# Patient Record
Sex: FEMALE | Race: WHITE | NOT HISPANIC OR LATINO
[De-identification: names, ages, dates, MRNs, and addresses within clinical notes are randomized per-mention and may not be internally consistent; named-entity substitution may affect disease eponyms.]

---

## 2018-02-14 ENCOUNTER — APPOINTMENT (OUTPATIENT)
Dept: RHEUMATOLOGY | Facility: CLINIC | Age: 83
End: 2018-02-14
Payer: MEDICARE

## 2018-02-14 VITALS
RESPIRATION RATE: 16 BRPM | BODY MASS INDEX: 20.22 KG/M2 | OXYGEN SATURATION: 98 % | TEMPERATURE: 99.2 F | HEIGHT: 60 IN | SYSTOLIC BLOOD PRESSURE: 138 MMHG | HEART RATE: 81 BPM | WEIGHT: 103 LBS | DIASTOLIC BLOOD PRESSURE: 74 MMHG

## 2018-02-14 DIAGNOSIS — Z87.39 PERSONAL HISTORY OF OTHER DISEASES OF THE MUSCULOSKELETAL SYSTEM AND CONNECTIVE TISSUE: ICD-10-CM

## 2018-02-14 DIAGNOSIS — Z87.09 PERSONAL HISTORY OF OTHER DISEASES OF THE RESPIRATORY SYSTEM: ICD-10-CM

## 2018-02-14 PROCEDURE — 99205 OFFICE O/P NEW HI 60 MIN: CPT

## 2018-02-14 RX ORDER — TIOTROPIUM BROMIDE INHALATION SPRAY 3.12 UG/1
2.5 SPRAY, METERED RESPIRATORY (INHALATION)
Qty: 4 | Refills: 0 | Status: COMPLETED | COMMUNITY
Start: 2017-06-28

## 2018-02-14 RX ORDER — AMOXICILLIN AND CLAVULANATE POTASSIUM 500; 125 MG/1; MG/1
500-125 TABLET, FILM COATED ORAL
Qty: 20 | Refills: 0 | Status: COMPLETED | COMMUNITY
Start: 2017-11-28

## 2018-02-20 PROBLEM — Z87.09 HISTORY OF LUNG DISEASE: Status: RESOLVED | Noted: 2018-02-14 | Resolved: 2018-02-20

## 2018-02-20 PROBLEM — Z87.09 HISTORY OF ASTHMA: Status: RESOLVED | Noted: 2018-02-14 | Resolved: 2018-02-20

## 2018-02-20 PROBLEM — Z87.39 HISTORY OF OSTEOPOROSIS: Status: RESOLVED | Noted: 2018-02-14 | Resolved: 2018-02-20

## 2018-02-28 ENCOUNTER — RX RENEWAL (OUTPATIENT)
Age: 83
End: 2018-02-28

## 2018-03-04 ENCOUNTER — RX RENEWAL (OUTPATIENT)
Age: 83
End: 2018-03-04

## 2018-03-28 ENCOUNTER — APPOINTMENT (OUTPATIENT)
Dept: RHEUMATOLOGY | Facility: CLINIC | Age: 83
End: 2018-03-28

## 2018-04-04 ENCOUNTER — MOBILE ON CALL (OUTPATIENT)
Age: 83
End: 2018-04-04

## 2018-04-07 ENCOUNTER — APPOINTMENT (OUTPATIENT)
Dept: RHEUMATOLOGY | Facility: CLINIC | Age: 83
End: 2018-04-07

## 2018-04-16 ENCOUNTER — RX RENEWAL (OUTPATIENT)
Age: 83
End: 2018-04-16

## 2018-04-21 ENCOUNTER — EMERGENCY (EMERGENCY)
Facility: HOSPITAL | Age: 83
LOS: 1 days | Discharge: HOME | End: 2018-04-21
Attending: EMERGENCY MEDICINE
Payer: MEDICARE

## 2018-04-21 VITALS — WEIGHT: 104.94 LBS

## 2018-04-21 VITALS
HEART RATE: 105 BPM | DIASTOLIC BLOOD PRESSURE: 86 MMHG | TEMPERATURE: 98 F | SYSTOLIC BLOOD PRESSURE: 195 MMHG | OXYGEN SATURATION: 99 %

## 2018-04-21 LAB
ALBUMIN SERPL ELPH-MCNC: 4.4 G/DL — SIGNIFICANT CHANGE UP (ref 3.3–5.2)
ALP SERPL-CCNC: 94 U/L — SIGNIFICANT CHANGE UP (ref 40–120)
ALT FLD-CCNC: 23 U/L — SIGNIFICANT CHANGE UP
ANION GAP SERPL CALC-SCNC: 15 MMOL/L — SIGNIFICANT CHANGE UP (ref 5–17)
APTT BLD: 26.4 SEC — LOW (ref 27.5–37.4)
AST SERPL-CCNC: 29 U/L — SIGNIFICANT CHANGE UP
BASOPHILS # BLD AUTO: 0 K/UL — SIGNIFICANT CHANGE UP (ref 0–0.2)
BASOPHILS NFR BLD AUTO: 0.2 % — SIGNIFICANT CHANGE UP (ref 0–2)
BILIRUB SERPL-MCNC: 0.3 MG/DL — LOW (ref 0.4–2)
BUN SERPL-MCNC: 31 MG/DL — HIGH (ref 8–20)
CALCIUM SERPL-MCNC: 9.6 MG/DL — SIGNIFICANT CHANGE UP (ref 8.6–10.2)
CHLORIDE SERPL-SCNC: 102 MMOL/L — SIGNIFICANT CHANGE UP (ref 98–107)
CO2 SERPL-SCNC: 26 MMOL/L — SIGNIFICANT CHANGE UP (ref 22–29)
CREAT SERPL-MCNC: 0.66 MG/DL — SIGNIFICANT CHANGE UP (ref 0.5–1.3)
EOSINOPHIL # BLD AUTO: 0.2 K/UL — SIGNIFICANT CHANGE UP (ref 0–0.5)
EOSINOPHIL NFR BLD AUTO: 1.9 % — SIGNIFICANT CHANGE UP (ref 0–6)
GLUCOSE SERPL-MCNC: 140 MG/DL — HIGH (ref 70–115)
HCT VFR BLD CALC: 37.8 % — SIGNIFICANT CHANGE UP (ref 37–47)
HGB BLD-MCNC: 12.4 G/DL — SIGNIFICANT CHANGE UP (ref 12–16)
INR BLD: 0.89 RATIO — SIGNIFICANT CHANGE UP (ref 0.88–1.16)
LIDOCAIN IGE QN: 47 U/L — SIGNIFICANT CHANGE UP (ref 22–51)
LYMPHOCYTES # BLD AUTO: 1.8 K/UL — SIGNIFICANT CHANGE UP (ref 1–4.8)
LYMPHOCYTES # BLD AUTO: 18.5 % — LOW (ref 20–55)
MAGNESIUM SERPL-MCNC: 2.1 MG/DL — SIGNIFICANT CHANGE UP (ref 1.6–2.6)
MCHC RBC-ENTMCNC: 30.5 PG — SIGNIFICANT CHANGE UP (ref 27–31)
MCHC RBC-ENTMCNC: 32.8 G/DL — SIGNIFICANT CHANGE UP (ref 32–36)
MCV RBC AUTO: 93.1 FL — SIGNIFICANT CHANGE UP (ref 81–99)
MONOCYTES # BLD AUTO: 0.3 K/UL — SIGNIFICANT CHANGE UP (ref 0–0.8)
MONOCYTES NFR BLD AUTO: 3 % — SIGNIFICANT CHANGE UP (ref 3–10)
NEUTROPHILS # BLD AUTO: 7.3 K/UL — SIGNIFICANT CHANGE UP (ref 1.8–8)
NEUTROPHILS NFR BLD AUTO: 74.1 % — HIGH (ref 37–73)
NT-PROBNP SERPL-SCNC: 110 PG/ML — SIGNIFICANT CHANGE UP (ref 0–300)
PLATELET # BLD AUTO: 211 K/UL — SIGNIFICANT CHANGE UP (ref 150–400)
POTASSIUM SERPL-MCNC: 4.2 MMOL/L — SIGNIFICANT CHANGE UP (ref 3.5–5.3)
POTASSIUM SERPL-SCNC: 4.2 MMOL/L — SIGNIFICANT CHANGE UP (ref 3.5–5.3)
PROT SERPL-MCNC: 6.6 G/DL — SIGNIFICANT CHANGE UP (ref 6.6–8.7)
PROTHROM AB SERPL-ACNC: 9.8 SEC — SIGNIFICANT CHANGE UP (ref 9.8–12.7)
RBC # BLD: 4.06 M/UL — LOW (ref 4.4–5.2)
RBC # FLD: 14.4 % — SIGNIFICANT CHANGE UP (ref 11–15.6)
SODIUM SERPL-SCNC: 143 MMOL/L — SIGNIFICANT CHANGE UP (ref 135–145)
TROPONIN T SERPL-MCNC: <0.01 NG/ML — SIGNIFICANT CHANGE UP (ref 0–0.06)
TSH SERPL-MCNC: 1.12 UIU/ML — SIGNIFICANT CHANGE UP (ref 0.27–4.2)
WBC # BLD: 9.9 K/UL — SIGNIFICANT CHANGE UP (ref 4.8–10.8)
WBC # FLD AUTO: 9.9 K/UL — SIGNIFICANT CHANGE UP (ref 4.8–10.8)

## 2018-04-21 PROCEDURE — 85730 THROMBOPLASTIN TIME PARTIAL: CPT

## 2018-04-21 PROCEDURE — 71046 X-RAY EXAM CHEST 2 VIEWS: CPT

## 2018-04-21 PROCEDURE — 93005 ELECTROCARDIOGRAM TRACING: CPT

## 2018-04-21 PROCEDURE — 84484 ASSAY OF TROPONIN QUANT: CPT

## 2018-04-21 PROCEDURE — 83735 ASSAY OF MAGNESIUM: CPT

## 2018-04-21 PROCEDURE — 86140 C-REACTIVE PROTEIN: CPT

## 2018-04-21 PROCEDURE — 99282 EMERGENCY DEPT VISIT SF MDM: CPT

## 2018-04-21 PROCEDURE — 71250 CT THORAX DX C-: CPT

## 2018-04-21 PROCEDURE — 84443 ASSAY THYROID STIM HORMONE: CPT

## 2018-04-21 PROCEDURE — 80053 COMPREHEN METABOLIC PANEL: CPT

## 2018-04-21 PROCEDURE — 99284 EMERGENCY DEPT VISIT MOD MDM: CPT | Mod: 25

## 2018-04-21 PROCEDURE — 36415 COLL VENOUS BLD VENIPUNCTURE: CPT

## 2018-04-21 PROCEDURE — 85610 PROTHROMBIN TIME: CPT

## 2018-04-21 PROCEDURE — 71046 X-RAY EXAM CHEST 2 VIEWS: CPT | Mod: 26

## 2018-04-21 PROCEDURE — 85652 RBC SED RATE AUTOMATED: CPT

## 2018-04-21 PROCEDURE — 83880 ASSAY OF NATRIURETIC PEPTIDE: CPT

## 2018-04-21 PROCEDURE — 83690 ASSAY OF LIPASE: CPT

## 2018-04-21 PROCEDURE — 99285 EMERGENCY DEPT VISIT HI MDM: CPT

## 2018-04-21 PROCEDURE — 85027 COMPLETE CBC AUTOMATED: CPT

## 2018-04-21 PROCEDURE — 71250 CT THORAX DX C-: CPT | Mod: 26

## 2018-04-21 PROCEDURE — 93010 ELECTROCARDIOGRAM REPORT: CPT

## 2018-04-21 RX ORDER — LIDOCAINE 4 G/100G
1 CREAM TOPICAL ONCE
Qty: 0 | Refills: 0 | Status: COMPLETED | OUTPATIENT
Start: 2018-04-21 | End: 2018-04-21

## 2018-04-21 RX ADMIN — LIDOCAINE 1 PATCH: 4 CREAM TOPICAL at 21:09

## 2018-04-21 NOTE — ED ADULT TRIAGE NOTE - CHIEF COMPLAINT QUOTE
I am having diff breathing and when I bend over it is worse  I have pain in my back   and I have had pain in my lt chest since earlier

## 2018-04-21 NOTE — ED PROVIDER NOTE - PHYSICAL EXAMINATION
Constitutional : Appears uncomfortable , mild to moderate distress secondary to pain, talking in full sentences  Head :NC AT , no swelling  Eyes :eomi, no swelling  Mouth :mm moist,  Neck : supple, trachea in midline  Chest : Kyphotic chest. [place] air entry, symm chest expansion, no distress  Heart :S1 S2 distant  Abdomen :abd soft, non tender, old surgical scar  Musc/Skel : tenderness at T5-T7 band like, no skin changes. No andrew prominence. Able to exaggerate pain with movement and inspiration. ext no swelling, no deformity, distal pulses present  Neuro  :AAO 3 no focal deficits Constitutional : Appears uncomfortable , mild to moderate distress secondary to pain, talking in full sentences  Head :NC AT , no swelling  Eyes :eomi, no swelling  Mouth :mm moist,  Neck : supple, trachea in midline  Chest : Kyphotic chest. [place] air entry, symm chest expansion, no distress  Heart :S1 S2 distant  Abdomen :abd soft, non tender, old surgical scar  Musc/Skel : Multiple joint deformities. Tenderness at T5-T7 band like, no skin changes. No andrew prominence. Able to exaggerate pain with movement and inspiration. ext no swelling, no deformity, distal pulses present  Neuro  :AAO 3 no focal deficits

## 2018-04-21 NOTE — ED PROVIDER NOTE - OBJECTIVE STATEMENT
84 y/o female nonsmoker with PMHx diverticulosis, asthma and PSHx appendectomy and hysterectomy presents to the ED for evaluation of back pain. Pt reports back pain, and began to experience difficulty breathing upon bending over. Pt reports headache and visual changes. Per PT has similar symptoms in the past, pt states she has frequent flare-ups. Pt attempted to alleviate with 5mg of Prednisone, which pt was prescribed for arthritis. Per pt recently had x-ray of shoulder preformed, secondary to shoulder tear, pt scheduled to see ortho for MRI. Pt denies n/v, fever, and any other acute symptoms and complaints at this time.   Rheumatologist: Dr. Lai [phone]

## 2018-04-21 NOTE — ED PROVIDER NOTE - PROGRESS NOTE DETAILS
Spoke to Dr. Phillips, Rheumatologist, at [facility], who would like pt to make appointment for Monday, explained to pt to call office. follow up discussed, copy of labs given meds and side effects discussed

## 2018-04-21 NOTE — ED PROVIDER NOTE - NS ED ROS FT
no weight change, no fever or chills  no rash, no bruises  + visual changes, no eye discharge  no cough cold or congestion,   + sob, no chest pain  +back pain, no orthopnea, no pnd  no abd pain, no n/v/d  no hematuria, no change in urinary habits  no joint pain, no deformity  + headache, no paresthesia

## 2018-04-21 NOTE — ED PROVIDER NOTE - MEDICAL DECISION MAKING DETAILS
82 y/o with known diagnosis of - with frequent flares, pt has prescription of Prednisone for flare-ups presents with acute back pain, headache, and visual changes which improved with 1 dose of Prednisone, plan to check labs, CT, and reevaluate.

## 2018-04-23 ENCOUNTER — APPOINTMENT (OUTPATIENT)
Dept: RHEUMATOLOGY | Facility: CLINIC | Age: 83
End: 2018-04-23
Payer: MEDICARE

## 2018-04-23 VITALS
TEMPERATURE: 98.5 F | DIASTOLIC BLOOD PRESSURE: 80 MMHG | HEART RATE: 85 BPM | OXYGEN SATURATION: 98 % | SYSTOLIC BLOOD PRESSURE: 140 MMHG | RESPIRATION RATE: 17 BRPM

## 2018-04-23 PROCEDURE — 99215 OFFICE O/P EST HI 40 MIN: CPT

## 2018-04-23 RX ORDER — CELECOXIB 200 MG/1
200 CAPSULE ORAL
Qty: 60 | Refills: 0 | Status: COMPLETED | COMMUNITY
Start: 2018-02-22

## 2018-04-23 RX ORDER — CEPHALEXIN 500 MG/1
500 CAPSULE ORAL
Qty: 14 | Refills: 0 | Status: COMPLETED | COMMUNITY
Start: 2018-03-29 | End: 2018-04-05

## 2018-04-23 RX ORDER — IBUPROFEN 800 MG/1
800 TABLET, FILM COATED ORAL
Qty: 30 | Refills: 0 | Status: COMPLETED | COMMUNITY
Start: 2018-04-11

## 2018-05-03 ENCOUNTER — RX RENEWAL (OUTPATIENT)
Age: 83
End: 2018-05-03

## 2018-05-07 ENCOUNTER — APPOINTMENT (OUTPATIENT)
Dept: RHEUMATOLOGY | Facility: CLINIC | Age: 83
End: 2018-05-07

## 2018-05-10 ENCOUNTER — APPOINTMENT (OUTPATIENT)
Dept: ORTHOPEDIC SURGERY | Facility: CLINIC | Age: 83
End: 2018-05-10

## 2018-05-16 ENCOUNTER — APPOINTMENT (OUTPATIENT)
Dept: RHEUMATOLOGY | Facility: CLINIC | Age: 83
End: 2018-05-16
Payer: MEDICARE

## 2018-05-16 VITALS
RESPIRATION RATE: 17 BRPM | HEART RATE: 100 BPM | SYSTOLIC BLOOD PRESSURE: 136 MMHG | BODY MASS INDEX: 20.89 KG/M2 | HEIGHT: 59.5 IN | TEMPERATURE: 97.9 F | OXYGEN SATURATION: 98 % | DIASTOLIC BLOOD PRESSURE: 81 MMHG | WEIGHT: 105 LBS

## 2018-05-16 PROCEDURE — 36415 COLL VENOUS BLD VENIPUNCTURE: CPT

## 2018-05-16 PROCEDURE — 99215 OFFICE O/P EST HI 40 MIN: CPT | Mod: 25

## 2018-05-16 RX ORDER — OXYCODONE AND ACETAMINOPHEN 5; 325 MG/1; MG/1
5-325 TABLET ORAL
Qty: 10 | Refills: 0 | Status: DISCONTINUED | COMMUNITY
Start: 2018-05-03 | End: 2018-05-16

## 2018-06-07 ENCOUNTER — CLINICAL ADVICE (OUTPATIENT)
Age: 83
End: 2018-06-07

## 2018-06-08 ENCOUNTER — CLINICAL ADVICE (OUTPATIENT)
Age: 83
End: 2018-06-08

## 2018-06-20 ENCOUNTER — APPOINTMENT (OUTPATIENT)
Dept: RHEUMATOLOGY | Facility: CLINIC | Age: 83
End: 2018-06-20
Payer: MEDICARE

## 2018-06-20 VITALS
HEIGHT: 59.5 IN | SYSTOLIC BLOOD PRESSURE: 164 MMHG | TEMPERATURE: 98.3 F | HEART RATE: 86 BPM | WEIGHT: 106 LBS | DIASTOLIC BLOOD PRESSURE: 66 MMHG | RESPIRATION RATE: 16 BRPM | BODY MASS INDEX: 21.09 KG/M2 | OXYGEN SATURATION: 99 %

## 2018-06-20 PROCEDURE — 36415 COLL VENOUS BLD VENIPUNCTURE: CPT

## 2018-06-20 PROCEDURE — 99215 OFFICE O/P EST HI 40 MIN: CPT | Mod: 25

## 2018-06-20 RX ORDER — ALENDRONATE SODIUM 70 MG/1
70 TABLET ORAL
Qty: 12 | Refills: 3 | Status: DISCONTINUED | COMMUNITY
Start: 2018-05-16 | End: 2018-06-20

## 2018-06-21 ENCOUNTER — RX RENEWAL (OUTPATIENT)
Age: 83
End: 2018-06-21

## 2018-06-27 ENCOUNTER — APPOINTMENT (OUTPATIENT)
Dept: RHEUMATOLOGY | Facility: CLINIC | Age: 83
End: 2018-06-27
Payer: MEDICARE

## 2018-06-27 VITALS
WEIGHT: 106 LBS | RESPIRATION RATE: 18 BRPM | DIASTOLIC BLOOD PRESSURE: 88 MMHG | SYSTOLIC BLOOD PRESSURE: 140 MMHG | TEMPERATURE: 97.9 F | BODY MASS INDEX: 21.05 KG/M2 | OXYGEN SATURATION: 98 % | HEART RATE: 72 BPM

## 2018-06-27 DIAGNOSIS — M25.519 PAIN IN UNSPECIFIED SHOULDER: ICD-10-CM

## 2018-06-27 DIAGNOSIS — N30.00 ACUTE CYSTITIS W/OUT HEMATURIA: ICD-10-CM

## 2018-06-27 PROCEDURE — 99215 OFFICE O/P EST HI 40 MIN: CPT | Mod: 25

## 2018-06-27 PROCEDURE — 96401 CHEMO ANTI-NEOPL SQ/IM: CPT

## 2018-06-28 ENCOUNTER — RX RENEWAL (OUTPATIENT)
Age: 83
End: 2018-06-28

## 2018-06-29 ENCOUNTER — RX RENEWAL (OUTPATIENT)
Age: 83
End: 2018-06-29

## 2018-06-29 ENCOUNTER — MED ADMIN CHARGE (OUTPATIENT)
Age: 83
End: 2018-06-29

## 2018-06-29 PROBLEM — N30.00 ACUTE CYSTITIS WITHOUT HEMATURIA: Status: ACTIVE | Noted: 2018-03-29

## 2018-06-29 RX ORDER — DENOSUMAB 60 MG/ML
60 INJECTION SUBCUTANEOUS
Qty: 0 | Refills: 0 | Status: COMPLETED | OUTPATIENT
Start: 2018-06-29

## 2018-06-29 RX ADMIN — DENOSUMAB 0 MG/ML: 60 INJECTION SUBCUTANEOUS at 00:00

## 2018-07-09 ENCOUNTER — APPOINTMENT (OUTPATIENT)
Dept: RHEUMATOLOGY | Facility: CLINIC | Age: 83
End: 2018-07-09
Payer: MEDICARE

## 2018-07-09 VITALS
SYSTOLIC BLOOD PRESSURE: 118 MMHG | OXYGEN SATURATION: 98 % | TEMPERATURE: 97.9 F | HEART RATE: 105 BPM | DIASTOLIC BLOOD PRESSURE: 78 MMHG | RESPIRATION RATE: 26 BRPM

## 2018-07-09 PROCEDURE — 20610 DRAIN/INJ JOINT/BURSA W/O US: CPT | Mod: RT

## 2018-07-09 PROCEDURE — 99215 OFFICE O/P EST HI 40 MIN: CPT | Mod: 25

## 2018-07-09 PROCEDURE — 36415 COLL VENOUS BLD VENIPUNCTURE: CPT

## 2018-07-09 RX ORDER — PREDNISONE 1 MG/1
1 TABLET ORAL
Qty: 300 | Refills: 0 | Status: COMPLETED | COMMUNITY
Start: 2018-02-28

## 2018-07-12 ENCOUNTER — RESULT CHARGE (OUTPATIENT)
Age: 83
End: 2018-07-12

## 2018-07-13 RX ORDER — METHYLPRED ACET/NACL,ISO-OS/PF 40 MG/ML
40 VIAL (ML) INJECTION
Qty: 1 | Refills: 0 | Status: COMPLETED | OUTPATIENT
Start: 2018-07-13

## 2018-07-13 RX ORDER — LIDOCAINE HYDROCHLORIDE 10 MG/ML
1 INJECTION, SOLUTION INFILTRATION; PERINEURAL
Refills: 0 | Status: COMPLETED | OUTPATIENT
Start: 2018-07-13

## 2018-07-13 RX ADMIN — METHYLPREDNISOLONE ACETATE MG/ML: 40 INJECTION, SUSPENSION INTRA-ARTICULAR; INTRALESIONAL; INTRAMUSCULAR; SOFT TISSUE at 00:00

## 2018-07-13 RX ADMIN — LIDOCAINE HYDROCHLORIDE %: 10 INJECTION, SOLUTION INFILTRATION; PERINEURAL at 00:00

## 2018-07-23 ENCOUNTER — RX CHANGE (OUTPATIENT)
Age: 83
End: 2018-07-23

## 2018-07-25 ENCOUNTER — APPOINTMENT (OUTPATIENT)
Dept: RHEUMATOLOGY | Facility: CLINIC | Age: 83
End: 2018-07-25
Payer: MEDICARE

## 2018-07-25 VITALS
SYSTOLIC BLOOD PRESSURE: 150 MMHG | HEIGHT: 59.5 IN | DIASTOLIC BLOOD PRESSURE: 98 MMHG | BODY MASS INDEX: 20.89 KG/M2 | OXYGEN SATURATION: 97 % | TEMPERATURE: 98.6 F | RESPIRATION RATE: 20 BRPM | HEART RATE: 104 BPM | WEIGHT: 105 LBS

## 2018-07-25 PROCEDURE — 99215 OFFICE O/P EST HI 40 MIN: CPT | Mod: 25

## 2018-07-25 PROCEDURE — 36415 COLL VENOUS BLD VENIPUNCTURE: CPT

## 2018-08-01 ENCOUNTER — APPOINTMENT (OUTPATIENT)
Dept: RHEUMATOLOGY | Facility: CLINIC | Age: 83
End: 2018-08-01

## 2018-08-15 ENCOUNTER — APPOINTMENT (OUTPATIENT)
Dept: RHEUMATOLOGY | Facility: CLINIC | Age: 83
End: 2018-08-15
Payer: MEDICARE

## 2018-08-15 ENCOUNTER — LABORATORY RESULT (OUTPATIENT)
Age: 83
End: 2018-08-15

## 2018-08-15 VITALS
BODY MASS INDEX: 20.76 KG/M2 | RESPIRATION RATE: 22 BRPM | HEIGHT: 59 IN | HEART RATE: 101 BPM | OXYGEN SATURATION: 97 % | TEMPERATURE: 98.3 F | WEIGHT: 103 LBS | SYSTOLIC BLOOD PRESSURE: 140 MMHG | DIASTOLIC BLOOD PRESSURE: 102 MMHG

## 2018-08-15 DIAGNOSIS — M54.2 CERVICALGIA: ICD-10-CM

## 2018-08-15 DIAGNOSIS — R09.89 OTHER SPECIFIED SYMPTOMS AND SIGNS INVOLVING THE CIRCULATORY AND RESPIRATORY SYSTEMS: ICD-10-CM

## 2018-08-15 DIAGNOSIS — R13.19 OTHER DYSPHAGIA: ICD-10-CM

## 2018-08-15 PROCEDURE — 36415 COLL VENOUS BLD VENIPUNCTURE: CPT

## 2018-08-15 PROCEDURE — 99215 OFFICE O/P EST HI 40 MIN: CPT | Mod: 25

## 2018-08-15 RX ORDER — DULOXETINE HYDROCHLORIDE 30 MG/1
30 CAPSULE, DELAYED RELEASE PELLETS ORAL
Qty: 30 | Refills: 2 | Status: DISCONTINUED | COMMUNITY
Start: 2018-07-09 | End: 2018-08-15

## 2018-08-15 RX ORDER — METAXALONE 800 MG/1
TABLET ORAL
Refills: 0 | Status: DISCONTINUED | COMMUNITY
End: 2018-08-15

## 2018-08-15 RX ORDER — CIPROFLOXACIN HYDROCHLORIDE 500 MG/1
500 TABLET, FILM COATED ORAL
Qty: 10 | Refills: 0 | Status: COMPLETED | COMMUNITY
Start: 2018-03-24 | End: 2018-07-18

## 2018-08-15 RX ORDER — NITROFURANTOIN (MONOHYDRATE/MACROCRYSTALS) 25; 75 MG/1; MG/1
100 CAPSULE ORAL TWICE DAILY
Qty: 14 | Refills: 0 | Status: DISCONTINUED | COMMUNITY
Start: 2018-07-23 | End: 2018-08-15

## 2018-08-27 PROBLEM — R13.19 CERVICAL DYSPHAGIA: Status: ACTIVE | Noted: 2018-08-15

## 2018-08-27 PROBLEM — M54.2 NECK PAIN: Status: ACTIVE | Noted: 2018-08-15

## 2018-08-27 PROBLEM — R09.89 SENSATION, CHOKING: Status: ACTIVE | Noted: 2018-08-15

## 2018-09-05 DIAGNOSIS — R19.00 INTRA-ABDOMINAL AND PELVIC SWELLING, MASS AND LUMP, UNSPECIFIED SITE: ICD-10-CM

## 2018-09-05 DIAGNOSIS — R10.84 GENERALIZED ABDOMINAL PAIN: ICD-10-CM

## 2018-09-12 ENCOUNTER — APPOINTMENT (OUTPATIENT)
Dept: RHEUMATOLOGY | Facility: CLINIC | Age: 83
End: 2018-09-12
Payer: MEDICARE

## 2018-09-12 VITALS
HEIGHT: 59 IN | RESPIRATION RATE: 20 BRPM | DIASTOLIC BLOOD PRESSURE: 78 MMHG | WEIGHT: 103 LBS | BODY MASS INDEX: 20.76 KG/M2 | OXYGEN SATURATION: 98 % | TEMPERATURE: 99 F | HEART RATE: 102 BPM | SYSTOLIC BLOOD PRESSURE: 144 MMHG

## 2018-09-12 PROCEDURE — 99215 OFFICE O/P EST HI 40 MIN: CPT

## 2018-09-12 RX ORDER — BUDESONIDE AND FORMOTEROL FUMARATE DIHYDRATE 160; 4.5 UG/1; UG/1
160-4.5 AEROSOL RESPIRATORY (INHALATION)
Qty: 102 | Refills: 0 | Status: DISCONTINUED | COMMUNITY
Start: 2017-06-28 | End: 2018-09-12

## 2018-09-21 ENCOUNTER — APPOINTMENT (OUTPATIENT)
Dept: RHEUMATOLOGY | Facility: CLINIC | Age: 83
End: 2018-09-21
Payer: MEDICARE

## 2018-09-21 VITALS
OXYGEN SATURATION: 98 % | RESPIRATION RATE: 20 BRPM | HEART RATE: 100 BPM | DIASTOLIC BLOOD PRESSURE: 70 MMHG | SYSTOLIC BLOOD PRESSURE: 130 MMHG

## 2018-09-21 PROCEDURE — 96372 THER/PROPH/DIAG INJ SC/IM: CPT

## 2018-09-21 RX ORDER — TERIPARATIDE 250 UG/ML
600 INJECTION, SOLUTION SUBCUTANEOUS
Qty: 0 | Refills: 0 | Status: COMPLETED | OUTPATIENT
Start: 2018-09-21

## 2018-09-21 RX ADMIN — TERIPARATIDE 0 MCG/2.4ML: 250 INJECTION, SOLUTION SUBCUTANEOUS at 00:00

## 2018-10-02 ENCOUNTER — EMERGENCY (EMERGENCY)
Facility: HOSPITAL | Age: 83
LOS: 1 days | Discharge: HOME | End: 2018-10-02
Attending: EMERGENCY MEDICINE
Payer: MEDICARE

## 2018-10-02 VITALS
TEMPERATURE: 98 F | SYSTOLIC BLOOD PRESSURE: 184 MMHG | RESPIRATION RATE: 20 BRPM | DIASTOLIC BLOOD PRESSURE: 91 MMHG | OXYGEN SATURATION: 97 % | HEART RATE: 113 BPM

## 2018-10-02 PROCEDURE — 99283 EMERGENCY DEPT VISIT LOW MDM: CPT

## 2018-10-02 PROCEDURE — 73502 X-RAY EXAM HIP UNI 2-3 VIEWS: CPT | Mod: 26,LT

## 2018-10-02 RX ORDER — ACETAMINOPHEN 500 MG
650 TABLET ORAL ONCE
Qty: 0 | Refills: 0 | Status: COMPLETED | OUTPATIENT
Start: 2018-10-02 | End: 2018-10-02

## 2018-10-02 NOTE — ED ADULT NURSE NOTE - NSIMPLEMENTINTERV_GEN_ALL_ED
Implemented All Fall with Harm Risk Interventions:  [place] to call system. Call bell, personal items and telephone within reach. Instruct patient to call for assistance. Room bathroom lighting operational. Non-slip footwear when patient is off stretcher. Physically safe environment: no spills, clutter or unnecessary equipment. Stretcher in lowest position, wheels locked, appropriate side rails in place. Provide visual cue, wrist band, yellow gown, etc. Monitor gait and stability. Monitor for mental status changes and reorient to person, place, and time. Review medications for side effects contributing to fall risk. Reinforce activity limits and safety measures with patient and family. Provide visual clues: red socks.

## 2018-10-02 NOTE — ED PROVIDER NOTE - OBJECTIVE STATEMENT
85 y/o F, with hx of asthma, diverticulosis, hysterectomy, appendectomy, and left knee replacement, presents to the ED c/o right lower leg pain, s/p fall.  Pt states that she stumbled when walking and then had difficulty getting up.  Pt states that she hurt her leg when trying to get up.  Pt states that she is concerned about old fractures to her back, but notes pain is the same as it was prior to the fall.  Denies LOC, head trauma, numbness, or tingling,  Denies HA, chest pain, fever, chills, visual changes, 85 y/o F, with hx of asthma, diverticulosis, hysterectomy, appendectomy, and left knee replacement, presents to the ED c/o right lower leg pain, s/p fall.  Pt states that she stumbled when walking and then had difficulty getting up.  Pt states that she hurt her leg when trying to get up.  Pt states that she is concerned about old fractures to her back, but notes pain is the same as it was prior to the fall.  Denies LOC, head trauma, numbness, or tingling,  Denies HA, chest pain, fever, chills, visual changes, or abd pain. 85 y/o F, with hx of asthma, diverticulosis, hysterectomy, appendectomy, and left knee replacement, presents to the ED c/o right lower leg pain, s/p fall.  Pt states that she stumbled when walking and then had difficulty getting up.  Pt states that she hurt her leg ( point to the left shin) when trying to get up.  Pt states that she is concerned about old fractures to her back, but notes pain is the same as it was prior to the fall.  Denies LOC, head trauma, numbness, or tingling,  Denies HA, chest pain, fever, chills, visual changes, or abd pain.

## 2018-10-02 NOTE — ED PROVIDER NOTE - CONSTITUTIONAL, MLM
normal... Anxious affect, well nourished, awake, alert, oriented to person, place, time/situation and in no apparent distress.

## 2018-10-02 NOTE — ED ADULT NURSE NOTE - OBJECTIVE STATEMENT
pt biba s/p trip and fall while at home, c/o hip pain. no obv. deformities noted. no rotation or shortening. denies blood thinners. or hitting head. no loc.

## 2018-10-02 NOTE — ED ADULT TRIAGE NOTE - CHIEF COMPLAINT QUOTE
Pt BIBA s/p trip and fall at home.  Pt reports leg pain.  Denies hitting her head.  Pt on blood thinners.  Dr. Alfonso at bedside to evaluate pt.

## 2018-10-02 NOTE — ED PROVIDER NOTE - MUSCULOSKELETAL, MLM
Spine appears normal, range of motion is not limited, mild tenderness on palpation to left hip pain and right anterior Tib/Fib region

## 2018-10-03 VITALS
DIASTOLIC BLOOD PRESSURE: 76 MMHG | TEMPERATURE: 99 F | RESPIRATION RATE: 189 BRPM | SYSTOLIC BLOOD PRESSURE: 144 MMHG | HEART RATE: 98 BPM

## 2018-10-03 PROCEDURE — 73502 X-RAY EXAM HIP UNI 2-3 VIEWS: CPT

## 2018-10-03 PROCEDURE — 99283 EMERGENCY DEPT VISIT LOW MDM: CPT

## 2018-10-03 RX ADMIN — Medication 650 MILLIGRAM(S): at 03:44

## 2018-10-10 ENCOUNTER — APPOINTMENT (OUTPATIENT)
Dept: RHEUMATOLOGY | Facility: CLINIC | Age: 83
End: 2018-10-10
Payer: MEDICARE

## 2018-10-10 VITALS
BODY MASS INDEX: 20.16 KG/M2 | SYSTOLIC BLOOD PRESSURE: 140 MMHG | OXYGEN SATURATION: 98 % | TEMPERATURE: 98.2 F | HEIGHT: 59 IN | WEIGHT: 100 LBS | DIASTOLIC BLOOD PRESSURE: 60 MMHG | HEART RATE: 95 BPM

## 2018-10-10 PROCEDURE — 99215 OFFICE O/P EST HI 40 MIN: CPT | Mod: 25

## 2018-10-10 PROCEDURE — 36415 COLL VENOUS BLD VENIPUNCTURE: CPT

## 2018-10-10 RX ORDER — CALCITONIN SALMON 200 [IU]/1
200 SPRAY, METERED NASAL DAILY
Qty: 1 | Refills: 2 | Status: DISCONTINUED | COMMUNITY
Start: 2018-04-23 | End: 2018-10-10

## 2018-10-14 ENCOUNTER — EMERGENCY (EMERGENCY)
Facility: HOSPITAL | Age: 83
LOS: 1 days | Discharge: HOME | End: 2018-10-14
Attending: EMERGENCY MEDICINE
Payer: MEDICARE

## 2018-10-14 VITALS
OXYGEN SATURATION: 97 % | SYSTOLIC BLOOD PRESSURE: 161 MMHG | TEMPERATURE: 98 F | DIASTOLIC BLOOD PRESSURE: 91 MMHG | HEIGHT: 58 IN | WEIGHT: 102.07 LBS | HEART RATE: 101 BPM | RESPIRATION RATE: 18 BRPM

## 2018-10-14 PROCEDURE — 99284 EMERGENCY DEPT VISIT MOD MDM: CPT | Mod: 57,25

## 2018-10-14 PROCEDURE — 99282 EMERGENCY DEPT VISIT SF MDM: CPT

## 2018-10-14 PROCEDURE — 93010 ELECTROCARDIOGRAM REPORT: CPT

## 2018-10-14 PROCEDURE — 99284 EMERGENCY DEPT VISIT MOD MDM: CPT

## 2018-10-14 PROCEDURE — 23570 CLTX SCAPULAR FX W/O MNPJ: CPT | Mod: RT

## 2018-10-14 NOTE — ED ADULT TRIAGE NOTE - CHIEF COMPLAINT QUOTE
pt biba c/o back pain and nausea s/p fall ten days ago.  pt denies any new injuries, but states pain is worsening.  pt also reports dizziness and shortness of breath.

## 2018-10-15 VITALS
DIASTOLIC BLOOD PRESSURE: 99 MMHG | SYSTOLIC BLOOD PRESSURE: 155 MMHG | RESPIRATION RATE: 18 BRPM | HEART RATE: 100 BPM | OXYGEN SATURATION: 95 %

## 2018-10-15 LAB
ALBUMIN SERPL ELPH-MCNC: 4.2 G/DL — SIGNIFICANT CHANGE UP (ref 3.3–5.2)
ALP SERPL-CCNC: 58 U/L — SIGNIFICANT CHANGE UP (ref 40–120)
ALT FLD-CCNC: 17 U/L — SIGNIFICANT CHANGE UP
ANION GAP SERPL CALC-SCNC: 13 MMOL/L — SIGNIFICANT CHANGE UP (ref 5–17)
APTT BLD: 28 SEC — SIGNIFICANT CHANGE UP (ref 27.5–37.4)
AST SERPL-CCNC: 24 U/L — SIGNIFICANT CHANGE UP
BASOPHILS # BLD AUTO: 0 K/UL — SIGNIFICANT CHANGE UP (ref 0–0.2)
BASOPHILS NFR BLD AUTO: 0.2 % — SIGNIFICANT CHANGE UP (ref 0–2)
BILIRUB SERPL-MCNC: 0.4 MG/DL — SIGNIFICANT CHANGE UP (ref 0.4–2)
BUN SERPL-MCNC: 21 MG/DL — HIGH (ref 8–20)
CALCIUM SERPL-MCNC: 9.3 MG/DL — SIGNIFICANT CHANGE UP (ref 8.6–10.2)
CHLORIDE SERPL-SCNC: 103 MMOL/L — SIGNIFICANT CHANGE UP (ref 98–107)
CK SERPL-CCNC: 53 U/L — SIGNIFICANT CHANGE UP (ref 25–170)
CO2 SERPL-SCNC: 26 MMOL/L — SIGNIFICANT CHANGE UP (ref 22–29)
CREAT SERPL-MCNC: 0.66 MG/DL — SIGNIFICANT CHANGE UP (ref 0.5–1.3)
EOSINOPHIL # BLD AUTO: 0.1 K/UL — SIGNIFICANT CHANGE UP (ref 0–0.5)
EOSINOPHIL NFR BLD AUTO: 1 % — SIGNIFICANT CHANGE UP (ref 0–6)
GLUCOSE SERPL-MCNC: 104 MG/DL — SIGNIFICANT CHANGE UP (ref 70–115)
HCT VFR BLD CALC: 37.8 % — SIGNIFICANT CHANGE UP (ref 37–47)
HGB BLD-MCNC: 12.2 G/DL — SIGNIFICANT CHANGE UP (ref 12–16)
INR BLD: 0.98 RATIO — SIGNIFICANT CHANGE UP (ref 0.88–1.16)
LYMPHOCYTES # BLD AUTO: 1.8 K/UL — SIGNIFICANT CHANGE UP (ref 1–4.8)
LYMPHOCYTES # BLD AUTO: 21.5 % — SIGNIFICANT CHANGE UP (ref 20–55)
MCHC RBC-ENTMCNC: 30.4 PG — SIGNIFICANT CHANGE UP (ref 27–31)
MCHC RBC-ENTMCNC: 32.3 G/DL — SIGNIFICANT CHANGE UP (ref 32–36)
MCV RBC AUTO: 94.3 FL — SIGNIFICANT CHANGE UP (ref 81–99)
MONOCYTES # BLD AUTO: 0.9 K/UL — HIGH (ref 0–0.8)
MONOCYTES NFR BLD AUTO: 10.3 % — HIGH (ref 3–10)
NEUTROPHILS # BLD AUTO: 5.6 K/UL — SIGNIFICANT CHANGE UP (ref 1.8–8)
NEUTROPHILS NFR BLD AUTO: 66.5 % — SIGNIFICANT CHANGE UP (ref 37–73)
PLATELET # BLD AUTO: 229 K/UL — SIGNIFICANT CHANGE UP (ref 150–400)
POTASSIUM SERPL-MCNC: 3.4 MMOL/L — LOW (ref 3.5–5.3)
POTASSIUM SERPL-SCNC: 3.4 MMOL/L — LOW (ref 3.5–5.3)
PROT SERPL-MCNC: 6.5 G/DL — LOW (ref 6.6–8.7)
PROTHROM AB SERPL-ACNC: 10.8 SEC — SIGNIFICANT CHANGE UP (ref 9.8–12.7)
RBC # BLD: 4.01 M/UL — LOW (ref 4.4–5.2)
RBC # FLD: 13.5 % — SIGNIFICANT CHANGE UP (ref 11–15.6)
SODIUM SERPL-SCNC: 142 MMOL/L — SIGNIFICANT CHANGE UP (ref 135–145)
WBC # BLD: 8.4 K/UL — SIGNIFICANT CHANGE UP (ref 4.8–10.8)
WBC # FLD AUTO: 8.4 K/UL — SIGNIFICANT CHANGE UP (ref 4.8–10.8)

## 2018-10-15 PROCEDURE — 71045 X-RAY EXAM CHEST 1 VIEW: CPT | Mod: 26

## 2018-10-15 PROCEDURE — 72128 CT CHEST SPINE W/O DYE: CPT | Mod: 26

## 2018-10-15 PROCEDURE — 85610 PROTHROMBIN TIME: CPT

## 2018-10-15 PROCEDURE — 85730 THROMBOPLASTIN TIME PARTIAL: CPT

## 2018-10-15 PROCEDURE — 82550 ASSAY OF CK (CPK): CPT

## 2018-10-15 PROCEDURE — 72146 MRI CHEST SPINE W/O DYE: CPT | Mod: 26

## 2018-10-15 PROCEDURE — 72146 MRI CHEST SPINE W/O DYE: CPT

## 2018-10-15 PROCEDURE — 93005 ELECTROCARDIOGRAM TRACING: CPT

## 2018-10-15 PROCEDURE — 71045 X-RAY EXAM CHEST 1 VIEW: CPT

## 2018-10-15 PROCEDURE — 72128 CT CHEST SPINE W/O DYE: CPT

## 2018-10-15 PROCEDURE — 36415 COLL VENOUS BLD VENIPUNCTURE: CPT

## 2018-10-15 PROCEDURE — 85027 COMPLETE CBC AUTOMATED: CPT

## 2018-10-15 PROCEDURE — 96374 THER/PROPH/DIAG INJ IV PUSH: CPT

## 2018-10-15 PROCEDURE — 70450 CT HEAD/BRAIN W/O DYE: CPT

## 2018-10-15 PROCEDURE — 80053 COMPREHEN METABOLIC PANEL: CPT

## 2018-10-15 PROCEDURE — 70450 CT HEAD/BRAIN W/O DYE: CPT | Mod: 26

## 2018-10-15 PROCEDURE — 99284 EMERGENCY DEPT VISIT MOD MDM: CPT | Mod: 25

## 2018-10-15 RX ORDER — OXYCODONE AND ACETAMINOPHEN 5; 325 MG/1; MG/1
1 TABLET ORAL ONCE
Qty: 0 | Refills: 0 | Status: DISCONTINUED | OUTPATIENT
Start: 2018-10-15 | End: 2018-10-15

## 2018-10-15 RX ORDER — HYDROMORPHONE HYDROCHLORIDE 2 MG/ML
0.5 INJECTION INTRAMUSCULAR; INTRAVENOUS; SUBCUTANEOUS ONCE
Qty: 0 | Refills: 0 | Status: DISCONTINUED | OUTPATIENT
Start: 2018-10-15 | End: 2018-10-15

## 2018-10-15 RX ADMIN — HYDROMORPHONE HYDROCHLORIDE 0.5 MILLIGRAM(S): 2 INJECTION INTRAMUSCULAR; INTRAVENOUS; SUBCUTANEOUS at 02:21

## 2018-10-15 RX ADMIN — OXYCODONE AND ACETAMINOPHEN 1 TABLET(S): 5; 325 TABLET ORAL at 05:30

## 2018-10-15 NOTE — ED ADULT NURSE NOTE - OBJECTIVE STATEMENT
patient states that she fell 10days ago and has HX of back problems, states that her right upper back pain is getting worse with nausea and dizziness with HA and stabbing feeling, takes her breath away, was seen by Rheumatologist told him symptoms and was told to stop calcium injections, stopped fgor 1 week and does not believe that is the cause

## 2018-10-15 NOTE — ED PROVIDER NOTE - PHYSICAL EXAMINATION
Point tenderness to spot at R parathoracic rib cage; no bruising/ stepoffs/ crepitance/ rashes.   Full ROm b/l hips.   no hip tenderness.   Full ROM b/l knees; some limitation to R knee ( pt states chronic)  2+ radial and pedal pulses b/l.

## 2018-10-15 NOTE — ED PROVIDER NOTE - OBJECTIVE STATEMENT
Pt is an 86yoF with h/o osteoporosis, presenting with R lateral mid upper back pain , sharp, jabbing in nature; onset 3 days ago;  pt reports a "tumbling" fall 10 days ago but does not recall if she fell on her back.  Pt states that she had gotten up from bed too quickly and instead of getting her cane she stumbled. She states she remembers hitting her L hip.  Pt states she was not having this back pain at the time.  Pain is worse when using R arm.  Pt states she has known spinal compression fractures but she is unsure where they are.  Pt states she has PMR and chronically takes 15 mg prednisone.  Pt states that this feels different from her previous compression fracture. PT deneis SOB except when pain spikes it " takes her breatha way" . She denies any numbness/tingling/ paresthesias. Pt also reports headache, dizziness, and nausea ever since leaving the hospital last time.  She states that she followed up with her doctor regarding those symptoms; he advised her to stay off her osteoporosis injections (Forteo) to see if it could be 2/2 that, and said that he would reevaluate it at another time if she was still c/o those symptoms.

## 2018-10-15 NOTE — ED PROVIDER NOTE - PROGRESS NOTE DETAILS
[place]: received sign out; patient with frequent falls at home, with multiple old chronic injuries found on imaging [place]: received sign out; patient with frequent falls at home, with multiple old chronic injuries found on imaging; MRI reviewed, no cord injury; ambulated well with PT; family and patient eager for d/c; will give some additional pain control for home; f/u with dr doss outpt [place]: received sign out; patient with frequent falls at home, with multiple old chronic injuries found on imaging; MRI reviewed, no cord injury; ambulated well with PT; family and patient eager for d/c; will give some additional pain control for home; f/u with dr doss outpt; see notes from social work/case management, home care services arranged

## 2018-10-15 NOTE — ED PROVIDER NOTE - CARE PLAN
Principal Discharge DX:	Thoracic compression fracture, closed, initial encounter  Secondary Diagnosis:	Scapular fracture

## 2018-10-15 NOTE — PHYSICAL THERAPY INITIAL EVALUATION ADULT - ADDITIONAL COMMENTS
Pt lives alone in a two story house on the first floor with 3 steps to enter with bilat. rails.  Modified Independent with all PTA, with quad cane.

## 2018-10-15 NOTE — ED ADULT NURSE NOTE - NSIMPLEMENTINTERV_GEN_ALL_ED
Implemented All Fall Risk Interventions:  [place] to call system. Call bell, personal items and telephone within reach. Instruct patient to call for assistance. Room bathroom lighting operational. Non-slip footwear when patient is off stretcher. Physically safe environment: no spills, clutter or unnecessary equipment. Stretcher in lowest position, wheels locked, appropriate side rails in place. Provide visual cue, wrist band, yellow gown, etc. Monitor gait and stability. Monitor for mental status changes and reorient to person, place, and time. Review medications for side effects contributing to fall risk. Reinforce activity limits and safety measures with patient and family.

## 2018-10-15 NOTE — ED ADULT NURSE NOTE - CHPI ED NUR SYMPTOMS NEG
no neck tenderness/no numbness/no bladder dysfunction/no bowel dysfunction/no constipation/no anorexia

## 2018-10-15 NOTE — CONSULT NOTE ADULT - ATTENDING COMMENTS
Ortho Trauma Attending:  Agree with above PA note.  Note edited where necessary.  Non-op management for scapula fracture. PT/OT, f/u ortho trauma PRN    Sajan Rojas MD  Orthopaedic Trauma Surgery

## 2018-10-15 NOTE — CHART NOTE - NSCHARTNOTEFT_GEN_A_CORE
SOCIAL WORK NOTE:  MET WITH PATIENT THIS MORNING TO DISCUSS DISCHARGE PLANNING AFTER SPEAKING WITH THE DOCTOR.  PATIENT DOES NOT WANT [facility]. SPOKE WITH PHYSICAL THERAPIST WHOM STATED PATIENT IS SAFE TO GO HOME AND DOES NOT NEED REHAB AS SHE IS AT BASELINE. CCC SPOKE WITH PATIENT TO DISCUSS OUTPAITENT PLANNING AT THIS TIME

## 2018-10-15 NOTE — CONSULT NOTE ADULT - SUBJECTIVE AND OBJECTIVE BOX
Pt Name: SARA TRIANA    MRN: [id number]      Patient is a 86y Female presenting to the emergency department with a chief complaint of right sided upper back pain, worse with breathing x 3 days. Pt states that she fell 10 days ago but did not report any back pain at that time. Pt is chronically on 15mg prednisone for PMR. Pt otherwise denies fever, chills, c/p, sob, abdominal pain, n/v, numbness/tingling/weakness and has no other complaints. Pt states that she is aware of her compression fractures of her spine, but states that this pain is "new and different."    HEALTH ISSUES - PROBLEM Dx:      .      REVIEW OF SYSTEMS      General: Alert, responsive, in NAD    Skin/Breast: No rashes, no pruritis. +swelling.  	  Ophthalmologic: No visual changes. No redness.   	  ENMT:	No discharge. No swelling.    Respiratory and Thorax: No difficulty breathing. No cough.  	   Cardiovascular:	No chest pain. No palpitations.    Gastrointestinal:	 No abdominal pain. No diarrhea.     Genitourinary: No dysuria. No bleeding.    Musculoskeletal: SEE HPI.    Neurological: No sensory or motor changes.     Psychiatric: No anxiety or depression.    Hematology/Lymphatics: No swelling.    Endocrine: No Hx of diabetes.    ROS is otherwise negative.    PAST MEDICAL & SURGICAL HISTORY:  PAST MEDICAL & SURGICAL HISTORY:  Polymyalgia rheumatica  Diverticulosis  Asthma  S/P knee replacement, left  S/P hysterectomy: 1982  S/P appendectomy: 1962      Allergies: No Known Allergies      Medications: oxyCODONE    5 mG/acetaminophen 325 mG 1 Tablet(s) Oral Once      FAMILY HISTORY:  Family history of stroke (Father)  : non-contributory    Social History: Denies IVDA, denies ETOH.    Ambulation: Walking independently [ ] With Cane [ x ] With Walker [ ]  Bed / wheelchairbound [ ]                           12.2   8.4   )-----------( 229      ( 15 Oct 2018 02:21 )             37.8     10-15    142  |  103  |  21.0<H>  ----------------------------<  104  3.4<L>   |  26.0  |  0.66    Ca    9.3      15 Oct 2018 02:21    TPro  6.5<L>  /  Alb  4.2  /  TBili  0.4  /  DBili  x   /  AST  24  /  ALT  17  /  AlkPhos  58  10-15      PHYSICAL EXAM:    Vital Signs Last 24 Hrs  T(C): 36.7 (14 Oct 2018 20:14), Max: 36.7 (14 Oct 2018 20:14)  T(F): 98.1 (14 Oct 2018 20:14), Max: 98.1 (14 Oct 2018 20:14)  HR: 101 (14 Oct 2018 20:14) (101 - 101)  BP: 161/91 (14 Oct 2018 20:14) (161/91 - 161/91)  BP(mean): --  RR: 18 (14 Oct 2018 20:14) (18 - 18)  SpO2: 97% (14 Oct 2018 20:14) (97% - 97%)  Daily Height in cm: 147.32 (14 Oct 2018 20:14)    Daily     Appearance: Alert, responsive, in no acute distress.    Skin: Skin is clean, dry and intact. No bleeding. No abrasions. No ulcerations. +palpable localized swelling/ecchymosis of the right upper back.    Vascular: 2+ distal radial/DP/PT pulses. Cap refill < 2 sec. No extremity ulcerations. No cyanosis.    Musculoskeletal:         Left Upper Extremity: Atraumatic with normal alignment NROM. No crepitus. No bony tenderness.        Right Upper Extremity: Atraumatic with normal alignment NROM. No crepitus. No bony tenderness.        Left Lower Extremity: Atraumatic with normal alignment NROM. No crepitus. No bony tenderness.        Right Lower Extremity: Atraumatic with normal alignment NROM. No crepitus. No bony tenderness.     Neurological: Sensation is grossly intact to light touch. No focal deficits or weaknesses found.    Pathologic reflexes: NO Donovan,  NO Clonus            Reflexes:    Patella, Achilles intact            Motor exam: [  ]          Upper extremities - 5/5 hand  strength, 5/5 biceps flexion, 5/5 triceps extension, 5/5 shoulder extension           Lower extremities  -  5/5 plantar/dorsiflexion, 5/5 knee flexion, 5/5 hip flexion, able to SLR without difficulty      Imaging Studies: All imaging reviewed with Dr. 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    PLAN:  * Pain control as clinically indicated  * Pending plan as per Dr. Guerra Pt Name: SARA TRIANA    MRN: [id number]      Patient is a 86y Female presenting to the emergency department with a chief complaint of right sided upper back pain, worse with breathing x 3 days. Pt states that she fell 10 days ago but did not report any back pain at that time. Pt is chronically on 15mg prednisone for PMR. Pt otherwise denies fever, chills, c/p, sob, abdominal pain, n/v, numbness/tingling/weakness and has no other complaints. Pt states that she is aware of her compression fractures of her spine, but states that this pain is "new and different."    HEALTH ISSUES - PROBLEM Dx:      .      REVIEW OF SYSTEMS      General: Alert, responsive, in NAD    Skin/Breast: No rashes, no pruritis. +swelling.  	  Ophthalmologic: No visual changes. No redness.   	  ENMT:	No discharge. No swelling.    Respiratory and Thorax: No difficulty breathing. No cough.  	   Cardiovascular:	No chest pain. No palpitations.    Gastrointestinal:	 No abdominal pain. No diarrhea.     Genitourinary: No dysuria. No bleeding.    Musculoskeletal: SEE HPI.    Neurological: No sensory or motor changes.     Psychiatric: No anxiety or depression.    Hematology/Lymphatics: No swelling.    Endocrine: No Hx of diabetes.    ROS is otherwise negative.    PAST MEDICAL & SURGICAL HISTORY:  PAST MEDICAL & SURGICAL HISTORY:  Polymyalgia rheumatica  Diverticulosis  Asthma  S/P knee replacement, left  S/P hysterectomy: 1982  S/P appendectomy: 1962      Allergies: No Known Allergies      Medications: oxyCODONE    5 mG/acetaminophen 325 mG 1 Tablet(s) Oral Once      FAMILY HISTORY:  Family history of stroke (Father)  : non-contributory    Social History: Denies IVDA, denies ETOH.    Ambulation: Walking independently [ ] With Cane [ x ] With Walker [ ]  Bed / wheelchairbound [ ]                           12.2   8.4   )-----------( 229      ( 15 Oct 2018 02:21 )             37.8     10-15    142  |  103  |  21.0<H>  ----------------------------<  104  3.4<L>   |  26.0  |  0.66    Ca    9.3      15 Oct 2018 02:21    TPro  6.5<L>  /  Alb  4.2  /  TBili  0.4  /  DBili  x   /  AST  24  /  ALT  17  /  AlkPhos  58  10-15      PHYSICAL EXAM:    Vital Signs Last 24 Hrs  T(C): 36.7 (14 Oct 2018 20:14), Max: 36.7 (14 Oct 2018 20:14)  T(F): 98.1 (14 Oct 2018 20:14), Max: 98.1 (14 Oct 2018 20:14)  HR: 101 (14 Oct 2018 20:14) (101 - 101)  BP: 161/91 (14 Oct 2018 20:14) (161/91 - 161/91)  BP(mean): --  RR: 18 (14 Oct 2018 20:14) (18 - 18)  SpO2: 97% (14 Oct 2018 20:14) (97% - 97%)  Daily Height in cm: 147.32 (14 Oct 2018 20:14)    Daily     Appearance: Alert, responsive, in no acute distress.    Skin: Skin is clean, dry and intact. No bleeding. No abrasions. No ulcerations. +palpable localized swelling/ecchymosis of the right upper back.    Vascular: 2+ distal radial/DP/PT pulses. Cap refill < 2 sec. No extremity ulcerations. No cyanosis.    Musculoskeletal:         Back: No palpable spinal deformities or step offs, No midline spinal TTP palpated, + localized swelling/ecchymosis of the right upper back with significant TTP noted.       Left Upper Extremity: Atraumatic with normal alignment NROM. No crepitus. No bony tenderness.        Right Upper Extremity: Atraumatic with normal alignment NROM. No crepitus. No bony tenderness.        Left Lower Extremity: Atraumatic with normal alignment NROM. No crepitus. No bony tenderness.        Right Lower Extremity: Atraumatic with normal alignment NROM. No crepitus. No bony tenderness.     Neurological: Sensation is grossly intact to light touch. No focal deficits or weaknesses found.    Pathologic reflexes: NO Donovan,  NO Clonus            Reflexes:    Patella, Achilles intact            Motor exam: [  ]          Upper extremities - 5/5 hand  strength, 5/5 biceps flexion, 5/5 triceps extension, 5/5 shoulder extension           Lower extremities  -  5/5 plantar/dorsiflexion, 5/5 knee flexion, 5/5 hip flexion, able to SLR without difficulty      Imaging Studies: All imaging reviewed with Dr. 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 Pts area of localized tenderness/swelling/ecchymosis appears to correlate with a rib fracture based on CT scan.    PLAN:  * Pain control as clinically indicated  * Pending plan as per Dr. Guerra Pt Name: SARA TRIANA    MRN: [id number]      Patient is a 86y Female presenting to the emergency department with a chief complaint of right sided upper back pain, worse with breathing x 3 days. Pt states that she fell 10 days ago but did not report any back pain at that time. Pt is chronically on 15mg prednisone for PMR. Pt otherwise denies fever, chills, c/p, sob, abdominal pain, n/v, numbness/tingling/weakness and has no other complaints. Pt states that she is aware of her compression fractures of her spine, but states that this pain is "new and different."    HEALTH ISSUES - PROBLEM Dx:      .      REVIEW OF SYSTEMS      General: Alert, responsive, in NAD    Skin/Breast: No rashes, no pruritis. +swelling.  	  Ophthalmologic: No visual changes. No redness.   	  ENMT:	No discharge. No swelling.    Respiratory and Thorax: No difficulty breathing. No cough.  	   Cardiovascular:	No chest pain. No palpitations.    Gastrointestinal:	 No abdominal pain. No diarrhea.     Genitourinary: No dysuria. No bleeding.    Musculoskeletal: SEE HPI.    Neurological: No sensory or motor changes.     Psychiatric: No anxiety or depression.    Hematology/Lymphatics: No swelling.    Endocrine: No Hx of diabetes.    ROS is otherwise negative.    PAST MEDICAL & SURGICAL HISTORY:  PAST MEDICAL & SURGICAL HISTORY:  Polymyalgia rheumatica  Diverticulosis  Asthma  S/P knee replacement, left  S/P hysterectomy: 1982  S/P appendectomy: 1962      Allergies: No Known Allergies      Medications: oxyCODONE    5 mG/acetaminophen 325 mG 1 Tablet(s) Oral Once      FAMILY HISTORY:  Family history of stroke (Father)  : non-contributory    Social History: Denies IVDA, denies ETOH.    Ambulation: Walking independently [ ] With Cane [ x ] With Walker [ ]  Bed / wheelchairbound [ ]                           12.2   8.4   )-----------( 229      ( 15 Oct 2018 02:21 )             37.8     10-15    142  |  103  |  21.0<H>  ----------------------------<  104  3.4<L>   |  26.0  |  0.66    Ca    9.3      15 Oct 2018 02:21    TPro  6.5<L>  /  Alb  4.2  /  TBili  0.4  /  DBili  x   /  AST  24  /  ALT  17  /  AlkPhos  58  10-15      PHYSICAL EXAM:    Vital Signs Last 24 Hrs  T(C): 36.7 (14 Oct 2018 20:14), Max: 36.7 (14 Oct 2018 20:14)  T(F): 98.1 (14 Oct 2018 20:14), Max: 98.1 (14 Oct 2018 20:14)  HR: 101 (14 Oct 2018 20:14) (101 - 101)  BP: 161/91 (14 Oct 2018 20:14) (161/91 - 161/91)  BP(mean): --  RR: 18 (14 Oct 2018 20:14) (18 - 18)  SpO2: 97% (14 Oct 2018 20:14) (97% - 97%)  Daily Height in cm: 147.32 (14 Oct 2018 20:14)    Daily     Appearance: Alert, responsive, in no acute distress.    Skin: Skin is clean, dry and intact. No bleeding. No abrasions. No ulcerations. +palpable localized swelling/ecchymosis of the right upper back.    Vascular: 2+ distal radial/DP/PT pulses. Cap refill < 2 sec. No extremity ulcerations. No cyanosis.    Musculoskeletal:         Back: No palpable spinal deformities or step offs, No midline spinal TTP palpated, + localized swelling/ecchymosis of the right upper back with significant TTP noted.       Left Upper Extremity: Atraumatic with normal alignment NROM. No crepitus. No bony tenderness.        Right Upper Extremity: Atraumatic with normal alignment NROM. No crepitus. No bony tenderness.        Left Lower Extremity: Atraumatic with normal alignment NROM. No crepitus. No bony tenderness.        Right Lower Extremity: Atraumatic with normal alignment NROM. No crepitus. No bony tenderness.     Neurological: Sensation is grossly intact to light touch. No focal deficits or weaknesses found.    Pathologic reflexes: NO Donovan,  NO Clonus            Reflexes:    Patella, Achilles intact            Motor exam: [  ]          Upper extremities - 5/5 hand  strength, 5/5 biceps flexion, 5/5 triceps extension, 5/5 shoulder extension           Lower extremities  -  5/5 plantar/dorsiflexion, 5/5 knee flexion, 5/5 hip flexion, able to SLR without difficulty      Imaging Studies: All imaging reviewed with Dr. 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 Pts area of localized tenderness/swelling/ecchymosis appears to correlate with a rib fracture based on CT scan.    PLAN:  * reviewed CT imaging and will tx T7 fx with a TLSO brace if mri depicts acute fx. Additional fx (rib & scap.) will be treated conservatively. PT/OT evals WBATafter mri obtained

## 2018-10-15 NOTE — PROVIDER CONTACT NOTE (OTHER) - SITUATION
pt requesting to be dc'd with out pt services.  CM discussed with FRANCES who wrote a script. pt states that her son will drive her if needed.
Chart reviewed, contents noted. Attempted to see pt for OT evaluation however pt has been discharged. Pt NOT seen for OT evaluation.
Chart reviewed, contents noted. Pt currently off unit at this time at MRI. Pt NOT seen for OT at this time. Will attempt to see pt at a later time as schedule and medical orders permit.
PT order received, chart reviewed and contents noted.  PT eval completed and documented.

## 2018-10-15 NOTE — PROVIDER CONTACT NOTE (OTHER) - ASSESSMENT
Pt with  c/o 5/10 upper back pain, before, during and after Rx. Pt is functionally independent and not in need of PT.  Will no longer continue to follow. Pt left supine in bed in no apparent distress and call bell within reach, RN made aware.

## 2018-10-15 NOTE — PHYSICAL THERAPY INITIAL EVALUATION ADULT - PERTINENT HX OF CURRENT PROBLEM, REHAB EVAL
chronic compression fx of T7, multiple acute on chronic b/l rib fractures and right superior scapular fx

## 2018-10-15 NOTE — ED ADULT NURSE REASSESSMENT NOTE - NS ED NURSE REASSESS COMMENT FT1
Pt is AOx4 status post fall at home about10 days ago. Pt complaining of chronic back pain 8/10. Awaiting physical therapy eval and MRI. Pt aware of plan of care.

## 2018-11-14 ENCOUNTER — APPOINTMENT (OUTPATIENT)
Dept: RHEUMATOLOGY | Facility: CLINIC | Age: 83
End: 2018-11-14
Payer: MEDICARE

## 2018-11-14 ENCOUNTER — LABORATORY RESULT (OUTPATIENT)
Age: 83
End: 2018-11-14

## 2018-11-14 VITALS
BODY MASS INDEX: 20.2 KG/M2 | SYSTOLIC BLOOD PRESSURE: 126 MMHG | RESPIRATION RATE: 22 BRPM | DIASTOLIC BLOOD PRESSURE: 64 MMHG | TEMPERATURE: 98.6 F | OXYGEN SATURATION: 98 % | WEIGHT: 100 LBS | HEART RATE: 104 BPM

## 2018-11-14 PROCEDURE — 36415 COLL VENOUS BLD VENIPUNCTURE: CPT

## 2018-11-14 PROCEDURE — 99215 OFFICE O/P EST HI 40 MIN: CPT | Mod: 25

## 2018-11-23 ENCOUNTER — MOBILE ON CALL (OUTPATIENT)
Age: 83
End: 2018-11-23

## 2018-12-12 ENCOUNTER — APPOINTMENT (OUTPATIENT)
Dept: RHEUMATOLOGY | Facility: CLINIC | Age: 83
End: 2018-12-12
Payer: MEDICARE

## 2018-12-12 VITALS
BODY MASS INDEX: 19.79 KG/M2 | HEART RATE: 107 BPM | TEMPERATURE: 98.6 F | DIASTOLIC BLOOD PRESSURE: 80 MMHG | OXYGEN SATURATION: 98 % | WEIGHT: 98 LBS | RESPIRATION RATE: 22 BRPM | SYSTOLIC BLOOD PRESSURE: 132 MMHG

## 2018-12-12 PROBLEM — M35.3 POLYMYALGIA RHEUMATICA: Chronic | Status: ACTIVE | Noted: 2018-10-15

## 2018-12-12 PROCEDURE — 36415 COLL VENOUS BLD VENIPUNCTURE: CPT

## 2018-12-12 PROCEDURE — 99215 OFFICE O/P EST HI 40 MIN: CPT | Mod: 25

## 2018-12-13 LAB
BASOPHILS # BLD AUTO: 0.01 K/UL
BASOPHILS NFR BLD AUTO: 0.1 %
EOSINOPHIL # BLD AUTO: 0.01 K/UL
EOSINOPHIL NFR BLD AUTO: 0.1 %
HCT VFR BLD CALC: 38.6 %
HGB BLD-MCNC: 12.4 G/DL
IMM GRANULOCYTES NFR BLD AUTO: 0.6 %
LYMPHOCYTES # BLD AUTO: 0.72 K/UL
LYMPHOCYTES NFR BLD AUTO: 10.1 %
MAN DIFF?: NORMAL
MCHC RBC-ENTMCNC: 31.3 PG
MCHC RBC-ENTMCNC: 32.1 GM/DL
MCV RBC AUTO: 97.5 FL
MONOCYTES # BLD AUTO: 0.31 K/UL
MONOCYTES NFR BLD AUTO: 4.3 %
NEUTROPHILS # BLD AUTO: 6.07 K/UL
NEUTROPHILS NFR BLD AUTO: 84.8 %
PLATELET # BLD AUTO: 280 K/UL
RBC # BLD: 3.96 M/UL
RBC # FLD: 14.6 %
WBC # FLD AUTO: 7.16 K/UL

## 2019-01-16 ENCOUNTER — APPOINTMENT (OUTPATIENT)
Dept: RHEUMATOLOGY | Facility: CLINIC | Age: 84
End: 2019-01-16
Payer: MEDICARE

## 2019-01-16 VITALS
OXYGEN SATURATION: 98 % | HEART RATE: 69 BPM | SYSTOLIC BLOOD PRESSURE: 138 MMHG | BODY MASS INDEX: 20.16 KG/M2 | DIASTOLIC BLOOD PRESSURE: 80 MMHG | WEIGHT: 100 LBS | RESPIRATION RATE: 20 BRPM | TEMPERATURE: 98 F | HEIGHT: 59 IN

## 2019-01-16 DIAGNOSIS — M53.3 SACROCOCCYGEAL DISORDERS, NOT ELSEWHERE CLASSIFIED: ICD-10-CM

## 2019-01-16 PROCEDURE — 99215 OFFICE O/P EST HI 40 MIN: CPT | Mod: 25

## 2019-01-16 PROCEDURE — 36415 COLL VENOUS BLD VENIPUNCTURE: CPT

## 2019-01-21 PROBLEM — M53.3 SACRAL PAIN: Status: ACTIVE | Noted: 2018-11-02

## 2019-01-22 ENCOUNTER — RX CHANGE (OUTPATIENT)
Age: 84
End: 2019-01-22

## 2019-01-24 LAB
25(OH)D3 SERPL-MCNC: 28.1 NG/ML
25(OH)D3 SERPL-MCNC: 34.9 NG/ML
25(OH)D3 SERPL-MCNC: 35 NG/ML
ALBUMIN SERPL ELPH-MCNC: 4.6 G/DL
ALBUMIN SERPL ELPH-MCNC: 4.6 G/DL
ALBUMIN SERPL ELPH-MCNC: 4.7 G/DL
ALBUMIN SERPL ELPH-MCNC: 4.8 G/DL
ALP BLD-CCNC: 106 U/L
ALP BLD-CCNC: 110 U/L
ALP BLD-CCNC: 117 U/L
ALP BLD-CCNC: 153 U/L
ALP BLD-CCNC: 60 U/L
ALP BLD-CCNC: 64 U/L
ALP BLD-CCNC: 66 U/L
ALP BLD-CCNC: 70 U/L
ALP BONE SERPL-MCNC: 20 MCG/L
ALT SERPL-CCNC: 13 U/L
ALT SERPL-CCNC: 17 U/L
ALT SERPL-CCNC: 18 U/L
ALT SERPL-CCNC: 20 U/L
ALT SERPL-CCNC: 20 U/L
ALT SERPL-CCNC: 23 U/L
ALT SERPL-CCNC: 27 U/L
ALT SERPL-CCNC: 27 U/L
ANION GAP SERPL CALC-SCNC: 16 MMOL/L
ANION GAP SERPL CALC-SCNC: 17 MMOL/L
ANION GAP SERPL CALC-SCNC: 18 MMOL/L
ANION GAP SERPL CALC-SCNC: 19 MMOL/L
ANION GAP SERPL CALC-SCNC: 22 MMOL/L
APPEARANCE: ABNORMAL
APPEARANCE: CLEAR
AST SERPL-CCNC: 22 U/L
AST SERPL-CCNC: 24 U/L
AST SERPL-CCNC: 25 U/L
AST SERPL-CCNC: 26 U/L
AST SERPL-CCNC: 27 U/L
AST SERPL-CCNC: 28 U/L
AST SERPL-CCNC: 30 U/L
AST SERPL-CCNC: 30 U/L
BACTERIA: ABNORMAL
BACTERIA: ABNORMAL
BASOPHILS # BLD AUTO: 0 K/UL
BASOPHILS # BLD AUTO: 0.01 K/UL
BASOPHILS # BLD AUTO: 0.01 K/UL
BASOPHILS # BLD AUTO: 0.02 K/UL
BASOPHILS # BLD AUTO: 0.03 K/UL
BASOPHILS NFR BLD AUTO: 0 %
BASOPHILS NFR BLD AUTO: 0.1 %
BASOPHILS NFR BLD AUTO: 0.2 %
BASOPHILS NFR BLD AUTO: 0.2 %
BASOPHILS NFR BLD AUTO: 0.3 %
BILIRUB SERPL-MCNC: 0.3 MG/DL
BILIRUB SERPL-MCNC: 0.4 MG/DL
BILIRUB SERPL-MCNC: 0.6 MG/DL
BILIRUBIN URINE: NEGATIVE
BILIRUBIN URINE: NEGATIVE
BLOOD URINE: ABNORMAL
BLOOD URINE: NEGATIVE
BUN SERPL-MCNC: 26 MG/DL
BUN SERPL-MCNC: 27 MG/DL
BUN SERPL-MCNC: 27 MG/DL
BUN SERPL-MCNC: 29 MG/DL
BUN SERPL-MCNC: 29 MG/DL
BUN SERPL-MCNC: 31 MG/DL
BUN SERPL-MCNC: 33 MG/DL
BUN SERPL-MCNC: 40 MG/DL
CA-I SERPL-SCNC: 1.03 MMOL/L
CA-I SERPL-SCNC: 1.2 MMOL/L
CA-I SERPL-SCNC: 1.27 MMOL/L
CA-I SERPL-SCNC: 1.33 MMOL/L
CALCIUM OXALATE CRYSTALS: ABNORMAL
CALCIUM SERPL-MCNC: 10.2 MG/DL
CALCIUM SERPL-MCNC: 10.3 MG/DL
CALCIUM SERPL-MCNC: 10.5 MG/DL
CALCIUM SERPL-MCNC: 9.4 MG/DL
CALCIUM SERPL-MCNC: 9.5 MG/DL
CALCIUM SERPL-MCNC: 9.6 MG/DL
CALCIUM SERPL-MCNC: 9.7 MG/DL
CHLORIDE SERPL-SCNC: 101 MMOL/L
CHLORIDE SERPL-SCNC: 102 MMOL/L
CHLORIDE SERPL-SCNC: 103 MMOL/L
CHLORIDE SERPL-SCNC: 104 MMOL/L
CHLORIDE SERPL-SCNC: 96 MMOL/L
CHLORIDE SERPL-SCNC: 99 MMOL/L
CO2 SERPL-SCNC: 20 MMOL/L
CO2 SERPL-SCNC: 22 MMOL/L
CO2 SERPL-SCNC: 24 MMOL/L
CO2 SERPL-SCNC: 24 MMOL/L
CO2 SERPL-SCNC: 25 MMOL/L
CO2 SERPL-SCNC: 26 MMOL/L
COLOR: YELLOW
COLOR: YELLOW
CREAT SERPL-MCNC: 0.82 MG/DL
CREAT SERPL-MCNC: 0.9 MG/DL
CREAT SERPL-MCNC: 0.91 MG/DL
CREAT SERPL-MCNC: 0.95 MG/DL
CREAT SERPL-MCNC: 0.98 MG/DL
CREAT SERPL-MCNC: 0.99 MG/DL
CREAT SERPL-MCNC: 1.06 MG/DL
CREAT SERPL-MCNC: 1.24 MG/DL
CRP SERPL-MCNC: 0.2 MG/DL
CRP SERPL-MCNC: 0.24 MG/DL
CRP SERPL-MCNC: 0.33 MG/DL
CRP SERPL-MCNC: 0.58 MG/DL
CRP SERPL-MCNC: 0.77 MG/DL
CRP SERPL-MCNC: 0.85 MG/DL
CRP SERPL-MCNC: 1.08 MG/DL
CRP SERPL-MCNC: 1.1 MG/DL
CRP SERPL-MCNC: 12.8 MG/DL
EOSINOPHIL # BLD AUTO: 0 K/UL
EOSINOPHIL # BLD AUTO: 0.01 K/UL
EOSINOPHIL # BLD AUTO: 0.03 K/UL
EOSINOPHIL # BLD AUTO: 0.04 K/UL
EOSINOPHIL # BLD AUTO: 0.07 K/UL
EOSINOPHIL NFR BLD AUTO: 0 %
EOSINOPHIL NFR BLD AUTO: 0.1 %
EOSINOPHIL NFR BLD AUTO: 0.4 %
EOSINOPHIL NFR BLD AUTO: 0.4 %
EOSINOPHIL NFR BLD AUTO: 0.7 %
ERYTHROCYTE [SEDIMENTATION RATE] IN BLOOD BY WESTERGREN METHOD: 22 MM/HR
ERYTHROCYTE [SEDIMENTATION RATE] IN BLOOD BY WESTERGREN METHOD: 23 MM/HR
ERYTHROCYTE [SEDIMENTATION RATE] IN BLOOD BY WESTERGREN METHOD: 4 MM/HR
ERYTHROCYTE [SEDIMENTATION RATE] IN BLOOD BY WESTERGREN METHOD: 4 MM/HR
ERYTHROCYTE [SEDIMENTATION RATE] IN BLOOD BY WESTERGREN METHOD: 5 MM/HR
ERYTHROCYTE [SEDIMENTATION RATE] IN BLOOD BY WESTERGREN METHOD: 5 MM/HR
ERYTHROCYTE [SEDIMENTATION RATE] IN BLOOD BY WESTERGREN METHOD: 7 MM/HR
ERYTHROCYTE [SEDIMENTATION RATE] IN BLOOD BY WESTERGREN METHOD: 8 MM/HR
ERYTHROCYTE [SEDIMENTATION RATE] IN BLOOD BY WESTERGREN METHOD: 9 MM/HR
GLUCOSE QUALITATIVE U: 100 MG/DL
GLUCOSE QUALITATIVE U: NEGATIVE MG/DL
GLUCOSE SERPL-MCNC: 109 MG/DL
GLUCOSE SERPL-MCNC: 112 MG/DL
GLUCOSE SERPL-MCNC: 114 MG/DL
GLUCOSE SERPL-MCNC: 127 MG/DL
GLUCOSE SERPL-MCNC: 138 MG/DL
GLUCOSE SERPL-MCNC: 175 MG/DL
GLUCOSE SERPL-MCNC: 186 MG/DL
GLUCOSE SERPL-MCNC: 93 MG/DL
HCT VFR BLD CALC: 39.9 %
HCT VFR BLD CALC: 40.1 %
HCT VFR BLD CALC: 40.1 %
HCT VFR BLD CALC: 40.4 %
HCT VFR BLD CALC: 40.5 %
HCT VFR BLD CALC: 40.6 %
HCT VFR BLD CALC: 43.8 %
HGB BLD-MCNC: 12.1 G/DL
HGB BLD-MCNC: 12.3 G/DL
HGB BLD-MCNC: 12.4 G/DL
HGB BLD-MCNC: 12.8 G/DL
HGB BLD-MCNC: 12.9 G/DL
HGB BLD-MCNC: 13.2 G/DL
HGB BLD-MCNC: 14.3 G/DL
HYALINE CASTS: 4 /LPF
IL6 SERPL-MCNC: 4.1 PG/ML
IL6 SERPL-MCNC: 4.7 PG/ML
IMM GRANULOCYTES NFR BLD AUTO: 0.3 %
IMM GRANULOCYTES NFR BLD AUTO: 0.4 %
IMM GRANULOCYTES NFR BLD AUTO: 0.5 %
IMM GRANULOCYTES NFR BLD AUTO: 0.6 %
IMM GRANULOCYTES NFR BLD AUTO: 0.6 %
IMM GRANULOCYTES NFR BLD AUTO: 1.4 %
IMM GRANULOCYTES NFR BLD AUTO: 1.9 %
KETONES URINE: ABNORMAL
KETONES URINE: NEGATIVE
LEUKOCYTE ESTERASE URINE: NEGATIVE
LEUKOCYTE ESTERASE URINE: NEGATIVE
LYMPHOCYTES # BLD AUTO: 0.48 K/UL
LYMPHOCYTES # BLD AUTO: 0.55 K/UL
LYMPHOCYTES # BLD AUTO: 0.58 K/UL
LYMPHOCYTES # BLD AUTO: 0.64 K/UL
LYMPHOCYTES # BLD AUTO: 0.76 K/UL
LYMPHOCYTES # BLD AUTO: 0.93 K/UL
LYMPHOCYTES # BLD AUTO: 1.26 K/UL
LYMPHOCYTES NFR BLD AUTO: 11.3 %
LYMPHOCYTES NFR BLD AUTO: 12.4 %
LYMPHOCYTES NFR BLD AUTO: 3.3 %
LYMPHOCYTES NFR BLD AUTO: 5.5 %
LYMPHOCYTES NFR BLD AUTO: 5.5 %
LYMPHOCYTES NFR BLD AUTO: 7.2 %
LYMPHOCYTES NFR BLD AUTO: 7.2 %
MAGNESIUM SERPL-MCNC: 2.1 MG/DL
MAGNESIUM SERPL-MCNC: 2.3 MG/DL
MAGNESIUM SERPL-MCNC: 2.5 MG/DL
MAN DIFF?: NORMAL
MCHC RBC-ENTMCNC: 29.8 PG
MCHC RBC-ENTMCNC: 30 PG
MCHC RBC-ENTMCNC: 30.1 PG
MCHC RBC-ENTMCNC: 30.2 GM/DL
MCHC RBC-ENTMCNC: 30.5 PG
MCHC RBC-ENTMCNC: 30.8 GM/DL
MCHC RBC-ENTMCNC: 30.9 GM/DL
MCHC RBC-ENTMCNC: 31.1 PG
MCHC RBC-ENTMCNC: 31.3 PG
MCHC RBC-ENTMCNC: 31.6 GM/DL
MCHC RBC-ENTMCNC: 31.8 PG
MCHC RBC-ENTMCNC: 31.9 GM/DL
MCHC RBC-ENTMCNC: 32.5 GM/DL
MCHC RBC-ENTMCNC: 32.6 GM/DL
MCV RBC AUTO: 96.2 FL
MCV RBC AUTO: 96.4 FL
MCV RBC AUTO: 96.9 FL
MCV RBC AUTO: 97.3 FL
MCV RBC AUTO: 97.6 FL
MCV RBC AUTO: 97.8 FL
MCV RBC AUTO: 98.8 FL
MICROSCOPIC-UA: NORMAL
MICROSCOPIC-UA: NORMAL
MONOCYTES # BLD AUTO: 0.39 K/UL
MONOCYTES # BLD AUTO: 0.56 K/UL
MONOCYTES # BLD AUTO: 0.59 K/UL
MONOCYTES # BLD AUTO: 0.73 K/UL
MONOCYTES # BLD AUTO: 0.83 K/UL
MONOCYTES # BLD AUTO: 0.83 K/UL
MONOCYTES # BLD AUTO: 1.04 K/UL
MONOCYTES NFR BLD AUTO: 4.5 %
MONOCYTES NFR BLD AUTO: 4.5 %
MONOCYTES NFR BLD AUTO: 6.7 %
MONOCYTES NFR BLD AUTO: 7 %
MONOCYTES NFR BLD AUTO: 8.1 %
MONOCYTES NFR BLD AUTO: 8.3 %
MONOCYTES NFR BLD AUTO: 8.9 %
NEUTROPHILS # BLD AUTO: 20.96 K/UL
NEUTROPHILS # BLD AUTO: 6.51 K/UL
NEUTROPHILS # BLD AUTO: 6.83 K/UL
NEUTROPHILS # BLD AUTO: 7.57 K/UL
NEUTROPHILS # BLD AUTO: 7.75 K/UL
NEUTROPHILS # BLD AUTO: 7.87 K/UL
NEUTROPHILS # BLD AUTO: 8.34 K/UL
NEUTROPHILS NFR BLD AUTO: 77.1 %
NEUTROPHILS NFR BLD AUTO: 78.9 %
NEUTROPHILS NFR BLD AUTO: 83.7 %
NEUTROPHILS NFR BLD AUTO: 84.9 %
NEUTROPHILS NFR BLD AUTO: 85.8 %
NEUTROPHILS NFR BLD AUTO: 89.4 %
NEUTROPHILS NFR BLD AUTO: 91.5 %
NITRITE URINE: NEGATIVE
NITRITE URINE: NEGATIVE
PARATHYROID HORMONE INTACT: 65 PG/ML
PH URINE: 5
PH URINE: 6.5
PHOSPHATE SERPL-MCNC: 3.5 MG/DL
PHOSPHATE SERPL-MCNC: 3.6 MG/DL
PHOSPHATE SERPL-MCNC: 3.9 MG/DL
PLATELET # BLD AUTO: 232 K/UL
PLATELET # BLD AUTO: 254 K/UL
PLATELET # BLD AUTO: 279 K/UL
PLATELET # BLD AUTO: 282 K/UL
PLATELET # BLD AUTO: 283 K/UL
PLATELET # BLD AUTO: 295 K/UL
PLATELET # BLD AUTO: 313 K/UL
POTASSIUM SERPL-SCNC: 4.2 MMOL/L
POTASSIUM SERPL-SCNC: 4.5 MMOL/L
POTASSIUM SERPL-SCNC: 4.6 MMOL/L
POTASSIUM SERPL-SCNC: 4.7 MMOL/L
POTASSIUM SERPL-SCNC: 4.9 MMOL/L
POTASSIUM SERPL-SCNC: 5 MMOL/L
POTASSIUM SERPL-SCNC: 5.1 MMOL/L
POTASSIUM SERPL-SCNC: 5.2 MMOL/L
PROT SERPL-MCNC: 6.6 G/DL
PROT SERPL-MCNC: 6.8 G/DL
PROT SERPL-MCNC: 7 G/DL
PROT SERPL-MCNC: 7.2 G/DL
PROT SERPL-MCNC: 7.3 G/DL
PROTEIN URINE: 30 MG/DL
PROTEIN URINE: NEGATIVE MG/DL
RBC # BLD: 4.06 M/UL
RBC # BLD: 4.08 M/UL
RBC # BLD: 4.14 M/UL
RBC # BLD: 4.15 M/UL
RBC # BLD: 4.2 M/UL
RBC # BLD: 4.22 M/UL
RBC # BLD: 4.49 M/UL
RBC # FLD: 14.4 %
RBC # FLD: 14.6 %
RBC # FLD: 14.6 %
RBC # FLD: 14.7 %
RBC # FLD: 15 %
RBC # FLD: 15.4 %
RBC # FLD: 15.6 %
RED BLOOD CELLS URINE: 2 /HPF
RED BLOOD CELLS URINE: 5 /HPF
SODIUM SERPL-SCNC: 141 MMOL/L
SODIUM SERPL-SCNC: 142 MMOL/L
SODIUM SERPL-SCNC: 143 MMOL/L
SODIUM SERPL-SCNC: 144 MMOL/L
SODIUM SERPL-SCNC: 145 MMOL/L
SODIUM SERPL-SCNC: 145 MMOL/L
SPECIFIC GRAVITY URINE: 1.02
SPECIFIC GRAVITY URINE: 1.03
SQUAMOUS EPITHELIAL CELLS: 0 /HPF
SQUAMOUS EPITHELIAL CELLS: 4 /HPF
T3 SERPL-MCNC: 72 NG/DL
T3RU NFR SERPL: 1.05 INDEX
T4 FREE SERPL-MCNC: 0.9 NG/DL
THYROGLOB AB SERPL-ACNC: <20 IU/ML
THYROPEROXIDASE AB SERPL IA-ACNC: <10 IU/ML
TSH SERPL-ACNC: 0.42 UIU/ML
TSH SERPL-ACNC: 0.54 UIU/ML
UROBILINOGEN URINE: NEGATIVE MG/DL
UROBILINOGEN URINE: NEGATIVE MG/DL
WBC # FLD AUTO: 10.2 K/UL
WBC # FLD AUTO: 22.91 K/UL
WBC # FLD AUTO: 8.05 K/UL
WBC # FLD AUTO: 8.24 K/UL
WBC # FLD AUTO: 8.67 K/UL
WBC # FLD AUTO: 8.83 K/UL
WBC # FLD AUTO: 9.97 K/UL
WHITE BLOOD CELLS URINE: 2 /HPF
WHITE BLOOD CELLS URINE: 4 /HPF

## 2019-02-20 ENCOUNTER — RX RENEWAL (OUTPATIENT)
Age: 84
End: 2019-02-20

## 2019-02-20 ENCOUNTER — APPOINTMENT (OUTPATIENT)
Dept: RHEUMATOLOGY | Facility: CLINIC | Age: 84
End: 2019-02-20

## 2019-02-28 ENCOUNTER — TRANSCRIPTION ENCOUNTER (OUTPATIENT)
Age: 84
End: 2019-02-28

## 2019-03-14 NOTE — PHYSICAL THERAPY INITIAL EVALUATION ADULT - PREDICTED DURATION OF THERAPY (DAYS/WKS), PT EVAL
<<----- Click to add NO significant Past Surgical History Pt is functionally independent and not in need of skilled PT, will no longer follow

## 2019-03-18 ENCOUNTER — TRANSCRIPTION ENCOUNTER (OUTPATIENT)
Age: 84
End: 2019-03-18

## 2019-03-19 ENCOUNTER — TRANSCRIPTION ENCOUNTER (OUTPATIENT)
Age: 84
End: 2019-03-19

## 2019-03-20 ENCOUNTER — APPOINTMENT (OUTPATIENT)
Dept: RHEUMATOLOGY | Facility: CLINIC | Age: 84
End: 2019-03-20
Payer: MEDICARE

## 2019-03-20 VITALS
BODY MASS INDEX: 20.99 KG/M2 | WEIGHT: 100 LBS | HEART RATE: 45 BPM | RESPIRATION RATE: 17 BRPM | HEIGHT: 58 IN | SYSTOLIC BLOOD PRESSURE: 140 MMHG | DIASTOLIC BLOOD PRESSURE: 80 MMHG | OXYGEN SATURATION: 98 % | TEMPERATURE: 97.8 F

## 2019-03-20 DIAGNOSIS — S32.9XXA FRACTURE OF UNSPECIFIED PARTS OF LUMBOSACRAL SPINE AND PELVIS, INITIAL ENCOUNTER FOR CLOSED FRACTURE: ICD-10-CM

## 2019-03-20 PROCEDURE — 99215 OFFICE O/P EST HI 40 MIN: CPT | Mod: 25

## 2019-03-20 PROCEDURE — 36415 COLL VENOUS BLD VENIPUNCTURE: CPT

## 2019-03-20 RX ORDER — TERIPARATIDE 250 UG/ML
600 INJECTION, SOLUTION SUBCUTANEOUS
Qty: 1 | Refills: 6 | Status: DISCONTINUED | COMMUNITY
Start: 2018-09-12 | End: 2019-03-20

## 2019-03-20 RX ORDER — CALCITONIN SALMON 200 [IU]/1
200 SPRAY, METERED NASAL DAILY
Qty: 3 | Refills: 3 | Status: ACTIVE | COMMUNITY
Start: 2019-03-20 | End: 1900-01-01

## 2019-03-20 RX ORDER — PEN NEEDLE, DIABETIC 29 G X1/2"
32G X 4 MM NEEDLE, DISPOSABLE MISCELLANEOUS
Qty: 100 | Refills: 1 | Status: DISCONTINUED | COMMUNITY
Start: 2018-09-13 | End: 2019-03-20

## 2019-03-20 RX ORDER — DENOSUMAB 60 MG/ML
60 INJECTION SUBCUTANEOUS
Qty: 1 | Refills: 0 | Status: DISCONTINUED | COMMUNITY
Start: 2018-06-20 | End: 2019-03-20

## 2019-03-20 NOTE — ASSESSMENT
[FreeTextEntry1] : 1. PMR currently on steroids - has been an high courses with pt increasing dosage due to her pain symptoms, many of which were not 2/2 PMR and due to underlying diffuse DDD, osteoarthritis\par 2. OA R knee OA, hand OA, and bilateral shoulder OA c/w glenohumeral and acromioclavicular OA.\par 3. Chronic LBP with lumbar spondylosis and radiculopathy, possibly peripheral vs central (herniation?)\par 4. T7 wedge deformity compression fx - was seen in ED, started on Skelaxin\par 5.R RTC tendinopathy s/p injection\par 6. injury of L shoulder with resultant similar RTC tendinopathy on L with tender/reduced internal rotation\par Bone health - unable to tolerate alendronate for GI intolerance will change to Prolia injections q6 months\par On calcitonin spray as well for pain mgmt and increase BMD\par 7. Coracoid process fx on R,- STS swelling in neck over last days.\par 8 Rib fxs - multiple noted on CTAP, no evidence of hernia, hepatic lesions stable\par 9. Fall - due to chronically progressive decompensation, difficulty with ambulation.  Has assistive devices w/ cane, walker \par Has not followed appropriately with orthopedics as requested regarding shoulder coracoid fracture, vertebral compression fxs, rib fractures.\par Pt has not reduced prednisone as per recommendations due to her pain. I described many times on multiple visits that her pain is due to osteoarthritis and degenerative disease, as well as fractures and vertebral compression fxs.  This has resulted in prolonged high-dose steroids. I have begun her on Prolia and Forteo injections to try to improve any remaining BMD - she has maintained normal calcium and mineral levels.\par \par - c/w prednisone taper from 18mg down to 13 or less if tolerated\par - F/U PCP, obtain pain mgmt evaluation.  C/w prescribed as per PCP, hydrocodone 5-300mg q4h as needed, use to control pain effectively - reports splinting and decreased breathing due to rib pain. Reinforced deep breaths to prevent atelectasis.\par - remain on Ca 500-600mg BID, D3 6217-7589 IU daily\par - c/w Forteo injections daily\par - check UA and Cx\par RTO 1 month

## 2019-03-20 NOTE — HISTORY OF PRESENT ILLNESS
[___ Month(s) Ago] : [unfilled] month(s) ago [FreeTextEntry1] : - was reporting increased pain and taking more prednisone again, had nurse call her back w/ recommendations. Plan for labs today to evaluate for PMR activity, however she has been having normal inflammatory markers repeatedly w/ severe degenerative OA, RTC tendinopathy, fractures of ribs/coracoid/shoulder that are nonsurgical. She has not followed w/ ortho as per my recommendations, however was seen inpatient once recently\par - has been taking Forteo as well daily to reinforce and encourage bone strength and healing rib/coracoid fracture since pt deferred seeing orthopedic and having surgical interventions. \par - on prednisone 15mg/d recently she increased due to pain and more symptoms, however are all likely due to OA.  Unable to taper as she feels pain\par - tries to be active, takes care of her own home, self, and pets.  Unable to drive on her own now.\par - Also on Ca 500mg BID, D3 2000 IU daily, Forteo inj daily\par - recent fall - went to ED, had imaging with no acute fractures. Has been having chronic pain\par - last inflamm markers normal - no evidence of inflammatory PMR disease [de-identified] : \par \par All other ROS negative except as mentioned in HPI.

## 2019-03-20 NOTE — CONSULT LETTER
[Dear  ___] : Dear  [unfilled], [Courtesy Letter:] : I had the pleasure of seeing your patient, [unfilled], in my office today. [Please see my note below.] : Please see my note below. [Consult Closing:] : Thank you very much for allowing me to participate in the care of this patient.  If you have any questions, please do not hesitate to contact me. [Sincerely,] : Sincerely, [FreeTextEntry2] :  [phone] [FreeTextEntry3] : Paul Lai II, MD\par \par Division of Rheumatology\par [facility]\par    [facility] &\par    Rheumatology at [place],\par    [facility]\par [facility] [employer] [facility]\par \par Office: [phone]

## 2019-03-20 NOTE — DATA REVIEWED
[No studies available for review at this time.] : No studies available for review at this time. [FreeTextEntry1] : CXR 1/2018 normal\par ESR 10\par \par 4/2018 ED visit\par ESR 13, CRP 1.1

## 2019-03-20 NOTE — PROCEDURE
[Risks] : risks [Benefits] : benefits [Today's Date:] : Date: [unfilled] [Alternatives] : alternatives [Patient] : Prior to the start of the procedure a time out was taken and the identity of the patient was confirmed via name and date of birth with the patient. The correct site and the procedure to be performed were confirmed. The correct side was confirmed if applicable. The availability of the correct equipment was verified [Therapeutic] : therapeutic [#1 Site: ______] : #1 site identified in the [unfilled] [Ethyl Chloride] : ethyl chloride [Chlorhexidine] : chlorhexidine [22 gauge 1.5 inch] : A 22 gauge 1.5 inch needle was used [___ml 1% Lidocaine] : [unfilled] ml of 1% lidocaine [Depomedrol ___ mg] : Depomedrol [unfilled] mg [Tolerated Well] : the patient tolerated the procedure well [Reports Improvement in Symptoms] : reports improvement in symptoms [No Complications] : there were no complications [Instructions Given] : handouts/patient instructions were given to patient [Patient Instructed to Call] : patient was instructed to call if redness at site, a decrease in range of motion or an increase in pain is noted after procedure. [de-identified] : Posterior approach

## 2019-03-20 NOTE — PHYSICAL EXAM
[General Appearance - Well Nourished] : well nourished [General Appearance - Alert] : alert [General Appearance - In No Acute Distress] : in no acute distress [General Appearance - Well-Appearing] : healthy appearing [Sclera] : the sclera and conjunctiva were normal [Extraocular Movements] : extraocular movements were intact [Outer Ear] : the ears and nose were normal in appearance [Oropharynx] : the oropharynx was normal [Neck Appearance] : the appearance of the neck was normal [Neck Cervical Mass (___cm)] : no neck mass was observed [Thyroid Diffuse Enlargement] : the thyroid was not enlarged [Jugular Venous Distention Increased] : there was no jugular-venous distention [Thyroid Nodule] : there were no palpable thyroid nodules [Auscultation Breath Sounds / Voice Sounds] : lungs were clear to auscultation bilaterally [Respiration, Rhythm And Depth] : normal respiratory rhythm and effort [Heart Rate And Rhythm] : heart rate was normal and rhythm regular [Heart Sounds] : normal S1 and S2 [Heart Sounds Gallop] : no gallops [Murmurs] : no murmurs [Full Pulse] : the pedal pulses are present [Heart Sounds Pericardial Friction Rub] : no pericardial rub [Edema] : there was no peripheral edema [Veins - Varicosity Changes] : there were no varicosital changes [Bowel Sounds] : normal bowel sounds [Abdomen Tenderness] : non-tender [Abdomen Soft] : soft [Abdomen Mass (___ Cm)] : no abdominal mass palpated [Cervical Lymph Nodes Enlarged Posterior Bilaterally] : posterior cervical [Cervical Lymph Nodes Enlarged Anterior Bilaterally] : anterior cervical [Supraclavicular Lymph Nodes Enlarged Bilaterally] : supraclavicular [No CVA Tenderness] : no ~M costovertebral angle tenderness [No Spinal Tenderness] : no spinal tenderness [Abnormal Walk] : normal gait [Nail Clubbing] : no clubbing  or cyanosis of the fingernails [Musculoskeletal - Swelling] : no joint swelling seen [Motor Tone] : muscle strength and tone were normal [Skin Color & Pigmentation] : normal skin color and pigmentation [Skin Lesions] : no skin lesions [Skin Turgor] : normal skin turgor [] : no rash [Deep Tendon Reflexes (DTR)] : deep tendon reflexes were 2+ and symmetric [Sensation] : the sensory exam was normal to light touch and pinprick [Motor Exam] : the motor exam was normal [No Focal Deficits] : no focal deficits [Oriented To Time, Place, And Person] : oriented to person, place, and time [Impaired Insight] : insight and judgment were intact [Affect] : the affect was normal [Mood] : the mood was normal [FreeTextEntry1] : \par Hands- OA changes in B/L hands with Heberden and Lizandro's nodes. \par Wrists- normal\par Elbows- normal\par Shoulders- R shoulder w/ severely reduced abd/FF actively, passively up to 30. +gh and ac tenderness, +cross over, mild RTC tendinopathy throughout cuff.\par Hips- Reduced external rotation bilaterally. \par Knees- Patellofemoral crepitus bilaterally without effusion on R. L normal s/p TKR\par Ankles- normal\par Feet- normal\par MMT 10/10 proximally/distally bilaterally

## 2019-03-27 LAB
ALBUMIN SERPL ELPH-MCNC: 4.7 G/DL
ALP BLD-CCNC: 68 U/L
ALT SERPL-CCNC: 19 U/L
ANION GAP SERPL CALC-SCNC: 14 MMOL/L
AST SERPL-CCNC: 28 U/L
BASOPHILS # BLD AUTO: 0.03 K/UL
BASOPHILS NFR BLD AUTO: 0.3 %
BILIRUB SERPL-MCNC: 0.2 MG/DL
BUN SERPL-MCNC: 29 MG/DL
CALCIUM SERPL-MCNC: 9.9 MG/DL
CHLORIDE SERPL-SCNC: 104 MMOL/L
CO2 SERPL-SCNC: 24 MMOL/L
CREAT SERPL-MCNC: 1.07 MG/DL
CRP SERPL-MCNC: 0.46 MG/DL
EOSINOPHIL # BLD AUTO: 0.02 K/UL
EOSINOPHIL NFR BLD AUTO: 0.2 %
ERYTHROCYTE [SEDIMENTATION RATE] IN BLOOD BY WESTERGREN METHOD: 4 MM/HR
GLUCOSE SERPL-MCNC: 95 MG/DL
HCT VFR BLD CALC: 40 %
HGB BLD-MCNC: 12.5 G/DL
IL6 SERPL-MCNC: 4.7 PG/ML
IMM GRANULOCYTES NFR BLD AUTO: 1.1 %
LDH SERPL-CCNC: 287 U/L
LYMPHOCYTES # BLD AUTO: 0.65 K/UL
LYMPHOCYTES NFR BLD AUTO: 7.2 %
MAN DIFF?: NORMAL
MCHC RBC-ENTMCNC: 30.4 PG
MCHC RBC-ENTMCNC: 31.3 GM/DL
MCV RBC AUTO: 97.3 FL
MONOCYTES # BLD AUTO: 0.71 K/UL
MONOCYTES NFR BLD AUTO: 7.9 %
NEUTROPHILS # BLD AUTO: 7.48 K/UL
NEUTROPHILS NFR BLD AUTO: 83.3 %
PLATELET # BLD AUTO: 258 K/UL
POTASSIUM SERPL-SCNC: 4.6 MMOL/L
PROT SERPL-MCNC: 6.7 G/DL
RBC # BLD: 4.11 M/UL
RBC # FLD: 14.1 %
SODIUM SERPL-SCNC: 142 MMOL/L
WBC # FLD AUTO: 8.99 K/UL

## 2019-04-17 ENCOUNTER — APPOINTMENT (OUTPATIENT)
Dept: RHEUMATOLOGY | Facility: CLINIC | Age: 84
End: 2019-04-17

## 2019-06-05 ENCOUNTER — APPOINTMENT (OUTPATIENT)
Dept: RHEUMATOLOGY | Facility: CLINIC | Age: 84
End: 2019-06-05

## 2019-06-05 RX ORDER — PREDNISONE 1 MG/1
1 TABLET ORAL
Qty: 100 | Refills: 1 | Status: ACTIVE | COMMUNITY
Start: 2018-10-10 | End: 1900-01-01

## 2019-06-26 ENCOUNTER — APPOINTMENT (OUTPATIENT)
Dept: RHEUMATOLOGY | Facility: CLINIC | Age: 84
End: 2019-06-26
Payer: MEDICARE

## 2019-06-26 VITALS
HEART RATE: 87 BPM | TEMPERATURE: 98.6 F | HEIGHT: 57 IN | WEIGHT: 98 LBS | SYSTOLIC BLOOD PRESSURE: 140 MMHG | OXYGEN SATURATION: 98 % | BODY MASS INDEX: 21.14 KG/M2 | RESPIRATION RATE: 17 BRPM | DIASTOLIC BLOOD PRESSURE: 80 MMHG

## 2019-06-26 DIAGNOSIS — S42.91XA FRACTURE OF RIGHT SHOULDER GIRDLE, PART UNSPECIFIED, INITIAL ENCOUNTER FOR CLOSED FRACTURE: ICD-10-CM

## 2019-06-26 DIAGNOSIS — M25.511 PAIN IN RIGHT SHOULDER: ICD-10-CM

## 2019-06-26 DIAGNOSIS — G89.29 PAIN IN LEFT SHOULDER: ICD-10-CM

## 2019-06-26 DIAGNOSIS — S22.41XA MULTIPLE FRACTURES OF RIBS, RIGHT SIDE, INITIAL ENCOUNTER FOR CLOSED FRACTURE: ICD-10-CM

## 2019-06-26 DIAGNOSIS — S32.501D: ICD-10-CM

## 2019-06-26 DIAGNOSIS — S32.502D: ICD-10-CM

## 2019-06-26 DIAGNOSIS — M25.512 PAIN IN LEFT SHOULDER: ICD-10-CM

## 2019-06-26 PROCEDURE — 36415 COLL VENOUS BLD VENIPUNCTURE: CPT

## 2019-06-26 PROCEDURE — 99215 OFFICE O/P EST HI 40 MIN: CPT | Mod: 25

## 2019-06-26 RX ORDER — METHENAMINE HIPPURATE 1 G/1
1 TABLET ORAL DAILY
Qty: 30 | Refills: 0 | Status: DISCONTINUED | COMMUNITY
Start: 2019-03-20 | End: 2019-06-26

## 2019-06-26 RX ORDER — CIPROFLOXACIN HYDROCHLORIDE 250 MG/1
250 TABLET, FILM COATED ORAL
Qty: 14 | Refills: 0 | Status: DISCONTINUED | COMMUNITY
Start: 2018-07-25 | End: 2019-06-26

## 2019-07-08 PROBLEM — S42.91XA CLOSED FRACTURE OF RIGHT SHOULDER, INITIAL ENCOUNTER: Status: RESOLVED | Noted: 2018-08-27 | Resolved: 2019-07-08

## 2019-07-08 PROBLEM — S22.41XA CLOSED FRACTURE OF MULTIPLE RIBS OF RIGHT SIDE, INITIAL ENCOUNTER: Status: RESOLVED | Noted: 2018-09-12 | Resolved: 2019-07-08

## 2019-07-08 PROBLEM — M25.512 CHRONIC LEFT SHOULDER PAIN: Status: RESOLVED | Noted: 2018-05-16 | Resolved: 2019-07-08

## 2019-07-08 PROBLEM — M25.511 ACUTE PAIN OF RIGHT SHOULDER: Status: RESOLVED | Noted: 2018-07-13 | Resolved: 2019-07-08

## 2019-07-29 ENCOUNTER — RX RENEWAL (OUTPATIENT)
Age: 84
End: 2019-07-29

## 2019-08-26 ENCOUNTER — RX RENEWAL (OUTPATIENT)
Age: 84
End: 2019-08-26

## 2019-08-29 ENCOUNTER — TRANSCRIPTION ENCOUNTER (OUTPATIENT)
Age: 84
End: 2019-08-29

## 2019-09-07 ENCOUNTER — TRANSCRIPTION ENCOUNTER (OUTPATIENT)
Age: 84
End: 2019-09-07

## 2019-09-11 ENCOUNTER — LABORATORY RESULT (OUTPATIENT)
Age: 84
End: 2019-09-11

## 2019-09-11 ENCOUNTER — APPOINTMENT (OUTPATIENT)
Dept: RHEUMATOLOGY | Facility: CLINIC | Age: 84
End: 2019-09-11
Payer: MEDICARE

## 2019-09-11 VITALS
DIASTOLIC BLOOD PRESSURE: 80 MMHG | RESPIRATION RATE: 17 BRPM | WEIGHT: 98 LBS | HEIGHT: 56 IN | BODY MASS INDEX: 22.04 KG/M2 | TEMPERATURE: 98.1 F | SYSTOLIC BLOOD PRESSURE: 142 MMHG

## 2019-09-11 DIAGNOSIS — T38.0X5A OTHER OSTEOPOROSIS W/OUT CURRENT PATHOLOGICAL FRACTURE: ICD-10-CM

## 2019-09-11 DIAGNOSIS — M35.3 POLYMYALGIA RHEUMATICA: ICD-10-CM

## 2019-09-11 DIAGNOSIS — N39.0 URINARY TRACT INFECTION, SITE NOT SPECIFIED: ICD-10-CM

## 2019-09-11 DIAGNOSIS — M54.16 RADICULOPATHY, LUMBAR REGION: ICD-10-CM

## 2019-09-11 DIAGNOSIS — S32.010D WEDGE COMPRESSION FRACTURE OF T11-T12 VERTEBRA, SUBSEQUENT ENCOUNTER FOR FRACTURE WITH ROUTINE HEALING: ICD-10-CM

## 2019-09-11 DIAGNOSIS — R53.1 WEAKNESS: ICD-10-CM

## 2019-09-11 DIAGNOSIS — M19.041 PRIMARY OSTEOARTHRITIS, RIGHT HAND: ICD-10-CM

## 2019-09-11 DIAGNOSIS — S42.134S: ICD-10-CM

## 2019-09-11 DIAGNOSIS — S22.41XS MULTIPLE FRACTURES OF RIBS, RIGHT SIDE, SEQUELA: ICD-10-CM

## 2019-09-11 DIAGNOSIS — M47.816 SPONDYLOSIS W/OUT MYELOPATHY OR RADICULOPATHY, LUMBAR REGION: ICD-10-CM

## 2019-09-11 DIAGNOSIS — M19.011 PRIMARY OSTEOARTHRITIS, RIGHT SHOULDER: ICD-10-CM

## 2019-09-11 DIAGNOSIS — Z79.52 LONG TERM (CURRENT) USE OF SYSTEMIC STEROIDS: ICD-10-CM

## 2019-09-11 DIAGNOSIS — M81.0 AGE-RELATED OSTEOPOROSIS W/OUT CURRENT PATHOLOGICAL FRACTURE: ICD-10-CM

## 2019-09-11 DIAGNOSIS — M19.042 PRIMARY OSTEOARTHRITIS, RIGHT HAND: ICD-10-CM

## 2019-09-11 DIAGNOSIS — M81.8 OTHER OSTEOPOROSIS W/OUT CURRENT PATHOLOGICAL FRACTURE: ICD-10-CM

## 2019-09-11 DIAGNOSIS — M19.012 PRIMARY OSTEOARTHRITIS, RIGHT SHOULDER: ICD-10-CM

## 2019-09-11 DIAGNOSIS — M67.911 UNSPECIFIED DISORDER OF SYNOVIUM AND TENDON, RIGHT SHOULDER: ICD-10-CM

## 2019-09-11 DIAGNOSIS — T07.XXXA UNSPECIFIED MULTIPLE INJURIES, INITIAL ENCOUNTER: ICD-10-CM

## 2019-09-11 DIAGNOSIS — M67.912 UNSPECIFIED DISORDER OF SYNOVIUM AND TENDON, LEFT SHOULDER: ICD-10-CM

## 2019-09-11 DIAGNOSIS — M17.11 UNILATERAL PRIMARY OSTEOARTHRITIS, RIGHT KNEE: ICD-10-CM

## 2019-09-11 DIAGNOSIS — S22.080D WEDGE COMPRESSION FRACTURE OF T11-T12 VERTEBRA, SUBSEQUENT ENCOUNTER FOR FRACTURE WITH ROUTINE HEALING: ICD-10-CM

## 2019-09-11 PROCEDURE — 36415 COLL VENOUS BLD VENIPUNCTURE: CPT

## 2019-09-11 PROCEDURE — 99215 OFFICE O/P EST HI 40 MIN: CPT | Mod: 25

## 2019-09-11 RX ORDER — METHENAMINE HIPPURATE 1 G/1
1 TABLET ORAL DAILY
Qty: 30 | Refills: 2 | Status: DISCONTINUED | COMMUNITY
Start: 2019-07-29 | End: 2019-09-11

## 2019-09-11 RX ORDER — PREDNISONE 5 MG/1
5 TABLET ORAL
Qty: 120 | Refills: 1 | Status: DISCONTINUED | COMMUNITY
Start: 2018-07-25 | End: 2019-09-11

## 2019-09-19 PROBLEM — S22.080D: Status: ACTIVE | Noted: 2018-04-23

## 2019-09-19 PROBLEM — T07.XXXA MULTIPLE FRACTURES: Status: ACTIVE | Noted: 2018-09-12

## 2019-09-19 PROBLEM — M19.041 PRIMARY OSTEOARTHRITIS OF BOTH HANDS: Status: ACTIVE | Noted: 2018-02-20

## 2019-09-19 PROBLEM — S42.134S: Status: ACTIVE | Noted: 2018-11-14

## 2019-09-19 PROBLEM — M47.816 LUMBAR SPONDYLOSIS: Status: ACTIVE | Noted: 2018-02-20

## 2019-09-19 PROBLEM — M19.011 OSTEOARTHRITIS OF GLENOHUMERAL JOINTS, BILATERAL: Status: ACTIVE | Noted: 2018-02-20

## 2019-09-19 PROBLEM — M67.911 TENDINOPATHY OF RIGHT ROTATOR CUFF: Status: ACTIVE | Noted: 2018-05-16

## 2019-09-19 PROBLEM — M81.8 STEROID-INDUCED OSTEOPOROSIS: Status: ACTIVE | Noted: 2018-09-12

## 2019-09-19 PROBLEM — S22.41XS CLOSED FRACTURE OF MULTIPLE RIBS OF RIGHT SIDE, SEQUELA: Status: ACTIVE | Noted: 2018-11-14

## 2019-09-19 PROBLEM — M35.3 POLYMYALGIA RHEUMATICA: Status: ACTIVE | Noted: 2018-02-14

## 2019-09-19 PROBLEM — M67.912 TENDINOPATHY OF LEFT ROTATOR CUFF: Status: ACTIVE | Noted: 2018-05-16

## 2019-09-19 PROBLEM — N39.0 CHRONIC URINARY TRACT INFECTION: Status: ACTIVE | Noted: 2019-03-20

## 2019-09-19 PROBLEM — Z79.52 CURRENT CHRONIC USE OF SYSTEMIC STEROIDS: Status: ACTIVE | Noted: 2018-02-20

## 2019-09-19 PROBLEM — M17.11 PRIMARY OSTEOARTHRITIS OF RIGHT KNEE: Status: ACTIVE | Noted: 2018-02-20

## 2019-09-19 PROBLEM — M54.16 LUMBAR RADICULOPATHY: Status: ACTIVE | Noted: 2018-02-20

## 2019-09-19 PROBLEM — R53.1 GENERALIZED WEAKNESS: Status: ACTIVE | Noted: 2018-10-10

## 2019-09-19 PROBLEM — M19.011 OSTEOARTHRITIS OF ACROMIOCLAVICULAR JOINTS, BILATERAL: Status: ACTIVE | Noted: 2018-02-20

## 2019-09-19 PROBLEM — M81.0 OSTEOPOROSIS, POSTMENOPAUSAL: Status: ACTIVE | Noted: 2018-05-16

## 2019-11-17 ENCOUNTER — INPATIENT (INPATIENT)
Facility: HOSPITAL | Age: 84
LOS: 2 days | Discharge: NURSING, REHAB OR LONG-TERM CARE | DRG: 300 | End: 2019-11-20
Attending: HOSPITALIST | Admitting: INTERNAL MEDICINE
Payer: MEDICARE

## 2019-11-17 VITALS
DIASTOLIC BLOOD PRESSURE: 90 MMHG | WEIGHT: 93.92 LBS | HEIGHT: 59 IN | OXYGEN SATURATION: 98 % | HEART RATE: 98 BPM | RESPIRATION RATE: 20 BRPM | TEMPERATURE: 98 F | SYSTOLIC BLOOD PRESSURE: 138 MMHG

## 2019-11-17 DIAGNOSIS — I82.432 ACUTE EMBOLISM AND THROMBOSIS OF LEFT POPLITEAL VEIN: ICD-10-CM

## 2019-11-17 DIAGNOSIS — I80.229 PHLEBITIS AND THROMBOPHLEBITIS OF UNSPECIFIED POPLITEAL VEIN: ICD-10-CM

## 2019-11-17 LAB
ALBUMIN SERPL ELPH-MCNC: 4.5 G/DL — SIGNIFICANT CHANGE UP (ref 3.3–5.2)
ALP SERPL-CCNC: 126 U/L — HIGH (ref 40–120)
ALT FLD-CCNC: 24 U/L — SIGNIFICANT CHANGE UP
ANION GAP SERPL CALC-SCNC: 15 MMOL/L — SIGNIFICANT CHANGE UP (ref 5–17)
ANISOCYTOSIS BLD QL: SLIGHT — SIGNIFICANT CHANGE UP
APPEARANCE UR: CLEAR — SIGNIFICANT CHANGE UP
APTT BLD: 29 SEC — SIGNIFICANT CHANGE UP (ref 27.5–36.3)
AST SERPL-CCNC: 31 U/L — SIGNIFICANT CHANGE UP
BACTERIA # UR AUTO: ABNORMAL
BASOPHILS # BLD AUTO: 0 K/UL — SIGNIFICANT CHANGE UP (ref 0–0.2)
BASOPHILS NFR BLD AUTO: 0 % — SIGNIFICANT CHANGE UP (ref 0–2)
BILIRUB SERPL-MCNC: 0.6 MG/DL — SIGNIFICANT CHANGE UP (ref 0.4–2)
BILIRUB UR-MCNC: NEGATIVE — SIGNIFICANT CHANGE UP
BUN SERPL-MCNC: 30 MG/DL — HIGH (ref 8–20)
CALCIUM SERPL-MCNC: 9.9 MG/DL — SIGNIFICANT CHANGE UP (ref 8.6–10.2)
CHLORIDE SERPL-SCNC: 101 MMOL/L — SIGNIFICANT CHANGE UP (ref 98–107)
CK SERPL-CCNC: 60 U/L — SIGNIFICANT CHANGE UP (ref 25–170)
CO2 SERPL-SCNC: 26 MMOL/L — SIGNIFICANT CHANGE UP (ref 22–29)
COLOR SPEC: YELLOW — SIGNIFICANT CHANGE UP
CREAT SERPL-MCNC: 1.03 MG/DL — SIGNIFICANT CHANGE UP (ref 0.5–1.3)
DACRYOCYTES BLD QL SMEAR: SLIGHT — SIGNIFICANT CHANGE UP
DIFF PNL FLD: ABNORMAL
EOSINOPHIL # BLD AUTO: 0 K/UL — SIGNIFICANT CHANGE UP (ref 0–0.5)
EOSINOPHIL NFR BLD AUTO: 0 % — SIGNIFICANT CHANGE UP (ref 0–6)
EPI CELLS # UR: SIGNIFICANT CHANGE UP
GIANT PLATELETS BLD QL SMEAR: PRESENT — SIGNIFICANT CHANGE UP
GLUCOSE SERPL-MCNC: 217 MG/DL — HIGH (ref 70–115)
GLUCOSE UR QL: NEGATIVE MG/DL — SIGNIFICANT CHANGE UP
HCT VFR BLD CALC: 40.5 % — SIGNIFICANT CHANGE UP (ref 34.5–45)
HGB BLD-MCNC: 13.1 G/DL — SIGNIFICANT CHANGE UP (ref 11.5–15.5)
INR BLD: 0.95 RATIO — SIGNIFICANT CHANGE UP (ref 0.88–1.16)
KETONES UR-MCNC: NEGATIVE — SIGNIFICANT CHANGE UP
LEUKOCYTE ESTERASE UR-ACNC: ABNORMAL
LYMPHOCYTES # BLD AUTO: 0.09 K/UL — LOW (ref 1–3.3)
LYMPHOCYTES # BLD AUTO: 0.9 % — LOW (ref 13–44)
MACROCYTES BLD QL: SLIGHT — SIGNIFICANT CHANGE UP
MANUAL SMEAR VERIFICATION: SIGNIFICANT CHANGE UP
MCHC RBC-ENTMCNC: 30.9 PG — SIGNIFICANT CHANGE UP (ref 27–34)
MCHC RBC-ENTMCNC: 32.3 GM/DL — SIGNIFICANT CHANGE UP (ref 32–36)
MCV RBC AUTO: 95.5 FL — SIGNIFICANT CHANGE UP (ref 80–100)
MICROCYTES BLD QL: SLIGHT — SIGNIFICANT CHANGE UP
MONOCYTES # BLD AUTO: 0.17 K/UL — SIGNIFICANT CHANGE UP (ref 0–0.9)
MONOCYTES NFR BLD AUTO: 1.7 % — LOW (ref 2–14)
NEUTROPHILS # BLD AUTO: 9.49 K/UL — HIGH (ref 1.8–7.4)
NEUTROPHILS NFR BLD AUTO: 97.4 % — HIGH (ref 43–77)
NITRITE UR-MCNC: NEGATIVE — SIGNIFICANT CHANGE UP
NT-PROBNP SERPL-SCNC: 515 PG/ML — HIGH (ref 0–300)
OVALOCYTES BLD QL SMEAR: SLIGHT — SIGNIFICANT CHANGE UP
PH UR: 6.5 — SIGNIFICANT CHANGE UP (ref 5–8)
PLAT MORPH BLD: NORMAL — SIGNIFICANT CHANGE UP
PLATELET # BLD AUTO: 225 K/UL — SIGNIFICANT CHANGE UP (ref 150–400)
POIKILOCYTOSIS BLD QL AUTO: SLIGHT — SIGNIFICANT CHANGE UP
POLYCHROMASIA BLD QL SMEAR: SLIGHT — SIGNIFICANT CHANGE UP
POTASSIUM SERPL-MCNC: 4.5 MMOL/L — SIGNIFICANT CHANGE UP (ref 3.5–5.3)
POTASSIUM SERPL-SCNC: 4.5 MMOL/L — SIGNIFICANT CHANGE UP (ref 3.5–5.3)
PROT SERPL-MCNC: 7.1 G/DL — SIGNIFICANT CHANGE UP (ref 6.6–8.7)
PROT UR-MCNC: 30 MG/DL
PROTHROM AB SERPL-ACNC: 10.9 SEC — SIGNIFICANT CHANGE UP (ref 10–12.9)
RBC # BLD: 4.24 M/UL — SIGNIFICANT CHANGE UP (ref 3.8–5.2)
RBC # FLD: 14 % — SIGNIFICANT CHANGE UP (ref 10.3–14.5)
RBC BLD AUTO: ABNORMAL
RBC CASTS # UR COMP ASSIST: ABNORMAL /HPF (ref 0–4)
SODIUM SERPL-SCNC: 142 MMOL/L — SIGNIFICANT CHANGE UP (ref 135–145)
SP GR SPEC: 1.01 — SIGNIFICANT CHANGE UP (ref 1.01–1.02)
TROPONIN T SERPL-MCNC: <0.01 NG/ML — SIGNIFICANT CHANGE UP (ref 0–0.06)
UROBILINOGEN FLD QL: NEGATIVE MG/DL — SIGNIFICANT CHANGE UP
WBC # BLD: 9.74 K/UL — SIGNIFICANT CHANGE UP (ref 3.8–10.5)
WBC # FLD AUTO: 9.74 K/UL — SIGNIFICANT CHANGE UP (ref 3.8–10.5)
WBC UR QL: ABNORMAL

## 2019-11-17 PROCEDURE — 71046 X-RAY EXAM CHEST 2 VIEWS: CPT | Mod: 26

## 2019-11-17 PROCEDURE — 99285 EMERGENCY DEPT VISIT HI MDM: CPT

## 2019-11-17 PROCEDURE — 99223 1ST HOSP IP/OBS HIGH 75: CPT | Mod: AI

## 2019-11-17 PROCEDURE — 93010 ELECTROCARDIOGRAM REPORT: CPT

## 2019-11-17 PROCEDURE — 93970 EXTREMITY STUDY: CPT | Mod: 26

## 2019-11-17 PROCEDURE — 71275 CT ANGIOGRAPHY CHEST: CPT | Mod: 26

## 2019-11-17 RX ORDER — BUDESONIDE AND FORMOTEROL FUMARATE DIHYDRATE 160; 4.5 UG/1; UG/1
2 AEROSOL RESPIRATORY (INHALATION)
Refills: 0 | Status: DISCONTINUED | OUTPATIENT
Start: 2019-11-17 | End: 2019-11-20

## 2019-11-17 RX ORDER — HEPARIN SODIUM 5000 [USP'U]/ML
INJECTION INTRAVENOUS; SUBCUTANEOUS
Qty: 25000 | Refills: 0 | Status: DISCONTINUED | OUTPATIENT
Start: 2019-11-17 | End: 2019-11-18

## 2019-11-17 RX ORDER — HEPARIN SODIUM 5000 [USP'U]/ML
1500 INJECTION INTRAVENOUS; SUBCUTANEOUS EVERY 6 HOURS
Refills: 0 | Status: DISCONTINUED | OUTPATIENT
Start: 2019-11-17 | End: 2019-11-17

## 2019-11-17 RX ORDER — HEPARIN SODIUM 5000 [USP'U]/ML
INJECTION INTRAVENOUS; SUBCUTANEOUS
Qty: 25000 | Refills: 0 | Status: DISCONTINUED | OUTPATIENT
Start: 2019-11-17 | End: 2019-11-17

## 2019-11-17 RX ORDER — OXYCODONE HYDROCHLORIDE 5 MG/1
5 TABLET ORAL ONCE
Refills: 0 | Status: DISCONTINUED | OUTPATIENT
Start: 2019-11-17 | End: 2019-11-17

## 2019-11-17 RX ORDER — POLYETHYLENE GLYCOL 3350 17 G/17G
17 POWDER, FOR SOLUTION ORAL DAILY
Refills: 0 | Status: DISCONTINUED | OUTPATIENT
Start: 2019-11-17 | End: 2019-11-20

## 2019-11-17 RX ORDER — SENNA PLUS 8.6 MG/1
2 TABLET ORAL AT BEDTIME
Refills: 0 | Status: DISCONTINUED | OUTPATIENT
Start: 2019-11-17 | End: 2019-11-20

## 2019-11-17 RX ORDER — HEPARIN SODIUM 5000 [USP'U]/ML
3000 INJECTION INTRAVENOUS; SUBCUTANEOUS ONCE
Refills: 0 | Status: COMPLETED | OUTPATIENT
Start: 2019-11-17 | End: 2019-11-17

## 2019-11-17 RX ORDER — HEPARIN SODIUM 5000 [USP'U]/ML
3500 INJECTION INTRAVENOUS; SUBCUTANEOUS EVERY 6 HOURS
Refills: 0 | Status: DISCONTINUED | OUTPATIENT
Start: 2019-11-17 | End: 2019-11-17

## 2019-11-17 RX ORDER — CELECOXIB 200 MG/1
200 CAPSULE ORAL
Refills: 0 | Status: DISCONTINUED | OUTPATIENT
Start: 2019-11-17 | End: 2019-11-20

## 2019-11-17 RX ORDER — OXYCODONE AND ACETAMINOPHEN 5; 325 MG/1; MG/1
1 TABLET ORAL EVERY 4 HOURS
Refills: 0 | Status: DISCONTINUED | OUTPATIENT
Start: 2019-11-17 | End: 2019-11-20

## 2019-11-17 RX ORDER — OXYCODONE AND ACETAMINOPHEN 5; 325 MG/1; MG/1
1 TABLET ORAL EVERY 6 HOURS
Refills: 0 | Status: DISCONTINUED | OUTPATIENT
Start: 2019-11-17 | End: 2019-11-20

## 2019-11-17 RX ORDER — SODIUM CHLORIDE 9 MG/ML
500 INJECTION INTRAMUSCULAR; INTRAVENOUS; SUBCUTANEOUS
Refills: 0 | Status: DISCONTINUED | OUTPATIENT
Start: 2019-11-17 | End: 2019-11-19

## 2019-11-17 RX ORDER — HEPARIN SODIUM 5000 [USP'U]/ML
3500 INJECTION INTRAVENOUS; SUBCUTANEOUS ONCE
Refills: 0 | Status: DISCONTINUED | OUTPATIENT
Start: 2019-11-17 | End: 2019-11-17

## 2019-11-17 RX ORDER — HEPARIN SODIUM 5000 [USP'U]/ML
1500 INJECTION INTRAVENOUS; SUBCUTANEOUS EVERY 6 HOURS
Refills: 0 | Status: DISCONTINUED | OUTPATIENT
Start: 2019-11-17 | End: 2019-11-18

## 2019-11-17 RX ORDER — HEPARIN SODIUM 5000 [USP'U]/ML
3000 INJECTION INTRAVENOUS; SUBCUTANEOUS EVERY 6 HOURS
Refills: 0 | Status: DISCONTINUED | OUTPATIENT
Start: 2019-11-17 | End: 2019-11-18

## 2019-11-17 RX ADMIN — BUDESONIDE AND FORMOTEROL FUMARATE DIHYDRATE 2 PUFF(S): 160; 4.5 AEROSOL RESPIRATORY (INHALATION) at 20:56

## 2019-11-17 RX ADMIN — OXYCODONE HYDROCHLORIDE 5 MILLIGRAM(S): 5 TABLET ORAL at 15:33

## 2019-11-17 RX ADMIN — HEPARIN SODIUM 3000 UNIT(S): 5000 INJECTION INTRAVENOUS; SUBCUTANEOUS at 17:24

## 2019-11-17 RX ADMIN — OXYCODONE HYDROCHLORIDE 5 MILLIGRAM(S): 5 TABLET ORAL at 14:39

## 2019-11-17 RX ADMIN — OXYCODONE AND ACETAMINOPHEN 1 TABLET(S): 5; 325 TABLET ORAL at 23:11

## 2019-11-17 RX ADMIN — HEPARIN SODIUM 700 UNIT(S)/HR: 5000 INJECTION INTRAVENOUS; SUBCUTANEOUS at 17:25

## 2019-11-17 RX ADMIN — SENNA PLUS 2 TABLET(S): 8.6 TABLET ORAL at 23:11

## 2019-11-17 NOTE — ED PROVIDER NOTE - PHYSICAL EXAMINATION
Gen: NAD  Head: NCAT  HEENT: oral mucosa moist, normal conjunctiva  Lung: CTAB, no respiratory distress, no wheezing, rales, rhonchi  CV: normal s1/s2, rrr, no murmurs, Normal perfusion  Abd: soft, NTND  MSK: trace nonpitting edema b/l lower extremities L>R, no visible deformities, full range of motion in all 4 extremities  Neuro: No focal neurologic deficits  Skin: petechial rash b/l lower extremities, L>R  Psych: normal affect

## 2019-11-17 NOTE — ED PROVIDER NOTE - CLINICAL SUMMARY MEDICAL DECISION MAKING FREE TEXT BOX
86yo female PMH PMR presenting with b/l lower extremity edema and petechial rash- no petechiae elsewhere other than legs- will check labs, us b/l LE, cxr, ekg, pain control, and reassess. Charlotte Bobo DO

## 2019-11-17 NOTE — ED ADULT NURSE NOTE - OBJECTIVE STATEMENT
Assumed care at 1248 pt c/o edema and numbness to bilateral lower legs, pt noted to have petechia bilaterally to lower extremities, pt also co increasing SOB with ambulation and difficulty ambulating for 2 weeks, pt usually uses a walker to get around. Pt lives alone and has son that comes and checks on her daily.

## 2019-11-17 NOTE — H&P ADULT - HISTORY OF PRESENT ILLNESS
87 year old female with PMH PMR, Asthma/COPD and opioid related constipation present with 2 weeks of difficulty ambulating, ankles swollen and legs 'blood shot'. Her feet hurt upon ambulation and she was using a 4 pronged cane prior, now using a walker with seat.  Worsening over the past week so came to the ER  Occasional dyspnea (resolves with Nebs) and chills. Denies chest pain, palpitations or dizziness    Denies any immobilization - usually very active. Denies any recent trips, prior VTE.  On chronic Prednisone 20mg for PMR  Never smoker    Unsure about family history of VTE - one of patients 3 children is dead, patient didn't want to discuss further.

## 2019-11-17 NOTE — H&P ADULT - EXTREMITIES COMMENTS
LLE with mottling, ecchymoses. intact peripheral pulses  Right ankle with mottling, ecchymoses (much improved as per patient)

## 2019-11-17 NOTE — CONSULT NOTE ADULT - SUBJECTIVE AND OBJECTIVE BOX
SUBJECTIVE: 87 W h/o PMR on prednisone, asthma, osteoarthritis presents with 2 weeks of leg swelling. Legs have progressively become swollen and painful. Legs are so painful that they are preventing her from walking. Pain worse on L leg. States legs are chronically swollen but legs are now more swollen than usual. Patient is normally independent with exception of having groceries delivered once a month. Ambulates with assistance of walker at baseline.    States she also has SOB with exertion which also occurred over last 2 weeks. Patient attributes this to cold weather.    OBJECTIVE  ROS:  +leg swelling, leg pain, SOB  -f/c/n/v/d/cp    PHYSICAL EXAM: Tele-evaluation precludes physical exam. Pertinent physical exam findings as per verbal communication by Dr. Bobo and nurse at bedside are as following:  WN/WD elderly woman, seated in stretcher, pleasant, no use of accessory muscles, speaking in complete sentences; legs appear swollen, bilateral purpura on inspection    LABS AND IMAGING DATA:                        13.1   9.74  )-----------( 225      ( 2019 12:47 )             40.5         142  |  101  |  30.0<H>  ----------------------------<  217<H>  4.5   |  26.0  |  1.03    Ca    9.9      2019 12:47    TPro  7.1  /  Alb  4.5  /  TBili  0.6  /  DBili  x   /  AST  31  /  ALT  24  /  AlkPhos  126<H>      Urinalysis Basic - ( 2019 17:31 )    Color: Yellow / Appearance: Clear / S.015 / pH: x  Gluc: x / Ketone: Negative  / Bili: Negative / Urobili: Negative mg/dL   Blood: x / Protein: 30 mg/dL / Nitrite: Negative   Leuk Esterase: Trace / RBC: 3-5 /HPF / WBC 6-10   Sq Epi: x / Non Sq Epi: Occasional / Bacteria: TNTC      LE duplex: nonocclusive thrombus from the popliteal vein and to the tibioperoneal trunk to the gastrocnemius vein on left    CXR clear lungs    ASSESSMENT AND PLAN: 87 W h/o PMR on prednisone, asthma, osteoarthritis presents with 2 weeks of leg swelling. Found to have L leg DVT.    1) L leg DVT - on heparin gtt. sCr 1.03. Unclear if this represents baseline as sCr was 0.66 in 2018. Will trial IVNS at 75 cc/hr, repeat chemistry in AM. Continue with heparin gtt in AM. Can transition to DOAC based off of findings. Purpura on inspection. Unclear if related to DVT or whether represent skin manifestation of rheumatologic disease (slightly elevated CRP, normal ESR, h/o PMR), such as vasculitis. Consider rheumatology vs dermatology consultation.    2) SOB - patient reports SOB with exertion. CXR is clear. pro-BNP normal for age. ED ordered CTA to exclude PE. This would not affect ultimate management. Defer decision to proceed with CTA to primary team.    3) Abnormal UA - patient has no sx of UTI.  UA also with occasional epithelial cells. Specimen is contaminated. At best, it is asymptomatic bacteriuria. Will not treat with abx.    4) Osteoarthritis - patient on celecoxib and hydrocodone daily. Will hold celecoxib given need for anticoagulation. Pain well-controlled at present.    5) Asthma - no observable respiratory distress on interview. c/w home dose Symbicort, prn albuterol inhaler    Voicemail left with Dr. Andrews.

## 2019-11-17 NOTE — H&P ADULT - ASSESSMENT
87 year old female with PMH PMR, Asthma/COPD and opioid related constipation present with 2 weeks of difficulty ambulating, ankles swollen and legs 'blood shot'. LE doppler with non-occlusive popliteal thrombus.   Hemodynamically stable, without any hypoxia though has intermittent dyspnea.  Started on Heparin in ER    Acute Left Popliteal DVT - Given patient with limited ambulation, support at home will admit for now.  - Monitored bed  - Heparin drip for now, will transition to DOAC  - TTE to evaluate for RV dysfunction  - PT evaluation (patient open to rehab if necessary)  - Hematology consult; will need to follow up for outpatient work up for hypercoagulable disorder    Elevated BUN/SCr ratio - dehydration  - IVF as order by TeleHospitalist    Chronic Moderate Persistent Asthma; likely component of Restrictive disease as well given severe kyphosis  - Continue ICS  - Continue NYA    PMR  - Continue Prednisone  - Continue Opioids, Celebrex    Constipation  - Continue bowel regimen    Dirty UA, no symptoms of infection.  - Monitor clinically - defer antibiotics for now      Disposition - pending clinical course, PT

## 2019-11-17 NOTE — ED PROVIDER NOTE - OBJECTIVE STATEMENT
88yo female PMH polymyalgia rheumatica, asthma, presenting with b/l lower extremity edema x 2 weeks, a/w lower extremity pain, and redness x 2 days. patient states that she noticed both of her ankles were swollen over last 2 weeks, a/w increased shortness of breath and increased use of nebulizer. Denies trauma/falls. Denies h/o blood clots. Takes no blood thinners. Unable to ambulate today 2/2 pain. No fever/chills. patient states that her PMR flares typically present as neck and shoulder pain.

## 2019-11-18 LAB
ANION GAP SERPL CALC-SCNC: 13 MMOL/L — SIGNIFICANT CHANGE UP (ref 5–17)
APTT BLD: 50.4 SEC — HIGH (ref 27.5–36.3)
APTT BLD: 53.7 SEC — HIGH (ref 27.5–36.3)
BUN SERPL-MCNC: 24 MG/DL — HIGH (ref 8–20)
CALCIUM SERPL-MCNC: 9.1 MG/DL — SIGNIFICANT CHANGE UP (ref 8.6–10.2)
CHLORIDE SERPL-SCNC: 101 MMOL/L — SIGNIFICANT CHANGE UP (ref 98–107)
CO2 SERPL-SCNC: 25 MMOL/L — SIGNIFICANT CHANGE UP (ref 22–29)
CREAT SERPL-MCNC: 0.72 MG/DL — SIGNIFICANT CHANGE UP (ref 0.5–1.3)
GLUCOSE SERPL-MCNC: 128 MG/DL — HIGH (ref 70–115)
HCT VFR BLD CALC: 33.7 % — LOW (ref 34.5–45)
HCT VFR BLD CALC: 36 % — SIGNIFICANT CHANGE UP (ref 34.5–45)
HGB BLD-MCNC: 11.2 G/DL — LOW (ref 11.5–15.5)
HGB BLD-MCNC: 11.9 G/DL — SIGNIFICANT CHANGE UP (ref 11.5–15.5)
MCHC RBC-ENTMCNC: 31.1 PG — SIGNIFICANT CHANGE UP (ref 27–34)
MCHC RBC-ENTMCNC: 31.3 PG — SIGNIFICANT CHANGE UP (ref 27–34)
MCHC RBC-ENTMCNC: 33.1 GM/DL — SIGNIFICANT CHANGE UP (ref 32–36)
MCHC RBC-ENTMCNC: 33.2 GM/DL — SIGNIFICANT CHANGE UP (ref 32–36)
MCV RBC AUTO: 94 FL — SIGNIFICANT CHANGE UP (ref 80–100)
MCV RBC AUTO: 94.1 FL — SIGNIFICANT CHANGE UP (ref 80–100)
PLATELET # BLD AUTO: 197 K/UL — SIGNIFICANT CHANGE UP (ref 150–400)
PLATELET # BLD AUTO: 205 K/UL — SIGNIFICANT CHANGE UP (ref 150–400)
POTASSIUM SERPL-MCNC: 3.7 MMOL/L — SIGNIFICANT CHANGE UP (ref 3.5–5.3)
POTASSIUM SERPL-SCNC: 3.7 MMOL/L — SIGNIFICANT CHANGE UP (ref 3.5–5.3)
RBC # BLD: 3.58 M/UL — LOW (ref 3.8–5.2)
RBC # BLD: 3.83 M/UL — SIGNIFICANT CHANGE UP (ref 3.8–5.2)
RBC # FLD: 13.8 % — SIGNIFICANT CHANGE UP (ref 10.3–14.5)
RBC # FLD: 14 % — SIGNIFICANT CHANGE UP (ref 10.3–14.5)
SODIUM SERPL-SCNC: 139 MMOL/L — SIGNIFICANT CHANGE UP (ref 135–145)
TSH SERPL-MCNC: 0.62 UIU/ML — SIGNIFICANT CHANGE UP (ref 0.27–4.2)
VIT B12 SERPL-MCNC: 464 PG/ML — SIGNIFICANT CHANGE UP (ref 232–1245)
WBC # BLD: 11.24 K/UL — HIGH (ref 3.8–10.5)
WBC # BLD: 9.83 K/UL — SIGNIFICANT CHANGE UP (ref 3.8–10.5)
WBC # FLD AUTO: 11.24 K/UL — HIGH (ref 3.8–10.5)
WBC # FLD AUTO: 9.83 K/UL — SIGNIFICANT CHANGE UP (ref 3.8–10.5)

## 2019-11-18 PROCEDURE — 99233 SBSQ HOSP IP/OBS HIGH 50: CPT

## 2019-11-18 PROCEDURE — 99222 1ST HOSP IP/OBS MODERATE 55: CPT | Mod: 57

## 2019-11-18 PROCEDURE — 22310 CLOSED TX VERT FX W/O MANJ: CPT

## 2019-11-18 RX ORDER — CEFTRIAXONE 500 MG/1
1000 INJECTION, POWDER, FOR SOLUTION INTRAMUSCULAR; INTRAVENOUS EVERY 24 HOURS
Refills: 0 | Status: DISCONTINUED | OUTPATIENT
Start: 2019-11-18 | End: 2019-11-19

## 2019-11-18 RX ORDER — ACETAMINOPHEN 500 MG
650 TABLET ORAL EVERY 6 HOURS
Refills: 0 | Status: DISCONTINUED | OUTPATIENT
Start: 2019-11-18 | End: 2019-11-20

## 2019-11-18 RX ORDER — ENOXAPARIN SODIUM 100 MG/ML
35 INJECTION SUBCUTANEOUS
Refills: 0 | Status: DISCONTINUED | OUTPATIENT
Start: 2019-11-18 | End: 2019-11-19

## 2019-11-18 RX ADMIN — OXYCODONE AND ACETAMINOPHEN 1 TABLET(S): 5; 325 TABLET ORAL at 20:25

## 2019-11-18 RX ADMIN — BUDESONIDE AND FORMOTEROL FUMARATE DIHYDRATE 2 PUFF(S): 160; 4.5 AEROSOL RESPIRATORY (INHALATION) at 23:00

## 2019-11-18 RX ADMIN — SENNA PLUS 2 TABLET(S): 8.6 TABLET ORAL at 21:16

## 2019-11-18 RX ADMIN — OXYCODONE AND ACETAMINOPHEN 1 TABLET(S): 5; 325 TABLET ORAL at 01:05

## 2019-11-18 RX ADMIN — OXYCODONE AND ACETAMINOPHEN 1 TABLET(S): 5; 325 TABLET ORAL at 14:52

## 2019-11-18 RX ADMIN — CELECOXIB 200 MILLIGRAM(S): 200 CAPSULE ORAL at 05:03

## 2019-11-18 RX ADMIN — HEPARIN SODIUM 800 UNIT(S)/HR: 5000 INJECTION INTRAVENOUS; SUBCUTANEOUS at 01:01

## 2019-11-18 RX ADMIN — OXYCODONE AND ACETAMINOPHEN 1 TABLET(S): 5; 325 TABLET ORAL at 21:50

## 2019-11-18 RX ADMIN — HEPARIN SODIUM 1500 UNIT(S): 5000 INJECTION INTRAVENOUS; SUBCUTANEOUS at 00:58

## 2019-11-18 RX ADMIN — CEFTRIAXONE 100 MILLIGRAM(S): 500 INJECTION, POWDER, FOR SOLUTION INTRAMUSCULAR; INTRAVENOUS at 10:03

## 2019-11-18 RX ADMIN — OXYCODONE AND ACETAMINOPHEN 1 TABLET(S): 5; 325 TABLET ORAL at 07:58

## 2019-11-18 RX ADMIN — ENOXAPARIN SODIUM 35 MILLIGRAM(S): 100 INJECTION SUBCUTANEOUS at 21:17

## 2019-11-18 RX ADMIN — OXYCODONE AND ACETAMINOPHEN 1 TABLET(S): 5; 325 TABLET ORAL at 08:14

## 2019-11-18 RX ADMIN — OXYCODONE AND ACETAMINOPHEN 1 TABLET(S): 5; 325 TABLET ORAL at 13:50

## 2019-11-18 RX ADMIN — Medication 1 TABLET(S): at 16:55

## 2019-11-18 RX ADMIN — Medication 20 MILLIGRAM(S): at 05:00

## 2019-11-18 RX ADMIN — ENOXAPARIN SODIUM 35 MILLIGRAM(S): 100 INJECTION SUBCUTANEOUS at 10:02

## 2019-11-18 NOTE — CONSULT NOTE ADULT - ATTENDING COMMENTS
Attending statement:  I have personally seen this patient, and formed a face to face diagnostic evaluation on this patient on this date.  I have reviewed the PA, NP and or Medical/PA student and/or Resident documentation and agree with the history, physical exam and plan of care except if noted otherwise.     Patient seen and examined in emergency department today's date is November 18. Patient states her primary concern is her inability to walk. And based on the above mentioned physical exam I believe this patient is myelopathic with sustained clonus in bilateral lower extremities patient has pronounced thoracic kyphosis to thoracic compression fractures. 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 Cervical imaging needs to be obtained as well as thoracic and lumbar and perform an MRI to be 7 educated discussion with this woman as regard to the spine as a contributing factor hurt to her gait disturbance/disability. Surgical management is going to be highly unlikely based upon her age.

## 2019-11-18 NOTE — CONSULT NOTE ADULT - SUBJECTIVE AND OBJECTIVE BOX
SARA TRIANA  87y  Female  [id number]      Patient is a 87y old  Female who presents with a chief complaint of DVT (18 Nov 2019 16:18)    HPI:  87 year old female with PMH PMR, Asthma/COPD and opioid related constipation present with 2 weeks of difficulty ambulating, ankles swollen and legs 'blood shot'. Her feet hurt upon ambulation and she was using a 4 pronged cane prior, now using a walker with seat.  Worsening over the past week so came to the ER  Occasional dyspnea (resolves with Nebs) and chills. Denies chest pain, palpitations or dizziness  Hx of PMR, was on 20 mg prednisone a day  Has had compression fx and was not very active or mobile  Developed pain and swelling left ankle    Denies any immobilization - usually very active. Denies any recent trips, prior VTE.  On chronic Prednisone 20mg for PMR  Never smoker    Unsure about family history of VTE - one of patients 3 children is dead, patient didn't want to discuss further. (17 Nov 2019 19:02)    PAST MEDICAL & SURGICAL HISTORY:  Polymyalgia rheumatica  Diverticulosis  Asthma  S/P knee replacement, left  S/P hysterectomy: 1982  S/P appendectomy: 1962    FAMILY HISTORY:  Family history of stroke    REVIEW OF SYSTEMS    No SOB  No CP  No cough   No bleeding  No headaches  No change in BMs or urination  No generalized bone pain.  All other ROS is neg.  	      PHYSICAL EXAM:    Alert.  Oriented  Very pleasant  Mild pallor  No jaundice  No LAD  Lungs clear  Heart RR  Abd: Non-tender  No HSM  LEs: redness left ankle and shin  No tenderness or significant edema  :      CBC Full  -  ( 18 Nov 2019 07:19 )  WBC Count : 11.24 K/uL  RBC Count : 3.83 M/uL  Hemoglobin : 11.9 g/dL  Hematocrit : 36.0 %  Platelet Count - Automated : 205 K/uL  Mean Cell Volume : 94.0 fl  Mean Cell Hemoglobin : 31.1 pg  Mean Cell Hemoglobin Concentration : 33.1 gm/dL  Auto Neutrophil # : x  Auto Lymphocyte # : x  Auto Monocyte # : x  Auto Eosinophil # : x  Auto Basophil # : x  Auto Neutrophil % : x  Auto Lymphocyte % : x  Auto Monocyte % : x  Auto Eosinophil % : x  Auto Basophil % : x    PT/INR - ( 17 Nov 2019 12:47 )   PT: 10.9 sec;   INR: 0.95 ratio         PTT - ( 18 Nov 2019 09:56 )  PTT:50.4 sec  18 Nov 2019 07:19    139    |  101    |  24.0   ----------------------------<  128    3.7     |  25.0   |  0.72     Ca    9.1        18 Nov 2019 07:19    TPro  7.1    /  Alb  4.5    /  TBili  0.6    /  DBili  x      /  AST  31     /  ALT  24     /  AlkPhos  126    17 Nov 2019 12:47          < from: CT Angio Chest w/ IV Cont (11.17.19 @ 19:18) >   EXAM:  CT ANGIO CHEST (W)AW IC                          PROCEDURE DATE:  11/17/2019          INTERPRETATION:  FINAL REPORT:    PROCEDURE INFORMATION:   Exam: CT Angiography Chest With Contrast   Exam date and time: 11/17/2019 7:05 PM   Clinical history: Shortness of breath    TECHNIQUE:   Imaging protocol: Computed tomographic angiography of the chest with   intravenous contrast.   3D rendering: MIP reconstructed images were created and reviewed.   Radiation optimization: All CT scans at this facility use at least one of   these   dose optimization techniques: automated exposure control; mA and/or kV   adjustment per patient size (includes targeted exams where dose is   matched to   clinical indication); or iterative reconstruction.   Contrast material: 350 OMNIPAQUE; Contrast volume: 44 ml; Contrast route:   IV;      COMPARISON:   CT chest 4/21/2018    FINDINGS:   Vessels: No pulmonary embolism.  Aorta: Thoracic aorta normal in caliber with mild calcified plaque.  Lungs: Central airways are patent. Scattered foci of linear atelectasis   or scarring. Subsegmental atelectasis or scarring at the posterior medial   aspect of the right lower lobe. Lungs otherwise clear.  Pleural space: Unremarkable. No pneumothorax. No pleural effusion.   Heart: Cardiomegaly. No pericardial effusion.  Mediastinum/south: No enlarged lymph nodes.  Chest wall/lower neck: No enlarged axillary lymph nodes.  Upper abdomen: 5.6 cm cyst in the lateral segment of the left hepatic   lobe containing a thin calcified internal septation. Small cyst in the   right hepatic lobe.  Bones/joints: Multiple healing bilateral rib fractures. Compression   fractures of T7 and T11, T7 has worsened since 4/21/2018 and T11 is new.   Severe bilateral glenohumeral joint space narrowing. Fracture of the   anterior aspect of the glenoid on the right. Mild anterolisthesis of C6   on C7.    IMPRESSION:    No pulmonary embolism.    No acute pulmonary disease.     Compression fracture of T11 and fracture of the anterior aspect of the   glenoid on the right of indeterminate age; clinical correlation is   recommended.    Compression fracture of T7, worsened since 4/21/2018.                ENEIDA FUNEZ   This document has been electronically signed. Nov 18 2019  8:56AM        < end of copied text >      LLE DVT. partially occlusive, popliteal vein likely due to immobility  Plan: Becuase of age and expected ongoing immobility, will need indefinite AC  No need to do a hypercoagulable state workup    Switch to Eliquis or Xarelto    Thank you/

## 2019-11-18 NOTE — CONSULT NOTE ADULT - SUBJECTIVE AND OBJECTIVE BOX
Pt Name: SARA TRIANA    MRN: [id number]      Patient is a 87y Female presenting to the emergency department with a chief complaint of difficulty ambulating and left leg pain found to have LLE DVT and T7 and T11 compression fxs. Pt says she has known T7 compression fx and has been treated by her rheumatologist x 3 years. She has chronic back pain and was told she is not a surgical candidate due to her comorbidities. She has been treated conservatively by her rheumatologist and pain management with minimal relief. Back pain is mostly tolerable and she says she functions fine and lives by herself without any issues. Recently she noticed difficulty ambulating. She does not fall. She uses a cane. She has some weakness in her legs and had pain and swelling in left lower leg, found to have dvt. Denies any numbness or tingling. No problems with upper extremities. No other complaints.  .    HEALTH ISSUES - PROBLEM Dx:  Thrombophlebitis of popliteal vein    REVIEW OF SYSTEMS    General:	No fevers/chills    Skin/Breast: No rash    Respiratory and Thorax: Some dyspnea secondary to rib fxs per pt  	  Cardiovascular:	No CP    Gastrointestinal:	 No nausea    Genitourinary:	No incontinence    Musculoskeletal:	 See HPI    Neurological:	See HPI    ROS is otherwise negative.    PAST MEDICAL & SURGICAL HISTORY:  PAST MEDICAL & SURGICAL HISTORY:  Polymyalgia rheumatica  Diverticulosis  Asthma  S/P knee replacement, left  S/P hysterectomy: 1982  S/P appendectomy: 1962      Allergies: No Known Allergies      Medications: acetaminophen   Tablet .. 650 milliGRAM(s) Oral every 6 hours PRN  budesonide 160 MICROgram(s)/formoterol 4.5 MICROgram(s) Inhaler 2 Puff(s) Inhalation two times a day  calcium carbonate 1250 mG  + Vitamin D (OsCal 500 + D) 1 Tablet(s) Oral two times a day  cefTRIAXone   IVPB 1000 milliGRAM(s) IV Intermittent every 24 hours  celecoxib Oral Tab/Cap - Peds 200 milliGRAM(s) Oral before breakfast PRN  enoxaparin Injectable 35 milliGRAM(s) SubCutaneous two times a day  oxycodone    5 mG/acetaminophen 325 mG 1 Tablet(s) Oral every 4 hours PRN  oxycodone    5 mG/acetaminophen 325 mG 1 Tablet(s) Oral every 6 hours PRN  polyethylene glycol 3350 17 Gram(s) Oral daily PRN  predniSONE   Tablet 20 milliGRAM(s) Oral daily  senna 2 Tablet(s) Oral at bedtime  sodium chloride 0.9%. 500 milliLiter(s) IV Continuous <Continuous>      FAMILY HISTORY:  Family history of stroke  : non-contributory    Social History:     Ambulation: Walking independently [ ] With Cane [X ] With Walker [ ]  Bedbound [ ]                           11.9   11.24 )-----------( 205      ( 18 Nov 2019 07:19 )             36.0     11-18    139  |  101  |  24.0<H>  ----------------------------<  128<H>  3.7   |  25.0  |  0.72    Ca    9.1      18 Nov 2019 07:19    TPro  7.1  /  Alb  4.5  /  TBili  0.6  /  DBili  x   /  AST  31  /  ALT  24  /  AlkPhos  126<H>  11-17      PHYSICAL EXAM:    Vital Signs Last 24 Hrs  T(C): 36.9 (18 Nov 2019 15:25), Max: 37.2 (17 Nov 2019 17:52)  T(F): 98.4 (18 Nov 2019 15:25), Max: 99 (17 Nov 2019 17:52)  HR: 89 (18 Nov 2019 15:25) (74 - 100)  BP: 126/64 (18 Nov 2019 15:25) (98/58 - 155/84)  BP(mean): 107 (17 Nov 2019 19:02) (107 - 107)  RR: 18 (18 Nov 2019 15:25) (16 - 18)  SpO2: 97% (18 Nov 2019 15:25) (97% - 98%)  Daily     Daily     Appearance: Alert, responsive, in no acute distress.    Skin: no rash on visible skin. Skin is clean, dry and intact. No bleeding. No abrasions. No ulcerations. +ecchymosis left lower leg    Vascular: 2+ distal pulses. Cap refill < 2 sec. No extremity ulcerations. No cyanosis.    Musculoskeletal:         Left Upper Extremity: Atraumatic with normal alignment NROM. No crepitus. No bony tenderness.        Right Upper Extremity: Atraumatic with normal alignment NROM. No crepitus. No bony tenderness.        Left Lower Extremity: Atraumatic with normal alignment NROM. No crepitus. No bony tenderness.        Right Lower Extremity: Atraumatic with normal alignment NROM. No crepitus. No bony tenderness.     Neurological: Sensation is grossly intact to light touch. No focal deficits or weaknesses found.  Back- +Kyphotic deformity. Minimal TTP Thoracic region.   Pathologic reflexes: [- ] Donovan,  [ +]  Clonus            Reflexes:   Biceps, Brachioradialis, Patella, Achilles intact            Motor exam: [  ]          [ ] Upper extremity              Bi(c5)  WE(c6)  EE(c7)   FF(c8)                                       R         5/5        5/5        5/5       5/5                                      L          5/5        5/5        5/5       5/5         [ ] Lower extremity             HF(l2)   KE(l3)    TA(l4)   EHL(l5)  GS(s1)                                          R        5/5        5/5        5/5       5/5         5/5                                        L         5/5        5/5       5/5       5/5          5/5    Imaging Studies:  Imaging Studies:  < from: CT Angio Chest w/ IV Cont (11.17.19 @ 19:18) >   EXAM:  CT ANGIO CHEST (W)AW IC                          PROCEDURE DATE:  11/17/2019          INTERPRETATION:  FINAL REPORT:    PROCEDURE INFORMATION:   Exam: CT Angiography Chest With Contrast   Exam date and time: 11/17/2019 7:05 PM   Clinical history: Shortness of breath    TECHNIQUE:   Imaging protocol: Computed tomographic angiography of the chest with   intravenous contrast.   3D rendering: MIP reconstructed images were created and reviewed.   Radiation optimization: All CT scans at this facility use at least one of   these   dose optimization techniques: automated exposure control; mA and/or kV   adjustment per patient size (includes targeted exams where dose is   matched to   clinical indication); or iterative reconstruction.   Contrast material: 350 OMNIPAQUE; Contrast volume: 44 ml; Contrast route:   IV;      COMPARISON:   CT chest 4/21/2018    FINDINGS:   Vessels: No pulmonary embolism.  Aorta: Thoracic aorta normal in caliber with mild calcified plaque.  Lungs: Central airways are patent. Scattered foci of linear atelectasis   or scarring. Subsegmental atelectasis or scarring at the posterior medial   aspect of the right lower lobe. Lungs otherwise clear.  Pleural space: Unremarkable. No pneumothorax. No pleural effusion.   Heart: Cardiomegaly. No pericardial effusion.  Mediastinum/south: No enlarged lymph nodes.  Chest wall/lower neck: No enlarged axillary lymph nodes.  Upper abdomen: 5.6 cm cyst in the lateral segment of the left hepatic   lobe containing a thin calcified internal septation. Small cyst in the   right hepatic lobe.  Bones/joints: Multiple healing bilateral rib fractures. Compression   fractures of T7 and T11, T7 has worsened since 4/21/2018 and T11 is new.   Severe bilateral glenohumeral joint space narrowing. Fracture of the   anterior aspect of the glenoid on the right. Mild anterolisthesis of C6   on C7.    IMPRESSION:    No pulmonary embolism.    No acute pulmonary disease.     Compression fracture of T11 and fracture of the anterior aspect of the   glenoid on the right of indeterminate age; clinical correlation is   recommended.    Compression fracture of T7, worsened since 4/21/2018.    < end of copied text >       A/P:  Pt is a  87y Female with LLE DVT found to have chronic T7 compression fx and sub/acute T11 compression fx    PLAN:  -pain control  -PT/OT  -WBAT  -MRI C/T/L spine  -will f/u mri  -Rehab eval  Pt seen with and discussed with Dr Guerra

## 2019-11-18 NOTE — PROGRESS NOTE ADULT - ASSESSMENT
87 year old female with PMH PMR, Asthma/COPD and opioid related constipation present with 2 weeks of difficulty ambulating, ankles swollen and legs 'blood shot'. LE doppler with non-occlusive popliteal thrombus.   Hemodynamically stable, without any hypoxia though has intermittent dyspnea.  Started on Heparin in ER    Acute Left Popliteal DVT - Admitted and started on Heparin. CTA chest without any PE, however has multiple VCF  - Monitored bed  - Heparin drip for now, will transition to DOAC  - TTE to evaluate for RV dysfunction  - PT evaluation (patient open to rehab if necessary)  - Hematology consult; will need to follow up for outpatient work up for hypercoagulable disorder    Vertebral Compression Fracture T7 chronic, t11 new  - MR spine  - Pain medications  - Ortho (may need brace)    Elevated BUN/SCr ratio - dehydration  - IVF       Chronic Moderate Persistent Asthma; likely component of Restrictive disease as well given severe kyphosis  - Continue ICS  - Continue NYA    PMR  - Continue Prednisone  - Continue Opioids, Celebrex    Constipation  - Continue bowel regimen    Dirty UA, no symptoms of infection, however now with leukocytosis  - Rocephin      Disposition - pending clinical course, PT

## 2019-11-19 ENCOUNTER — TRANSCRIPTION ENCOUNTER (OUTPATIENT)
Age: 84
End: 2019-11-19

## 2019-11-19 DIAGNOSIS — S22.000A WEDGE COMPRESSION FRACTURE OF UNSPECIFIED THORACIC VERTEBRA, INITIAL ENCOUNTER FOR CLOSED FRACTURE: ICD-10-CM

## 2019-11-19 LAB
-  AMIKACIN: SIGNIFICANT CHANGE UP
-  AMPICILLIN/SULBACTAM: SIGNIFICANT CHANGE UP
-  AMPICILLIN: SIGNIFICANT CHANGE UP
-  AZTREONAM: SIGNIFICANT CHANGE UP
-  CEFAZOLIN: SIGNIFICANT CHANGE UP
-  CEFEPIME: SIGNIFICANT CHANGE UP
-  CEFOXITIN: SIGNIFICANT CHANGE UP
-  CEFTRIAXONE: SIGNIFICANT CHANGE UP
-  CIPROFLOXACIN: SIGNIFICANT CHANGE UP
-  ERTAPENEM: SIGNIFICANT CHANGE UP
-  GENTAMICIN: SIGNIFICANT CHANGE UP
-  IMIPENEM: SIGNIFICANT CHANGE UP
-  LEVOFLOXACIN: SIGNIFICANT CHANGE UP
-  MEROPENEM: SIGNIFICANT CHANGE UP
-  NITROFURANTOIN: SIGNIFICANT CHANGE UP
-  PIPERACILLIN/TAZOBACTAM: SIGNIFICANT CHANGE UP
-  TIGECYCLINE: SIGNIFICANT CHANGE UP
-  TOBRAMYCIN: SIGNIFICANT CHANGE UP
-  TRIMETHOPRIM/SULFAMETHOXAZOLE: SIGNIFICANT CHANGE UP
ALBUMIN SERPL ELPH-MCNC: 4 G/DL — SIGNIFICANT CHANGE UP (ref 3.3–5.2)
ALP SERPL-CCNC: 104 U/L — SIGNIFICANT CHANGE UP (ref 40–120)
ALT FLD-CCNC: 18 U/L — SIGNIFICANT CHANGE UP
ANION GAP SERPL CALC-SCNC: 16 MMOL/L — SIGNIFICANT CHANGE UP (ref 5–17)
AST SERPL-CCNC: 26 U/L — SIGNIFICANT CHANGE UP
BILIRUB SERPL-MCNC: 0.4 MG/DL — SIGNIFICANT CHANGE UP (ref 0.4–2)
BUN SERPL-MCNC: 34 MG/DL — HIGH (ref 8–20)
CALCIUM SERPL-MCNC: 9.6 MG/DL — SIGNIFICANT CHANGE UP (ref 8.6–10.2)
CHLORIDE SERPL-SCNC: 101 MMOL/L — SIGNIFICANT CHANGE UP (ref 98–107)
CO2 SERPL-SCNC: 25 MMOL/L — SIGNIFICANT CHANGE UP (ref 22–29)
CREAT SERPL-MCNC: 0.76 MG/DL — SIGNIFICANT CHANGE UP (ref 0.5–1.3)
CULTURE RESULTS: SIGNIFICANT CHANGE UP
GLUCOSE SERPL-MCNC: 181 MG/DL — HIGH (ref 70–115)
HCT VFR BLD CALC: 41.1 % — SIGNIFICANT CHANGE UP (ref 34.5–45)
HGB BLD-MCNC: 13.3 G/DL — SIGNIFICANT CHANGE UP (ref 11.5–15.5)
MAGNESIUM SERPL-MCNC: 2.1 MG/DL — SIGNIFICANT CHANGE UP (ref 1.6–2.6)
MCHC RBC-ENTMCNC: 30.7 PG — SIGNIFICANT CHANGE UP (ref 27–34)
MCHC RBC-ENTMCNC: 32.4 GM/DL — SIGNIFICANT CHANGE UP (ref 32–36)
MCV RBC AUTO: 94.9 FL — SIGNIFICANT CHANGE UP (ref 80–100)
METHOD TYPE: SIGNIFICANT CHANGE UP
ORGANISM # SPEC MICROSCOPIC CNT: SIGNIFICANT CHANGE UP
ORGANISM # SPEC MICROSCOPIC CNT: SIGNIFICANT CHANGE UP
PLATELET # BLD AUTO: 253 K/UL — SIGNIFICANT CHANGE UP (ref 150–400)
POTASSIUM SERPL-MCNC: 4.4 MMOL/L — SIGNIFICANT CHANGE UP (ref 3.5–5.3)
POTASSIUM SERPL-SCNC: 4.4 MMOL/L — SIGNIFICANT CHANGE UP (ref 3.5–5.3)
PROT SERPL-MCNC: 6.5 G/DL — LOW (ref 6.6–8.7)
RBC # BLD: 4.33 M/UL — SIGNIFICANT CHANGE UP (ref 3.8–5.2)
RBC # FLD: 13.9 % — SIGNIFICANT CHANGE UP (ref 10.3–14.5)
SODIUM SERPL-SCNC: 142 MMOL/L — SIGNIFICANT CHANGE UP (ref 135–145)
SPECIMEN SOURCE: SIGNIFICANT CHANGE UP
WBC # BLD: 10.74 K/UL — HIGH (ref 3.8–10.5)
WBC # FLD AUTO: 10.74 K/UL — HIGH (ref 3.8–10.5)

## 2019-11-19 PROCEDURE — 99232 SBSQ HOSP IP/OBS MODERATE 35: CPT

## 2019-11-19 PROCEDURE — 72146 MRI CHEST SPINE W/O DYE: CPT | Mod: 26

## 2019-11-19 PROCEDURE — 72148 MRI LUMBAR SPINE W/O DYE: CPT | Mod: 26

## 2019-11-19 PROCEDURE — 72141 MRI NECK SPINE W/O DYE: CPT | Mod: 26

## 2019-11-19 PROCEDURE — 99232 SBSQ HOSP IP/OBS MODERATE 35: CPT | Mod: 24

## 2019-11-19 RX ORDER — CEPHALEXIN 500 MG
500 CAPSULE ORAL THREE TIMES A DAY
Refills: 0 | Status: DISCONTINUED | OUTPATIENT
Start: 2019-11-19 | End: 2019-11-20

## 2019-11-19 RX ORDER — APIXABAN 2.5 MG/1
10 TABLET, FILM COATED ORAL EVERY 12 HOURS
Refills: 0 | Status: DISCONTINUED | OUTPATIENT
Start: 2019-11-19 | End: 2019-11-19

## 2019-11-19 RX ORDER — ALBUTEROL 90 UG/1
1 AEROSOL, METERED ORAL EVERY 4 HOURS
Refills: 0 | Status: DISCONTINUED | OUTPATIENT
Start: 2019-11-19 | End: 2019-11-20

## 2019-11-19 RX ORDER — APIXABAN 2.5 MG/1
5 TABLET, FILM COATED ORAL EVERY 12 HOURS
Refills: 0 | Status: DISCONTINUED | OUTPATIENT
Start: 2019-11-19 | End: 2019-11-20

## 2019-11-19 RX ORDER — LIDOCAINE 4 G/100G
2 CREAM TOPICAL DAILY
Refills: 0 | Status: DISCONTINUED | OUTPATIENT
Start: 2019-11-19 | End: 2019-11-20

## 2019-11-19 RX ORDER — ALBUTEROL 90 UG/1
2.5 AEROSOL, METERED ORAL ONCE
Refills: 0 | Status: DISCONTINUED | OUTPATIENT
Start: 2019-11-19 | End: 2019-11-20

## 2019-11-19 RX ORDER — FENTANYL CITRATE 50 UG/ML
1 INJECTION INTRAVENOUS
Refills: 0 | Status: DISCONTINUED | OUTPATIENT
Start: 2019-11-19 | End: 2019-11-20

## 2019-11-19 RX ORDER — ALBUTEROL 90 UG/1
2.5 AEROSOL, METERED ORAL EVERY 6 HOURS
Refills: 0 | Status: DISCONTINUED | OUTPATIENT
Start: 2019-11-19 | End: 2019-11-20

## 2019-11-19 RX ADMIN — OXYCODONE AND ACETAMINOPHEN 1 TABLET(S): 5; 325 TABLET ORAL at 21:30

## 2019-11-19 RX ADMIN — BUDESONIDE AND FORMOTEROL FUMARATE DIHYDRATE 2 PUFF(S): 160; 4.5 AEROSOL RESPIRATORY (INHALATION) at 22:22

## 2019-11-19 RX ADMIN — OXYCODONE AND ACETAMINOPHEN 1 TABLET(S): 5; 325 TABLET ORAL at 08:13

## 2019-11-19 RX ADMIN — SENNA PLUS 2 TABLET(S): 8.6 TABLET ORAL at 21:52

## 2019-11-19 RX ADMIN — Medication 500 MILLIGRAM(S): at 23:00

## 2019-11-19 RX ADMIN — APIXABAN 5 MILLIGRAM(S): 2.5 TABLET, FILM COATED ORAL at 15:45

## 2019-11-19 RX ADMIN — OXYCODONE AND ACETAMINOPHEN 1 TABLET(S): 5; 325 TABLET ORAL at 05:52

## 2019-11-19 RX ADMIN — OXYCODONE AND ACETAMINOPHEN 1 TABLET(S): 5; 325 TABLET ORAL at 02:38

## 2019-11-19 RX ADMIN — OXYCODONE AND ACETAMINOPHEN 1 TABLET(S): 5; 325 TABLET ORAL at 10:18

## 2019-11-19 RX ADMIN — OXYCODONE AND ACETAMINOPHEN 1 TABLET(S): 5; 325 TABLET ORAL at 01:38

## 2019-11-19 RX ADMIN — CEFTRIAXONE 100 MILLIGRAM(S): 500 INJECTION, POWDER, FOR SOLUTION INTRAMUSCULAR; INTRAVENOUS at 10:17

## 2019-11-19 RX ADMIN — Medication 1 TABLET(S): at 05:15

## 2019-11-19 RX ADMIN — CELECOXIB 200 MILLIGRAM(S): 200 CAPSULE ORAL at 05:53

## 2019-11-19 RX ADMIN — OXYCODONE AND ACETAMINOPHEN 1 TABLET(S): 5; 325 TABLET ORAL at 11:29

## 2019-11-19 RX ADMIN — BUDESONIDE AND FORMOTEROL FUMARATE DIHYDRATE 2 PUFF(S): 160; 4.5 AEROSOL RESPIRATORY (INHALATION) at 08:35

## 2019-11-19 RX ADMIN — OXYCODONE AND ACETAMINOPHEN 1 TABLET(S): 5; 325 TABLET ORAL at 20:44

## 2019-11-19 RX ADMIN — Medication 1 TABLET(S): at 15:45

## 2019-11-19 RX ADMIN — ENOXAPARIN SODIUM 35 MILLIGRAM(S): 100 INJECTION SUBCUTANEOUS at 05:15

## 2019-11-19 RX ADMIN — ALBUTEROL 2.5 MILLIGRAM(S): 90 AEROSOL, METERED ORAL at 22:20

## 2019-11-19 RX ADMIN — Medication 20 MILLIGRAM(S): at 05:15

## 2019-11-19 RX ADMIN — FENTANYL CITRATE 1 PATCH: 50 INJECTION INTRAVENOUS at 14:43

## 2019-11-19 RX ADMIN — CELECOXIB 200 MILLIGRAM(S): 200 CAPSULE ORAL at 08:13

## 2019-11-19 RX ADMIN — FENTANYL CITRATE 1 PATCH: 50 INJECTION INTRAVENOUS at 21:50

## 2019-11-19 RX ADMIN — LIDOCAINE 2 PATCH: 4 CREAM TOPICAL at 21:39

## 2019-11-19 RX ADMIN — ALBUTEROL 2.5 MILLIGRAM(S): 90 AEROSOL, METERED ORAL at 14:34

## 2019-11-19 RX ADMIN — LIDOCAINE 2 PATCH: 4 CREAM TOPICAL at 14:42

## 2019-11-19 NOTE — DISCHARGE NOTE PROVIDER - NSDCFUSCHEDAPPT_GEN_ALL_CORE_FT
SARA TRIANA ; 11/20/2019 ; [facility] SARA TRIANA ; 11/20/2019 ; [facility]

## 2019-11-19 NOTE — DISCHARGE NOTE PROVIDER - HOSPITAL COURSE
87 year old female with PMH PMR, Asthma/COPD and opioid related constipation present with 2 weeks of difficulty ambulating, ankles swollen and legs 'blood shot'. LE doppler with non-occlusive popliteal thrombus. She has remained hemodynamically stable, without any hypoxia though has intermittent dyspnea, which is chronic. Started on Heparin in ER, then switched to LMWH and then Eliquis (lower dose given age, weight and CrCl. CTA chest without any PE, however has multiple VCF. TTE without any RV dysfunction. She is tolerating her anticoagulation well and was also seen in consultation by hematology who did not recommend any further work up            Vertebral Compression Fracture T7 chronic, T11 subacute on MRI. No intervention as per Spine surgery. She was started on Lidoderm as well as Fentanyl. Seen in consulation by PT with recommendations for KATIE.        She was also noted to have a dirty UA, culture positive for E. coli, treated with Rocephin and switched to PO Keflx to complete 5 day course.        She was continued on her home medications for her Asthma, PMR.        Medically stable for discharge.        Discharge time 38 minutes

## 2019-11-19 NOTE — PROGRESS NOTE ADULT - ASSESSMENT
Pt. requires LSO with rigid anterior, posterior and lateral panels, interface socks.  Device to limit ROM, support Fx, support spine, reduce discomfort and promote healing.  Socks to prevent skin breakdown.

## 2019-11-19 NOTE — PROGRESS NOTE ADULT - ASSESSMENT
87 year old female with PMH PMR, Asthma/COPD and opioid related constipation present with 2 weeks of difficulty ambulating, ankles swollen and legs 'blood shot'. LE doppler with non-occlusive popliteal thrombus.   Hemodynamically stable, without any hypoxia though has intermittent dyspnea.  Started on Heparin in ER    Acute Left Popliteal DVT - Admitted and started on Heparin. CTA chest without any PE, however has multiple VCF. TTE without any RV dysfunction  - Monitored bed  - Eliquis started, lower dose given weight and CrCl  - PT evaluation (patient open to rehab if necessary)  - Hematology consult appreciated    Vertebral Compression Fracture T7 chronic, [id number] on MRI. No intervention as per Spine surgery  - Pain medications, added Lidoderm  - Added Fentanyl (observe for sedation, delirium)  - PT evaluation      Elevated BUN/SCr ratio - dehydration  - IVF       Chronic Moderate Persistent Asthma; likely component of Restrictive disease as well given severe kyphosis  - Continue ICS  - Continue NYA  - Add Albuterol Nebs    PMR  - Continue Prednisone  - Continue Opioids, Celebrex    Constipation  - Continue bowel regimen    UTi with E. coli  - Rocephin, switch to Cefazolin      Disposition - monitor for side effects of Fentanyl PT evaluation.   [facility] vs home in next 24 hours      Discussed with patient son Gilberto who is concerned about patient going home on DOAC and weakness, risk.      Discussed with Rheumatologist who has tried to take off Prednisone but patient insistent on staying as contains her pain. 87 year old female with PMH PMR, Asthma/COPD and opioid related constipation present with 2 weeks of difficulty ambulating, ankles swollen and legs 'blood shot'. LE doppler with non-occlusive popliteal thrombus.   Hemodynamically stable, without any hypoxia though has intermittent dyspnea.  Started on Heparin in ER    Acute Left Popliteal DVT - Admitted and started on Heparin. CTA chest without any PE, however has multiple VCF. TTE without any RV dysfunction  - Monitored bed  - Eliquis started, lower dose given weight and CrCl  - PT evaluation (patient open to rehab if necessary)  - Hematology consult appreciated    Vertebral Compression Fracture T7 chronic, [id number] on MRI. No intervention as per Spine surgery  - Pain medications, added Lidoderm  - Added Fentanyl (observe for sedation, delirium)  - PT evaluation      Elevated BUN/SCr ratio - dehydration  - IVF       Chronic Moderate Persistent Asthma; likely component of Restrictive disease as well given severe kyphosis  - Continue ICS  - Continue NYA  - Add Albuterol Nebs    PMR  - Continue Prednisone  - Continue Opioids, Celebrex    Constipation  - Continue bowel regimen    UTi with E. coli  - Rocephin, switch to Cephalexin      Disposition - monitor for side effects of Fentanyl PT evaluation.   [facility]ATIE vs home in next 24 hours      Discussed with patient son Gilberto who is concerned about patient going home on DOAC and weakness, risk.      Discussed with Rheumatologist who has tried to take off Prednisone but patient insistent on staying as contains her pain.

## 2019-11-19 NOTE — DISCHARGE NOTE PROVIDER - CARE PROVIDER_API CALL
Reynaldo Guerra (DO)  Orthopaedic Surgery  [address]  [place] [id number]  Phone: [phone]  Fax: [phone]  Follow Up Time:     Alcira Alas)  [facility]  [address], [address]  [place] [address]  Phone: [phone]  Fax: [phone]  Follow Up Time:     Paul [place])  [place] [address]  Phone: [phone]  Fax: [phone]  Follow Up Time:

## 2019-11-19 NOTE — DISCHARGE NOTE PROVIDER - NSDCCPCAREPLAN_GEN_ALL_CORE_FT
PRINCIPAL DISCHARGE DIAGNOSIS  Diagnosis: Acute deep vein thrombosis (DVT) of popliteal vein of left lower extremity  Assessment and Plan of Treatment:       SECONDARY DISCHARGE DIAGNOSES  Diagnosis: Thoracic compression fracture  Assessment and Plan of Treatment: Follow all verbal and written instructions. Take medications as prescribed. DO NOT drive, operate machinery, and/or make important decisions while on prescription pain medication. DO NOT hesitate to call Doctor's office with questions or concerns.   * follow up 1-2 weeks in office with Dr. Guerra.  * No lifting greater than 10 - 15 lbs.  * light walking with assistance of walker and use of LSO brace when out of bed. Brace not needed when in bed.  * Activity as tolerated.  * Pain control as indicated. PRINCIPAL DISCHARGE DIAGNOSIS  Diagnosis: Acute deep vein thrombosis (DVT) of popliteal vein of left lower extremity  Assessment and Plan of Treatment: Continue medications as prescribed.      SECONDARY DISCHARGE DIAGNOSES  Diagnosis: Thoracic compression fracture  Assessment and Plan of Treatment: Follow all verbal and written instructions. Take medications as prescribed. DO NOT drive, operate machinery, and/or make important decisions while on prescription pain medication. DO NOT hesitate to call Doctor's office with questions or concerns.   * follow up 1-2 weeks in office with Dr. Guerra.  * No lifting greater than 10 - 15 lbs.  * light walking with assistance of walker and use of LSO brace when out of bed. Brace not needed when in bed.  * Activity as tolerated.  * Pain control as indicated.      Diagnosis: PMR (polymyalgia rheumatica)  Assessment and Plan of Treatment: Continue medications as prescribed.  Follow up with Rheumatology    Diagnosis: Asthma with COPD  Assessment and Plan of Treatment: Contionue home medications

## 2019-11-19 NOTE — PROGRESS NOTE ADULT - PROBLEM SELECTOR PLAN 1
Dispensed LSO and socks.  All went without incident.  Pt. to use as directed by   Please call [facility] with any questions, [phone].

## 2019-11-19 NOTE — PROGRESS NOTE ADULT - ATTENDING COMMENTS
Attending statement:  I have personally seen this patient, and formed a face to face diagnostic evaluation on this patient on this date.  I have reviewed the PA, NP and or Medical/PA student and/or Resident documentation and agree with the history, physical exam and plan of care except if noted otherwise.

## 2019-11-19 NOTE — DISCHARGE NOTE PROVIDER - CARE PROVIDERS DIRECT ADDRESSES
[email or web address]

## 2019-11-19 NOTE — DISCHARGE NOTE PROVIDER - PROVIDER TOKENS
PROVIDER:[TOKEN:[[id number]]],PROVIDER:[TOKEN:[[id number]]],PROVIDER:[TOKEN:[[id number]]]

## 2019-11-19 NOTE — DISCHARGE NOTE PROVIDER - NSDCMRMEDTOKEN_GEN_ALL_CORE_FT
albuterol 4 mg oral tablet: 1 tab(s) orally once a day (at bedtime)  albuterol CFC free 90 mcg/inh inhalation aerosol: 1 puff(s) inhaled every 6 hours, As needed, Shortness of Breath and/or Wheezing  celecoxib 200 mg oral capsule: 1 cap(s) orally once a day (before a meal), As Needed  hydrocodone-acetaminophen 5 mg-300 mg oral tablet: 1 tab(s) orally every 6 hours  predniSONE 20 mg oral tablet: 1 tab(s) orally once a day  Symbicort 160 mcg-4.5 mcg/inh inhalation aerosol: 2 puff(s) inhaled 2 times a day albuterol 4 mg oral tablet: 1 tab(s) orally once a day (at bedtime)  albuterol CFC free 90 mcg/inh inhalation aerosol: 1 puff(s) inhaled every 6 hours, As needed, Shortness of Breath and/or Wheezing  apixaban 5 mg oral tablet: 1 tab(s) orally every 12 hours  calcium-vitamin D 500 mg-200 intl units oral tablet: 1 tab(s) orally 2 times a day  celecoxib 200 mg oral capsule: 1 cap(s) orally once a day (before a meal), As Needed  cephalexin 500 mg oral capsule: 1 cap(s) orally 3 times a day  fentaNYL 12 mcg/hr transdermal film, extended release: 1 patch transdermal every 48 hours  lidocaine 5% topical film: Apply topically to affected area once a day; on for 12 hours, off for 12 hours  oxycodone-acetaminophen 5 mg-325 mg oral tablet: 1 tab(s) orally every 6 hours, As needed, Moderate Pain (4 - 6)  polyethylene glycol 3350 oral powder for reconstitution: 17 gram(s) orally once a day, As needed, Constipation  predniSONE 20 mg oral tablet: 1 tab(s) orally once a day  saccharomyces boulardii lyo 250 mg oral capsule: 1 cap(s) orally 2 times a day  senna oral tablet: 2 tab(s) orally once a day (at bedtime)  Symbicort 160 mcg-4.5 mcg/inh inhalation aerosol: 2 puff(s) inhaled 2 times a day

## 2019-11-20 ENCOUNTER — TRANSCRIPTION ENCOUNTER (OUTPATIENT)
Age: 84
End: 2019-11-20

## 2019-11-20 ENCOUNTER — APPOINTMENT (OUTPATIENT)
Dept: RHEUMATOLOGY | Facility: CLINIC | Age: 84
End: 2019-11-20

## 2019-11-20 VITALS
HEART RATE: 84 BPM | OXYGEN SATURATION: 97 % | SYSTOLIC BLOOD PRESSURE: 146 MMHG | RESPIRATION RATE: 18 BRPM | TEMPERATURE: 98 F | DIASTOLIC BLOOD PRESSURE: 82 MMHG

## 2019-11-20 PROCEDURE — 71275 CT ANGIOGRAPHY CHEST: CPT

## 2019-11-20 PROCEDURE — 81001 URINALYSIS AUTO W/SCOPE: CPT

## 2019-11-20 PROCEDURE — 84484 ASSAY OF TROPONIN QUANT: CPT

## 2019-11-20 PROCEDURE — 85027 COMPLETE CBC AUTOMATED: CPT

## 2019-11-20 PROCEDURE — 83880 ASSAY OF NATRIURETIC PEPTIDE: CPT

## 2019-11-20 PROCEDURE — 86140 C-REACTIVE PROTEIN: CPT

## 2019-11-20 PROCEDURE — 71046 X-RAY EXAM CHEST 2 VIEWS: CPT

## 2019-11-20 PROCEDURE — 99239 HOSP IP/OBS DSCHRG MGMT >30: CPT

## 2019-11-20 PROCEDURE — 84443 ASSAY THYROID STIM HORMONE: CPT

## 2019-11-20 PROCEDURE — 94640 AIRWAY INHALATION TREATMENT: CPT

## 2019-11-20 PROCEDURE — 83735 ASSAY OF MAGNESIUM: CPT

## 2019-11-20 PROCEDURE — 93970 EXTREMITY STUDY: CPT

## 2019-11-20 PROCEDURE — 85730 THROMBOPLASTIN TIME PARTIAL: CPT

## 2019-11-20 PROCEDURE — 93306 TTE W/DOPPLER COMPLETE: CPT

## 2019-11-20 PROCEDURE — 72146 MRI CHEST SPINE W/O DYE: CPT

## 2019-11-20 PROCEDURE — 82607 VITAMIN B-12: CPT

## 2019-11-20 PROCEDURE — 99285 EMERGENCY DEPT VISIT HI MDM: CPT

## 2019-11-20 PROCEDURE — 93005 ELECTROCARDIOGRAM TRACING: CPT

## 2019-11-20 PROCEDURE — 97163 PT EVAL HIGH COMPLEX 45 MIN: CPT

## 2019-11-20 PROCEDURE — 85610 PROTHROMBIN TIME: CPT

## 2019-11-20 PROCEDURE — 82550 ASSAY OF CK (CPK): CPT

## 2019-11-20 PROCEDURE — 36415 COLL VENOUS BLD VENIPUNCTURE: CPT

## 2019-11-20 PROCEDURE — 85652 RBC SED RATE AUTOMATED: CPT

## 2019-11-20 PROCEDURE — 80053 COMPREHEN METABOLIC PANEL: CPT

## 2019-11-20 PROCEDURE — 87086 URINE CULTURE/COLONY COUNT: CPT

## 2019-11-20 PROCEDURE — 72148 MRI LUMBAR SPINE W/O DYE: CPT

## 2019-11-20 PROCEDURE — 87186 SC STD MICRODIL/AGAR DIL: CPT

## 2019-11-20 PROCEDURE — 72141 MRI NECK SPINE W/O DYE: CPT

## 2019-11-20 PROCEDURE — 80048 BASIC METABOLIC PNL TOTAL CA: CPT

## 2019-11-20 RX ORDER — CEPHALEXIN 500 MG
1 CAPSULE ORAL
Qty: 0 | Refills: 0 | DISCHARGE
Start: 2019-11-20 | End: 2019-11-21

## 2019-11-20 RX ORDER — POLYETHYLENE GLYCOL 3350 17 G/17G
17 POWDER, FOR SOLUTION ORAL
Qty: 0 | Refills: 0 | DISCHARGE
Start: 2019-11-20

## 2019-11-20 RX ORDER — SENNA PLUS 8.6 MG/1
2 TABLET ORAL
Qty: 0 | Refills: 0 | DISCHARGE
Start: 2019-11-20

## 2019-11-20 RX ORDER — SACCHAROMYCES BOULARDII 250 MG
1 POWDER IN PACKET (EA) ORAL
Qty: 0 | Refills: 0 | DISCHARGE
Start: 2019-11-20

## 2019-11-20 RX ORDER — SACCHAROMYCES BOULARDII 250 MG
250 POWDER IN PACKET (EA) ORAL
Refills: 0 | Status: DISCONTINUED | OUTPATIENT
Start: 2019-11-20 | End: 2019-11-20

## 2019-11-20 RX ORDER — APIXABAN 2.5 MG/1
1 TABLET, FILM COATED ORAL
Qty: 0 | Refills: 0 | DISCHARGE
Start: 2019-11-20

## 2019-11-20 RX ORDER — LIDOCAINE 4 G/100G
1 CREAM TOPICAL
Qty: 0 | Refills: 0 | DISCHARGE
Start: 2019-11-20

## 2019-11-20 RX ORDER — FENTANYL CITRATE 50 UG/ML
1 INJECTION INTRAVENOUS
Qty: 0 | Refills: 0 | DISCHARGE
Start: 2019-11-20

## 2019-11-20 RX ADMIN — OXYCODONE AND ACETAMINOPHEN 1 TABLET(S): 5; 325 TABLET ORAL at 06:21

## 2019-11-20 RX ADMIN — Medication 20 MILLIGRAM(S): at 05:19

## 2019-11-20 RX ADMIN — OXYCODONE AND ACETAMINOPHEN 1 TABLET(S): 5; 325 TABLET ORAL at 01:45

## 2019-11-20 RX ADMIN — LIDOCAINE 2 PATCH: 4 CREAM TOPICAL at 04:19

## 2019-11-20 RX ADMIN — APIXABAN 5 MILLIGRAM(S): 2.5 TABLET, FILM COATED ORAL at 05:14

## 2019-11-20 RX ADMIN — OXYCODONE AND ACETAMINOPHEN 1 TABLET(S): 5; 325 TABLET ORAL at 09:18

## 2019-11-20 RX ADMIN — Medication 10 MILLIGRAM(S): at 15:51

## 2019-11-20 RX ADMIN — ALBUTEROL 2.5 MILLIGRAM(S): 90 AEROSOL, METERED ORAL at 15:14

## 2019-11-20 RX ADMIN — FENTANYL CITRATE 1 PATCH: 50 INJECTION INTRAVENOUS at 07:01

## 2019-11-20 RX ADMIN — CELECOXIB 200 MILLIGRAM(S): 200 CAPSULE ORAL at 06:00

## 2019-11-20 RX ADMIN — OXYCODONE AND ACETAMINOPHEN 1 TABLET(S): 5; 325 TABLET ORAL at 06:50

## 2019-11-20 RX ADMIN — Medication 500 MILLIGRAM(S): at 15:48

## 2019-11-20 RX ADMIN — Medication 500 MILLIGRAM(S): at 05:15

## 2019-11-20 RX ADMIN — OXYCODONE AND ACETAMINOPHEN 1 TABLET(S): 5; 325 TABLET ORAL at 00:54

## 2019-11-20 RX ADMIN — OXYCODONE AND ACETAMINOPHEN 1 TABLET(S): 5; 325 TABLET ORAL at 11:44

## 2019-11-20 RX ADMIN — LIDOCAINE 2 PATCH: 4 CREAM TOPICAL at 11:46

## 2019-11-20 RX ADMIN — Medication 1 TABLET(S): at 05:14

## 2019-11-20 RX ADMIN — CELECOXIB 200 MILLIGRAM(S): 200 CAPSULE ORAL at 05:14

## 2019-11-20 NOTE — PROGRESS NOTE ADULT - SUBJECTIVE AND OBJECTIVE BOX
87 YOF with compression Fx, thoracic.  Pt. in bed.
HOSPITALIST PROGRESS NOTE    SARA TRIANA  [id number]  87yFemale    Patient is a 87y old  Female who presents with a chief complaint of DVT (2019 19:02)      SUBJECTIVE:   Chart reviewed since last visit.  Patient seen and examined at bedside for LLE DVT  Feels better - denies any leg pain at rest  Denies any dyspnea, chest pain or palpitations. inquiring about discharge home      OBJECTIVE:  Vital Signs Last 24 Hrs  T(C): 36.9 (2019 15:25), Max: 37.2 (2019 17:52)  T(F): 98.4 (2019 15:25), Max: 99 (2019 17:52)  HR: 89 (2019 15:25) (74 - 100)  BP: 126/64 (2019 15:25) (98/58 - 155/84)  BP(mean): 107 (2019 19:02) (107 - 107)  RR: 18 (2019 15:25) (16 - 18)  SpO2: 97% (2019 15:25) (97% - 98%)    PHYSICAL EXAMINATION  General: Sitting in bed, NAD  HEENT:  EOMI  NECK:  Supple  CVS: regular rate and rhythm S1 S2  RESP:  Fair air entry  GI:  Soft nondistended nontender BS+  : No suprapubic tenderness  MSK:  LLE with patchy/punctate erythema. Severe Kyphosis  CNS:  No gross global or focal deficit noted  INTEG:  Erythematous rash, Ecchymoses  PSYCH:  Fair mood    MONITOR:  CAPILLARY BLOOD GLUCOSE            I&O's Summary                          11.9   11.24 )-----------( 205      ( 2019 07:19 )             36.0     PT/INR - ( 2019 12:47 )   PT: 10.9 sec;   INR: 0.95 ratio         PTT - ( 2019 09:56 )  PTT:50.4 sec      139  |  101  |  24.0<H>  ----------------------------<  128<H>  3.7   |  25.0  |  0.72    Ca    9.1      2019 07:19    TPro  7.1  /  Alb  4.5  /  TBili  0.6  /  DBili  x   /  AST  31  /  ALT  24  /  AlkPhos  126<H>  -    CARDIAC MARKERS ( 2019 12:47 )  x     / <0.01 ng/mL / 60 U/L / x     / x          Urinalysis Basic - ( 2019 17:31 )    Color: Yellow / Appearance: Clear / S.015 / pH: x  Gluc: x / Ketone: Negative  / Bili: Negative / Urobili: Negative mg/dL   Blood: x / Protein: 30 mg/dL / Nitrite: Negative   Leuk Esterase: Trace / RBC: 3-5 /HPF / WBC 6-10   Sq Epi: x / Non Sq Epi: Occasional / Bacteria: TNTC        Culture:    TTE:    RADIOLOGY  < from: CT Angio Chest w/ IV Cont (19 @ 19:18) >     EXAM:  CT ANGIO CHEST (W)AW IC                          PROCEDURE DATE:  2019          INTERPRETATION:  FINAL REPORT:    PROCEDURE INFORMATION:   Exam: CT Angiography Chest With Contrast   Exam date and time: 2019 7:05 PM   Clinical history: Shortness of breath    TECHNIQUE:   Imaging protocol: Computed tomographic angiography of the chest with   intravenous contrast.   3D rendering: MIP reconstructed images were created and reviewed.   Radiation optimization: All CT scans at this facility use at least one of   these   dose optimization techniques: automated exposure control; mA and/or kV   adjustment per patient size (includes targeted exams where dose is   matched to   clinical indication); or iterative reconstruction.   Contrast material: 350 OMNIPAQUE; Contrast volume: 44 ml; Contrast route:   IV;      COMPARISON:   CT chest 2018    FINDINGS:   Vessels: No pulmonary embolism.  Aorta: Thoracic aorta normal in caliber with mild calcified plaque.  Lungs: Central airways are patent. Scattered foci of linear atelectasis   or scarring. Subsegmental atelectasis or scarring at the posterior medial   aspect of the right lower lobe. Lungs otherwise clear.  Pleural space: Unremarkable. No pneumothorax. No pleural effusion.   Heart: Cardiomegaly. No pericardial effusion.  Mediastinum/south: No enlarged lymph nodes.  Chest wall/lower neck: No enlarged axillary lymph nodes.  Upper abdomen: 5.6 cm cyst in the lateral segment of the left hepatic   lobe containing a thin calcified internal septation. Small cyst in the   right hepatic lobe.  Bones/joints: Multiple healing bilateral rib fractures. Compression   fractures of T7 and T11, T7 has worsened since 2018 and T11 is new.   Severe bilateral glenohumeral joint space narrowing. Fracture of the   anterior aspect of the glenoid on the right. Mild anterolisthesis of C6   on C7.    IMPRESSION:    No pulmonary embolism.    No acute pulmonary disease.     Compression fracture of T11 and fracture of the anterior aspect of the   glenoid on the right of indeterminate age; clinical correlation is   recommended.    Compression fracture of T7, worsened since 2018.                ENEIDA FUNEZ   This document has been electronically signed. 2019  8:56AM    < end of copied text >        MEDICATIONS  (STANDING):  budesonide 160 MICROgram(s)/formoterol 4.5 MICROgram(s) Inhaler 2 Puff(s) Inhalation two times a day  calcium carbonate 1250 mG  + Vitamin D (OsCal 500 + D) 1 Tablet(s) Oral two times a day  cefTRIAXone   IVPB 1000 milliGRAM(s) IV Intermittent every 24 hours  enoxaparin Injectable 35 milliGRAM(s) SubCutaneous two times a day  predniSONE   Tablet 20 milliGRAM(s) Oral daily  senna 2 Tablet(s) Oral at bedtime  sodium chloride 0.9%. 500 milliLiter(s) (75 mL/Hr) IV Continuous <Continuous>      MEDICATIONS  (PRN):  acetaminophen   Tablet .. 650 milliGRAM(s) Oral every 6 hours PRN Temp greater or equal to 38C (100.4F), Mild Pain (1 - 3)  celecoxib Oral Tab/Cap - Peds 200 milliGRAM(s) Oral before breakfast PRN PMR pain  oxycodone    5 mG/acetaminophen 325 mG 1 Tablet(s) Oral every 4 hours PRN Mild Pain (1 - 3)  oxycodone    5 mG/acetaminophen 325 mG 1 Tablet(s) Oral every 6 hours PRN Moderate Pain (4 - 6)  polyethylene glycol 3350 17 Gram(s) Oral daily PRN Constipation
HOSPITALIST PROGRESS NOTE    SARA TRIANA  [id number]  87yFemale    Patient is a 87y old  Female who presents with a chief complaint of DVT (2019 14:05)      SUBJECTIVE:   Chart reviewed since last visit.  Patient seen and examined at bedside for LLE DVT, T11 fracture.  Denies any leg or foot pain.      OBJECTIVE:  Vital Signs Last 24 Hrs  T(C): 36.4 (2019 16:13), Max: 36.8 (2019 23:45)  T(F): 97.6 (2019 16:13), Max: 98.3 (2019 23:45)  HR: 74 (2019 16:13) (74 - 79)  BP: 105/63 (2019 16:13) (105/63 - 153/90)  BP(mean): 106 (2019 04:44) (106 - 111)  RR: 18 (2019 16:13) (18 - 18)  SpO2: 98% (2019 16:13) (98% - 98%)    PHYSICAL EXAMINATION  General: Sitting in bed, NAD  HEENT:  EOMI  NECK:  Supple  CVS: regular rate and rhythm S1 S2  RESP:  Fair air entry  GI:  Soft nondistended nontender BS+  : No suprapubic tenderness  MSK:  LLE with patchy/punctate erythema. Severe Kyphosis  CNS:  No gross global or focal deficit noted  INTEG:  Erythematous rash, Ecchymoses  PSYCH:  Fair mood      MONITOR:  CAPILLARY BLOOD GLUCOSE            I&O's Summary                          13.3   10.74 )-----------( 253      ( 2019 11:33 )             41.1     PTT - ( 2019 09:56 )  PTT:50.4 sec      142  |  101  |  34.0<H>  ----------------------------<  181<H>  4.4   |  25.0  |  0.76    Ca    9.6      2019 11:33  Mg     2.1         TPro  6.5<L>  /  Alb  4.0  /  TBili  0.4  /  DBili  x   /  AST  26  /  ALT  18  /  AlkPhos  104          Urinalysis Basic - ( 2019 17:31 )    Color: Yellow / Appearance: Clear / S.015 / pH: x  Gluc: x / Ketone: Negative  / Bili: Negative / Urobili: Negative mg/dL   Blood: x / Protein: 30 mg/dL / Nitrite: Negative   Leuk Esterase: Trace / RBC: 3-5 /HPF / WBC 6-10   Sq Epi: x / Non Sq Epi: Occasional / Bacteria: TNTC        Culture:  Culture - Urine (19 @ 17:32)    -  Gentamicin: S <=4    -  Imipenem: S <=1    -  Levofloxacin: S <=2    -  Meropenem: S <=1    -  Nitrofurantoin: R >64 Should not be used to treat pyelonephritis    -  Piperacillin/Tazobactam: S <=16    -  Tigecycline: S <=2    -  Tobramycin: S <=4    -  Trimethoprim/Sulfamethoxazole: S <=2/38    -  Amikacin: S <=16    -  Ampicillin: R >16 These ampicillin results predict results for amoxicillin    -  Ampicillin/Sulbactam: I 16/8 Enterobacter, Citrobacter, and Serratia may develop resistance during prolonged therapy (3-4 days)    -  Aztreonam: S <=4    -  Cefazolin: S <=8 ([id number]) For uncomplicated UTI with K. pneumoniae, E. coli, or P. mirablis: TALIB <=16 is sensitive and TALIB >=32 is resistant. This also predicts results for oral agents cefaclor, cefdinir, cefpodoxime, cefprozil, cefuroxime axetil, cephalexin and locarbef for uncomplicated UTI. Note that some isolates may be susceptible to these agents while testing resistant to cefazolin.    -  Cefepime: S <=4    -  Cefoxitin: S <=8    -  Ceftriaxone: S <=1 Enterobacter, Citrobacter, and Serratia may develop resistance during prolonged therapy    -  Ciprofloxacin: S <=1    -  Ertapenem: S <=1    Specimen Source: .Urine    Culture Results:   >100,000 CFU/ml Escherichia coli    Organism Identification: Escherichia coli    Organism: Escherichia coli    Method Type: TALIB      TTE:  < from: TTE Echo Complete w/Doppler (19 @ 08:09) >    EXAM:  ECHO TRANSTHORACIC COMP W DOPP      PROCEDURE DATE:  2019   .      INTERPRETATION:  REPORT:    TRANSTHORACIC ECHOCARDIOGRAM REPORT         Patient Name:   SARA TRIANA Patient Location: [facility] Rec #:  [id number] Accession #:      [id number]  Account #:      [id number]          Height:           59.0 in 149.9 cm  YOB: 1932         Weight:           80.5 lb 36.50 kg  Patient Age:    87 years          BSA:              1.25 m²  Patient Gender: F             BP:               136/82 mmHg       Date of Exam:        2019 8:09:03 AM  Sonographer:         Lisa Pandey  Referring Physician: Roshan Storm MD    Procedure:     2D Echo/Doppler/Color Doppler Complete.  Indications:   Dyspnea, unspecified - R06.00  Diagnosis:     Dyspnea, unspecified - R06.00  Study Details: Technically adequate study.         2D AND M-MODE MEASUREMENTS (normal ranges within parentheses):  Left                 Normal   Aorta/Left            Normal  Ventricle:                    Atrium:  IVSd (2D):    0.99  (0.7-1.1) Left Atrium  3.70 cm (1.9-4.0)                 cm             (2D):  LVPWd (2D):   0.95  (0.7-1.1) LA Volume     34.4                 cm             Index         ml/m²  LVIDd (2D):   4.07 (3.4-5.7) Right Ventricle:                 cm             RVd (2D):        3.10 cm  LVIDs (2D):   3.19                 cm  LV FS (2D):   21.6   (>25%)                  %  Relative Wall 0.47   (<0.42)  Thickness    LV SYSTOLIC FUNCTION BY 2D PLANIMETRY (MOD):  EF-Biplane: 50 %    LV DIASTOLIC FUNCTION:  MV Peak E: 0.59 m/s e', MV Ping: 0.06 m/s  MV Peak A: 1.06 m/s E/e' Ratio: 9.25  E/A Ratio: 0.56    SPECTRAL DOPPLER ANALYSIS (where applicable):  Aortic Valve: AoV Max Robert: 1.34 m/s AoV Peak P.2 mmHg AoV Mean PG:   3.7 mmHg    LVOT Vmax: 0.90 m/s LVOT VTI: 0.176 m LVOT Diameter: 1.89 cm    AoV Area, Vmax: 1.88 cm² AoV Area, VTI: 2.06 cm² AoV Area, Vmn: 1.81 cm²  Ao VTI: 0.240  Tricuspid Valve and PA/RV Systolic Pressure: TR Max Velocity: 2.53 m/s RA   Pressure: 8 mmHg RVSP/PASP: 33.6 mmHg       PHYSICIAN INTERPRETATION:  Left Ventricle: The left ventricular internal cavity size is normal. Left   ventricular wall thickness is mildly increased.  Global LV systolic function was mildly decreased. Left ventricular   ejection fraction, by visual estimation, is 50 to 55%. Spectral Doppler   shows impaired relaxation pattern of left ventricular myocardial filling   (Grade I diastolic dysfunction).  Right Ventricle: Normal right ventricularsize and function.  Left Atrium: Mildly enlarged left atrium.  Right Atrium: Normal right atrial size.  Pericardium: There is no evidence of pericardial effusion.  Mitral Valve: Mild thickening of the anterior and posterior mitral valve   leaflets. Moderate mitral valve regurgitation is seen.  Aortic Valve: The aortic valve is trileaflet. Sclerotic aortic valve with   normal opening. Peak transaortic gradient equals 7.2 mmHg, mean   transaortic gradient equals 3.7 mmHg, the calculated aortic valve area   equals 2.06 cm² by the continuity equation consistent with mild aortic   stenosis. No evidence of aortic valve regurgitation is seen.  Pulmonic Valve: Structurally normal pulmonic valve, with normal leaflet   excursion. Trace pulmonic valve regurgitation.  Aorta: The aortic root is normal in size and structure.  Pulmonary Artery: The main pulmonary artery is normal in size.  Venous: The inferior vena cava was normal sized, with respiratory size   variation less than 50%.       Summary:   1. Left ventricular ejection fraction, by visual estimation, is 50 to   55%.   2. Mildly decreased global left ventricular systolic function.   3. Mildly increased LV wall thickness.   4. Spectral Doppler shows impaired relaxation pattern of left   ventricular myocardial filling (Grade I diastolic dysfunction).   5. Mild thickening of the anterior and posterior mitral valve leaflets.   6. Moderate mitral valve regurgitation.   7. Sclerotic aortic valve with normal opening.   8. Peak transaortic gradient equals 7.2 mmHg, mean transaortic gradient   equals 3.7 mmHg, the calculated aortic valve area equals 2.06 cm² by the   continuity equation consistent with mild aortic stenosis.    MD Raymon Electronically signed on 2019 at 8:06:53 PM              *** Final ***                  DEMI SOUSA M.D.  This document has been electronically signed. 2019  8:09AM    < end of copied text >    RADIOLOGY  < from: MR Thoracic Spine No Cont (11.19.19 @ 07:57) >     EXAM:  MR SPINE THORACIC                          PROCEDURE DATE:  2019          INTERPRETATION:  CLINICAL INDICATIONS: T11 fracture    COMPARISON: CT chest 2019. MRI thoracic spine dated 10/13/2018    TECHNIQUE: Thoracic spine MRI: Multiplanar, multisequence MR images of   the thoracic spine without the administration of intravenous gadolinium.    FINDINGS:  Severe chronic T7 compression fracture with mild retropulsion of the   inferior endplate indenting the ventral aspect of thecord, unchanged. No   cord signal abnormality. Severe subacute chronic T11 compression fracture   mild retropulsion of fracture fragments. No evidence of cord compression   or cord signal abnormality.    Small left-sided pleural effusion. Tiny right-sided pleural   effusion/pleural thickening. Mediastinal adenopathy.    IMPRESSION: Severe chronic T7 compression fracture. Severe   subacute/chronic T11 compression fracture.                ELODIA GAMING M.D., ATTENDING RADIOLOGIST  This document has been electronically signed. 2019  9:14AM                < end of copied text >        MEDICATIONS  (STANDING):  ALBUTerol    0.083% 2.5 milliGRAM(s) Nebulizer every 6 hours  ALBUTerol    0.083%. 2.5 milliGRAM(s) Nebulizer once  ALBUTerol    90 MICROgram(s) HFA Inhaler 1 Puff(s) Inhalation every 4 hours  apixaban 5 milliGRAM(s) Oral every 12 hours  budesonide 160 MICROgram(s)/formoterol 4.5 MICROgram(s) Inhaler 2 Puff(s) Inhalation two times a day  calcium carbonate 1250 mG  + Vitamin D (OsCal 500 + D) 1 Tablet(s) Oral two times a day  cefTRIAXone   IVPB 1000 milliGRAM(s) IV Intermittent every 24 hours  fentaNYL   Patch  12 MICROgram(s)/Hr. 1 Patch Transdermal every 48 hours  lidocaine   Patch 2 Patch Transdermal daily  predniSONE   Tablet 20 milliGRAM(s) Oral daily  senna 2 Tablet(s) Oral at bedtime      MEDICATIONS  (PRN):  acetaminophen   Tablet .. 650 milliGRAM(s) Oral every 6 hours PRN Temp greater or equal to 38C (100.4F), Mild Pain (1 - 3)  celecoxib Oral Tab/Cap - Peds 200 milliGRAM(s) Oral before breakfast PRN PMR pain  oxycodone    5 mG/acetaminophen 325 mG 1 Tablet(s) Oral every 4 hours PRN Mild Pain (1 - 3)  oxycodone    5 mG/acetaminophen 325 mG 1 Tablet(s) Oral every 6 hours PRN Moderate Pain (4 - 6)  polyethylene glycol 3350 17 Gram(s) Oral daily PRN Constipation
HOSPITALIST PROGRESS NOTE    SARA TRIANA  [id number]  87yFemale    Patient is a 87y old  Female who presents with a chief complaint of DVT (19 Nov 2019 21:23)      SUBJECTIVE:   Chart reviewed since last visit.  Patient seen and examined at bedside.      OBJECTIVE:  Vital Signs Last 24 Hrs  T(C): 36.7 (20 Nov 2019 08:25), Max: 37 (19 Nov 2019 18:29)  T(F): 98.1 (20 Nov 2019 08:25), Max: 98.6 (19 Nov 2019 18:29)  HR: 69 (20 Nov 2019 08:25) (69 - 86)  BP: 128/83 (20 Nov 2019 08:25) (105/63 - 149/86)   RR: 18 (20 Nov 2019 08:25) (17 - 18)  SpO2: 96% (20 Nov 2019 08:25) (93% - 98%)    PHYSICAL EXAMINATION  General: Sitting in bed, NAD  HEENT:  EOMI  NECK:  Supple  CVS: regular rate and rhythm S1 S2  RESP:  Fair air entry  GI:  Soft nondistended nontender BS+  : No suprapubic tenderness  MSK:  LLE with patchy/punctate erythema. Severe Kyphosis  CNS:  No gross global or focal deficit noted  INTEG:  Erythematous rash, Ecchymoses  PSYCH:  Fair mood      MONITOR:  CAPILLARY BLOOD GLUCOSE            I&O's Summary    19 Nov 2019 07:01  -  20 Nov 2019 07:00  --------------------------------------------------------  IN: 0 mL / OUT: 1 mL / NET: -1 mL                  Culture:    TTE:    RADIOLOGY        MEDICATIONS  (STANDING):  ALBUTerol    0.083% 2.5 milliGRAM(s) Nebulizer every 6 hours  ALBUTerol    0.083%. 2.5 milliGRAM(s) Nebulizer once  ALBUTerol    90 MICROgram(s) HFA Inhaler 1 Puff(s) Inhalation every 4 hours  apixaban 5 milliGRAM(s) Oral every 12 hours  bisacodyl Suppository 10 milliGRAM(s) Rectal once  budesonide 160 MICROgram(s)/formoterol 4.5 MICROgram(s) Inhaler 2 Puff(s) Inhalation two times a day  calcium carbonate 1250 mG  + Vitamin D (OsCal 500 + D) 1 Tablet(s) Oral two times a day  cephalexin 500 milliGRAM(s) Oral three times a day  fentaNYL   Patch  12 MICROgram(s)/Hr. 1 Patch Transdermal every 48 hours  lidocaine   Patch 2 Patch Transdermal daily  predniSONE   Tablet 20 milliGRAM(s) Oral daily  saccharomyces boulardii 250 milliGRAM(s) Oral two times a day  senna 2 Tablet(s) Oral at bedtime      MEDICATIONS  (PRN):  acetaminophen   Tablet .. 650 milliGRAM(s) Oral every 6 hours PRN Temp greater or equal to 38C (100.4F), Mild Pain (1 - 3)  celecoxib Oral Tab/Cap - Peds 200 milliGRAM(s) Oral before breakfast PRN PMR pain  oxycodone    5 mG/acetaminophen 325 mG 1 Tablet(s) Oral every 4 hours PRN Mild Pain (1 - 3)  oxycodone    5 mG/acetaminophen 325 mG 1 Tablet(s) Oral every 6 hours PRN Moderate Pain (4 - 6)  polyethylene glycol 3350 17 Gram(s) Oral daily PRN Constipation  saline laxative (FLEET) Rectal Enema 1 Enema Rectal once PRN if no BM after suppository
SARA FITCHMIMI    [id number]    History:  The patient is currently being treated non-operatively for a subacute T11 compression fracture. Patient is pending physical therapy evaluation. Denies nausea, vomiting, chest pain, shortness of breath, abdominal pain or fever. No new complaints. No acute motor or sensory changes are reported.         Vital Signs Last 24 Hrs  T(C): 36.6 (19 Nov 2019 04:44), Max: 36.9 (18 Nov 2019 15:25)  T(F): 97.8 (19 Nov 2019 04:44), Max: 98.4 (18 Nov 2019 15:25)  HR: 75 (19 Nov 2019 04:44) (75 - 89)  BP: 145/86 (19 Nov 2019 04:44) (126/64 - 153/90)  BP(mean): 106 (19 Nov 2019 04:44) (106 - 111)  RR: 18 (19 Nov 2019 04:44) (18 - 18)  SpO2: 97% (18 Nov 2019 15:25) (97% - 97%)                          13.3   10.74 )-----------( 253      ( 19 Nov 2019 11:33 )             41.1     11-19    142  |  101  |  34.0<H>  ----------------------------<  181<H>  4.4   |  25.0  |  0.76    Ca    9.6      19 Nov 2019 11:33  Mg     2.1     11-19    TPro  6.5<L>  /  Alb  4.0  /  TBili  0.4  /  DBili  x   /  AST  26  /  ALT  18  /  AlkPhos  104  11-19      MEDICATIONS  (STANDING):  ALBUTerol    0.083% 2.5 milliGRAM(s) Nebulizer every 6 hours  ALBUTerol    0.083%. 2.5 milliGRAM(s) Nebulizer once  ALBUTerol    90 MICROgram(s) HFA Inhaler 1 Puff(s) Inhalation every 4 hours  apixaban 5 milliGRAM(s) Oral every 12 hours  budesonide 160 MICROgram(s)/formoterol 4.5 MICROgram(s) Inhaler 2 Puff(s) Inhalation two times a day  calcium carbonate 1250 mG  + Vitamin D (OsCal 500 + D) 1 Tablet(s) Oral two times a day  cefTRIAXone   IVPB 1000 milliGRAM(s) IV Intermittent every 24 hours  fentaNYL   Patch  12 MICROgram(s)/Hr. 1 Patch Transdermal every 48 hours  lidocaine   Patch 2 Patch Transdermal daily  predniSONE   Tablet 20 milliGRAM(s) Oral daily  senna 2 Tablet(s) Oral at bedtime  sodium chloride 0.9%. 500 milliLiter(s) (75 mL/Hr) IV Continuous <Continuous>    MEDICATIONS  (PRN):  acetaminophen   Tablet .. 650 milliGRAM(s) Oral every 6 hours PRN Temp greater or equal to 38C (100.4F), Mild Pain (1 - 3)  celecoxib Oral Tab/Cap - Peds 200 milliGRAM(s) Oral before breakfast PRN PMR pain  oxycodone    5 mG/acetaminophen 325 mG 1 Tablet(s) Oral every 4 hours PRN Mild Pain (1 - 3)  oxycodone    5 mG/acetaminophen 325 mG 1 Tablet(s) Oral every 6 hours PRN Moderate Pain (4 - 6)  polyethylene glycol 3350 17 Gram(s) Oral daily PRN Constipation      < from: MR Lumbar Spine No Cont (11.19.19 @ 07:58) >   EXAM:  MR SPINE LUMBAR                          PROCEDURE DATE:  11/19/2019          INTERPRETATION:  CLINICAL HISTORY: Back pain.     COMPARISON: CT abdomen pelvis dated 11/8/2013    TECHNIQUE: MR lumbar spine was performed without the administration of IV   contrast. Multiplanar reformations are submitted.     FINDINGS:  Mild grade 1 retrolisthesis of L2 over L3 and L3 over L4. Mild grade 1   anterolisthesis of L4 over L5. Severe disc desiccation L4/L5. Moderate   disc desiccation at L2/L3. Unremarkable T1 marrow signal. No bone marrow   edema. No soft tissue edema. The conus medullaris ends at the T12/L1   level. The visualized SI joints unremarkable. Subacute/chronic severe T11   compression fracture with mild retropulsion of fracture fragments.    Evaluation of the individual levels:    L1/L2 level: No disc herniation, spinal canal stenosis, or foraminal   narrowing.    L2/L3 level: Broad-based ridging. Mild bilateral foraminal narrowing. No   spinal canal stenosis.     L3/L4 level: Mild diffuse disc bulge. No spinal canal stenosis. No   right-sided foraminal narrowing. Mild left-sided foraminal narrowing.     L4/L5 level: Mild diffuse disc bulge. Mild spinal canal stenosis.   Moderate right-sided foraminal narrowing. Moderateto severe left-sided   foraminal narrowing. Ligamentum flavum thickening.     L5/S1 level: Broad-based disc herniation. No spinal canal stenosis. Mild   to moderate right-sided foraminal narrowing. No left-sided foraminal   narrowing.     5.1 cm lefthepatic lobe cyst is partially visualized. Please refer to   the preceding chest CT for further details.    IMPRESSION: Subacute/chronic severe T11 compression fracture with mild   retropulsion of fracture fragments. Moderate to severe multilevel   discogenic degenerative and spondylitic changes throughout the lumbar   spine. Additional findings above.      ELODIA GAMING M.D., ATTENDING RADIOLOGIST  This document has been electronically signed. Nov 19 2019  9:22AM         < from: MR Cervical Spine No Cont (11.19.19 @ 07:57) >  EXAM:  MR SPINE CERVICAL                          PROCEDURE DATE:  11/19/2019          INTERPRETATION:  CLINICAL HISTORY: Myelopathic    COMPARISON: None.    TECHNIQUE: Cervical spine MRI: Multiplanar, multisequence MR images of   the cervical spine are obtained without administration of intravenous   gadolinium.    FINDINGS:  There is no evidence for acute fracture. Severe exaggeration of the   normal lordosis is noted, can be on the basis of patient positioning. Not   really anterolisthesis of C5 over C6. 5 mm grade 1 anterolisthesis of C7   over T1. Mild chronic height loss involves the C6 and C7 vertebral   bodies. No bone marrow edema or soft tissue edema. Unremarkable T1 marrow   signal. Moderate disc desiccation with loss of height at C4/C5 and C7/T1   levels. Craniocervical junction is normal. The cervicovertebral body   heights and remaining intervertebral disc spaces are preserved. There is   no prevertebral soft tissue abnormality. No cord signal abnormality.    T1 hyperintense right thyroid lobe nodule measures 1.6 cm. Outpatient   thyroid ultrasound and FNA are recommended as clinically warranted.    Evaluation of the individual levels:    C2/C3 level: Small midline disc protrusion. No disc herniation, spinal   canal stenosis, or foraminal narrowing.    C3/C4 level: No disc herniation, spinal canal stenosis, or foraminal   narrowing.    C4/C5 level: Broad-based ridging. Mild right-sided foraminal narrowing.   No spinal canal stenosis or left-sided foraminal narrowing.    C5/C6 level: No disc herniation, spinal canal stenosis, or foraminal   narrowing.    C6/C7 level: No disc herniation, spinal canal stenosis, or foraminal   narrowing.    C7/T1 level: No disc herniation, spinal canal stenosis, or foraminal   narrowing.      IMPRESSION:    Moderate to severe exaggeration of the normal cervical lordosis. Moderate   multilevel spondylosis, as above.      ELODIA GAMING M.D., ATTENDING RADIOLOGIST  This document has been electronically signed. Nov 19 20199:01AM          < from: MR Thoracic Spine No Cont (11.19.19 @ 07:57) >   EXAM:  MR SPINE THORACIC                          PROCEDURE DATE:  11/19/2019          INTERPRETATION:  CLINICAL INDICATIONS: T11 fracture    COMPARISON: CT chest 11/17/2019. MRI thoracic spine dated 10/13/2018    TECHNIQUE: Thoracic spine MRI: Multiplanar, multisequence MR images of   the thoracic spine without the administration of intravenous gadolinium.    FINDINGS:  Severe chronic T7 compression fracture with mild retropulsion of the   inferior endplate indenting the ventral aspect of thecord, unchanged. No   cord signal abnormality. Severe subacute chronic T11 compression fracture   mild retropulsion of fracture fragments. No evidence of cord compression   or cord signal abnormality.    Small left-sided pleural effusion. Tiny right-sided pleural   effusion/pleural thickening. Mediastinal adenopathy.    IMPRESSION: Severe chronic T7 compression fracture. Severe   subacute/chronic T11 compression fracture.      ELODIA GAMING M.D., ATTENDING RADIOLOGIST  This document has been electronically signed. Nov 19 2019  9:14AM            Physical exam: Sensation to light touch of the lower extremities is grossly intact without focal deficit and is symmetric bilaterally. No tremor or clonus. Babinski test is negative. Motor function is 4+/5 without focal deficit. No calf tenderness. There is good passive range of motion of the hips and knees without noted joint pain provocation. 2+ dorsalis pedis pulse. Capillary refill is less than 2 seconds. No cyanosis.    Primary Orthopedic Assessment:  • subacute T11 Compression Fracture    Secondary  Orthopedic Assessment(s):   • chronic T7 compression fx      Plan:   • DVT prophylaxis as prescribed, including use of compression devices and ankle pumps  • Continue physical therapy  • Weightbearing as tolerated of the lower extremities with assistance of a walker  • Incentive spirometry encouraged  • Pain control as clinically indicated  • LSO brace to be used when out of bed and ambulating  • Discharge planning – anticipated discharge is subacute rehabilitation
SARA TRIANA  87y  Female  [id number]   [facility]    Patient is a 87y old  Female who presents with a chief complaint of DVT (18 Nov 2019 16:18)    HPI:  87 year old female with PMH PMR, Asthma/COPD and opioid related constipation present with 2 weeks of difficulty ambulating, ankles swollen and legs 'blood shot'. Her feet hurt upon ambulation and she was using a 4 pronged cane prior, now using a walker with seat.  Worsening over the past week so came to the ER  Occasional dyspnea (resolves with Nebs) and chills. Denies chest pain, palpitations or dizziness  Hx of PMR, was on 20 mg prednisone a day  Has had compression fx and was not very active or mobile  Developed pain and swelling left ankle    Denies any immobilization - usually very active. Denies any recent trips, prior VTE.  On chronic Prednisone 20mg for PMR  Never smoker    Unsure about family history of VTE - one of patients 3 children is dead, patient didn't want to discuss further. (17 Nov 2019 19:02)    PAST MEDICAL & SURGICAL HISTORY:  Polymyalgia rheumatica  Diverticulosis  Asthma  S/P knee replacement, left  S/P hysterectomy: 1982  S/P appendectomy: 1962    FAMILY HISTORY:  Family history of stroke    REVIEW OF SYSTEMS    No SOB  No CP  No cough   No bleeding  No headaches  No change in BMs or urination  No generalized bone pain.  All other ROS is neg.  	      PHYSICAL EXAM:    Alert.  Oriented  Very pleasant  Mild pallor  No jaundice  No LAD  Lungs clear  Heart RR  Abd: Non-tender  No HSM  LEs: redness left ankle and shin  No tenderness or significant edema  :      CBC Full  -  ( 18 Nov 2019 07:19 )  WBC Count : 11.24 K/uL  RBC Count : 3.83 M/uL  Hemoglobin : 11.9 g/dL  Hematocrit : 36.0 %  Platelet Count - Automated : 205 K/uL  Mean Cell Volume : 94.0 fl  Mean Cell Hemoglobin : 31.1 pg  Mean Cell Hemoglobin Concentration : 33.1 gm/dL  Auto Neutrophil # : x  Auto Lymphocyte # : x  Auto Monocyte # : x  Auto Eosinophil # : x  Auto Basophil # : x  Auto Neutrophil % : x  Auto Lymphocyte % : x  Auto Monocyte % : x  Auto Eosinophil % : x  Auto Basophil % : x    PT/INR - ( 17 Nov 2019 12:47 )   PT: 10.9 sec;   INR: 0.95 ratio         PTT - ( 18 Nov 2019 09:56 )  PTT:50.4 sec  18 Nov 2019 07:19    139    |  101    |  24.0   ----------------------------<  128    3.7     |  25.0   |  0.72     Ca    9.1        18 Nov 2019 07:19    TPro  7.1    /  Alb  4.5    /  TBili  0.6    /  DBili  x      /  AST  31     /  ALT  24     /  AlkPhos  126    17 Nov 2019 12:47    CBC Full  -  ( 19 Nov 2019 11:33 )  WBC Count : 10.74 K/uL  RBC Count : 4.33 M/uL  Hemoglobin : 13.3 g/dL  Hematocrit : 41.1 %  Platelet Count - Automated : 253 K/uL  Mean Cell Volume : 94.9 fl  Mean Cell Hemoglobin : 30.7 pg  Mean Cell Hemoglobin Concentration : 32.4 gm/dL  Auto Neutrophil # : x  Auto Lymphocyte # : x  Auto Monocyte # : x  Auto Eosinophil # : x  Auto Basophil # : x  Auto Neutrophil % : x  Auto Lymphocyte % : x  Auto Monocyte % : x  Auto Eosinophil % : x  Auto Basophil % : x    11-19    142  |  101  |  34.0<H>  ----------------------------<  181<H>  4.4   |  25.0  |  0.76    Ca    9.6      19 Nov 2019 11:33  Mg     2.1     11-19    TPro  6.5<L>  /  Alb  4.0  /  TBili  0.4  /  DBili  x   /  AST  26  /  ALT  18  /  AlkPhos  104  11-19      < from: CT Angio Chest w/ IV Cont (11.17.19 @ 19:18) >   EXAM:  CT ANGIO CHEST (W)AW IC                          PROCEDURE DATE:  11/17/2019          INTERPRETATION:  FINAL REPORT:    PROCEDURE INFORMATION:   Exam: CT Angiography Chest With Contrast   Exam date and time: 11/17/2019 7:05 PM   Clinical history: Shortness of breath    TECHNIQUE:   Imaging protocol: Computed tomographic angiography of the chest with   intravenous contrast.   3D rendering: MIP reconstructed images were created and reviewed.   Radiation optimization: All CT scans at this facility use at least one of   these   dose optimization techniques: automated exposure control; mA and/or kV   adjustment per patient size (includes targeted exams where dose is   matched to   clinical indication); or iterative reconstruction.   Contrast material: 350 OMNIPAQUE; Contrast volume: 44 ml; Contrast route:   IV;      COMPARISON:   CT chest 4/21/2018    FINDINGS:   Vessels: No pulmonary embolism.  Aorta: Thoracic aorta normal in caliber with mild calcified plaque.  Lungs: Central airways are patent. Scattered foci of linear atelectasis   or scarring. Subsegmental atelectasis or scarring at the posterior medial   aspect of the right lower lobe. Lungs otherwise clear.  Pleural space: Unremarkable. No pneumothorax. No pleural effusion.   Heart: Cardiomegaly. No pericardial effusion.  Mediastinum/south: No enlarged lymph nodes.  Chest wall/lower neck: No enlarged axillary lymph nodes.  Upper abdomen: 5.6 cm cyst in the lateral segment of the left hepatic   lobe containing a thin calcified internal septation. Small cyst in the   right hepatic lobe.  Bones/joints: Multiple healing bilateral rib fractures. Compression   fractures of T7 and T11, T7 has worsened since 4/21/2018 and T11 is new.   Severe bilateral glenohumeral joint space narrowing. Fracture of the   anterior aspect of the glenoid on the right. Mild anterolisthesis of C6   on C7.    IMPRESSION:    No pulmonary embolism.    No acute pulmonary disease.     Compression fracture of T11 and fracture of the anterior aspect of the   glenoid on the right of indeterminate age; clinical correlation is   recommended.    Compression fracture of T7, worsened since 4/21/2018.                ENEIDA FUNEZ   This document has been electronically signed. Nov 18 2019  8:56AM        < end of copied text >      LLE DVT, partially occlusive, popliteal vein likely due to immobility  Plan: Because of age and expected ongoing immobility, will need indefinite AC  No need to do a hypercoagulable state workup    Switch to Eliquis or Xarelto    Thank you.
Patient's K. MRIs of the cervical thoracic and lumbar spine been reviewed. There is significant thoracic kyphosis which I think is age-appropriate as well as the underlying thoracic compression fractures. There does not appear to be a significant area of thoracic myelomalacia type changes however the spinal cord is significantly draped over the thoracic kyphosis and associated fractures. At her age of 87 there is a non-operative approach to this I would not recommend deformity type correction at this point in time. This was communicated to the patient. And her clonus that she is presenting with maybe initiation of loss of lower extremity function which I think is not surgically correctable at this point in time.    Physical exam  Patient is lying supine she is alert she is orientated x3 from a neurologic perspective there does remain sustained clonus in bilateral lower extremities upper and lower extremity motor strength is 4+ out of 5 thank age-appropriate. Constitutional pain is reported but however well-controlled. She is in no acute distress.    Imaging  Cervical thoracic and lumbar MRIs of been reviewed with no mary spinal cord compression or significant myelomalacia findings.    Impression and plan  Acute thoracic compression fractures which will be treated conservatively. I would not recommend bracing at this point because of the kyphosis may be a significant limiting factor. If patient can tolerate an LSO brace at all I would recommend for bracing perspective. With regard to clonus/disability patient does not have any operative conditions at this point in time which will remedy her clonus and/or postpone loss of lower extremity functioning. Recommend neurology followup.

## 2019-11-20 NOTE — DISCHARGE NOTE NURSING/CASE MANAGEMENT/SOCIAL WORK - PATIENT PORTAL LINK FT
You can access the FollowMyHealth Patient Portal offered by [facility] by registering at the following website: [email or web address]. By joining [facility]’s FollowMyHealth portal, you will also be able to view your health information using other applications (apps) compatible with our system.

## 2019-11-20 NOTE — PROGRESS NOTE ADULT - ASSESSMENT
87 year old female with PMH PMR, Asthma/COPD and opioid related constipation present with 2 weeks of difficulty ambulating, ankles swollen and legs 'blood shot'. LE doppler with non-occlusive popliteal thrombus.   Hemodynamically stable, without any hypoxia though has intermittent dyspnea.  Started on Heparin in ER    Acute Left Popliteal DVT - Admitted and started on Heparin. CTA chest without any PE, however has multiple VCF. TTE without any RV dysfunction  - Eliquis started, lower dose given weight and CrCl  - PT evaluation recommending Rehab  - Hematology consult appreciated    Vertebral Compression Fracture T7 chronic, [id number] on MRI. No intervention as per Spine surgery  - Pain medications, added Lidoderm  - Added Fentanyl (observe for sedation, delirium)  - PT        Elevated BUN/SCr ratio - dehydration resolved     Chronic Moderate Persistent Asthma; likely component of Restrictive disease as well given severe kyphosis  - Continue ICS  - Continue NYA  - Continue Albuterol Nebs    PMR  - Continue Prednisone  - Continue Opioids, Celebrex    Constipation  - Continue bowel regimen    UTi with E. coli  - Rocephin, switched to Cephalexin      Disposition - Discharge to Rehab

## 2019-11-20 NOTE — PHYSICAL THERAPY INITIAL EVALUATION ADULT - ADDITIONAL COMMENTS
Pt reports living alone in a 1 story house with 2 steps to enter.  Independent with all PTA, with use of rollator.

## 2019-11-20 NOTE — DISCHARGE NOTE NURSING/CASE MANAGEMENT/SOCIAL WORK - NSDCPEELIQUIS_GEN_ALL_CORE
Apixaban/Eliquis - Follow up monitoring/Apixaban/Eliquis - Dietary Advice/Apixaban/Eliquis - Potential for adverse drug reactions and interactions/Apixaban/Eliquis - Compliance

## 2019-11-20 NOTE — DISCHARGE NOTE NURSING/CASE MANAGEMENT/SOCIAL WORK - NSDCPETBCESMAN_GEN_ALL_CORE
If you are a smoker, it is important for your health to stop smoking. Please be aware that second hand smoke is also harmful. 02-Feb-2018 08:31

## 2019-12-06 LAB
25(OH)D3 SERPL-MCNC: 25.2 NG/ML
ALBUMIN SERPL ELPH-MCNC: 4.6 G/DL
ALBUMIN SERPL ELPH-MCNC: 4.6 G/DL
ALP BLD-CCNC: 74 U/L
ALP BLD-CCNC: 84 U/L
ALT SERPL-CCNC: 20 U/L
ALT SERPL-CCNC: 25 U/L
ANION GAP SERPL CALC-SCNC: 14 MMOL/L
ANION GAP SERPL CALC-SCNC: 16 MMOL/L
AST SERPL-CCNC: 24 U/L
AST SERPL-CCNC: 32 U/L
BASOPHILS # BLD AUTO: 0.02 K/UL
BASOPHILS # BLD AUTO: 0.02 K/UL
BASOPHILS NFR BLD AUTO: 0.2 %
BASOPHILS NFR BLD AUTO: 0.2 %
BILIRUB SERPL-MCNC: 0.3 MG/DL
BILIRUB SERPL-MCNC: 0.4 MG/DL
BUN SERPL-MCNC: 28 MG/DL
BUN SERPL-MCNC: 30 MG/DL
CALCIUM SERPL-MCNC: 10.4 MG/DL
CALCIUM SERPL-MCNC: 9.9 MG/DL
CHLORIDE SERPL-SCNC: 105 MMOL/L
CHLORIDE SERPL-SCNC: 105 MMOL/L
CO2 SERPL-SCNC: 24 MMOL/L
CO2 SERPL-SCNC: 25 MMOL/L
CREAT SERPL-MCNC: 0.85 MG/DL
CREAT SERPL-MCNC: 0.95 MG/DL
CRP SERPL-MCNC: 0.35 MG/DL
CRP SERPL-MCNC: 0.4 MG/DL
EOSINOPHIL # BLD AUTO: 0.01 K/UL
EOSINOPHIL # BLD AUTO: 0.01 K/UL
EOSINOPHIL NFR BLD AUTO: 0.1 %
EOSINOPHIL NFR BLD AUTO: 0.1 %
ERYTHROCYTE [SEDIMENTATION RATE] IN BLOOD BY WESTERGREN METHOD: 2 MM/HR
ERYTHROCYTE [SEDIMENTATION RATE] IN BLOOD BY WESTERGREN METHOD: 3 MM/HR
GLUCOSE SERPL-MCNC: 102 MG/DL
GLUCOSE SERPL-MCNC: 131 MG/DL
HCT VFR BLD CALC: 38.2 %
HCT VFR BLD CALC: 39.4 %
HGB BLD-MCNC: 11.9 G/DL
HGB BLD-MCNC: 12.3 G/DL
IMM GRANULOCYTES NFR BLD AUTO: 1 %
IMM GRANULOCYTES NFR BLD AUTO: 1.2 %
LYMPHOCYTES # BLD AUTO: 0.46 K/UL
LYMPHOCYTES # BLD AUTO: 0.61 K/UL
LYMPHOCYTES NFR BLD AUTO: 5.6 %
LYMPHOCYTES NFR BLD AUTO: 6.9 %
MAN DIFF?: NORMAL
MAN DIFF?: NORMAL
MCHC RBC-ENTMCNC: 30.7 PG
MCHC RBC-ENTMCNC: 31.2 GM/DL
MCHC RBC-ENTMCNC: 31.2 GM/DL
MCHC RBC-ENTMCNC: 31.2 PG
MCV RBC AUTO: 100 FL
MCV RBC AUTO: 98.7 FL
MONOCYTES # BLD AUTO: 0.56 K/UL
MONOCYTES # BLD AUTO: 0.73 K/UL
MONOCYTES NFR BLD AUTO: 6.8 %
MONOCYTES NFR BLD AUTO: 8.2 %
NEUTROPHILS # BLD AUTO: 7.09 K/UL
NEUTROPHILS # BLD AUTO: 7.41 K/UL
NEUTROPHILS NFR BLD AUTO: 83.4 %
NEUTROPHILS NFR BLD AUTO: 86.3 %
PLATELET # BLD AUTO: 226 K/UL
PLATELET # BLD AUTO: 248 K/UL
POTASSIUM SERPL-SCNC: 4.2 MMOL/L
POTASSIUM SERPL-SCNC: 4.9 MMOL/L
PROT SERPL-MCNC: 6.4 G/DL
PROT SERPL-MCNC: 6.6 G/DL
RBC # BLD: 3.87 M/UL
RBC # BLD: 3.94 M/UL
RBC # FLD: 14.3 %
RBC # FLD: 14.6 %
SODIUM SERPL-SCNC: 143 MMOL/L
SODIUM SERPL-SCNC: 146 MMOL/L
WBC # FLD AUTO: 8.22 K/UL
WBC # FLD AUTO: 8.89 K/UL

## 2019-12-13 ENCOUNTER — APPOINTMENT (OUTPATIENT)
Dept: NEUROSURGERY | Facility: CLINIC | Age: 84
End: 2019-12-13
Payer: MEDICARE

## 2019-12-13 VITALS
SYSTOLIC BLOOD PRESSURE: 132 MMHG | WEIGHT: 98 LBS | BODY MASS INDEX: 22.04 KG/M2 | HEIGHT: 56 IN | TEMPERATURE: 98.6 F | DIASTOLIC BLOOD PRESSURE: 82 MMHG

## 2019-12-13 PROCEDURE — 99203 OFFICE O/P NEW LOW 30 MIN: CPT

## 2019-12-17 NOTE — REVIEW OF SYSTEMS
[Difficulty Walking] : difficulty walking [As Noted in HPI] : as noted in HPI [Joint Pain] : joint pain [Negative] : Heme/Lymph [FreeTextEntry9] : lower back pain

## 2019-12-17 NOTE — HISTORY OF PRESENT ILLNESS
[FreeTextEntry1] : lower back pain  [de-identified] : Ms. Madrid is a 87 year old female who presents for neurosurgical evaluation.  Post hospitalization 11/17- 11/20/2019. 87 year old female with PMH PMR, Asthma/COPD.\par Vertebral Compression Fracture T7 chronic, T11 subacute on MRI. No intervention as per Spine surgery. She was started on Lidoderm as well as Fentanyl.  Patient presents with her son in no acute distress, patient is currently residing at the [facility].  Ms. Madrid presents with but is partially compliant with her TLSO brace.  She reports mid thoracic and lower back pain.  Denies any radicular component.   Pain intensity 6/10.  Denies any saddle anesthesia, urinary or bowel dysfunction.  Patient is ambulating with the assistance of a walker. She is participating in physical therapy at the facility.  She is managing her pain with Fentanyl patch.   \par

## 2019-12-17 NOTE — PHYSICAL EXAM
[General Appearance - Alert] : alert [General Appearance - Well Nourished] : well nourished [General Appearance - In No Acute Distress] : in no acute distress [General Appearance - Well Developed] : well developed [Oriented To Time, Place, And Person] : oriented to person, place, and time [Impaired Insight] : insight and judgment were intact [Person] : oriented to person [Time] : oriented to time [Place] : oriented to place [Concentration Intact] : normal concentrating ability [Short Term Intact] : short term memory intact [Cranial Nerves Trigeminal (V)] : facial sensation intact symmetrically [Fluency] : fluency intact [Comprehension] : comprehension intact [Cranial Nerves Facial (VII)] : face symmetrical [Motor Tone] : muscle tone was normal in all four extremities [Sensation Pain / Temperature Decrease] : pain and temperature was intact [Sensation Tactile Decrease] : light touch was intact [Motor Strength] : muscle strength was normal in all four extremities [No Visual Abnormalities] : no visible abnormailities [Over the Past 2 Weeks, Have You Felt Down, Depressed, or Hopeless?] : 1.) Over the past 2 weeks, have you felt down, depressed, or hopeless? No [Over the Past 2 Weeks, Have You Felt Little Interest or Pleasure Doing Things?] : 2.) Over the past 2 weeks, have you felt little interest or pleasure doing things? No [FreeTextEntry8] : ambulates with a cane.

## 2019-12-17 NOTE — ASSESSMENT
[FreeTextEntry1] : Ms. Madrid presents with above history and imaging.  She is neurologically intact.  Patient's primary complaint is TLSO brace is ill fitted and causes her more discomfort. Patient will present to [facility] for adjustment.  Patient may merit a custom fit TLSO brace contoured to her body habitus.  She should follow up in 1 month to assess her recovery. She knows to call the office if there are any new or worsening symptoms.

## 2019-12-17 NOTE — REASON FOR VISIT
[New Patient Visit] : a new patient visit [Referred By: _________] : Patient was referred by FREDDIE [FreeTextEntry1] : T7 and T11 compression fracture

## 2019-12-20 ENCOUNTER — APPOINTMENT (OUTPATIENT)
Dept: FAMILY MEDICINE | Facility: CLINIC | Age: 84
End: 2019-12-20
Payer: MEDICARE

## 2019-12-20 VITALS
SYSTOLIC BLOOD PRESSURE: 130 MMHG | HEIGHT: 56 IN | BODY MASS INDEX: 20.02 KG/M2 | HEART RATE: 82 BPM | WEIGHT: 89 LBS | DIASTOLIC BLOOD PRESSURE: 80 MMHG

## 2019-12-20 DIAGNOSIS — T14.8XXA OTHER INJURY OF UNSPECIFIED BODY REGION, INITIAL ENCOUNTER: ICD-10-CM

## 2019-12-20 PROCEDURE — 99213 OFFICE O/P EST LOW 20 MIN: CPT

## 2019-12-24 ENCOUNTER — MEDICATION RENEWAL (OUTPATIENT)
Age: 84
End: 2019-12-24

## 2019-12-27 RX ORDER — PREDNISONE 10 MG/1
10 TABLET ORAL
Qty: 8 | Refills: 0 | Status: ACTIVE | COMMUNITY
Start: 2019-12-27 | End: 1900-01-01

## 2019-12-27 RX ORDER — FENTANYL 12 UG/H
12 PATCH, EXTENDED RELEASE TRANSDERMAL
Qty: 2 | Refills: 0 | Status: ACTIVE | COMMUNITY
Start: 1900-01-01 | End: 1900-01-01

## 2019-12-31 PROBLEM — Z87.898 HISTORY OF DYSURIA: Status: RESOLVED | Noted: 2019-01-16 | Resolved: 2019-12-31

## 2020-01-03 ENCOUNTER — APPOINTMENT (OUTPATIENT)
Dept: FAMILY MEDICINE | Facility: CLINIC | Age: 85
End: 2020-01-03
Payer: MEDICARE

## 2020-01-03 VITALS
WEIGHT: 89 LBS | HEIGHT: 56 IN | DIASTOLIC BLOOD PRESSURE: 88 MMHG | BODY MASS INDEX: 20.02 KG/M2 | SYSTOLIC BLOOD PRESSURE: 148 MMHG | HEART RATE: 86 BPM

## 2020-01-03 DIAGNOSIS — J45.909 UNSPECIFIED ASTHMA, UNCOMPLICATED: ICD-10-CM

## 2020-01-03 DIAGNOSIS — Z87.898 PERSONAL HISTORY OF OTHER SPECIFIED CONDITIONS: ICD-10-CM

## 2020-01-03 PROCEDURE — 99214 OFFICE O/P EST MOD 30 MIN: CPT

## 2020-01-03 RX ORDER — MENTHOL/SORBITOL
250 LOZENGE MUCOUS MEMBRANE TWICE DAILY
Refills: 0 | Status: ACTIVE | COMMUNITY
Start: 2020-01-03

## 2020-01-03 RX ORDER — LACTOSE-REDUCED FOOD 0.04G-1/ML
LIQUID (ML) ORAL
Refills: 0 | Status: ACTIVE | COMMUNITY
Start: 2020-01-03

## 2020-01-03 RX ORDER — SENNOSIDES 8.6 MG/1
8.6 TABLET ORAL
Qty: 180 | Refills: 0 | Status: ACTIVE | COMMUNITY
Start: 2020-01-03

## 2020-01-03 RX ORDER — BUDESONIDE AND FORMOTEROL FUMARATE DIHYDRATE 160; 4.5 UG/1; UG/1
160-4.5 AEROSOL RESPIRATORY (INHALATION)
Qty: 1 | Refills: 0 | Status: ACTIVE | COMMUNITY
Start: 2020-01-03

## 2020-01-03 RX ORDER — OYSTER SHELL CALCIUM WITH VITAMIN D 500; 200 MG/1; [IU]/1
500-200 TABLET, FILM COATED ORAL TWICE DAILY
Refills: 0 | Status: ACTIVE | COMMUNITY
Start: 2020-01-03

## 2020-01-03 RX ORDER — AMINO ACIDS/PROTEIN HYDROLYS 15G-100/30
LIQUID (ML) ORAL
Refills: 0 | Status: ACTIVE | COMMUNITY
Start: 2020-01-03

## 2020-01-03 RX ORDER — POLYETHYLENE GLYCOL 3350 17 G/17G
17 POWDER, FOR SOLUTION ORAL DAILY
Refills: 0 | Status: ACTIVE | COMMUNITY
Start: 2020-01-03

## 2020-01-03 NOTE — PHYSICAL EXAM
[No Acute Distress] : no acute distress [Well Nourished] : well nourished [Well Developed] : well developed [Well-Appearing] : well-appearing [Normal Sclera/Conjunctiva] : normal sclera/conjunctiva [PERRL] : pupils equal round and reactive to light [EOMI] : extraocular movements intact [Normal Outer Ear/Nose] : the outer ears and nose were normal in appearance [Normal Oropharynx] : the oropharynx was normal [No JVD] : no jugular venous distention [No Lymphadenopathy] : no lymphadenopathy [Supple] : supple [Thyroid Normal, No Nodules] : the thyroid was normal and there were no nodules present [No Respiratory Distress] : no respiratory distress  [No Accessory Muscle Use] : no accessory muscle use [Clear to Auscultation] : lungs were clear to auscultation bilaterally [Normal Rate] : normal rate  [Regular Rhythm] : with a regular rhythm [Normal S1, S2] : normal S1 and S2 [No Murmur] : no murmur heard [No Carotid Bruits] : no carotid bruits [No Abdominal Bruit] : a ~M bruit was not heard ~T in the abdomen [No Varicosities] : no varicosities [Pedal Pulses Present] : the pedal pulses are present [No Edema] : there was no peripheral edema [No Palpable Aorta] : no palpable aorta [No Extremity Clubbing/Cyanosis] : no extremity clubbing/cyanosis [Soft] : abdomen soft [Non Tender] : non-tender [Non-distended] : non-distended [No Masses] : no abdominal mass palpated [No HSM] : no HSM [Normal Bowel Sounds] : normal bowel sounds [Normal Posterior Cervical Nodes] : no posterior cervical lymphadenopathy [Normal Anterior Cervical Nodes] : no anterior cervical lymphadenopathy [No CVA Tenderness] : no CVA  tenderness [No Spinal Tenderness] : no spinal tenderness [No Joint Swelling] : no joint swelling [Grossly Normal Strength/Tone] : grossly normal strength/tone [No Rash] : no rash [Coordination Grossly Intact] : coordination grossly intact [No Focal Deficits] : no focal deficits [Normal Gait] : normal gait [Deep Tendon Reflexes (DTR)] : deep tendon reflexes were 2+ and symmetric [Normal Affect] : the affect was normal [Normal Insight/Judgement] : insight and judgment were intact [de-identified] : leekusum inner leg brusing in thigh and leg area/ hematoma right below knee

## 2020-01-03 NOTE — PLAN
[FreeTextEntry1] : Brusing from Trauma - superficial on eliquis- - reassurance and don't get hurt\par DVT- to hematology On eliquis\par BAck pain- will need hydrocodone and fentanyl soon\par advise to stop Celebrex\par asthma- Meds renewed

## 2020-01-03 NOTE — HISTORY OF PRESENT ILLNESS
[de-identified] : here alone ,lives alone , few days ago she went around a TV screen On the floor and doesn't remember how she banged it now her entire left leg is bruised and she has hematoma on the shin area , no pain and she is able to walk ,\par she also needs her meds, [FreeTextEntry1] : patient states banged left leg and now experiencing pain and it is discolored.  Patient is also requesting refills

## 2020-01-05 PROBLEM — T14.8XXA BRUISING: Status: ACTIVE | Noted: 2020-01-03

## 2020-01-05 NOTE — HISTORY OF PRESENT ILLNESS
[de-identified] :  from Rheumatology\par 85y F here for evaluation of pain\par \par - h/o PMR dx 2015, was eventually tapered down to prednisone 1mg/d.  Had a flare and recurrence of symptoms.  Has had difficulty tapering off prednisone less than 3 mg.\par - last flare 1/23/18. increased to prednisone 7mg/d.  ESR 10\par - does not know having DEXA or dx of OP\par - current lumbar pain, had MRI and dx with spondylosis, ?herniation\par - takes calcium 1000mg/d, vitamin D 1000 IU, B12, MVI\par - h/o pelvic ring + sacral fx, previously seen by orthopedics Dr. Rojas.  Had CT imaging. Treated with pain meds, NSAIDs, and PT\par - no cranial symptoms - no visual changes, temporal headache, jaw claudication, tongue claudication, scalp tenderness.\par - takes Vivodin 5-300mg q5h, Celebrex 200mg BID prn\par - Vax: UTD Flu [id number], PNV/Prev-13 in past 5 years\par \par From [facility]\par CT chest\par Mild anterior wedging of T7, age indeterminate but potentially acute in  the appropriate clinical setting.\par CXR: Mild to moderate compression deformity of a mid thoracic vertebral body  with kyphosis.\par \par 5/2018:\par - Thoracic compression fx pain stable, still takes occasional hydrocodone-APAP 5-300mg.  Improved as well with calcitonin spray\par - had DEXA, overall w/ osteoporosis - Fem neck w/ -3.1 and -3.4; AP LS -3.2.  Has been on chronic steroids since 2015 for PMR.  Recent vertebral compression fx, no hip fxs. No parental hip fxs. Never a smoker, <3 EtOH beverages/wk, no history of RA but has PMR. On alendronate.\par - FRAX score:\par  Ten Year Major Osteoporotic Fracture Risk: 42.77% \par  Ten Year Hip Fracture Risk: 21.83% \par  T-Score: -3.4 \par - recent R shoulder pain, saw PMD and had XR -> dx with RTC tendinopathy and referred to orthopedic, pending appt however pt will not undergo surgery so it is unlikely a visit she may keep\par - Prolia last 6/27/18 injection\par - neck pain and swelling 2/2 coracoid process fx on R w/ STS, MRI confirmed, GH OA, RTC tears, old stress fx of acromion\par - CTAP showing multiple R rib fxs resulting in R side bulge and tenderness\par \par Updates:\par - has been attempting to reduce prednisone, PMR activity not likely as normal inflammatory markers. Pt was taking higher than recommended doses due to her pain, thinking it was due to PMR in the past.  I have had extensive conversations about the risk - including fractures\par - restarted her on osteoporosis treatment due to multiple fxs, h/o old pelvic R superior and inferior pubic rami and L pubic symphysis.  On Prolia given last 6/2018 next due 12/2018.\par - was placed previously on Calcitonin spray for OP aid and pain relief however was not helpful and stopped taking\par \par \par \par Ms. Madrid is a 87 year old female who presents for medical evaluation.  Post hospitalization 11/17- 11/20/2019. 87 year old female with PMH PMR, Asthma/COPD.\par Vertebral Compression Fracture T7 chronic, T11 subacute on MRI. No intervention as per Spine surgery. She was started on Lidoderm as well as Fentanyl.  Patient presents with her son in no acute distress, patient is currently residing at the [facility].  Ms. Madrid presents with but is partially compliant with her TLSO brace.  She reports mid thoracic and lower back pain.  Denies any radicular component.   Pain intensity 6/10.  Denies any saddle anesthesia, urinary or bowel dysfunction.  Patient is ambulating with the assistance\par  of a walker. She is participating in physical therapy at the facility.  She is managing her pain with Fentanyl patch.  \par here to get her pain meds and asking how to apply her Lidoderm patch as she lives alone and unable to reach her back.\par she is in pain but she gets around doing well on Fentanyl and Oxycodone.\par patient relies on TV dinner and her brother gets her food which she keeps frozen.\par also needs supply pf Proximal patches for her hump\par patient gets around through [employer]\par \par 12/2019- Patient is here t get checked on Bruise on her leg happened 3-4 days\par patient was trying to go around TV and she scratched [FreeTextEntry1] : lower back pain

## 2020-01-05 NOTE — PHYSICAL EXAM
[Well Nourished] : well nourished [No Acute Distress] : no acute distress [Well-Appearing] : well-appearing [Well Developed] : well developed [Normal Sclera/Conjunctiva] : normal sclera/conjunctiva [EOMI] : extraocular movements intact [PERRL] : pupils equal round and reactive to light [No JVD] : no jugular venous distention [Normal Outer Ear/Nose] : the outer ears and nose were normal in appearance [Normal Oropharynx] : the oropharynx was normal [No Lymphadenopathy] : no lymphadenopathy [Supple] : supple [Thyroid Normal, No Nodules] : the thyroid was normal and there were no nodules present [No Respiratory Distress] : no respiratory distress  [No Accessory Muscle Use] : no accessory muscle use [Clear to Auscultation] : lungs were clear to auscultation bilaterally [Regular Rhythm] : with a regular rhythm [Normal Rate] : normal rate  [No Abdominal Bruit] : a ~M bruit was not heard ~T in the abdomen [No Carotid Bruits] : no carotid bruits [Normal S1, S2] : normal S1 and S2 [No Murmur] : no murmur heard [Pedal Pulses Present] : the pedal pulses are present [No Edema] : there was no peripheral edema [No Varicosities] : no varicosities [Soft] : abdomen soft [No Palpable Aorta] : no palpable aorta [No Extremity Clubbing/Cyanosis] : no extremity clubbing/cyanosis [No Masses] : no abdominal mass palpated [Non Tender] : non-tender [Non-distended] : non-distended [Normal Posterior Cervical Nodes] : no posterior cervical lymphadenopathy [No HSM] : no HSM [Normal Bowel Sounds] : normal bowel sounds [Normal Anterior Cervical Nodes] : no anterior cervical lymphadenopathy [No Spinal Tenderness] : no spinal tenderness [No CVA Tenderness] : no CVA  tenderness [No Joint Swelling] : no joint swelling [Grossly Normal Strength/Tone] : grossly normal strength/tone [No Rash] : no rash [No Focal Deficits] : no focal deficits [Normal Gait] : normal gait [Deep Tendon Reflexes (DTR)] : deep tendon reflexes were 2+ and symmetric [Coordination Grossly Intact] : coordination grossly intact [Normal Affect] : the affect was normal [Normal Insight/Judgement] : insight and judgment were intact [de-identified] : left Inner leg thigh to lower leg Bruise with small hematoma near knee

## 2020-01-10 ENCOUNTER — INPATIENT (INPATIENT)
Facility: HOSPITAL | Age: 85
LOS: 4 days | Discharge: NURSING, REHAB OR LONG-TERM CARE | DRG: 551 | End: 2020-01-15
Attending: FAMILY MEDICINE | Admitting: HOSPITALIST
Payer: MEDICARE

## 2020-01-10 VITALS
DIASTOLIC BLOOD PRESSURE: 95 MMHG | RESPIRATION RATE: 18 BRPM | SYSTOLIC BLOOD PRESSURE: 160 MMHG | OXYGEN SATURATION: 96 % | HEART RATE: 102 BPM | TEMPERATURE: 98 F

## 2020-01-10 DIAGNOSIS — M54.9 DORSALGIA, UNSPECIFIED: ICD-10-CM

## 2020-01-10 LAB
ALBUMIN SERPL ELPH-MCNC: 3.9 G/DL — SIGNIFICANT CHANGE UP (ref 3.3–5.2)
ALP SERPL-CCNC: 132 U/L — HIGH (ref 40–120)
ALT FLD-CCNC: 21 U/L — SIGNIFICANT CHANGE UP
ANION GAP SERPL CALC-SCNC: 12 MMOL/L — SIGNIFICANT CHANGE UP (ref 5–17)
APTT BLD: 31.1 SEC — SIGNIFICANT CHANGE UP (ref 27.5–36.3)
AST SERPL-CCNC: 29 U/L — SIGNIFICANT CHANGE UP
BILIRUB SERPL-MCNC: 0.6 MG/DL — SIGNIFICANT CHANGE UP (ref 0.4–2)
BUN SERPL-MCNC: 21 MG/DL — HIGH (ref 8–20)
CALCIUM SERPL-MCNC: 9.3 MG/DL — SIGNIFICANT CHANGE UP (ref 8.6–10.2)
CHLORIDE SERPL-SCNC: 103 MMOL/L — SIGNIFICANT CHANGE UP (ref 98–107)
CO2 SERPL-SCNC: 27 MMOL/L — SIGNIFICANT CHANGE UP (ref 22–29)
CREAT SERPL-MCNC: 0.68 MG/DL — SIGNIFICANT CHANGE UP (ref 0.5–1.3)
GLUCOSE SERPL-MCNC: 96 MG/DL — SIGNIFICANT CHANGE UP (ref 70–115)
HCT VFR BLD CALC: 37.7 % — SIGNIFICANT CHANGE UP (ref 34.5–45)
HGB BLD-MCNC: 11.8 G/DL — SIGNIFICANT CHANGE UP (ref 11.5–15.5)
INR BLD: 1.13 RATIO — SIGNIFICANT CHANGE UP (ref 0.88–1.16)
MCHC RBC-ENTMCNC: 30.4 PG — SIGNIFICANT CHANGE UP (ref 27–34)
MCHC RBC-ENTMCNC: 31.3 GM/DL — LOW (ref 32–36)
MCV RBC AUTO: 97.2 FL — SIGNIFICANT CHANGE UP (ref 80–100)
PLATELET # BLD AUTO: 255 K/UL — SIGNIFICANT CHANGE UP (ref 150–400)
POTASSIUM SERPL-MCNC: 4.4 MMOL/L — SIGNIFICANT CHANGE UP (ref 3.5–5.3)
POTASSIUM SERPL-SCNC: 4.4 MMOL/L — SIGNIFICANT CHANGE UP (ref 3.5–5.3)
PROT SERPL-MCNC: 6.5 G/DL — LOW (ref 6.6–8.7)
PROTHROM AB SERPL-ACNC: 13.1 SEC — HIGH (ref 10–12.9)
RBC # BLD: 3.88 M/UL — SIGNIFICANT CHANGE UP (ref 3.8–5.2)
RBC # FLD: 14.1 % — SIGNIFICANT CHANGE UP (ref 10.3–14.5)
SODIUM SERPL-SCNC: 142 MMOL/L — SIGNIFICANT CHANGE UP (ref 135–145)
WBC # BLD: 9.92 K/UL — SIGNIFICANT CHANGE UP (ref 3.8–10.5)
WBC # FLD AUTO: 9.92 K/UL — SIGNIFICANT CHANGE UP (ref 3.8–10.5)

## 2020-01-10 PROCEDURE — 73562 X-RAY EXAM OF KNEE 3: CPT | Mod: 26,RT

## 2020-01-10 PROCEDURE — 70450 CT HEAD/BRAIN W/O DYE: CPT | Mod: 26

## 2020-01-10 PROCEDURE — 99285 EMERGENCY DEPT VISIT HI MDM: CPT

## 2020-01-10 PROCEDURE — 72125 CT NECK SPINE W/O DYE: CPT | Mod: 26

## 2020-01-10 PROCEDURE — 71250 CT THORAX DX C-: CPT | Mod: 26

## 2020-01-10 PROCEDURE — 74176 CT ABD & PELVIS W/O CONTRAST: CPT | Mod: 26

## 2020-01-10 PROCEDURE — 72131 CT LUMBAR SPINE W/O DYE: CPT | Mod: 26

## 2020-01-10 PROCEDURE — 72128 CT CHEST SPINE W/O DYE: CPT | Mod: 26

## 2020-01-10 RX ORDER — CALCIUM CARBONATE 500(1250)
1 TABLET ORAL
Refills: 0 | Status: DISCONTINUED | OUTPATIENT
Start: 2020-01-10 | End: 2020-01-15

## 2020-01-10 RX ORDER — FENTANYL CITRATE 50 UG/ML
50 INJECTION INTRAVENOUS ONCE
Refills: 0 | Status: DISCONTINUED | OUTPATIENT
Start: 2020-01-10 | End: 2020-01-10

## 2020-01-10 RX ORDER — ALBUTEROL 90 UG/1
1 AEROSOL, METERED ORAL EVERY 6 HOURS
Refills: 0 | Status: DISCONTINUED | OUTPATIENT
Start: 2020-01-10 | End: 2020-01-15

## 2020-01-10 RX ORDER — NALOXONE HYDROCHLORIDE 4 MG/.1ML
0.2 SPRAY NASAL ONCE
Refills: 0 | Status: COMPLETED | OUTPATIENT
Start: 2020-01-10 | End: 2020-01-10

## 2020-01-10 RX ORDER — SENNA PLUS 8.6 MG/1
2 TABLET ORAL AT BEDTIME
Refills: 0 | Status: DISCONTINUED | OUTPATIENT
Start: 2020-01-10 | End: 2020-01-15

## 2020-01-10 RX ORDER — CHOLECALCIFEROL (VITAMIN D3) 125 MCG
1000 CAPSULE ORAL DAILY
Refills: 0 | Status: DISCONTINUED | OUTPATIENT
Start: 2020-01-10 | End: 2020-01-15

## 2020-01-10 RX ORDER — POLYETHYLENE GLYCOL 3350 17 G/17G
17 POWDER, FOR SOLUTION ORAL DAILY
Refills: 0 | Status: DISCONTINUED | OUTPATIENT
Start: 2020-01-10 | End: 2020-01-15

## 2020-01-10 RX ORDER — CELECOXIB 200 MG/1
200 CAPSULE ORAL DAILY
Refills: 0 | Status: DISCONTINUED | OUTPATIENT
Start: 2020-01-10 | End: 2020-01-15

## 2020-01-10 RX ORDER — BUDESONIDE AND FORMOTEROL FUMARATE DIHYDRATE 160; 4.5 UG/1; UG/1
2 AEROSOL RESPIRATORY (INHALATION)
Refills: 0 | Status: DISCONTINUED | OUTPATIENT
Start: 2020-01-10 | End: 2020-01-15

## 2020-01-10 RX ORDER — LIDOCAINE 4 G/100G
2 CREAM TOPICAL DAILY
Refills: 0 | Status: DISCONTINUED | OUTPATIENT
Start: 2020-01-10 | End: 2020-01-15

## 2020-01-10 RX ORDER — APIXABAN 2.5 MG/1
5 TABLET, FILM COATED ORAL EVERY 12 HOURS
Refills: 0 | Status: DISCONTINUED | OUTPATIENT
Start: 2020-01-10 | End: 2020-01-15

## 2020-01-10 RX ADMIN — NALOXONE HYDROCHLORIDE 0.2 MILLIGRAM(S): 4 SPRAY NASAL at 22:05

## 2020-01-10 RX ADMIN — Medication 1 MILLIGRAM(S): at 17:46

## 2020-01-10 RX ADMIN — FENTANYL CITRATE 50 MICROGRAM(S): 50 INJECTION INTRAVENOUS at 15:41

## 2020-01-10 RX ADMIN — FENTANYL CITRATE 50 MICROGRAM(S): 50 INJECTION INTRAVENOUS at 16:29

## 2020-01-10 NOTE — ED ADULT TRIAGE NOTE - CHIEF COMPLAINT QUOTE
Fall from standing height now C/O upper back and neck pain. (-) LOC, is on Eliquis   C spine immobilization in place. Dr Moyer at bedside for eval. Pt taken down CT for priority CT of HEAD.   VSS Fall from standing height now C/O upper back and neck pain. (-) LOC, is on Eliquis   C spine immobilization in place. Dr West at bedside for eval. Pt taken down CT for priority CT of HEAD.   VSS

## 2020-01-10 NOTE — ED ADULT NURSE NOTE - OBJECTIVE STATEMENT
Pt c/o 10/10 pain r/t fall at home, son states mother was cleaning out litter box and lost her balance, hematoma noted to Left upper arm, pt screaming at staff, uncooperative, will continue to monitor

## 2020-01-10 NOTE — H&P ADULT - HISTORY OF PRESENT ILLNESS
87 year old female with PMH PMR, Asthma/COPD, opioid related constipation, DVT dx 11/2019 on eliquis, chronic t7, t11 compression fx 87 year old female with PMH PMR, osteoporosis, Asthma/COPD, opioid related constipation, DVT dx 11/2019 on eliquis, chronic t7, t11 compression fx, chronic RT iliac wing fx, chronic BL rib fx presenting w/ mechanical fall. patient sp multiple pain meds, unable to fully wake up. history obtained from chart + patient son via telephone. patient came from home sp fall, complained of diffuse back pain of entire spine on arrival. denies fevers/chills, cp, shortness of breath, weakness, LOC to ED doctor.

## 2020-01-10 NOTE — H&P ADULT - NSICDXPASTSURGICALHX_GEN_ALL_CORE_FT
PAST SURGICAL HISTORY:  S/P appendectomy 1962    S/P hysterectomy 1982    S/P knee replacement, left

## 2020-01-10 NOTE — CONSULT NOTE ADULT - ASSESSMENT
88 y/o female w/ PMHx of PMH PMR, Asthma/COPD, presents s/p trip and fall, found to have acute inferior endplate L1 compression fracture.     - Discussed case w/ Dr. Hoffman  - pt to be arranged for brace  - pain control as needed, avoid over sedation  - PT as tolerated  - continue home medications for Athma, and PMR  - continue to coordinate care w/ primary team

## 2020-01-10 NOTE — ED PROVIDER NOTE - CLINICAL SUMMARY MEDICAL DECISION MAKING FREE TEXT BOX
s/p fall with history compression fx T7 and T 11 with worseing back pain ; ct , pain meds and reeval; pt has seen Dr Guerra in past; pt not a surgical candidate due to comorbidities

## 2020-01-10 NOTE — H&P ADULT - NSHPLABSRESULTS_GEN_ALL_CORE
01-10    142  |  103  |  21.0<H>  ----------------------------<  96  4.4   |  27.0  |  0.68    Ca    9.3      10 Dar 2020 16:04    TPro  6.5<L>  /  Alb  3.9  /  TBili  0.6  /  DBili  x   /  AST  29  /  ALT  21  /  AlkPhos  132<H>  01-10                          11.8   9.92  )-----------( 255      ( 10 Dar 2020 16:04 )             37.7     LIVER FUNCTIONS - ( 10 Dar 2020 16:04 )  Alb: 3.9 g/dL / Pro: 6.5 g/dL / ALK PHOS: 132 U/L / ALT: 21 U/L / AST: 29 U/L / GGT: x           PT/INR - ( 10 Dar 2020 16:04 )   PT: 13.1 sec;   INR: 1.13 ratio         PTT - ( 10 Dar 2020 16:04 )  PTT:31.1 sec

## 2020-01-10 NOTE — CONSULT NOTE ADULT - SUBJECTIVE AND OBJECTIVE BOX
HISTORY OF PRESENT ILLNESS:   88 y/o female w/ PMHx of PMH PMR, Asthma/COPD, presents s/p trip and fall, found to have acute inferior endplate compression fracture. As per ED attending, pt lives at home, independent of self care. Pt last in [facility] Nov 2019 for 2 weeks of difficulty ambulating, found to have vertebral Compression Fracture T7 chronic, T11 subacute on MRI, w/ recommendations for no intervention as per spine surgery. Exam limited at bedside 2/2 pt receiving ativan.       PAST MEDICAL & SURGICAL HISTORY:  Polymyalgia rheumatica  Diverticulosis  Asthma  S/P knee replacement, left  S/P hysterectomy: 1982  S/P appendectomy: 1962    FAMILY HISTORY:  Family history of stroke      Allergies    No Known Allergies    Intolerances        REVIEW OF SYSTEMS  Pt unable to provide     HOME MEDICATIONS:  Home Medications:  albuterol 4 mg oral tablet: 1 tab(s) orally once a day (at bedtime) (17 Nov 2019 17:50)  apixaban 5 mg oral tablet: 1 tab(s) orally every 12 hours (20 Nov 2019 10:18)  calcium-vitamin D 500 mg-200 intl units oral tablet: 1 tab(s) orally 2 times a day (20 Nov 2019 10:18)  cephalexin 500 mg oral capsule: 1 cap(s) orally 3 times a day (20 Nov 2019 10:18)  fentaNYL 12 mcg/hr transdermal film, extended release: 1 patch transdermal every 48 hours (20 Nov 2019 10:18)  lidocaine 5% topical film: Apply topically to affected area once a day; on for 12 hours, off for 12 hours (20 Nov 2019 10:18)  oxycodone-acetaminophen 5 mg-325 mg oral tablet: 1 tab(s) orally every 6 hours, As needed, Moderate Pain (4 - 6) (20 Nov 2019 10:18)  polyethylene glycol 3350 oral powder for reconstitution: 17 gram(s) orally once a day, As needed, Constipation (20 Nov 2019 10:18)  predniSONE 20 mg oral tablet: 1 tab(s) orally once a day (17 Nov 2019 17:50)  saccharomyces boulardii lyo 250 mg oral capsule: 1 cap(s) orally 2 times a day (20 Nov 2019 10:18)  senna oral tablet: 2 tab(s) orally once a day (at bedtime) (20 Nov 2019 10:18)  Symbicort 160 mcg-4.5 mcg/inh inhalation aerosol: 2 puff(s) inhaled 2 times a day (17 Nov 2019 17:50)        Vital Signs Last 24 Hrs  T(C): 36.9 (10 Dar 2020 13:35), Max: 36.9 (10 Dar 2020 13:35)  T(F): 98.5 (10 Dar 2020 13:35), Max: 98.5 (10 Dar 2020 13:35)  HR: 102 (10 Dar 2020 13:35) (102 - 102)  BP: 160/95 (10 Dar 2020 13:35) (160/95 - 160/95)  BP(mean): --  RR: 18 (10 Dar 2020 13:35) (18 - 18)  SpO2: 96% (10 Dar 2020 13:35) (96% - 96%)      PHYSICAL EXAM:  GENERAL: NAD, well-groomed  HEAD:  Atraumatic, normocephalic  EYES: Conjunctiva and sclera clear; corneal reflex intact  ENMT: No tonsillar erythema, exudates, or enlargement; moist mucous membranes,   NECK: Supple, no JVD, dormal thyroid  MENTAL STATUS: sleeping; Opens eyes spontaneously to noxious stimuli;   CRANIAL NERVES: PERRL.   REFLEXES: Doll's eyes positive. Blinks to threat. Gag reflex intact.  MOTOR: Localizes B/L UE, withdraws B/L UE  CHEST/LUNG: +breath sounds bilaterally; no rales, rhonchi, wheezing  HEART: +S1/+S2; Regular rate and rhythm; no murmurs, rubs, or gallops  ABDOMEN: Soft, nontender, nondistended;   EXTREMITIES:  2+ peripheral pulses, no clubbing, cyanosis, or edema  SKIN: +ecchymosis LLE from ankle to inner thigh    LABS:                        11.8   9.92  )-----------( 255      ( 10 Dar 2020 16:04 )             37.7     01-10    142  |  103  |  21.0<H>  ----------------------------<  96  4.4   |  27.0  |  0.68    Ca    9.3      10 Dar 2020 16:04    TPro  6.5<L>  /  Alb  3.9  /  TBili  0.6  /  DBili  x   /  AST  29  /  ALT  21  /  AlkPhos  132<H>  01-10    PT/INR - ( 10 Dar 2020 16:04 )   PT: 13.1 sec;   INR: 1.13 ratio         PTT - ( 10 Dar 2020 16:04 )  PTT:31.1 sec      CULTURES:      RADIOLOGY & ADDITIONAL STUDIES:    EXAM: CT REFORM SPINE L   EXAM: CT REFORM SPINE T   PROCEDURE DATE: 01/10/2020   IMPRESSION:     CT thoracic spine:   No evidence of acute fracture or dislocation of the thoracic spine. Chronic   C7 and T11 compression fractures.   Multiple chronic appearing bilateral rib fractures.     CT lumbar spine:   New compression deformity at the inferior endplate of L1, likely acute.   There is mild bony retropulsion.

## 2020-01-10 NOTE — GOALS OF CARE CONVERSATION - ADVANCED CARE PLANNING - CONVERSATION DETAILS
Goals of Care discussed at length with the patient's son via telephone. This conversation included current condition, prognosis, and options for therapy. Discussed desires by patient for further care and wishes were expressed - curative measures. unaware of his moms current wishes and recommends discussion w/ his brother who is currently on a plane at the moment.    Conversation start time: 2100  Conversation end time: 2135  Total time of conversation: 35min Goals of Care discussed at length with the patient's son via telephone. This conversation included current condition, prognosis, and options for therapy. Discussed desires by patient for further care and wishes were expressed - curative measures. unaware of his moms current wishes and recommends discussion w/ his brother who is currently on a plane at the moment.    brother: parker mcguire. [phone]    Conversation start time: 2100  Conversation end time: 2135  Total time of conversation: 35min

## 2020-01-10 NOTE — CONSULT NOTE ADULT - ATTENDING COMMENTS
NSGY Attg:    see above    patient seen and examined by PA staff    imaging reviewed    agree with plan as above

## 2020-01-10 NOTE — ED PROVIDER NOTE - OBJECTIVE STATEMENT
86 yo female pmh compression fracture  T& and T11 on eliquis comes to ed s/p fall  with pain in upper and lower back; pt denies any head or neck injury; 86 yo female pmh compression fracture  T7 and T11 on eliquis comes to ed s/p fall  with pain in upper and lower back; pt denies any head or neck injury;

## 2020-01-10 NOTE — H&P ADULT - ASSESSMENT
87 year old female with PMH PMR, Asthma/COPD, opioid related constipation, DVT dx 11/2019 on eliquis, chronic t7, t11 compression fx, chronic RT iliac wing fx, chronic BL rib fx presenting w/ acute L1 compression fx and poss acute LT posterior 5th rib fx sp mechanical fall.     #post traumatic acute inferior endplate L1 compression fx, stable  -sec to mechanical fall, in setting of chronic t7 + t11 compression fx  -cth w/ incidental LT frontal lesion - meningioma vs. dural calcification, no acute findings  -clinically has osteoporosis - vitamin d + calcium supplementation, vitamin d level in am, should pursue tx  -neurosx eval appreciated, non surgical intervention  -brace pending - to be arranged by neurosx  -prn pain control  -PT to be ordered once brace secured    #acute on chronic BL rib fx, stable  -mult chronic fx noted, LT posteior 5th rib fx is poss acute  -supportive care, pain meds as above    #anemia, chronic, stable  -baseline hgb 12, no acute/active s/s of blood loss, repeat in am if stable stop trending, outpt w/u    #elevated alk phos, minimal, asymptomatic  -repeat in am, if still elevated can be worked up outpt    #dvt ppx. lovenox    #dispo. PT eval prior to dc, likely dc in 2-3d to [place]    #outpt fu. pmd, poss rheum for osteoporosis management 87 year old female with PMH PMR, osteoporosis, Asthma/COPD, opioid related constipation, DVT dx 11/2019 on eliquis, chronic t7, t11 compression fx, chronic RT iliac wing fx, chronic BL rib fx presenting w/ acute L1 compression fx and poss acute LT posterior 5th rib fx sp mechanical fall.     early hospital course complicated by overdose. given fentanyl 50mcg iv x2 + ativan 1mg iv x1 in ED. found heavily sedated + unarousable. narcan given.     #osteoporosis complicated by post traumatic acute inferior endplate L1 compression fx, stable  -sec to mechanical fall, in setting of chronic t7 + t11 compression fx  -cth w/ incidental LT frontal lesion - meningioma vs. dural calcification, no acute findings  -vitamin d + calcium supplementation, vitamin d level in am, per son pursuing further tx already outpt  -neurosx eval appreciated, non surgical intervention  -brace pending - to be arranged by neurosx  -prn pain control  -PT to be ordered once brace secured, bedrest until then    #iatrogenic overdose, not present on arrival  -sec to given fentanyl 50mcg iv x2 + ativan 1mg iv x1 in ED  -no signs resp compromise - protecting airway, vss but unarousable - per pt son pt normally aaox4  -narcan 0.2mg for partial reversal, , neuro checks - repeat narcan dose if becoming unstable    #acute on chronic BL rib fx, stable  -mult chronic fx noted, LT posteior 5th rib fx is poss acute  -supportive care, pain meds as above    #anemia, chronic, stable  -baseline hgb 12, no acute/active s/s of blood loss, repeat in am if stable stop trending, outpt w/u    #elevated alk phos, minimal, asymptomatic  -repeat in am, if still elevated can be worked up outpt    #dvt ppx. lovenox    #dispo. PT eval prior to dc, likely dc in 2-3d to [place]    #outpt fu. pmd 87 year old female with PMH PMR, osteoporosis, Asthma/COPD, opioid related constipation, DVT dx 11/2019 on eliquis, chronic t7, t11 compression fx, chronic RT iliac wing fx, chronic BL rib fx presenting w/ acute L1 compression fx and poss acute LT posterior 5th rib fx sp mechanical fall.     early hospital course complicated by overdose. given fentanyl 50mcg iv x2 + ativan 1mg iv x1 in ED. found heavily sedated + unarousable. narcan given.     #osteoporosis complicated by post traumatic acute inferior endplate L1 compression fx, stable  -sec to mechanical fall, in setting of chronic t7 + t11 compression fx  -cth w/ incidental LT frontal lesion - meningioma vs. dural calcification, no acute findings  -vitamin d + calcium supplementation, vitamin d level in am, per son pursuing further tx already outpt  -neurosx eval appreciated, non surgical intervention  -brace pending - to be arranged by neurosx  -prn pain control held given iatrogenic overdose  -PT to be ordered once brace secured, bedrest until then    #iatrogenic overdose, not present on arrival  -sec to given fentanyl 50mcg iv x2 + ativan 1mg iv x1 in ED  -no signs resp compromise - protecting airway, vss but fully unarousable - per pt son pt normally aaox4  -narcan 0.2mg for partial reversal,  - repeat narcan dose if becoming unstable  -med rec to be completed in daytime when patient awake    #acute on chronic BL rib fx, stable  -mult chronic fx noted, LT posteior 5th rib fx is poss acute  -supportive care, pain meds as above    #anemia, chronic, stable  -baseline hgb 12, no acute/active s/s of blood loss, repeat in am if stable stop trending, outpt w/u    #elevated alk phos, minimal, asymptomatic  -repeat in am, if still elevated can be worked up outpt    #dvt ppx. lovenox    #dispo. PT eval prior to dc, likely dc in 2-3d to [place]    #outpt fu. pmd

## 2020-01-10 NOTE — H&P ADULT - NSHPPHYSICALEXAM_GEN_ALL_CORE
T(C): 36.9 (01-10-20 @ 13:35), Max: 36.9 (01-10-20 @ 13:35)  HR: 91 (01-10-20 @ 19:40) (91 - 102)  BP: 130/71 (01-10-20 @ 19:40) (130/71 - 160/95)  RR: 18 (01-10-20 @ 19:40) (18 - 18)  SpO2: 98% (01-10-20 @ 19:40) (96% - 98%)    GEN - appears age appropriate. frail. unarousable - groans w/ deep sternal rub but not able to fully awaken  HEENT - NCAT  RESP - CTA BL, no wheeze/stridor/rhonchi/crackles. not on supplemental O2.  CARDIO - NS1S2, RRR. No murmurs/rubs/gallops.  ABD - Soft/Non distended. Normal BS x4 quadrants.   Ext - No BETTE. no signs of venous/arterial stasis ulcers  MSK - unable to assess   Neuro - unable to assess. no visible seizure activity appreciated. no tremor. gait not observed.   Skin - scattered areas of ecchymosis throughout body, particularly on LLE  Psych- unable to assess, unarousable

## 2020-01-10 NOTE — ED ADULT NURSE NOTE - CHIEF COMPLAINT QUOTE
Fall from standing height now C/O upper back and neck pain. (-) LOC, is on Eliquis   C spine immobilization in place. Dr West at bedside for eval. Pt taken down CT for priority CT of HEAD.   VSS

## 2020-01-10 NOTE — H&P ADULT - NSHPSOCIALHISTORY_GEN_ALL_CORE
per son denies hx or current tobacco use, ilicit drug use, etoh per son denies hx or current tobacco use, ilicit drug use, etoh    lives alone, walks w/ cane + walker, recent hx multiple falls + multiple fx from osteoporosis

## 2020-01-11 ENCOUNTER — TRANSCRIPTION ENCOUNTER (OUTPATIENT)
Age: 85
End: 2020-01-11

## 2020-01-11 DIAGNOSIS — M54.9 DORSALGIA, UNSPECIFIED: ICD-10-CM

## 2020-01-11 DIAGNOSIS — J45.909 UNSPECIFIED ASTHMA, UNCOMPLICATED: ICD-10-CM

## 2020-01-11 DIAGNOSIS — M35.3 POLYMYALGIA RHEUMATICA: ICD-10-CM

## 2020-01-11 LAB
24R-OH-CALCIDIOL SERPL-MCNC: 26.7 NG/ML — LOW (ref 30–80)
ALBUMIN SERPL ELPH-MCNC: 3.4 G/DL — SIGNIFICANT CHANGE UP (ref 3.3–5.2)
ALP SERPL-CCNC: 120 U/L — SIGNIFICANT CHANGE UP (ref 40–120)
ALT FLD-CCNC: 18 U/L — SIGNIFICANT CHANGE UP
ANION GAP SERPL CALC-SCNC: 14 MMOL/L — SIGNIFICANT CHANGE UP (ref 5–17)
APPEARANCE UR: CLEAR — SIGNIFICANT CHANGE UP
AST SERPL-CCNC: 27 U/L — SIGNIFICANT CHANGE UP
BACTERIA # UR AUTO: ABNORMAL
BILIRUB SERPL-MCNC: 0.9 MG/DL — SIGNIFICANT CHANGE UP (ref 0.4–2)
BILIRUB UR-MCNC: NEGATIVE — SIGNIFICANT CHANGE UP
BUN SERPL-MCNC: 16 MG/DL — SIGNIFICANT CHANGE UP (ref 8–20)
CALCIUM SERPL-MCNC: 9.1 MG/DL — SIGNIFICANT CHANGE UP (ref 8.6–10.2)
CHLORIDE SERPL-SCNC: 103 MMOL/L — SIGNIFICANT CHANGE UP (ref 98–107)
CO2 SERPL-SCNC: 25 MMOL/L — SIGNIFICANT CHANGE UP (ref 22–29)
COLOR SPEC: YELLOW — SIGNIFICANT CHANGE UP
CREAT SERPL-MCNC: 0.63 MG/DL — SIGNIFICANT CHANGE UP (ref 0.5–1.3)
DIFF PNL FLD: ABNORMAL
EPI CELLS # UR: SIGNIFICANT CHANGE UP
GLUCOSE SERPL-MCNC: 89 MG/DL — SIGNIFICANT CHANGE UP (ref 70–115)
GLUCOSE UR QL: NEGATIVE MG/DL — SIGNIFICANT CHANGE UP
HCT VFR BLD CALC: 37.8 % — SIGNIFICANT CHANGE UP (ref 34.5–45)
HGB BLD-MCNC: 11.8 G/DL — SIGNIFICANT CHANGE UP (ref 11.5–15.5)
KETONES UR-MCNC: NEGATIVE — SIGNIFICANT CHANGE UP
LEUKOCYTE ESTERASE UR-ACNC: NEGATIVE — SIGNIFICANT CHANGE UP
NITRITE UR-MCNC: NEGATIVE — SIGNIFICANT CHANGE UP
PH UR: 7 — SIGNIFICANT CHANGE UP (ref 5–8)
POTASSIUM SERPL-MCNC: 3.9 MMOL/L — SIGNIFICANT CHANGE UP (ref 3.5–5.3)
POTASSIUM SERPL-SCNC: 3.9 MMOL/L — SIGNIFICANT CHANGE UP (ref 3.5–5.3)
PROT SERPL-MCNC: 6 G/DL — LOW (ref 6.6–8.7)
PROT UR-MCNC: NEGATIVE MG/DL — SIGNIFICANT CHANGE UP
RBC CASTS # UR COMP ASSIST: ABNORMAL /HPF (ref 0–4)
SODIUM SERPL-SCNC: 142 MMOL/L — SIGNIFICANT CHANGE UP (ref 135–145)
SP GR SPEC: 1.01 — SIGNIFICANT CHANGE UP (ref 1.01–1.02)
UROBILINOGEN FLD QL: NEGATIVE MG/DL — SIGNIFICANT CHANGE UP
WBC UR QL: SIGNIFICANT CHANGE UP

## 2020-01-11 PROCEDURE — 99232 SBSQ HOSP IP/OBS MODERATE 35: CPT

## 2020-01-11 RX ADMIN — CELECOXIB 200 MILLIGRAM(S): 200 CAPSULE ORAL at 22:45

## 2020-01-11 RX ADMIN — Medication 1 TABLET(S): at 05:31

## 2020-01-11 RX ADMIN — APIXABAN 5 MILLIGRAM(S): 2.5 TABLET, FILM COATED ORAL at 17:28

## 2020-01-11 RX ADMIN — Medication 1000 UNIT(S): at 12:56

## 2020-01-11 RX ADMIN — LIDOCAINE 2 PATCH: 4 CREAM TOPICAL at 21:11

## 2020-01-11 RX ADMIN — Medication 20 MILLIGRAM(S): at 05:30

## 2020-01-11 RX ADMIN — Medication 1 TABLET(S): at 17:28

## 2020-01-11 RX ADMIN — BUDESONIDE AND FORMOTEROL FUMARATE DIHYDRATE 2 PUFF(S): 160; 4.5 AEROSOL RESPIRATORY (INHALATION) at 21:56

## 2020-01-11 RX ADMIN — APIXABAN 5 MILLIGRAM(S): 2.5 TABLET, FILM COATED ORAL at 05:30

## 2020-01-11 RX ADMIN — BUDESONIDE AND FORMOTEROL FUMARATE DIHYDRATE 2 PUFF(S): 160; 4.5 AEROSOL RESPIRATORY (INHALATION) at 10:55

## 2020-01-11 RX ADMIN — Medication 1 TABLET(S): at 12:56

## 2020-01-11 RX ADMIN — LIDOCAINE 2 PATCH: 4 CREAM TOPICAL at 12:56

## 2020-01-11 NOTE — CHART NOTE - NSCHARTNOTEFT_GEN_A_CORE
The patient could not be fit with an off the shelf brace. She is much to kyphotic for a stock brace. Nor will I be able to adjust it enough to make the necessary accommodations. I had to measure her for a custom orthosis which will be ready for Monday. I will look to have it on her as early as possible.     Brent Flower [facility] orthopedic  [phone]

## 2020-01-11 NOTE — DISCHARGE NOTE PROVIDER - NSDCFUSCHEDAPPT_GEN_ALL_CORE_FT
SARA TRIANA ; 02/04/2020 ; [facility]PP [facility] [address]  SARA TRIANA ; 02/26/2020 ; NPP Rheum [address] [facility] SARA TRIANA ; 02/04/2020 ; [facility] [address]  SARA TRIANA ; 02/26/2020 ; NPP Rheum [address] [facility] SARA TRIANA ; 02/04/2020 ; [facility] [address]  SARA TRIANA ; 02/26/2020 ; NPP Rheum [address] [facility] SARA TRIANA ; 02/04/2020 ; N[facility] [address] [address] [facility] [address]  SARA TRIANA ; 02/26/2020 ; NPP Rheum [address] [facility] SARA TRIANA ; 02/04/2020 ; N[facility] [address] [facility] [address]  SARA TRIANA ; 02/26/2020 ; NPP Rheum [address] [facility]

## 2020-01-11 NOTE — DISCHARGE NOTE PROVIDER - NSDCFUADDINST_GEN_ALL_CORE_FT
used back brace with all activities   remove brace at night for comfort used back brace with all activities   remove brace at night for comfort  pt lethargic with narcotics

## 2020-01-11 NOTE — PROGRESS NOTE ADULT - ASSESSMENT
87y Female PMH PMR, osteoporosis, asthma/COPD, opioid related constipation, DVT dx 11/2019 on Eliquis, chronic T7/T11 compression fracture, chronic right iliac wing fracture, chronic b/l ring fx, s/p fall found with L1 fx.     PLAN:  - D/w Dr. Hoffman  - Q4 neuro checks  - Bedrest for now  - MR L spine to determine acuity of fracture- if acute will need brace, if chronic, will not require brace  - Pain control PRN, avoid oversedation  - DVT ppx  - Supportive care/further medical management per primary team  - Will f/u MRI results

## 2020-01-11 NOTE — DISCHARGE NOTE PROVIDER - NSDCCPCAREPLAN_GEN_ALL_CORE_FT
PRINCIPAL DISCHARGE DIAGNOSIS  Diagnosis: Acute bilateral back pain, unspecified back location  Assessment and Plan of Treatment:       SECONDARY DISCHARGE DIAGNOSES  Diagnosis: Drug-induced osteopenia  Assessment and Plan of Treatment: due to Steroids

## 2020-01-11 NOTE — DISCHARGE NOTE PROVIDER - HOSPITAL COURSE
87 year old female with PMH PMR, osteoporosis, Asthma/COPD, opioid related constipation, DVT dx 11/2019 on eliquis, chronic t7, t11 compression fx, chronic RT iliac wing fx, chronic BL rib fx presenting w/ mechanical fall. patient sp multiple pain meds, unable to fully wake up. history obtained from chart + patient son via telephone. patient came from home sp fall, complained of diffuse back pain of entire spine on arrival. denies fevers/chills, cp, shortness of breath, weakness, LOC to ED doctor. 87 year old female with PMH PMR, osteoporosis, Asthma/COPD, opioid related constipation, DVT dx 11/2019 on eliquis, chronic t7, t11 compression fx, chronic RT iliac wing fx, chronic BL rib fx presenting w/ mechanical fall. patient sp multiple pain meds, unable to fully wake up. history obtained from chart + patient son via telephone. patient came from home sp fall, complained of diffuse back pain of entire spine on arrival. denies fevers/chills, cp, shortness of breath, weakness, LOC to ED doctor.        seen by neuroSx    fitted for back brace

## 2020-01-11 NOTE — DISCHARGE NOTE PROVIDER - NSDCMRMEDTOKEN_GEN_ALL_CORE_FT
albuterol 4 mg oral tablet: 1 tab(s) orally once a day (at bedtime)  albuterol CFC free 90 mcg/inh inhalation aerosol: 1 puff(s) inhaled every 6 hours, As needed, Shortness of Breath and/or Wheezing  apixaban 5 mg oral tablet: 1 tab(s) orally every 12 hours  calcium-vitamin D 500 mg-200 intl units oral tablet: 1 tab(s) orally 2 times a day  celecoxib 200 mg oral capsule: 1 cap(s) orally once a day (before a meal), As Needed  fentaNYL 12 mcg/hr transdermal film, extended release: 1 patch transdermal every 48 hours  lidocaine 5% topical film: Apply topically to affected area once a day; on for 12 hours, off for 12 hours  oxycodone-acetaminophen 5 mg-325 mg oral tablet: 1 tab(s) orally every 6 hours, As needed, Moderate Pain (4 - 6)  polyethylene glycol 3350 oral powder for reconstitution: 17 gram(s) orally once a day, As needed, Constipation  predniSONE 20 mg oral tablet: 1 tab(s) orally once a day  senna oral tablet: 2 tab(s) orally once a day (at bedtime)  Symbicort 160 mcg-4.5 mcg/inh inhalation aerosol: 2 puff(s) inhaled 2 times a day acetaminophen 325 mg oral tablet: 2 tab(s) orally every 8 hours, As needed, Severe Pain (7 - 10)  albuterol 4 mg oral tablet: 1 tab(s) orally once a day (at bedtime)  apixaban 5 mg oral tablet: 1 tab(s) orally every 12 hours  calcium-vitamin D 500 mg-200 intl units oral tablet: 1 tab(s) orally 2 times a day  celecoxib 200 mg oral capsule: 1 cap(s) orally once a day (before a meal), As Needed  lidocaine 5% topical film: Apply topically to affected area once a day; on for 12 hours, off for 12 hours  predniSONE 20 mg oral tablet: 1 tab(s) orally once a day  senna oral tablet: 2 tab(s) orally once a day (at bedtime)  Symbicort 160 mcg-4.5 mcg/inh inhalation aerosol: 2 puff(s) inhaled 2 times a day

## 2020-01-11 NOTE — ED ADULT NURSE REASSESSMENT NOTE - NS ED NURSE REASSESS COMMENT FT1
at 0030 pt c/o burning on urination and unable to urinate, bladder scan obtained showed >557 of urine. MD called and made aware, ordering straight cath x1 and RN to send urine samples.

## 2020-01-11 NOTE — DISCHARGE NOTE PROVIDER - CARE PROVIDERS DIRECT ADDRESSES
,DirectAddress_Unknown ,DirectAddress_Unknown,[email or web address]

## 2020-01-11 NOTE — PROGRESS NOTE ADULT - SUBJECTIVE AND OBJECTIVE BOX
INTERVAL HPI/OVERNIGHT EVENTS:  87y Female PMH PMR, osteoporosis, asthma/COPD, opioid related constipation, DVT dx 2019 on Eliquis, chronic T7/T11 compression fracture, chronic right iliac wing fracture, chronic b/l ring fx, s/p fall found with L1 fx. Patient seen earlier this AM c/o complaints     Vital Signs Last 24 Hrs  T(C): 36.7 (2020 03:31), Max: 36.7 (2020 03:31)  T(F): 98 (2020 03:31), Max: 98 (2020 03:31)  HR: 100 (2020 03:31) (86 - 108)  BP: 130/70 (2020 03:31) (124/63 - 138/87)  BP(mean): --  RR: 17 (2020 03:31) (17 - 18)  SpO2: 94% (2020 03:31) (94% - 98%)    PHYSICAL EXAM:  GENERAL: NAD, thin  HEAD:  Atraumatic, normocephalic  MENTAL STATUS: Awake, alert. Answers questions appropriately. Following commands  MOTOR: moves all extremities to command  SENSATION: grossly intact to light touch all extremities  SKIN: +ecchymosis LLE from ankle to inner thigh    LABS:                        11.8   x     )-----------( x        ( 2020 05:33 )             37.8     01-11    142  |  103  |  16.0  ----------------------------<  89  3.9   |  25.0  |  0.63    Ca    9.1      2020 05:33    TPro  6.0<L>  /  Alb  3.4  /  TBili  0.9  /  DBili  x   /  AST  27  /  ALT  18  /  AlkPhos  120  01-11    PT/INR - ( 10 Dar 2020 16:04 )   PT: 13.1 sec;   INR: 1.13 ratio      PTT - ( 10 Dar 2020 16:04 )  PTT:31.1 sec  Urinalysis Basic - ( 2020 03:38 )    Color: Yellow / Appearance: Clear / S.010 / pH: x  Gluc: x / Ketone: Negative  / Bili: Negative / Urobili: Negative mg/dL   Blood: x / Protein: Negative mg/dL / Nitrite: Negative   Leuk Esterase: Negative / RBC: 6-10 /HPF / WBC 0-2   Sq Epi: x / Non Sq Epi: Occasional / Bacteria: Many    RADIOLOGY & ADDITIONAL TESTS:  CT Head No Cont (01.10.20 @ 14:51)  IMPRESSION: No acute intracranial hemorrhage or mass effect.     CT Thoracic Spine Reform No Cont (01.10.20 @ 14:16)  IMPRESSION:   CT thoracic spine:  No evidence of acute fracture or dislocation of the thoracic spine. Chronic C7 and T11 compression fractures.   Multiple chronic appearing bilateral rib fractures.  CT lumbar spine:  New compression deformity at the inferior endplate of L1, likely acute. There is mild bony retropulsion.   Please see same-day CT chest abdomen and pelvis for additional findings.    CAPRINI SCORE [CLOT]:  Patient has an estimated Caprini score of greater than 5.  However, the patient's unique clinical situation will be addressed in an individual manner to determine appropriate anticoagulation treatment, if any.

## 2020-01-11 NOTE — DISCHARGE NOTE PROVIDER - PROVIDER TOKENS
FREE:[LAST:[PMD],PHONE:[(   )    -],FAX:[(   )    -]] FREE:[LAST:[PMD],PHONE:[(   )    -],FAX:[(   )    -]],PROVIDER:[TOKEN:[[id number]]]

## 2020-01-11 NOTE — DISCHARGE NOTE PROVIDER - CARE PROVIDER_API CALL
PMD,   Phone: (   )    -  Fax: (   )    -  Follow Up Time: PMD,   Phone: (   )    -  Fax: (   )    -  Follow Up Time:     Olegario Hoffman)  Neurosurgery  [facility], [address] [place] [address]  Phone: [phone]  Fax: [phone]  Follow Up Time:

## 2020-01-11 NOTE — PROGRESS NOTE ADULT - SUBJECTIVE AND OBJECTIVE BOX
Patient is a 87y old  Female who presents with a chief complaint of compression dx (2020 09:38)        87 yr old female with PMH PMR, osteoporosis, Asthma/COPD, opioid related constipation, DVT dx 2019 on eliquis, chronic t7, t11 compression fx, chronic RT iliac wing fx, chronic BL rib fx presenting w/ mechanical fall. patient sp multiple pain meds, unable to fully wake up. history obtained from chart + patient son via telephone. patient came from home s/p fall, complained of diffuse back pain of entire spine on arrival. denies fevers/chills, cp, shortness of breath, weakness, LOC to ED doctor.         PULMONARY:  ALBUTerol    90 MICROgram(s) HFA Inhaler 1 Puff(s) Inhalation every 6 hours PRN  budesonide 160 MICROgram(s)/formoterol 4.5 MICROgram(s) Inhaler 2 Puff(s) Inhalation two times a day    NEUROLOGIC:  celecoxib 200 milliGRAM(s) Oral daily PRN    HEMATOLOGIC:  apixaban 5 milliGRAM(s) Oral every 12 hours    GATROINTESTINAL:  polyethylene glycol 3350 17 Gram(s) Oral daily PRN  senna 2 Tablet(s) Oral at bedtime    ENDO/METABOLIC:  predniSONE   Tablet 20 milliGRAM(s) Oral <User Schedule>    IV FLUID/NUTRITION:  calcium carbonate   1250 mG (OsCal) 1 Tablet(s) Oral two times a day  cholecalciferol 1000 Unit(s) Oral daily  multivitamin 1 Tablet(s) Oral daily    TOPICAL:  lidocaine   Patch 2 Patch Transdermal daily        Allergies    No Known Allergies        Vital Signs Last 24 Hrs  T(C): 36.7 (2020 03:31), Max: 36.9 (10 Dar 2020 13:35)  T(F): 98 (2020 03:31), Max: 98.5 (10 Dar 2020 13:35)  HR: 100 (2020 03:31) (86 - 108)  BP: 130/70 (2020 03:31) (124/63 - 160/95)  BP(mean): --  RR: 17 (2020 03:31) (17 - 18)  SpO2: 94% (2020 03:31) (94% - 98%)          REVIEW OF SYSTEMS:    CONSTITUTIONAL: No fever, weight loss, or fatigue  EYES: No eye pain, visual disturbances, or discharge  ENMT:  No difficulty hearing, tinnitus, vertigo; No sinus or throat pain  NECK: No pain or stiffness  BREASTS: No pain, masses, or nipple discharge  RESPIRATORY: No cough, wheezing, chills or hemoptysis; No shortness of breath  CARDIOVASCULAR: No chest pain, palpitations, dizziness, or leg swelling  GASTROINTESTINAL: No abdominal or epigastric pain. No nausea, vomiting, or hematemesis; No diarrhea or constipation. No melena or hematochezia.  GENITOURINARY: No dysuria, frequency, hematuria, or incontinence  NEUROLOGICAL: No headaches, memory loss, loss of strength, numbness, or tremors  SKIN: No itching, burning, rashes, or lesions   LYMPH NODES: No enlarged glands  ENDOCRINE: No heat or cold intolerance; No hair loss  MUSCULOSKELETAL: back pain        PHYSICAL EXAM:    GENERAL: NAD, well-groomed, well-developed  HEAD:  Atraumatic, Normocephalic  EYES: EOMI, PERRLA, conjunctiva and sclera clear  NECK: Supple, No JVD, Normal thyroid  NERVOUS SYSTEM:  Alert & Oriented X3, Good concentration; Motor Strength 5/5 B/L upper and lower extremities; DTRs 2+ intact and symmetric  CHEST/LUNG: Clear to percussion bilaterally; No rales, rhonchi, wheezing, or rubs  HEART: Regular rate and rhythm; No murmurs, rubs, or gallops  ABDOMEN: Soft, Nontender, Nondistended; Bowel sounds present  EXTREMITIES:  2+ Peripheral Pulses, No clubbing, cyanosis, or edema  multiple areas of ecchymosis   LYMPH: No lymphadenopathy noted  +back pain       LABS:                        11.8   x     )-----------( x        ( 2020 05:33 )             37.8     CBC Full  -  ( 2020 05:33 )  WBC Count : x  RBC Count : x  Hemoglobin : 11.8 g/dL  Hematocrit : 37.8 %  Platelet Count - Automated : x  Mean Cell Volume : x  Mean Cell Hemoglobin : x  Mean Cell Hemoglobin Concentration : x  Auto Neutrophil # : x  Auto Lymphocyte # : x  Auto Monocyte # : x  Auto Eosinophil # : x  Auto Basophil # : x  Auto Neutrophil % : x  Auto Lymphocyte % : x  Auto Monocyte % : x  Auto Eosinophil % : x  Auto Basophil % : x        142  |  103  |  16.0  ----------------------------<  89  3.9   |  25.0  |  0.63    Ca    9.1      2020 05:33    TPro  6.0<L>  /  Alb  3.4  /  TBili  0.9  /  DBili  x   /  AST  27  /  ALT  18  /  AlkPhos  120      PT/INR - ( 10 Dar 2020 16:04 )   PT: 13.1 sec;   INR: 1.13 ratio         PTT - ( 10 Dar 2020 16:04 )  PTT:31.1 sec  Urinalysis Basic - ( 2020 03:38 )    Color: Yellow / Appearance: Clear / S.010 / pH: x  Gluc: x / Ketone: Negative  / Bili: Negative / Urobili: Negative mg/dL   Blood: x / Protein: Negative mg/dL / Nitrite: Negative   Leuk Esterase: Negative / RBC: 6-10 /HPF / WBC 0-2   Sq Epi: x / Non Sq Epi: Occasional / Bacteria: Many              Albumin, Serum: 3.4 g/dL ( @ 05:33)  Albumin, Serum: 3.9 g/dL (01-10 @ 16:04)    LIVER FUNCTIONS - ( 2020 05:33 )  Alb: 3.4 g/dL / Pro: 6.0 g/dL / ALK PHOS: 120 U/L / ALT: 18 U/L / AST: 27 U/L / GGT: x             RADIOLOGY & ADDITIONAL TESTS:

## 2020-01-12 PROCEDURE — 99232 SBSQ HOSP IP/OBS MODERATE 35: CPT

## 2020-01-12 RX ADMIN — LIDOCAINE 2 PATCH: 4 CREAM TOPICAL at 00:45

## 2020-01-12 RX ADMIN — LIDOCAINE 2 PATCH: 4 CREAM TOPICAL at 12:49

## 2020-01-12 RX ADMIN — LIDOCAINE 2 PATCH: 4 CREAM TOPICAL at 19:46

## 2020-01-12 RX ADMIN — Medication 1000 UNIT(S): at 12:49

## 2020-01-12 RX ADMIN — APIXABAN 5 MILLIGRAM(S): 2.5 TABLET, FILM COATED ORAL at 05:44

## 2020-01-12 RX ADMIN — Medication 1 TABLET(S): at 12:49

## 2020-01-12 RX ADMIN — Medication 20 MILLIGRAM(S): at 05:44

## 2020-01-12 RX ADMIN — Medication 1 TABLET(S): at 05:44

## 2020-01-12 RX ADMIN — SENNA PLUS 2 TABLET(S): 8.6 TABLET ORAL at 21:42

## 2020-01-12 RX ADMIN — Medication 1 TABLET(S): at 18:46

## 2020-01-12 RX ADMIN — APIXABAN 5 MILLIGRAM(S): 2.5 TABLET, FILM COATED ORAL at 18:46

## 2020-01-12 RX ADMIN — BUDESONIDE AND FORMOTEROL FUMARATE DIHYDRATE 2 PUFF(S): 160; 4.5 AEROSOL RESPIRATORY (INHALATION) at 09:15

## 2020-01-12 NOTE — PROGRESS NOTE ADULT - ASSESSMENT
87y Female PMH PMR, osteoporosis, asthma/COPD, opioid related constipation, DVT dx 11/2019 on Eliquis, chronic T7/T11 compression fracture, chronic right iliac wing fracture, chronic b/l ring fx, s/p fall found with L1 fx.    PLAN:  - D/w Dr. Hoffman  - Q4 neuro checks  - Bedrest for now  - MR L spine to determine acuity of fracture- if acute will need brace when OOB, if chronic, can use brace for comfort PRN  - Pain control PRN, avoid oversedation  - DVT ppx  - Supportive care/further medical management per primary team  - Will f/u MRI results

## 2020-01-12 NOTE — DIETITIAN INITIAL EVALUATION ADULT. - ETIOLOGY
related to inability to meet sufficient protein-energy in setting of poor po intake and advanced age

## 2020-01-12 NOTE — PROGRESS NOTE ADULT - SUBJECTIVE AND OBJECTIVE BOX
INTERVAL HPI/OVERNIGHT EVENTS:  87y Female PMH PMR, osteoporosis, asthma/COPD, opioid related constipation, DVT dx 2019 on Eliquis, chronic T7/T11 compression fracture, chronic right iliac wing fracture, chronic b/l ring fx, s/p fall found with L1 fx. Patient seen lying in bed, c/o shoulder pain and back pain    Vital Signs Last 24 Hrs  T(C): 36.8 (2020 07:39), Max: 36.8 (2020 03:00)  T(F): 98.2 (2020 07:39), Max: 98.3 (2020 03:00)  HR: 87 (2020 07:39) (87 - 110)  BP: 158/72 (2020 07:39) (105/65 - 158/72)  BP(mean): --  RR: 18 (2020 07:39) (17 - 19)  SpO2: 90% (2020 07:39) (90% - 98%)    PHYSICAL EXAM:  GENERAL: NAD, thin  HEAD:  Atraumatic, normocephalic  MENTAL STATUS: Awake, alert. Answers questions appropriately. Following commands  CRANIAL NERVES: EOMI. Face symmetric. Hearing intact. Speech clear  MOTOR: 5/5 all extremities, at times limited 2/2 pain  SENSATION: grossly intact to light touch all extremities  SKIN: +ecchymosis LLE from ankle to inner thigh    LABS:                        11.8   x     )-----------( x        ( 2020 05:33 )             37.8     01-11    142  |  103  |  16.0  ----------------------------<  89  3.9   |  25.0  |  0.63    Ca    9.1      2020 05:33    TPro  6.0<L>  /  Alb  3.4  /  TBili  0.9  /  DBili  x   /  AST  27  /  ALT  18  /  AlkPhos  120  01-11    PT/INR - ( 10 Dar 2020 16:04 )   PT: 13.1 sec;   INR: 1.13 ratio      PTT - ( 10 Dar 2020 16:04 )  PTT:31.1 sec  Urinalysis Basic - ( 2020 03:38 )    Color: Yellow / Appearance: Clear / S.010 / pH: x  Gluc: x / Ketone: Negative  / Bili: Negative / Urobili: Negative mg/dL   Blood: x / Protein: Negative mg/dL / Nitrite: Negative   Leuk Esterase: Negative / RBC: 6-10 /HPF / WBC 0-2   Sq Epi: x / Non Sq Epi: Occasional / Bacteria: Many    RADIOLOGY & ADDITIONAL TESTS:  CT Head No Cont (01.10.20 @ 14:51)  IMPRESSION: No acute intracranial hemorrhage or mass effect.     CT Thoracic Spine Reform No Cont (01.10.20 @ 14:16)  IMPRESSION:   CT thoracic spine:  No evidence of acute fracture or dislocation of the thoracic spine. Chronic C7 and T11 compression fractures.   Multiple chronic appearing bilateral rib fractures.  CT lumbar spine:  New compression deformity at the inferior endplate of L1, likely acute. There is mild bony retropulsion.   Please see same-day CT chest abdomen and pelvis for additional findings.      CAPRINI SCORE [CLOT]:  Patient has an estimated Caprini score of greater than 5.  However, the patient's unique clinical situation will be addressed in an individual manner to determine appropriate anticoagulation treatment, if any.

## 2020-01-12 NOTE — CHART NOTE - NSCHARTNOTEFT_GEN_A_CORE
Upon Nutritional Assessment by the Registered Dietitian your patient was determined to meet criteria / has evidence of the following diagnosis/diagnoses:            [x ] Severe Protein Calorie Malnutrition         Findings as based on:  •  Comprehensive nutrition assessment and consultation  •  Calorie counts (nutrient intake analysis)  •  Food acceptance and intake status from observations by staff  •  Follow up  •  Patient education  •  Intervention secondary to interdisciplinary rounds  •   concerns      Treatment:    The following diet has been recommended:      PROVIDER Section:     By signing this assessment you are acknowledging and agree with the diagnosis/diagnoses assigned by the Registered Dietitian    Comments:    RX: Add Ensure Enlive TID to optimize po intake and provide an additional 350kcal, 20g protein per serving   RX: Add Ensure Pudding TID to optimize po intake and provide an additional 170kcal, 4g protein per serving

## 2020-01-12 NOTE — DIETITIAN INITIAL EVALUATION ADULT. - OTHER INFO
87y Female PMH PMR, osteoporosis, asthma/COPD, opioid related constipation, DVT dx 11/2019, chronic T7/T11 compression fracture, chronic right iliac wing fracture, chronic b/l ring fx, s/p fall found with L1 fx. Pt was lethargic during assessment. Pt reported very poor po intake due to diarrhea and lack of appetite. Pt reported consuming Ensure, observed opened by bedside. Pt encouraged to consume HBV protein and small frequent snacks/meals. Pt reported wt loss but is unaware of amount/time frame. No ht/wt available per EMR, pt reported ht 4'11" and wt 92# per bed scale.

## 2020-01-12 NOTE — DIETITIAN INITIAL EVALUATION ADULT. - ADD RECOMMEND
RX: Add Ensure Enlive TID to optimize po intake and provide an additional 350kcal, 20g protein per serving. Add Ensure Pudding TID to optimize po intake and provide an additional 170kcal, 4g protein per serving. Continue MVI. Monitor weights and labs. Encourage po intake.

## 2020-01-12 NOTE — PROGRESS NOTE ADULT - SUBJECTIVE AND OBJECTIVE BOX
Patient is a 87y old  Female who presents with a chief complaint of compression dx (2020 09:38)        87 yr old female with PMH PMR, osteoporosis, Asthma/COPD, opioid related constipation, DVT dx 2019 on eliquis, chronic t7, t11 compression fx, chronic RT iliac wing fx, chronic BL rib fx presenting w/ mechanical fall. patient sp multiple pain meds, unable to fully wake up. history obtained from chart + patient son via telephone. patient came from home s/p fall, complained of diffuse back pain of entire spine on arrival. denies fevers/chills, cp, shortness of breath, weakness, LOC to ED doctor.         PULMONARY:  ALBUTerol    90 MICROgram(s) HFA Inhaler 1 Puff(s) Inhalation every 6 hours PRN  budesonide 160 MICROgram(s)/formoterol 4.5 MICROgram(s) Inhaler 2 Puff(s) Inhalation two times a day    NEUROLOGIC:  celecoxib 200 milliGRAM(s) Oral daily PRN    HEMATOLOGIC:  apixaban 5 milliGRAM(s) Oral every 12 hours    GATROINTESTINAL:  polyethylene glycol 3350 17 Gram(s) Oral daily PRN  senna 2 Tablet(s) Oral at bedtime    ENDO/METABOLIC:  predniSONE   Tablet 20 milliGRAM(s) Oral <User Schedule>    IV FLUID/NUTRITION:  calcium carbonate   1250 mG (OsCal) 1 Tablet(s) Oral two times a day  cholecalciferol 1000 Unit(s) Oral daily  multivitamin 1 Tablet(s) Oral daily    TOPICAL:  lidocaine   Patch 2 Patch Transdermal daily        Allergies    No Known Allergies        Vital Signs Last 24 Hrs  T(C): 36.8 (2020 07:39), Max: 36.8 (2020 03:00)  T(F): 98.2 (2020 07:39), Max: 98.3 (2020 03:00)  HR: 87 (2020 07:39) (87 - 110)  BP: 158/72 (2020 07:39) (105/65 - 158/72)  BP(mean): --  RR: 18 (2020 07:39) (17 - 19)  SpO2: 90% (2020 07:39) (90% - 98%)        REVIEW OF SYSTEMS:    CONSTITUTIONAL: No fever, weight loss, or fatigue  EYES: No eye pain, visual disturbances, or discharge  ENMT:  No difficulty hearing, tinnitus, vertigo; No sinus or throat pain  NECK: No pain or stiffness  BREASTS: No pain, masses, or nipple discharge  RESPIRATORY: No cough, wheezing, chills or hemoptysis; No shortness of breath  CARDIOVASCULAR: No chest pain, palpitations, dizziness, or leg swelling  GASTROINTESTINAL: No abdominal or epigastric pain. No nausea, vomiting, or hematemesis; No diarrhea or constipation. No melena or hematochezia.  GENITOURINARY: No dysuria, frequency, hematuria, or incontinence  NEUROLOGICAL: No headaches, memory loss, loss of strength, numbness, or tremors  SKIN: No itching, burning, rashes, or lesions   LYMPH NODES: No enlarged glands  ENDOCRINE: No heat or cold intolerance; No hair loss  MUSCULOSKELETAL: back pain        PHYSICAL EXAM:    GENERAL: NAD, well-groomed, well-developed  HEAD:  Atraumatic, Normocephalic  EYES: EOMI, PERRLA, conjunctiva and sclera clear  NECK: Supple, No JVD, Normal thyroid  NERVOUS SYSTEM:  Alert & Oriented X3, Good concentration; Motor Strength 5/5 B/L upper and lower extremities; DTRs 2+ intact and symmetric  CHEST/LUNG: Clear to percussion bilaterally; No rales, rhonchi, wheezing, or rubs  HEART: Regular rate and rhythm; No murmurs, rubs, or gallops  ABDOMEN: Soft, Nontender, Nondistended; Bowel sounds present  EXTREMITIES:  2+ Peripheral Pulses, No clubbing, cyanosis, or edema  multiple areas of ecchymosis   LYMPH: No lymphadenopathy noted  +back pain       LABS:                        11.8   x     )-----------( x        ( 2020 05:33 )             37.8     CBC Full  -  ( 2020 05:33 )  WBC Count : x  RBC Count : x  Hemoglobin : 11.8 g/dL  Hematocrit : 37.8 %  Platelet Count - Automated : x  Mean Cell Volume : x  Mean Cell Hemoglobin : x  Mean Cell Hemoglobin Concentration : x  Auto Neutrophil # : x  Auto Lymphocyte # : x  Auto Monocyte # : x  Auto Eosinophil # : x  Auto Basophil # : x  Auto Neutrophil % : x  Auto Lymphocyte % : x  Auto Monocyte % : x  Auto Eosinophil % : x  Auto Basophil % : x        142  |  103  |  16.0  ----------------------------<  89  3.9   |  25.0  |  0.63    Ca    9.1      2020 05:33    TPro  6.0<L>  /  Alb  3.4  /  TBili  0.9  /  DBili  x   /  AST  27  /  ALT  18  /  AlkPhos  120      PT/INR - ( 10 Dar 2020 16:04 )   PT: 13.1 sec;   INR: 1.13 ratio         PTT - ( 10 Dar 2020 16:04 )  PTT:31.1 sec  Urinalysis Basic - ( 2020 03:38 )    Color: Yellow / Appearance: Clear / S.010 / pH: x  Gluc: x / Ketone: Negative  / Bili: Negative / Urobili: Negative mg/dL   Blood: x / Protein: Negative mg/dL / Nitrite: Negative   Leuk Esterase: Negative / RBC: 6-10 /HPF / WBC 0-2   Sq Epi: x / Non Sq Epi: Occasional / Bacteria: Many              Albumin, Serum: 3.4 g/dL ( @ 05:33)  Albumin, Serum: 3.9 g/dL (01-10 @ 16:04)    LIVER FUNCTIONS - ( 2020 05:33 )  Alb: 3.4 g/dL / Pro: 6.0 g/dL / ALK PHOS: 120 U/L / ALT: 18 U/L / AST: 27 U/L / GGT: x             RADIOLOGY & ADDITIONAL TESTS:

## 2020-01-13 DIAGNOSIS — M54.9 DORSALGIA, UNSPECIFIED: ICD-10-CM

## 2020-01-13 PROCEDURE — 99232 SBSQ HOSP IP/OBS MODERATE 35: CPT

## 2020-01-13 PROCEDURE — 72148 MRI LUMBAR SPINE W/O DYE: CPT | Mod: 26

## 2020-01-13 RX ORDER — ACETAMINOPHEN WITH CODEINE 300MG-30MG
2 TABLET ORAL EVERY 8 HOURS
Refills: 0 | Status: DISCONTINUED | OUTPATIENT
Start: 2020-01-13 | End: 2020-01-13

## 2020-01-13 RX ORDER — ACETAMINOPHEN 500 MG
650 TABLET ORAL EVERY 8 HOURS
Refills: 0 | Status: DISCONTINUED | OUTPATIENT
Start: 2020-01-13 | End: 2020-01-15

## 2020-01-13 RX ADMIN — LIDOCAINE 2 PATCH: 4 CREAM TOPICAL at 18:53

## 2020-01-13 RX ADMIN — Medication 650 MILLIGRAM(S): at 18:00

## 2020-01-13 RX ADMIN — Medication 20 MILLIGRAM(S): at 05:49

## 2020-01-13 RX ADMIN — Medication 1 TABLET(S): at 17:26

## 2020-01-13 RX ADMIN — APIXABAN 5 MILLIGRAM(S): 2.5 TABLET, FILM COATED ORAL at 05:49

## 2020-01-13 RX ADMIN — LIDOCAINE 2 PATCH: 4 CREAM TOPICAL at 00:31

## 2020-01-13 RX ADMIN — CELECOXIB 200 MILLIGRAM(S): 200 CAPSULE ORAL at 06:49

## 2020-01-13 RX ADMIN — LIDOCAINE 2 PATCH: 4 CREAM TOPICAL at 12:01

## 2020-01-13 RX ADMIN — Medication 1000 UNIT(S): at 12:00

## 2020-01-13 RX ADMIN — Medication 1 TABLET(S): at 12:01

## 2020-01-13 RX ADMIN — Medication 1 TABLET(S): at 05:48

## 2020-01-13 RX ADMIN — BUDESONIDE AND FORMOTEROL FUMARATE DIHYDRATE 2 PUFF(S): 160; 4.5 AEROSOL RESPIRATORY (INHALATION) at 00:35

## 2020-01-13 RX ADMIN — CELECOXIB 200 MILLIGRAM(S): 200 CAPSULE ORAL at 05:49

## 2020-01-13 RX ADMIN — Medication 650 MILLIGRAM(S): at 18:40

## 2020-01-13 RX ADMIN — BUDESONIDE AND FORMOTEROL FUMARATE DIHYDRATE 2 PUFF(S): 160; 4.5 AEROSOL RESPIRATORY (INHALATION) at 09:58

## 2020-01-13 RX ADMIN — APIXABAN 5 MILLIGRAM(S): 2.5 TABLET, FILM COATED ORAL at 17:27

## 2020-01-13 NOTE — PROGRESS NOTE ADULT - SUBJECTIVE AND OBJECTIVE BOX
Patient is a 87y old  Female who presents with a chief complaint of compression dx (13 Jan 2020 08:46)      HPI:  87 year old female with PMH PMR, osteoporosis, Asthma/COPD, opioid related constipation, DVT dx 11/2019 on eliquis, chronic t7, t11 compression fx, chronic RT iliac wing fx, chronic BL rib fx presenting w/ mechanical fall. patient sp multiple pain meds, unable to fully wake up. history obtained from chart + patient son via telephone. patient came from home sp fall, complained of diffuse back pain of entire spine on arrival. denies fevers/chills, cp, shortness of breath, weakness, LOC to ED doctor. (10 Dar 2020 20:39)    1/13/20 pt seen and examined this morning and d/w Dr wynne, pt awaiting rpt MRI. Pt c/o back pain, denies any numbness tingling or radiation of pain.     PAST MEDICAL & SURGICAL HISTORY:  Polymyalgia rheumatica  Diverticulosis  Asthma  S/P knee replacement, left  S/P hysterectomy: 1982  S/P appendectomy: 1962      FAMILY HISTORY:  Family history of stroke      Social Hx:  Nonsmoker, no drug or alcohol use    MEDICATIONS  (STANDING):  apixaban 5 milliGRAM(s) Oral every 12 hours  budesonide 160 MICROgram(s)/formoterol 4.5 MICROgram(s) Inhaler 2 Puff(s) Inhalation two times a day  calcium carbonate   1250 mG (OsCal) 1 Tablet(s) Oral two times a day  cholecalciferol 1000 Unit(s) Oral daily  levoFLOXacin  Tablet 250 milliGRAM(s) Oral every 24 hours  lidocaine   Patch 2 Patch Transdermal daily  multivitamin 1 Tablet(s) Oral daily  predniSONE   Tablet 20 milliGRAM(s) Oral <User Schedule>  senna 2 Tablet(s) Oral at bedtime       Allergies    No Known Allergies    Intolerances      ROS: Pertinent positives in HPI, all other ROS were reviewed and are negative.      Vital Signs Last 24 Hrs  T(C): 36.7 (13 Jan 2020 07:16), Max: 36.8 (12 Jan 2020 16:21)  T(F): 98 (13 Jan 2020 07:16), Max: 98.3 (12 Jan 2020 16:21)  HR: 96 (13 Jan 2020 07:16) (94 - 108)  BP: 148/94 (13 Jan 2020 07:16) (118/72 - 148/94)  BP(mean): --  RR: 19 (13 Jan 2020 07:16) (18 - 19)  SpO2: 94% (13 Jan 2020 07:16) (94% - 98%)        Constitutional: awake and alert.  HEENT: PERRLA, EOMI,   Respiratory: Breath sounds are clear bilaterally  Cardiovascular: S1 and S2, regular rhythm  Gastrointestinal: soft, nontender  Musculoskeletal: no joint swelling/tenderness, no abnormal movements, + back tenderness to palp   Skin: No rashes    Neurological exam:  HF: A x O x 3. Appropriately interactive, normal affect. Speech fluent, No Aphasia or paraphasic errors. Naming /repetition intact   CN: ASHLEE, EOMI,  facial sensation normal, no NLFD, tongue midline, Palate moves equally, SCM equal bilaterally  Motor: No pronator drift, Strength 5/5 in all 4 ext, normal bulk and tone, no tremor, rigidity or bradykinesia.    Sens: Intact to light touch    Gait/Balance: Cannot test        A/P: 86 yo female with l1 compression fracture     - awaiting rpt MRI    [facility]    - oob w/ pT w/ brace  - pain control   - d/w Dr. wynne and Dr Kunz on rounds

## 2020-01-13 NOTE — PROGRESS NOTE ADULT - SUBJECTIVE AND OBJECTIVE BOX
Patient is a 87y old  Female who presents with a chief complaint of compression dx (2020 09:38)        87 yr old female with PMH PMR, osteoporosis, Asthma/COPD, opioid related constipation, DVT dx 2019 on eliquis, chronic t7, t11 compression fx, chronic RT iliac wing fx, chronic BL rib fx presenting w/ mechanical fall. patient sp multiple pain meds, unable to fully wake up. history obtained from chart + patient son via telephone. patient came from home s/p fall, complained of diffuse back pain of entire spine on arrival. denies fevers/chills, cp, shortness of breath, weakness, LOC to ED doctor.         PULMONARY:  ALBUTerol    90 MICROgram(s) HFA Inhaler 1 Puff(s) Inhalation every 6 hours PRN  budesonide 160 MICROgram(s)/formoterol 4.5 MICROgram(s) Inhaler 2 Puff(s) Inhalation two times a day    NEUROLOGIC:  celecoxib 200 milliGRAM(s) Oral daily PRN    HEMATOLOGIC:  apixaban 5 milliGRAM(s) Oral every 12 hours    GATROINTESTINAL:  polyethylene glycol 3350 17 Gram(s) Oral daily PRN  senna 2 Tablet(s) Oral at bedtime    ENDO/METABOLIC:  predniSONE   Tablet 20 milliGRAM(s) Oral <User Schedule>    IV FLUID/NUTRITION:  calcium carbonate   1250 mG (OsCal) 1 Tablet(s) Oral two times a day  cholecalciferol 1000 Unit(s) Oral daily  multivitamin 1 Tablet(s) Oral daily    TOPICAL:  lidocaine   Patch 2 Patch Transdermal daily        Allergies    No Known Allergies        Vital Signs Last 24 Hrs  T(C): 36.7 (2020 07:16), Max: 36.8 (2020 16:21)  T(F): 98 (2020 07:16), Max: 98.3 (2020 16:21)  HR: 96 (2020 07:16) (94 - 108)  BP: 148/94 (2020 07:16) (118/72 - 148/94)  BP(mean): --  RR: 19 (2020 07:16) (18 - 19)  SpO2: 94% (2020 07:16) (94% - 98%)        REVIEW OF SYSTEMS:    CONSTITUTIONAL: No fever, weight loss, or fatigue  EYES: No eye pain, visual disturbances, or discharge  ENMT:  No difficulty hearing, tinnitus, vertigo; No sinus or throat pain  NECK: No pain or stiffness  RESPIRATORY: No cough, wheezing, chills or hemoptysis; No shortness of breath  CARDIOVASCULAR: No chest pain, palpitations, dizziness, or leg swelling  GASTROINTESTINAL: No abdominal or epigastric pain. No nausea, vomiting, or hematemesis; No diarrhea or constipation. No melena or hematochezia.  GENITOURINARY: No dysuria, frequency, hematuria, or incontinence  NEUROLOGICAL: No headaches, memory loss, loss of strength, numbness, or tremors  SKIN: No itching, burning, rashes, or lesions   LYMPH NODES: No enlarged glands  ENDOCRINE: No heat or cold intolerance; No hair loss  MUSCULOSKELETAL: back pain        PHYSICAL EXAM:    GENERAL: NAD, well-groomed, well-developed  HEAD:  Atraumatic, Normocephalic  EYES: EOMI, PERRLA, conjunctiva and sclera clear  NECK: Supple, No JVD, Normal thyroid  NERVOUS SYSTEM:  Alert & Oriented X3, Good concentration; Motor Strength 5/5 B/L upper and lower extremities; DTRs 2+ intact and symmetric  CHEST/LUNG: Clear to percussion bilaterally; No rales, rhonchi, wheezing, or rubs  HEART: Regular rate and rhythm; No murmurs, rubs, or gallops  ABDOMEN: Soft, Nontender, Nondistended; Bowel sounds present  EXTREMITIES:  2+ Peripheral Pulses, No clubbing, cyanosis, or edema  multiple areas of ecchymosis   LYMPH: No lymphadenopathy noted  +back pain       LABS:                   Color: Yellow / Appearance: Clear / S.010 / pH: x  Gluc: x / Ketone: Negative  / Bili: Negative / Urobili: Negative mg/dL   Blood: x / Protein: Negative mg/dL / Nitrite: Negative   Leuk Esterase: Negative / RBC: 6-10 /HPF / WBC 0-2   Sq Epi: x / Non Sq Epi: Occasional / Bacteria: Many              Albumin, Serum: 3.4 g/dL ( @ 05:33)  Albumin, Serum: 3.9 g/dL (01-10 @ 16:04)    LIVER FUNCTIONS - ( 2020 05:33 )  Alb: 3.4 g/dL / Pro: 6.0 g/dL / ALK PHOS: 120 U/L / ALT: 18 U/L / AST: 27 U/L / GGT: x             RADIOLOGY & ADDITIONAL TESTS:

## 2020-01-13 NOTE — PROGRESS NOTE ADULT - ASSESSMENT
Pt. requires custom TLSO and interface socks.  Device to limit ROM, support spine, support Fx, reduce discomfort, accommodate kyphosis, promote healing.  Socks to prevent skin breakdown.

## 2020-01-14 PROCEDURE — 99232 SBSQ HOSP IP/OBS MODERATE 35: CPT

## 2020-01-14 RX ORDER — ACETAMINOPHEN 500 MG
2 TABLET ORAL
Qty: 0 | Refills: 0 | DISCHARGE
Start: 2020-01-14

## 2020-01-14 RX ADMIN — BUDESONIDE AND FORMOTEROL FUMARATE DIHYDRATE 2 PUFF(S): 160; 4.5 AEROSOL RESPIRATORY (INHALATION) at 10:14

## 2020-01-14 RX ADMIN — Medication 1000 UNIT(S): at 14:19

## 2020-01-14 RX ADMIN — SENNA PLUS 2 TABLET(S): 8.6 TABLET ORAL at 21:51

## 2020-01-14 RX ADMIN — Medication 20 MILLIGRAM(S): at 05:28

## 2020-01-14 RX ADMIN — LIDOCAINE 2 PATCH: 4 CREAM TOPICAL at 14:18

## 2020-01-14 RX ADMIN — Medication 1 TABLET(S): at 17:46

## 2020-01-14 RX ADMIN — Medication 650 MILLIGRAM(S): at 17:46

## 2020-01-14 RX ADMIN — APIXABAN 5 MILLIGRAM(S): 2.5 TABLET, FILM COATED ORAL at 17:46

## 2020-01-14 RX ADMIN — CELECOXIB 200 MILLIGRAM(S): 200 CAPSULE ORAL at 05:59

## 2020-01-14 RX ADMIN — Medication 1 TABLET(S): at 05:28

## 2020-01-14 RX ADMIN — APIXABAN 5 MILLIGRAM(S): 2.5 TABLET, FILM COATED ORAL at 05:28

## 2020-01-14 RX ADMIN — LIDOCAINE 2 PATCH: 4 CREAM TOPICAL at 19:39

## 2020-01-14 RX ADMIN — CELECOXIB 200 MILLIGRAM(S): 200 CAPSULE ORAL at 05:28

## 2020-01-14 RX ADMIN — Medication 650 MILLIGRAM(S): at 05:59

## 2020-01-14 RX ADMIN — Medication 1 TABLET(S): at 14:19

## 2020-01-14 RX ADMIN — Medication 650 MILLIGRAM(S): at 05:29

## 2020-01-14 RX ADMIN — BUDESONIDE AND FORMOTEROL FUMARATE DIHYDRATE 2 PUFF(S): 160; 4.5 AEROSOL RESPIRATORY (INHALATION) at 20:12

## 2020-01-14 RX ADMIN — LIDOCAINE 2 PATCH: 4 CREAM TOPICAL at 00:30

## 2020-01-14 NOTE — PHYSICAL THERAPY INITIAL EVALUATION ADULT - CRITERIA FOR SKILLED THERAPEUTIC INTERVENTIONS
rehab potential/functional limitations in following categories/predicted duration of therapy intervention/therapy frequency/anticipated equipment needs at discharge/anticipated discharge recommendation/impairments found/risk reduction/prevention

## 2020-01-14 NOTE — CHART NOTE - NSCHARTNOTEFT_GEN_A_CORE
Source: Patient [x ]  Family [ ]   other [x ]    Current Diet: Diet, Regular:   Supplement Feeding Modality:  Oral  Ensure Enlive Cans or Servings Per Day:  2       Frequency:  Two Times a day (01-10-20 @ 21:50)      Patient reports [ ] nausea  [ ] vomiting [ ] diarrhea [ ] constipation  [x ]chewing problems [ ] swallowing issues  [ ] other:     PO intake:  < 50% [ x]   50-75%  [ ]   %  [ ]  other :    Source for PO intake [ ] Patient [ ] family [ ] chart [ ] staff [x ] other: visual observation    Current Weight:   (1/13) 38.6 kg  No edema documented    Pertinent Medications: MEDICATIONS  (STANDING):  apixaban 5 milliGRAM(s) Oral every 12 hours  budesonide 160 MICROgram(s)/formoterol 4.5 MICROgram(s) Inhaler 2 Puff(s) Inhalation two times a day  calcium carbonate   1250 mG (OsCal) 1 Tablet(s) Oral two times a day  cholecalciferol 1000 Unit(s) Oral daily  levoFLOXacin  Tablet 250 milliGRAM(s) Oral every 24 hours  lidocaine   Patch 2 Patch Transdermal daily  multivitamin 1 Tablet(s) Oral daily  predniSONE   Tablet 20 milliGRAM(s) Oral <User Schedule>  senna 2 Tablet(s) Oral at bedtime    MEDICATIONS  (PRN):  acetaminophen   Tablet .. 650 milliGRAM(s) Oral every 8 hours PRN Severe Pain (7 - 10)  ALBUTerol    90 MICROgram(s) HFA Inhaler 1 Puff(s) Inhalation every 6 hours PRN Shortness of Breath and/or Wheezing  celecoxib 200 milliGRAM(s) Oral daily PRN pain  polyethylene glycol 3350 17 Gram(s) Oral daily PRN Constipation    Skin: Skin tear to left upper arm per documentation    Nutrition focused physical exam previously conducted - found signs of malnutrition [ ]absent [x ]present    Subcutaneous fat loss: [x ] Orbital fat pads region, [x ]Buccal fat region, [ x]Triceps region,  [ ]Ribs region    Muscle wasting: [ x]Temples region, [x ]Clavicle region, x[ ]Shoulder region, [ ]Scapula region, [ ]Interosseous region,  [ ]thigh region, [ ]Calf region    Estimated Needs:   [x ] no change since previous assessment  [ ] recalculated:     Current Nutrition Diagnosis: Pt remains at high nutrition risk secondary to malnutrition (severe, chronic) related to inability to meet sufficient protein-energy in setting of poor po intake and advanced age as evidenced by pt meeting <75% estimated energy needs x 1month, severe muscle loss of temples, clavicles, shoulders, severe fat loss of orbitals, buccal pads, and triceps. Pt reports poor appetite/po intake continues. Breakfast tray observed at bedside, <25% consumed per plate waste. Pt reports she is having difficulty chewing. Pt would like diet downgraded to mechanical soft for better tolerance. Also reports she is going to choose softer foods. Pt reports she is consuming Ensure Enlive supplements. Last BM 1/13 per documentation.    Recommendations:   1) Change diet to mechanical soft.  2) Continue Ensure Enlive BID to optimize po intake and provide an additional 350 kcal, 20g protein per serving.  3) Rx: MVI daily.   4) Encourage po intake, monitor tolerance, and provide assistance as needed.  5) Obtain daily weights to monitor trends.    Monitoring and Evaluation:   [x ] PO intake [ x] Tolerance to diet prescription [X] Weights  [X] Follow up per protocol [X] Labs

## 2020-01-14 NOTE — PHYSICAL THERAPY INITIAL EVALUATION ADULT - IMPAIRMENTS CONTRIBUTING TO GAIT DEVIATIONS, PT EVAL
impaired balance/impaired coordination/decreased ROM/decreased flexibility/impaired motor control/impaired postural control/decreased strength/narrow base of support/pain

## 2020-01-14 NOTE — PROGRESS NOTE ADULT - ATTENDING COMMENTS
NSGY Attg:    see above    patient seen and examined    LE 5/5    MRI pending
NSGY Attg:    see above    patient seen and examined    MRI reviewed    acute T11 and L1 compression fractures without significant retropulsion or stenosis  chronic L4-5 listhesis    agree with plan as above  pain control  brace when OOB
NSGY Attg:    see above    patient seen and examined    agree with exam and plan as above    MRI pending
NSGY Attg:    see above    patient seen and examined    agree with exam as above    recommend MRI as above

## 2020-01-14 NOTE — PHYSICAL THERAPY INITIAL EVALUATION ADULT - PERTINENT HX OF CURRENT PROBLEM, REHAB EVAL
87y Female PMH PMR, osteoporosis, asthma/COPD, opioid related constipation, DVT dx 11/2019 on Eliquis, chronic T7/T11 compression fracture, chronic right iliac wing fracture, chronic b/l ring fx, s/p fall found with L1 fx.

## 2020-01-14 NOTE — PHYSICAL THERAPY INITIAL EVALUATION ADULT - IMPAIRMENTS FOUND, PT EVAL
ergonomics and body mechanics/aerobic capacity/endurance/gait, locomotion, and balance/posture/neuromotor development and sensory integration/ROM/muscle strength

## 2020-01-14 NOTE — PROGRESS NOTE ADULT - ASSESSMENT
87y Female PMH PMR, osteoporosis, asthma/COPD, opioid related constipation, DVT dx 11/2019 on Eliquis, chronic T7/T11 compression fracture, chronic right iliac wing fracture, chronic b/l ring fx, s/p fall found with L1 fx  - MR L spine with acute L1 fx    PLAN:  - D/w Dr. Hoffman  - Q4 neuro checks while in house  - Clamshell brace when OOB  - Pain control PRN  - PT while in house  - DVT ppx  - Dispo pending to [facility]  - Supportive care further medical management per primary team  - Follow up outpatient in 2-4 weeks with Dr. Hoffman  - Reconsult PRN

## 2020-01-14 NOTE — PROGRESS NOTE ADULT - SUBJECTIVE AND OBJECTIVE BOX
Patient is a 87y old  Female who presents with a chief complaint of compression dx (2020 09:38)        87 yr old female with PMH PMR, osteoporosis, Asthma/COPD, opioid related constipation, DVT dx 2019 on eliquis, chronic t7, t11 compression fx, chronic RT iliac wing fx, chronic BL rib fx presenting w/ mechanical fall. patient sp multiple pain meds, unable to fully wake up. history obtained from chart + patient son via telephone. patient came from home s/p fall, complained of diffuse back pain of entire spine on arrival. denies fevers/chills, cp, shortness of breath, weakness, LOC to ED doctor.         PULMONARY:  ALBUTerol    90 MICROgram(s) HFA Inhaler 1 Puff(s) Inhalation every 6 hours PRN  budesonide 160 MICROgram(s)/formoterol 4.5 MICROgram(s) Inhaler 2 Puff(s) Inhalation two times a day    NEUROLOGIC:  celecoxib 200 milliGRAM(s) Oral daily PRN    HEMATOLOGIC:  apixaban 5 milliGRAM(s) Oral every 12 hours    GATROINTESTINAL:  polyethylene glycol 3350 17 Gram(s) Oral daily PRN  senna 2 Tablet(s) Oral at bedtime    ENDO/METABOLIC:  predniSONE   Tablet 20 milliGRAM(s) Oral <User Schedule>    IV FLUID/NUTRITION:  calcium carbonate   1250 mG (OsCal) 1 Tablet(s) Oral two times a day  cholecalciferol 1000 Unit(s) Oral daily  multivitamin 1 Tablet(s) Oral daily    TOPICAL:  lidocaine   Patch 2 Patch Transdermal daily        Allergies    No Known Allergies        Vital Signs Last 24 Hrs  T(C): 36.8 (2020 07:57), Max: 36.9 (2020 16:00)  T(F): 98.3 (2020 07:57), Max: 98.5 (2020 16:00)  HR: 93 (2020 07:57) (93 - 100)  BP: 128/88 (2020 07:57) (104/65 - 148/98)  BP(mean): --  RR: 18 (2020 07:57) (18 - 20)  SpO2: 97% (2020 07:57) (94% - 98%)        REVIEW OF SYSTEMS:    CONSTITUTIONAL: No fever, weight loss, or fatigue  EYES: No eye pain, visual disturbances, or discharge  ENMT:  No difficulty hearing, tinnitus, vertigo; No sinus or throat pain  NECK: No pain or stiffness  RESPIRATORY: No cough, wheezing, chills or hemoptysis; No shortness of breath  CARDIOVASCULAR: No chest pain, palpitations, dizziness, or leg swelling  GASTROINTESTINAL: No abdominal or epigastric pain. No nausea, vomiting, or hematemesis; No diarrhea or constipation. No melena or hematochezia.  GENITOURINARY: No dysuria, frequency, hematuria, or incontinence  NEUROLOGICAL: No headaches, memory loss, loss of strength, numbness, or tremors  SKIN: No itching, burning, rashes, or lesions   LYMPH NODES: No enlarged glands  ENDOCRINE: No heat or cold intolerance; No hair loss  MUSCULOSKELETAL: back pain        PHYSICAL EXAM:    GENERAL: NAD, well-groomed, well-developed  HEAD:  Atraumatic, Normocephalic  EYES: EOMI, PERRLA, conjunctiva and sclera clear  NECK: Supple, No JVD, Normal thyroid  NERVOUS SYSTEM:  Alert & Oriented X3, Good concentration; Motor Strength 5/5 B/L upper and lower extremities; DTRs 2+ intact and symmetric  CHEST/LUNG: Clear to percussion bilaterally; No rales, rhonchi, wheezing, or rubs  HEART: Regular rate and rhythm; No murmurs, rubs, or gallops  ABDOMEN: Soft, Nontender, Nondistended; Bowel sounds present  EXTREMITIES:  2+ Peripheral Pulses, No clubbing, cyanosis, or edema  multiple areas of ecchymosis   LYMPH: No lymphadenopathy noted  +back pain   with brace       LABS:                   Color: Yellow / Appearance: Clear / S.010 / pH: x  Gluc: x / Ketone: Negative  / Bili: Negative / Urobili: Negative mg/dL   Blood: x / Protein: Negative mg/dL / Nitrite: Negative   Leuk Esterase: Negative / RBC: 6-10 /HPF / WBC 0-2   Sq Epi: x / Non Sq Epi: Occasional / Bacteria: Many              Albumin, Serum: 3.4 g/dL ( @ 05:33)  Albumin, Serum: 3.9 g/dL (01-10 @ 16:04)    LIVER FUNCTIONS - ( 2020 05:33 )  Alb: 3.4 g/dL / Pro: 6.0 g/dL / ALK PHOS: 120 U/L / ALT: 18 U/L / AST: 27 U/L / GGT: x             RADIOLOGY & ADDITIONAL TESTS:

## 2020-01-14 NOTE — PHYSICAL THERAPY INITIAL EVALUATION ADULT - PLANNED THERAPY INTERVENTIONS, PT EVAL
gait training/motor coordination training/strengthening/bed mobility training/neuromuscular re-education/ROM/stretching/transfer training/balance training

## 2020-01-14 NOTE — PHYSICAL THERAPY INITIAL EVALUATION ADULT - ADDITIONAL COMMENTS
Pt reports independent PTA, "sometimes uses rollator", cooks, cleans independently. +, friend assists with shopping. Pt owns rollator, RW, SAC, WBQC. Had homecare in the past, admits to needing assist

## 2020-01-14 NOTE — PROGRESS NOTE ADULT - SUBJECTIVE AND OBJECTIVE BOX
INTERVAL HPI/OVERNIGHT EVENTS:  87y Female PMH PMR, osteoporosis, asthma/COPD, opioid related constipation, DVT dx 11/2019 on Eliquis, chronic T7/T11 compression fracture, chronic right iliac wing fracture, chronic b/l ring fx, s/p fall found with L1 fx. Patient seen lying in bed, states her back pain is slightly improved, but persists. Asking when she will be discharged    Vital Signs Last 24 Hrs  T(C): 36.8 (14 Jan 2020 07:57), Max: 36.9 (13 Jan 2020 16:00)  T(F): 98.3 (14 Jan 2020 07:57), Max: 98.5 (13 Jan 2020 16:00)  HR: 93 (14 Jan 2020 07:57) (93 - 100)  BP: 128/88 (14 Jan 2020 07:57) (104/65 - 148/98)  BP(mean): --  RR: 18 (14 Jan 2020 07:57) (18 - 20)  SpO2: 97% (14 Jan 2020 07:57) (94% - 98%)    PHYSICAL EXAM:  GENERAL: NAD, thin  HEAD:  Atraumatic, normocephalic  MENTAL STATUS: Awake, alert. Answers questions appropriately. Following commands  CRANIAL NERVES: EOMI. Face symmetric. Hearing intact. Speech clear  MOTOR: 5/5 all extremities, at times limited 2/2 pain  SENSATION: grossly intact to light touch all extremities  SKIN: +ecchymosis LLE from ankle to inner thigh    LABS:    RADIOLOGY & ADDITIONAL TESTS:  MR Lumbar Spine No Cont (01.13.20 @ 21:21)  IMPRESSION:   1. Acute compression fracture of L1 and a subacute compression fracture of T11.  2. Moderate-to-severe spinal canal stenosis at L4-5 dueto grade 1 anterior   spondylolisthesis which may effect the L4 and L5 nerve roots. This is a stable   finding.    CAPRINI SCORE [CLOT]:  Patient has an estimated Caprini score of greater than 5.  However, the patient's unique clinical situation will be addressed in an individual manner to determine appropriate anticoagulation treatment, if any.

## 2020-01-15 ENCOUNTER — TRANSCRIPTION ENCOUNTER (OUTPATIENT)
Age: 85
End: 2020-01-15

## 2020-01-15 VITALS — OXYGEN SATURATION: 97 %

## 2020-01-15 PROCEDURE — 85018 HEMOGLOBIN: CPT

## 2020-01-15 PROCEDURE — 85730 THROMBOPLASTIN TIME PARTIAL: CPT

## 2020-01-15 PROCEDURE — 96376 TX/PRO/DX INJ SAME DRUG ADON: CPT

## 2020-01-15 PROCEDURE — 94640 AIRWAY INHALATION TREATMENT: CPT

## 2020-01-15 PROCEDURE — 74176 CT ABD & PELVIS W/O CONTRAST: CPT

## 2020-01-15 PROCEDURE — 85610 PROTHROMBIN TIME: CPT

## 2020-01-15 PROCEDURE — 80053 COMPREHEN METABOLIC PANEL: CPT

## 2020-01-15 PROCEDURE — 82306 VITAMIN D 25 HYDROXY: CPT

## 2020-01-15 PROCEDURE — 70450 CT HEAD/BRAIN W/O DYE: CPT

## 2020-01-15 PROCEDURE — 85014 HEMATOCRIT: CPT

## 2020-01-15 PROCEDURE — 51798 US URINE CAPACITY MEASURE: CPT

## 2020-01-15 PROCEDURE — 87186 SC STD MICRODIL/AGAR DIL: CPT

## 2020-01-15 PROCEDURE — 96375 TX/PRO/DX INJ NEW DRUG ADDON: CPT

## 2020-01-15 PROCEDURE — 72148 MRI LUMBAR SPINE W/O DYE: CPT

## 2020-01-15 PROCEDURE — 81001 URINALYSIS AUTO W/SCOPE: CPT

## 2020-01-15 PROCEDURE — 36415 COLL VENOUS BLD VENIPUNCTURE: CPT

## 2020-01-15 PROCEDURE — 71250 CT THORAX DX C-: CPT

## 2020-01-15 PROCEDURE — 99285 EMERGENCY DEPT VISIT HI MDM: CPT | Mod: 25

## 2020-01-15 PROCEDURE — 96374 THER/PROPH/DIAG INJ IV PUSH: CPT

## 2020-01-15 PROCEDURE — 73562 X-RAY EXAM OF KNEE 3: CPT

## 2020-01-15 PROCEDURE — 87086 URINE CULTURE/COLONY COUNT: CPT

## 2020-01-15 PROCEDURE — 72125 CT NECK SPINE W/O DYE: CPT

## 2020-01-15 PROCEDURE — 85027 COMPLETE CBC AUTOMATED: CPT

## 2020-01-15 RX ADMIN — LIDOCAINE 2 PATCH: 4 CREAM TOPICAL at 02:11

## 2020-01-15 RX ADMIN — LIDOCAINE 2 PATCH: 4 CREAM TOPICAL at 11:18

## 2020-01-15 RX ADMIN — Medication 1 TABLET(S): at 11:17

## 2020-01-15 RX ADMIN — Medication 1 TABLET(S): at 05:24

## 2020-01-15 RX ADMIN — APIXABAN 5 MILLIGRAM(S): 2.5 TABLET, FILM COATED ORAL at 05:23

## 2020-01-15 RX ADMIN — Medication 1000 UNIT(S): at 11:17

## 2020-01-15 RX ADMIN — Medication 20 MILLIGRAM(S): at 05:23

## 2020-01-15 RX ADMIN — BUDESONIDE AND FORMOTEROL FUMARATE DIHYDRATE 2 PUFF(S): 160; 4.5 AEROSOL RESPIRATORY (INHALATION) at 08:52

## 2020-01-15 NOTE — PROGRESS NOTE ADULT - SUBJECTIVE AND OBJECTIVE BOX
Patient is a 87y old  Female who presents with a chief complaint of compression dx (2020 09:38)        87 yr old female with PMH PMR, osteoporosis, Asthma/COPD, opioid related constipation, DVT dx 2019 on eliquis, chronic t7, t11 compression fx, chronic RT iliac wing fx, chronic BL rib fx presenting w/ mechanical fall. patient sp multiple pain meds, unable to fully wake up. history obtained from chart + patient son via telephone. patient came from home s/p fall, complained of diffuse back pain of entire spine on arrival. denies fevers/chills, cp, shortness of breath, weakness, LOC to ED doctor.         PULMONARY:  ALBUTerol    90 MICROgram(s) HFA Inhaler 1 Puff(s) Inhalation every 6 hours PRN  budesonide 160 MICROgram(s)/formoterol 4.5 MICROgram(s) Inhaler 2 Puff(s) Inhalation two times a day    NEUROLOGIC:  celecoxib 200 milliGRAM(s) Oral daily PRN    HEMATOLOGIC:  apixaban 5 milliGRAM(s) Oral every 12 hours    GATROINTESTINAL:  polyethylene glycol 3350 17 Gram(s) Oral daily PRN  senna 2 Tablet(s) Oral at bedtime    ENDO/METABOLIC:  predniSONE   Tablet 20 milliGRAM(s) Oral <User Schedule>    IV FLUID/NUTRITION:  calcium carbonate   1250 mG (OsCal) 1 Tablet(s) Oral two times a day  cholecalciferol 1000 Unit(s) Oral daily  multivitamin 1 Tablet(s) Oral daily    TOPICAL:  lidocaine   Patch 2 Patch Transdermal daily        Allergies    No Known Allergies        Vital Signs Last 24 Hrs  T(C): 36.3 (15 Dar 2020 07:29), Max: 36.9 (2020 16:35)  T(F): 97.3 (15 Dar 2020 07:29), Max: 98.4 (2020 16:35)  HR: 95 (15 Dar 2020 08:53) (94 - 102)  BP: 142/83 (15 Dar 2020 07:29) (128/88 - 149/89)  BP(mean): --  RR: 18 (15 Dar 2020 07:29) (18 - 18)  SpO2: 97% (15 Dar 2020 08:53) (91% - 98%)        REVIEW OF SYSTEMS:    CONSTITUTIONAL: No fever, weight loss, or fatigue  EYES: No eye pain, visual disturbances, or discharge  ENMT:  No difficulty hearing, tinnitus, vertigo; No sinus or throat pain  NECK: No pain or stiffness  RESPIRATORY: No cough, wheezing, chills or hemoptysis; No shortness of breath  CARDIOVASCULAR: No chest pain, palpitations, dizziness, or leg swelling  GASTROINTESTINAL: No abdominal or epigastric pain. No nausea, vomiting, or hematemesis; No diarrhea or constipation. No melena or hematochezia.  GENITOURINARY: No dysuria, frequency, hematuria, or incontinence  NEUROLOGICAL: No headaches, memory loss, loss of strength, numbness, or tremors  SKIN: No itching, burning, rashes, or lesions   LYMPH NODES: No enlarged glands  ENDOCRINE: No heat or cold intolerance; No hair loss  MUSCULOSKELETAL: back pain        PHYSICAL EXAM:    GENERAL: NAD, well-groomed, well-developed  HEAD:  Atraumatic, Normocephalic  EYES: EOMI, PERRLA, conjunctiva and sclera clear  NECK: Supple, No JVD, Normal thyroid  NERVOUS SYSTEM:  Alert & Oriented X3, Good concentration; Motor Strength 5/5 B/L upper and lower extremities; DTRs 2+ intact and symmetric  CHEST/LUNG: Clear to percussion bilaterally; No rales, rhonchi, wheezing, or rubs  HEART: Regular rate and rhythm; No murmurs, rubs, or gallops  ABDOMEN: Soft, Nontender, Nondistended; Bowel sounds present  EXTREMITIES:  2+ Peripheral Pulses, No clubbing, cyanosis, or edema  multiple areas of ecchymosis   LYMPH: No lymphadenopathy noted  +back pain   with brace       LABS:                   Color: Yellow / Appearance: Clear / S.010 / pH: x  Gluc: x / Ketone: Negative  / Bili: Negative / Urobili: Negative mg/dL   Blood: x / Protein: Negative mg/dL / Nitrite: Negative   Leuk Esterase: Negative / RBC: 6-10 /HPF / WBC 0-2   Sq Epi: x / Non Sq Epi: Occasional / Bacteria: Many              Albumin, Serum: 3.4 g/dL ( @ 05:33)  Albumin, Serum: 3.9 g/dL (01-10 @ 16:04)    LIVER FUNCTIONS - ( 2020 05:33 )  Alb: 3.4 g/dL / Pro: 6.0 g/dL / ALK PHOS: 120 U/L / ALT: 18 U/L / AST: 27 U/L / GGT: x             RADIOLOGY & ADDITIONAL TESTS:

## 2020-01-15 NOTE — PROGRESS NOTE ADULT - PROBLEM SELECTOR PROBLEM 1
Acute bilateral back pain, unspecified back location

## 2020-01-15 NOTE — PROGRESS NOTE ADULT - PROBLEM SELECTOR PLAN 1
tylenol for pain   awaiting for back brace
tylenol for pain   awaiting for back brace
tylenol for pain   awaiting for back brace  appreciate neuro Sx f/u
tylenol for pain   with back brace  appreciate neuro Sx f/u  awaiting further recomm
tylenol for pain   with back brace  appreciate neuro Sx f/u  awaiting further recomm
Fit/dispensed TLSO and socks.  All went without incident.  Fit is proper.  Pt. to use as directed by   Please call [facility] with any questions, [phone].

## 2020-01-15 NOTE — PROGRESS NOTE ADULT - REASON FOR ADMISSION
compression dx

## 2020-01-15 NOTE — DISCHARGE NOTE NURSING/CASE MANAGEMENT/SOCIAL WORK - PATIENT PORTAL LINK FT
You can access the FollowMyHealth Patient Portal offered by [facility] by registering at the following website: [email or web address]. By joining [facility]’s FollowMyHealth portal, you will also be able to view your health information using other applications (apps) compatible with our system.

## 2020-02-04 ENCOUNTER — APPOINTMENT (OUTPATIENT)
Dept: FAMILY MEDICINE | Facility: CLINIC | Age: 85
End: 2020-02-04

## 2020-02-11 NOTE — ED PROVIDER NOTE - BIRTH SEX
----- Message from JUAN FRANCISCO Singh sent at 2/11/2020  7:12 AM EST -----  Please let patient know that her aspirin response assay shows she is a good responder. Thanks.   Female

## 2020-02-12 ENCOUNTER — APPOINTMENT (OUTPATIENT)
Dept: NEUROSURGERY | Facility: CLINIC | Age: 85
End: 2020-02-12
Payer: MEDICARE

## 2020-02-12 VITALS
WEIGHT: 88 LBS | HEIGHT: 56 IN | SYSTOLIC BLOOD PRESSURE: 148 MMHG | HEART RATE: 85 BPM | DIASTOLIC BLOOD PRESSURE: 89 MMHG | BODY MASS INDEX: 19.8 KG/M2

## 2020-02-12 DIAGNOSIS — S32.000A WEDGE COMPRESSION FRACTURE OF UNSPECIFIED LUMBAR VERTEBRA, INITIAL ENCOUNTER FOR CLOSED FRACTURE: ICD-10-CM

## 2020-02-12 DIAGNOSIS — S22.000A WEDGE COMPRESSION FRACTURE OF UNSPECIFIED THORACIC VERTEBRA, INITIAL ENCOUNTER FOR CLOSED FRACTURE: ICD-10-CM

## 2020-02-12 PROCEDURE — 99213 OFFICE O/P EST LOW 20 MIN: CPT

## 2020-02-12 NOTE — REVIEW OF SYSTEMS
[As Noted in HPI] : as noted in HPI [Feeling Tired] : feeling tired [Difficulty Walking] : difficulty walking [Negative] : Heme/Lymph [Numbness] : no numbness [Tingling] : no tingling [FreeTextEntry9] : thoracic and lumbar region pain.

## 2020-02-12 NOTE — ASSESSMENT
[FreeTextEntry1] : Ms. Madrid presents with above history and imaging.  She is neurologically intact.  She will continue TLSO bracing when OOB.   Maintain fall precautions.  She will obtain xray thoracolumbar spine standing with brace in 1 month.  She should follow up after imaging.  Patient knows to call the office if there are any new or worsening symptoms.\par

## 2020-02-12 NOTE — DATA REVIEWED
[de-identified] : 1. Acute compression fracture of L1 with mild bony retropulsion. Subacute to  chronic compression fracture of T11.  2. Moderate-to-severe spinal canal stenosis at L4-5 due to grade 1 anterior  spondylolisthesis. This is a stable finding.

## 2020-02-12 NOTE — REASON FOR VISIT
[Follow-Up: _____] : a [unfilled] follow-up visit [FreeTextEntry1] : Ms. Madrid is a 87 year old female who presents for follow up visit.  Post hospitalization 11/17- 11/20/2019. 87 year old female with PMH PMR, Asthma/COPD. \par Vertebral Compression Fracture T7 chronic, T11 subacute on MRI. New MRI 1/13/2020 performed at hospital reveals an acute L1 compression fracture.  Patient presented to [facility] for left leg DVT.  Denies any new falls or injury.  Previously patient was residing at the [facility]. Patient was not compliant with TLSO brace. She presents with [place] TLSO brace which she can tolerate and is compliant.  She reports mid thoracic and lower back pain. Denies any radicular component. Pain intensity 6/10. Denies any saddle anesthesia, urinary or bowel dysfunction. Patient is ambulating with the assistance of a rollator walker. She is participating in physical therapy at the facility. She is managing her pain with Fentanyl patch. \par

## 2020-02-12 NOTE — PHYSICAL EXAM
[General Appearance - In No Acute Distress] : in no acute distress [General Appearance - Well Nourished] : well nourished [General Appearance - Alert] : alert [General Appearance - Well Developed] : well developed [Oriented To Time, Place, And Person] : oriented to person, place, and time [Impaired Insight] : insight and judgment were intact [Person] : oriented to person [Place] : oriented to place [Time] : oriented to time [Short Term Intact] : short term memory intact [Concentration Intact] : normal concentrating ability [Fluency] : fluency intact [Comprehension] : comprehension intact [Cranial Nerves Trigeminal (V)] : facial sensation intact symmetrically [Cranial Nerves Facial (VII)] : face symmetrical [Motor Strength] : muscle strength was normal in all four extremities [Motor Tone] : muscle tone was normal in all four extremities [Sensation Tactile Decrease] : light touch was intact [Sensation Pain / Temperature Decrease] : pain and temperature was intact [No Visual Abnormalities] : no visible abnormailities [Over the Past 2 Weeks, Have You Felt Down, Depressed, or Hopeless?] : 1.) Over the past 2 weeks, have you felt down, depressed, or hopeless? No [Over the Past 2 Weeks, Have You Felt Little Interest or Pleasure Doing Things?] : 2.) Over the past 2 weeks, have you felt little interest or pleasure doing things? No [FreeTextEntry8] : ambulates with assistance of a rollator walker.

## 2020-02-24 ENCOUNTER — APPOINTMENT (OUTPATIENT)
Dept: NEUROSURGERY | Facility: CLINIC | Age: 85
End: 2020-02-24

## 2020-02-26 ENCOUNTER — APPOINTMENT (OUTPATIENT)
Dept: RHEUMATOLOGY | Facility: CLINIC | Age: 85
End: 2020-02-26

## 2020-03-09 RX ORDER — PREDNISONE 10 MG/1
10 TABLET ORAL
Qty: 60 | Refills: 3 | Status: ACTIVE | COMMUNITY
Start: 2017-11-28 | End: 1900-01-01

## 2020-03-17 DIAGNOSIS — H72.92 UNSPECIFIED PERFORATION OF TYMPANIC MEMBRANE, LEFT EAR: ICD-10-CM

## 2020-03-17 DIAGNOSIS — H66.92 UNSPECIFIED PERFORATION OF TYMPANIC MEMBRANE, LEFT EAR: ICD-10-CM

## 2020-03-17 RX ORDER — ACETAMINOPHEN 325 MG/1
325 TABLET, FILM COATED ORAL EVERY 6 HOURS
Qty: 60 | Refills: 0 | Status: ACTIVE | COMMUNITY
Start: 2020-01-03 | End: 1900-01-01

## 2020-03-17 RX ORDER — ALBUTEROL SULFATE 4 MG/1
4 TABLET ORAL
Qty: 90 | Refills: 0 | Status: ACTIVE | COMMUNITY
Start: 2020-01-03 | End: 1900-01-01

## 2020-03-23 ENCOUNTER — APPOINTMENT (OUTPATIENT)
Dept: FAMILY MEDICINE | Facility: CLINIC | Age: 85
End: 2020-03-23
Payer: MEDICARE

## 2020-03-23 DIAGNOSIS — H72.92 OTITIS MEDIA, UNSPECIFIED, LEFT EAR: ICD-10-CM

## 2020-03-23 DIAGNOSIS — H66.92 OTITIS MEDIA, UNSPECIFIED, LEFT EAR: ICD-10-CM

## 2020-03-23 PROCEDURE — G2012 BRIEF CHECK IN BY MD/QHP: CPT

## 2020-03-23 RX ORDER — AMOXICILLIN AND CLAVULANATE POTASSIUM 875; 125 MG/1; MG/1
875-125 TABLET, COATED ORAL
Qty: 14 | Refills: 0 | Status: ACTIVE | COMMUNITY
Start: 2020-03-23 | End: 1900-01-01

## 2020-03-24 PROBLEM — H66.92 ACUTE OTITIS MEDIA OF LEFT EAR WITH PERFORATION: Status: ACTIVE | Noted: 2020-03-24

## 2020-03-27 RX ORDER — ALBUTEROL SULFATE 90 UG/1
108 (90 BASE) INHALANT RESPIRATORY (INHALATION) EVERY 6 HOURS
Qty: 3 | Refills: 0 | Status: ACTIVE | COMMUNITY
Start: 2020-01-03 | End: 1900-01-01

## 2020-04-15 ENCOUNTER — APPOINTMENT (OUTPATIENT)
Dept: FAMILY MEDICINE | Facility: CLINIC | Age: 85
End: 2020-04-15
Payer: MEDICARE

## 2020-04-15 DIAGNOSIS — R19.7 DIARRHEA, UNSPECIFIED: ICD-10-CM

## 2020-04-15 DIAGNOSIS — M79.89 OTHER SPECIFIED SOFT TISSUE DISORDERS: ICD-10-CM

## 2020-04-15 PROCEDURE — 99443: CPT

## 2020-04-28 ENCOUNTER — APPOINTMENT (OUTPATIENT)
Dept: FAMILY MEDICINE | Facility: CLINIC | Age: 85
End: 2020-04-28
Payer: MEDICARE

## 2020-04-28 DIAGNOSIS — I82.403 ACUTE EMBOLISM AND THROMBOSIS OF UNSPECIFIED DEEP VEINS OF LOWER EXTREMITY, BILATERAL: ICD-10-CM

## 2020-04-28 PROCEDURE — 99442: CPT | Mod: CR

## 2020-04-28 RX ORDER — APIXABAN 2.5 MG/1
2.5 TABLET, FILM COATED ORAL
Qty: 180 | Refills: 3 | Status: ACTIVE | COMMUNITY
Start: 2020-01-03 | End: 1900-01-01

## 2020-04-28 RX ORDER — ALBUTEROL SULFATE 4 MG/1
4 TABLET, FILM COATED, EXTENDED RELEASE ORAL
Qty: 60 | Refills: 0 | Status: DISCONTINUED | COMMUNITY
Start: 2019-11-01

## 2020-04-28 RX ORDER — PREDNISONE 20 MG/1
20 TABLET ORAL
Qty: 30 | Refills: 0 | Status: DISCONTINUED | COMMUNITY
Start: 2020-03-05

## 2020-04-28 RX ORDER — MELOXICAM 7.5 MG/1
7.5 TABLET ORAL
Qty: 30 | Refills: 0 | Status: DISCONTINUED | COMMUNITY
Start: 2020-03-05

## 2020-04-29 ENCOUNTER — INPATIENT (INPATIENT)
Facility: HOSPITAL | Age: 85
LOS: 1 days | Discharge: HOME | DRG: 872 | End: 2020-05-01
Attending: FAMILY MEDICINE | Admitting: INTERNAL MEDICINE
Payer: MEDICARE

## 2020-04-29 VITALS
TEMPERATURE: 98 F | DIASTOLIC BLOOD PRESSURE: 82 MMHG | HEART RATE: 114 BPM | WEIGHT: 89.95 LBS | HEIGHT: 59 IN | RESPIRATION RATE: 20 BRPM | SYSTOLIC BLOOD PRESSURE: 131 MMHG | OXYGEN SATURATION: 98 %

## 2020-04-29 DIAGNOSIS — L03.116 CELLULITIS OF LEFT LOWER LIMB: ICD-10-CM

## 2020-04-29 LAB
ALBUMIN SERPL ELPH-MCNC: 3.7 G/DL — SIGNIFICANT CHANGE UP (ref 3.3–5.2)
ALP SERPL-CCNC: 112 U/L — SIGNIFICANT CHANGE UP (ref 40–120)
ALT FLD-CCNC: 19 U/L — SIGNIFICANT CHANGE UP
ANION GAP SERPL CALC-SCNC: 14 MMOL/L — SIGNIFICANT CHANGE UP (ref 5–17)
APTT BLD: 23.6 SEC — LOW (ref 27.5–36.3)
AST SERPL-CCNC: 25 U/L — SIGNIFICANT CHANGE UP
BASOPHILS # BLD AUTO: 0.01 K/UL — SIGNIFICANT CHANGE UP (ref 0–0.2)
BASOPHILS NFR BLD AUTO: 0.1 % — SIGNIFICANT CHANGE UP (ref 0–2)
BILIRUB SERPL-MCNC: 0.7 MG/DL — SIGNIFICANT CHANGE UP (ref 0.4–2)
BUN SERPL-MCNC: 33 MG/DL — HIGH (ref 8–20)
CALCIUM SERPL-MCNC: 9.3 MG/DL — SIGNIFICANT CHANGE UP (ref 8.6–10.2)
CHLORIDE SERPL-SCNC: 97 MMOL/L — LOW (ref 98–107)
CO2 SERPL-SCNC: 26 MMOL/L — SIGNIFICANT CHANGE UP (ref 22–29)
CREAT SERPL-MCNC: 0.65 MG/DL — SIGNIFICANT CHANGE UP (ref 0.5–1.3)
EOSINOPHIL # BLD AUTO: 0.01 K/UL — SIGNIFICANT CHANGE UP (ref 0–0.5)
EOSINOPHIL NFR BLD AUTO: 0.1 % — SIGNIFICANT CHANGE UP (ref 0–6)
GLUCOSE SERPL-MCNC: 187 MG/DL — HIGH (ref 70–99)
HCT VFR BLD CALC: 34.8 % — SIGNIFICANT CHANGE UP (ref 34.5–45)
HGB BLD-MCNC: 11.4 G/DL — LOW (ref 11.5–15.5)
IMM GRANULOCYTES NFR BLD AUTO: 1.3 % — SIGNIFICANT CHANGE UP (ref 0–1.5)
INR BLD: 1.41 RATIO — HIGH (ref 0.88–1.16)
LACTATE BLDV-MCNC: 1.2 MMOL/L — SIGNIFICANT CHANGE UP (ref 0.5–2)
LYMPHOCYTES # BLD AUTO: 0.2 K/UL — LOW (ref 1–3.3)
LYMPHOCYTES # BLD AUTO: 1.5 % — LOW (ref 13–44)
MCHC RBC-ENTMCNC: 31 PG — SIGNIFICANT CHANGE UP (ref 27–34)
MCHC RBC-ENTMCNC: 32.8 GM/DL — SIGNIFICANT CHANGE UP (ref 32–36)
MCV RBC AUTO: 94.6 FL — SIGNIFICANT CHANGE UP (ref 80–100)
MONOCYTES # BLD AUTO: 0.41 K/UL — SIGNIFICANT CHANGE UP (ref 0–0.9)
MONOCYTES NFR BLD AUTO: 3.1 % — SIGNIFICANT CHANGE UP (ref 2–14)
NEUTROPHILS # BLD AUTO: 12.43 K/UL — HIGH (ref 1.8–7.4)
NEUTROPHILS NFR BLD AUTO: 93.9 % — HIGH (ref 43–77)
PLATELET # BLD AUTO: 212 K/UL — SIGNIFICANT CHANGE UP (ref 150–400)
POTASSIUM SERPL-MCNC: 4.8 MMOL/L — SIGNIFICANT CHANGE UP (ref 3.5–5.3)
POTASSIUM SERPL-SCNC: 4.8 MMOL/L — SIGNIFICANT CHANGE UP (ref 3.5–5.3)
PROT SERPL-MCNC: 6.4 G/DL — LOW (ref 6.6–8.7)
PROTHROM AB SERPL-ACNC: 16.1 SEC — HIGH (ref 10–12.9)
RBC # BLD: 3.68 M/UL — LOW (ref 3.8–5.2)
RBC # FLD: 15.4 % — HIGH (ref 10.3–14.5)
SODIUM SERPL-SCNC: 137 MMOL/L — SIGNIFICANT CHANGE UP (ref 135–145)
WBC # BLD: 13.23 K/UL — HIGH (ref 3.8–10.5)
WBC # FLD AUTO: 13.23 K/UL — HIGH (ref 3.8–10.5)

## 2020-04-29 PROCEDURE — 99285 EMERGENCY DEPT VISIT HI MDM: CPT

## 2020-04-29 PROCEDURE — 71045 X-RAY EXAM CHEST 1 VIEW: CPT | Mod: 26

## 2020-04-29 PROCEDURE — 73030 X-RAY EXAM OF SHOULDER: CPT | Mod: 26,LT

## 2020-04-29 PROCEDURE — 99223 1ST HOSP IP/OBS HIGH 75: CPT | Mod: GC

## 2020-04-29 PROCEDURE — 93970 EXTREMITY STUDY: CPT | Mod: 26

## 2020-04-29 RX ORDER — TIOTROPIUM BROMIDE 18 UG/1
1 CAPSULE ORAL; RESPIRATORY (INHALATION) DAILY
Refills: 0 | Status: DISCONTINUED | OUTPATIENT
Start: 2020-04-29 | End: 2020-04-29

## 2020-04-29 RX ORDER — ACETAMINOPHEN 500 MG
650 TABLET ORAL EVERY 6 HOURS
Refills: 0 | Status: DISCONTINUED | OUTPATIENT
Start: 2020-04-29 | End: 2020-04-29

## 2020-04-29 RX ORDER — BUDESONIDE AND FORMOTEROL FUMARATE DIHYDRATE 160; 4.5 UG/1; UG/1
2 AEROSOL RESPIRATORY (INHALATION)
Refills: 0 | Status: DISCONTINUED | OUTPATIENT
Start: 2020-04-29 | End: 2020-05-01

## 2020-04-29 RX ORDER — SODIUM CHLORIDE 9 MG/ML
1500 INJECTION, SOLUTION INTRAVENOUS ONCE
Refills: 0 | Status: COMPLETED | OUTPATIENT
Start: 2020-04-29 | End: 2020-04-29

## 2020-04-29 RX ORDER — ALBUTEROL 90 UG/1
2 AEROSOL, METERED ORAL EVERY 6 HOURS
Refills: 0 | Status: DISCONTINUED | OUTPATIENT
Start: 2020-04-29 | End: 2020-04-30

## 2020-04-29 RX ORDER — OXYCODONE AND ACETAMINOPHEN 5; 325 MG/1; MG/1
2 TABLET ORAL EVERY 6 HOURS
Refills: 0 | Status: DISCONTINUED | OUTPATIENT
Start: 2020-04-29 | End: 2020-05-01

## 2020-04-29 RX ORDER — OXYCODONE AND ACETAMINOPHEN 5; 325 MG/1; MG/1
1 TABLET ORAL EVERY 6 HOURS
Refills: 0 | Status: DISCONTINUED | OUTPATIENT
Start: 2020-04-29 | End: 2020-05-01

## 2020-04-29 RX ORDER — OXYCODONE AND ACETAMINOPHEN 5; 325 MG/1; MG/1
1 TABLET ORAL EVERY 6 HOURS
Refills: 0 | Status: DISCONTINUED | OUTPATIENT
Start: 2020-04-29 | End: 2020-04-29

## 2020-04-29 RX ORDER — PREGABALIN 225 MG/1
1000 CAPSULE ORAL DAILY
Refills: 0 | Status: DISCONTINUED | OUTPATIENT
Start: 2020-04-30 | End: 2020-05-01

## 2020-04-29 RX ORDER — ACETAMINOPHEN 500 MG
650 TABLET ORAL EVERY 6 HOURS
Refills: 0 | Status: DISCONTINUED | OUTPATIENT
Start: 2020-04-29 | End: 2020-05-01

## 2020-04-29 RX ORDER — MORPHINE SULFATE 50 MG/1
2 CAPSULE, EXTENDED RELEASE ORAL ONCE
Refills: 0 | Status: DISCONTINUED | OUTPATIENT
Start: 2020-04-29 | End: 2020-04-29

## 2020-04-29 RX ORDER — SACCHAROMYCES BOULARDII 250 MG
250 POWDER IN PACKET (EA) ORAL
Refills: 0 | Status: DISCONTINUED | OUTPATIENT
Start: 2020-04-29 | End: 2020-05-01

## 2020-04-29 RX ORDER — ONDANSETRON 8 MG/1
4 TABLET, FILM COATED ORAL ONCE
Refills: 0 | Status: COMPLETED | OUTPATIENT
Start: 2020-04-29 | End: 2020-04-29

## 2020-04-29 RX ORDER — CHOLECALCIFEROL (VITAMIN D3) 125 MCG
200 CAPSULE ORAL ONCE
Refills: 0 | Status: COMPLETED | OUTPATIENT
Start: 2020-04-29 | End: 2020-04-29

## 2020-04-29 RX ORDER — POLYETHYLENE GLYCOL 3350 17 G/17G
17 POWDER, FOR SOLUTION ORAL DAILY
Refills: 0 | Status: DISCONTINUED | OUTPATIENT
Start: 2020-04-29 | End: 2020-05-01

## 2020-04-29 RX ORDER — CEFAZOLIN SODIUM 1 G
2000 VIAL (EA) INJECTION ONCE
Refills: 0 | Status: COMPLETED | OUTPATIENT
Start: 2020-04-29 | End: 2020-04-29

## 2020-04-29 RX ORDER — OXYCODONE AND ACETAMINOPHEN 5; 325 MG/1; MG/1
1 TABLET ORAL ONCE
Refills: 0 | Status: DISCONTINUED | OUTPATIENT
Start: 2020-04-29 | End: 2020-04-29

## 2020-04-29 RX ORDER — APIXABAN 2.5 MG/1
2.5 TABLET, FILM COATED ORAL EVERY 12 HOURS
Refills: 0 | Status: DISCONTINUED | OUTPATIENT
Start: 2020-04-29 | End: 2020-05-01

## 2020-04-29 RX ORDER — ALBUTEROL 90 UG/1
2 AEROSOL, METERED ORAL EVERY 6 HOURS
Refills: 0 | Status: DISCONTINUED | OUTPATIENT
Start: 2020-04-29 | End: 2020-05-01

## 2020-04-29 RX ORDER — SENNA PLUS 8.6 MG/1
2 TABLET ORAL AT BEDTIME
Refills: 0 | Status: DISCONTINUED | OUTPATIENT
Start: 2020-04-29 | End: 2020-05-01

## 2020-04-29 RX ORDER — CEFAZOLIN SODIUM 1 G
2000 VIAL (EA) INJECTION EVERY 8 HOURS
Refills: 0 | Status: DISCONTINUED | OUTPATIENT
Start: 2020-04-29 | End: 2020-04-30

## 2020-04-29 RX ORDER — CALCIUM CARBONATE 500(1250)
1 TABLET ORAL ONCE
Refills: 0 | Status: COMPLETED | OUTPATIENT
Start: 2020-04-30 | End: 2020-04-29

## 2020-04-29 RX ADMIN — Medication 1 TABLET(S): at 23:46

## 2020-04-29 RX ADMIN — MORPHINE SULFATE 2 MILLIGRAM(S): 50 CAPSULE, EXTENDED RELEASE ORAL at 12:13

## 2020-04-29 RX ADMIN — ALBUTEROL 2 PUFF(S): 90 AEROSOL, METERED ORAL at 23:49

## 2020-04-29 RX ADMIN — SENNA PLUS 2 TABLET(S): 8.6 TABLET ORAL at 23:45

## 2020-04-29 RX ADMIN — Medication 100 MILLIGRAM(S): at 23:50

## 2020-04-29 RX ADMIN — OXYCODONE AND ACETAMINOPHEN 1 TABLET(S): 5; 325 TABLET ORAL at 16:45

## 2020-04-29 RX ADMIN — Medication 100 MILLIGRAM(S): at 15:27

## 2020-04-29 RX ADMIN — Medication 250 MILLIGRAM(S): at 19:04

## 2020-04-29 RX ADMIN — Medication 200 UNIT(S): at 23:46

## 2020-04-29 RX ADMIN — BUDESONIDE AND FORMOTEROL FUMARATE DIHYDRATE 2 PUFF(S): 160; 4.5 AEROSOL RESPIRATORY (INHALATION) at 23:42

## 2020-04-29 RX ADMIN — APIXABAN 2.5 MILLIGRAM(S): 2.5 TABLET, FILM COATED ORAL at 19:04

## 2020-04-29 RX ADMIN — ONDANSETRON 4 MILLIGRAM(S): 8 TABLET, FILM COATED ORAL at 12:13

## 2020-04-29 RX ADMIN — ALBUTEROL 2 PUFF(S): 90 AEROSOL, METERED ORAL at 23:41

## 2020-04-29 NOTE — H&P ADULT - NSHPSOCIALHISTORY_GEN_ALL_CORE
Denies smoking, etoh, illicit drug use.   Lives alone in a private home. Her son visits her daily.  She ambulates with a rotary walker at baseline. Denies any recent falls.

## 2020-04-29 NOTE — H&P ADULT - ASSESSMENT
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     BL LE Cellulitis  -met SIRS criteria in the ED with fever/tachycardia/leukocytosis.   -start 30cc/kg fluid bolus. order lactate STAT. if lactate wnl, discontinue fluids as pt currently hemodynamically stable.  -c/w Ancef 2g q8h with florastor  -trend leukocytosis    DVT  -diagnosed 11/2019  -was on Eliquis 2.5mg bid but was reportedly decreased to ?1.5mg  -pt does not want the son to be called overnight. will need to verify with him in the am.   -continue with Eliquis 2.5mg bid  -US duplex negative for new DVTs    Left shoulder pain  -xray showing shoulder separation and ligamentous laxity or rotator cuff damage  -will need mri for further eval. can follow up outpt with ortho  -pain control    Intermittent asthma  -stable. not in acute exacerbation  -c/w symbicort and albuterol prn    Polymyalgia rheumatica  -chronic  -resume prednisone 20mg QD    Hx of Osteoporosis with multiple vertebral compression fx  -stable  -pain meds    DVT ppx: on eliquis for DVT treatment    Code status: full code for now. 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     BL LE Cellulitis  -met SIRS criteria in the ED with fever/tachycardia/leukocytosis.   -start 30cc/kg fluid bolus. order lactate STAT. if lactate wnl, discontinue fluids as pt currently hemodynamically stable.  -c/w Ancef 2g q8h with florastor  -trend leukocytosis    DVT  -diagnosed 11/2019  -was on Eliquis 2.5mg bid but was reportedly decreased to ?1.5mg  -pt does not want the son to be called overnight. will need to verify with him in the am.   -continue with Eliquis 2.5mg bid  -US duplex negative for new DVTs    Left shoulder pain  -likely chronic  -xray showing shoulder separation and ligamentous laxity or rotator cuff damage  -will need mri for further eval. can follow up outpt with ortho  -pain control    Intermittent asthma  -stable. not in acute exacerbation  -c/w symbicort and albuterol prn    Polymyalgia rheumatica  -chronic  -resume prednisone 20mg QD    Hx of Osteoporosis with multiple vertebral compression fx  -stable  -pain meds with laxatives prn    DVT ppx: on eliquis for DVT treatment    Code status: full code for now. 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     BL LE Cellulitis  -met SIRS criteria in the ED with fever/tachycardia/leukocytosis.   -start 30cc/kg fluid bolus. order lactate STAT. if lactate wnl, discontinue fluids as pt currently hemodynamically stable.  -c/w Ancef 2g q8h with florastor  -trend leukocytosis    DVT  -diagnosed 11/2019  -was on Eliquis 2.5mg bid but was reportedly decreased to ?1.5mg  -pt does not want the son to be called overnight. will need to verify this with him in the am.   -continue with Eliquis 2.5mg bid  -US duplex negative for new DVTs    Left shoulder pain  -likely chronic  -xray showing shoulder separation and ligamentous laxity or rotator cuff damage  -will need mri for further eval. can follow up outpt with ortho  -pain control    Intermittent asthma  -stable. not in acute exacerbation  -c/w symbicort and albuterol prn    Polymyalgia rheumatica  -chronic  -resume prednisone 20mg QD    Hx of Osteoporosis with multiple vertebral compression fx  -stable  -pain meds with laxatives prn    Medication management  -will need to verify home meds in the AM with son or pharmacy when open    DVT ppx: on eliquis for DVT treatment    Code status: full code for now. 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     BL LE Cellulitis  -met SIRS criteria in the ED with fever/tachycardia/leukocytosis.   -start 30cc/kg fluid bolus. order lactate STAT. if lactate wnl, discontinue fluids as pt currently hemodynamically stable.  -c/w Ancef 2g q8h with florastor  -trend leukocytosis and fever curve    DVT, diagnosed 11/2019  -was on Eliquis 2.5mg bid but was reportedly decreased to 1.5mg?  -pt does not want the son to be called overnight. will need to verify this with him in the am.   -continue with Eliquis 2.5mg bid for now  -US duplex negative for any new DVTs    Left shoulder pain, likely chronic  -xray showing shoulder separation and ligamentous laxity or rotator cuff damage  -will need mri for further eval. can follow up outpt with ortho  -pain control    Intermittent asthma  -stable. not in acute exacerbation  -c/w symbicort and albuterol prn    Polymyalgia rheumatica  -chronic  -resume prednisone 20mg QD    Hx of Osteoporosis with multiple vertebral compression fx  -stable  -pain meds with laxatives prn    Medication management  -will need to verify home meds in the AM with son or pharmacy when open    DVT ppx: on eliquis for DVT treatment    Code status: full code for now. 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     BL LE Cellulitis  -met SIRS criteria in the ED with fever/tachycardia/leukocytosis c/w Sepsis.   -start 30cc/kg fluid bolus. Order lactate STAT. if lactate wnl, discontinue fluids as pt currently hemodynamically stable.  -change to Unasyn to cover Bartonella Henselae as well as staph/strep with cat scratch history.   -trend leukocytosis and fever curve. No indication for stress dose steroids at this time.     DVT, diagnosed 11/2019  -was on Eliquis 2.5mg bid but was reportedly decreased to 1.5mg?  -pt does not want the son to be called overnight. Will need to verify this with him in the am.   -continue with Eliquis 2.5mg bid for now. Denies bleeding/falls   -US duplex negative for any new DVTs.   -Unlikely PE and due to high risk of contrast nephropathy will avoid CTA as this will not likely .     Left shoulder pain, likely chronic  -Denies any recent injury of falls  -xray showing shoulder separation and ligamentous laxity or rotator cuff damage  -will need nonemergent o/p mri for further eval. can follow up o/p with ortho  -pain control    Intermittent asthma  -stable. not in acute exacerbation  -c/w symbicort and albuterol prn    Polymyalgia rheumatica  -chronic  -resume prednisone 20mg QD  -Fall risk/ambulate with assistance     Hx of Osteoporosis with multiple vertebral compression fx  -Calcium/Vit D. f/u with PMD for Bisphosphate therapy   -stable  -pain meds with laxatives prn    Medication management  -will need to verify home meds in the AM with son or pharmacy when open    DVT ppx: on eliquis for DVT treatment    Code status: full code for now. refer to [facility] note     Dispo: 1-2 days, when medically stable

## 2020-04-29 NOTE — GOALS OF CARE CONVERSATION - ADVANCED CARE PLANNING - CONVERSATION DETAILS
Discussed advanced directives with patient at bedside. She states she would not want to be an "invalid" and got teary eyed. While she states that she would likely not want to be intubated, she wants to discuss this further with her son before reaching a final decision regarding code status. States she does not want the medical team to call her son tonight as he is "busy working". She does not want to bother him. Understands that she will be FULL CODE until she finalizes her decision.

## 2020-04-29 NOTE — H&P ADULT - HISTORY OF PRESENT ILLNESS
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 LLE > RLE. Assoc with erythema, pain and warmth. Her symptoms started after her cat scratched her about a week ago. Denies fevers, chills, n/v. She also states that her pmd recently decreased her Eliquis dose from 2.5mg bid to ?1.5mg bid. Pt states her son is a pharmacist and he advised her to take a lower dose due to her age and body weight. She does not want her son to be called tonight as he is "busy working". Denies chest pain, sob, palpitations, nasal congestion, cough.     ED course: Was noted to have fever with temp 100.4, mildly tachycardic with hr 106, satting well on RA and hemodynamically stable. +leukocytosis with wbc 13.2 with neutrophilic shift. pt given ancef 2g and pain medications.

## 2020-04-29 NOTE — ED PROVIDER NOTE - CLINICAL SUMMARY MEDICAL DECISION MAKING FREE TEXT BOX
pt with swelling and redness LLE>> RLE , swelling tender warm erythematous  pe as documented  will obtain ultrasound  labs meds reassess

## 2020-04-29 NOTE — ED PROVIDER NOTE - OBJECTIVE STATEMENT
c/c leg swelling  hpi 87 yo female with swelling bilateral legs Left >> Right Hx dvt on eliquis but recently her MD decreased her eliquis dose from 2.5 milligram bid to 1.5 bid as per pt. No other symptoms.  med hx Asthma    Diverticulosis    Polymyalgia rheumatica    social hx no cigs

## 2020-04-29 NOTE — ED ADULT TRIAGE NOTE - CHIEF COMPLAINT QUOTE
BIBA from home due to pain, swelling and redness to LLE sudden onset 0200. pt has hx of blood clots, on elequis dose recently decreased. positive L pedal pulse, pt denies cough or SOB. a&0x4

## 2020-04-29 NOTE — ED ADULT NURSE REASSESSMENT NOTE - NS ED NURSE REASSESS COMMENT FT1
Pt in US C/O pain and states unbearable to get US. Dr. Merlos notified who ordered analgesia. See eMAR.

## 2020-04-29 NOTE — ED ADULT NURSE NOTE - NSIMPLEMENTINTERV_GEN_ALL_ED
Implemented All Fall Risk Interventions:  [place] to call system. Call bell, personal items and telephone within reach. Instruct patient to call for assistance. Room bathroom lighting operational. Non-slip footwear when patient is off stretcher. Physically safe environment: no spills, clutter or unnecessary equipment. Stretcher in lowest position, wheels locked, appropriate side rails in place. Provide visual cue, wrist band, yellow gown, etc. Monitor gait and stability. Monitor for mental status changes and reorient to person, place, and time. Review medications for side effects contributing to fall risk. Reinforce activity limits and safety measures with patient and family.

## 2020-04-30 ENCOUNTER — TRANSCRIPTION ENCOUNTER (OUTPATIENT)
Age: 85
End: 2020-04-30

## 2020-04-30 DIAGNOSIS — L03.116 CELLULITIS OF LEFT LOWER LIMB: ICD-10-CM

## 2020-04-30 DIAGNOSIS — I82.409 ACUTE EMBOLISM AND THROMBOSIS OF UNSPECIFIED DEEP VEINS OF UNSPECIFIED LOWER EXTREMITY: ICD-10-CM

## 2020-04-30 DIAGNOSIS — J45.909 UNSPECIFIED ASTHMA, UNCOMPLICATED: ICD-10-CM

## 2020-04-30 DIAGNOSIS — R50.9 FEVER, UNSPECIFIED: ICD-10-CM

## 2020-04-30 DIAGNOSIS — D72.829 ELEVATED WHITE BLOOD CELL COUNT, UNSPECIFIED: ICD-10-CM

## 2020-04-30 LAB
ANION GAP SERPL CALC-SCNC: 15 MMOL/L — SIGNIFICANT CHANGE UP (ref 5–17)
BASOPHILS # BLD AUTO: 0.02 K/UL — SIGNIFICANT CHANGE UP (ref 0–0.2)
BASOPHILS NFR BLD AUTO: 0.2 % — SIGNIFICANT CHANGE UP (ref 0–2)
BUN SERPL-MCNC: 21 MG/DL — HIGH (ref 8–20)
CALCIUM SERPL-MCNC: 9 MG/DL — SIGNIFICANT CHANGE UP (ref 8.6–10.2)
CHLORIDE SERPL-SCNC: 96 MMOL/L — LOW (ref 98–107)
CO2 SERPL-SCNC: 26 MMOL/L — SIGNIFICANT CHANGE UP (ref 22–29)
CREAT SERPL-MCNC: 0.6 MG/DL — SIGNIFICANT CHANGE UP (ref 0.5–1.3)
EOSINOPHIL # BLD AUTO: 0.07 K/UL — SIGNIFICANT CHANGE UP (ref 0–0.5)
EOSINOPHIL NFR BLD AUTO: 0.7 % — SIGNIFICANT CHANGE UP (ref 0–6)
GLUCOSE SERPL-MCNC: 87 MG/DL — SIGNIFICANT CHANGE UP (ref 70–99)
HCT VFR BLD CALC: 36.1 % — SIGNIFICANT CHANGE UP (ref 34.5–45)
HGB BLD-MCNC: 11.6 G/DL — SIGNIFICANT CHANGE UP (ref 11.5–15.5)
IMM GRANULOCYTES NFR BLD AUTO: 1.4 % — SIGNIFICANT CHANGE UP (ref 0–1.5)
LYMPHOCYTES # BLD AUTO: 0.6 K/UL — LOW (ref 1–3.3)
LYMPHOCYTES # BLD AUTO: 5.6 % — LOW (ref 13–44)
MCHC RBC-ENTMCNC: 30.3 PG — SIGNIFICANT CHANGE UP (ref 27–34)
MCHC RBC-ENTMCNC: 32.1 GM/DL — SIGNIFICANT CHANGE UP (ref 32–36)
MCV RBC AUTO: 94.3 FL — SIGNIFICANT CHANGE UP (ref 80–100)
MONOCYTES # BLD AUTO: 0.88 K/UL — SIGNIFICANT CHANGE UP (ref 0–0.9)
MONOCYTES NFR BLD AUTO: 8.3 % — SIGNIFICANT CHANGE UP (ref 2–14)
NEUTROPHILS # BLD AUTO: 8.94 K/UL — HIGH (ref 1.8–7.4)
NEUTROPHILS NFR BLD AUTO: 83.8 % — HIGH (ref 43–77)
PLATELET # BLD AUTO: 248 K/UL — SIGNIFICANT CHANGE UP (ref 150–400)
POTASSIUM SERPL-MCNC: 4.2 MMOL/L — SIGNIFICANT CHANGE UP (ref 3.5–5.3)
POTASSIUM SERPL-SCNC: 4.2 MMOL/L — SIGNIFICANT CHANGE UP (ref 3.5–5.3)
RBC # BLD: 3.83 M/UL — SIGNIFICANT CHANGE UP (ref 3.8–5.2)
RBC # FLD: 15.2 % — HIGH (ref 10.3–14.5)
SARS-COV-2 RNA SPEC QL NAA+PROBE: SIGNIFICANT CHANGE UP
SODIUM SERPL-SCNC: 136 MMOL/L — SIGNIFICANT CHANGE UP (ref 135–145)
WBC # BLD: 10.66 K/UL — HIGH (ref 3.8–10.5)
WBC # FLD AUTO: 10.66 K/UL — HIGH (ref 3.8–10.5)

## 2020-04-30 PROCEDURE — 99221 1ST HOSP IP/OBS SF/LOW 40: CPT

## 2020-04-30 RX ORDER — AMPICILLIN SODIUM AND SULBACTAM SODIUM 250; 125 MG/ML; MG/ML
3 INJECTION, POWDER, FOR SUSPENSION INTRAMUSCULAR; INTRAVENOUS EVERY 6 HOURS
Refills: 0 | Status: DISCONTINUED | OUTPATIENT
Start: 2020-04-30 | End: 2020-05-01

## 2020-04-30 RX ORDER — SODIUM CHLORIDE 9 MG/ML
1000 INJECTION, SOLUTION INTRAVENOUS
Refills: 0 | Status: DISCONTINUED | OUTPATIENT
Start: 2020-04-30 | End: 2020-05-01

## 2020-04-30 RX ADMIN — OXYCODONE AND ACETAMINOPHEN 1 TABLET(S): 5; 325 TABLET ORAL at 17:24

## 2020-04-30 RX ADMIN — PREGABALIN 1000 MICROGRAM(S): 225 CAPSULE ORAL at 12:15

## 2020-04-30 RX ADMIN — APIXABAN 2.5 MILLIGRAM(S): 2.5 TABLET, FILM COATED ORAL at 17:24

## 2020-04-30 RX ADMIN — POLYETHYLENE GLYCOL 3350 17 GRAM(S): 17 POWDER, FOR SOLUTION ORAL at 12:14

## 2020-04-30 RX ADMIN — AMPICILLIN SODIUM AND SULBACTAM SODIUM 200 GRAM(S): 250; 125 INJECTION, POWDER, FOR SUSPENSION INTRAMUSCULAR; INTRAVENOUS at 12:12

## 2020-04-30 RX ADMIN — Medication 650 MILLIGRAM(S): at 12:14

## 2020-04-30 RX ADMIN — SODIUM CHLORIDE 65 MILLILITER(S): 9 INJECTION, SOLUTION INTRAVENOUS at 12:12

## 2020-04-30 RX ADMIN — Medication 1 TABLET(S): at 12:15

## 2020-04-30 RX ADMIN — AMPICILLIN SODIUM AND SULBACTAM SODIUM 200 GRAM(S): 250; 125 INJECTION, POWDER, FOR SUSPENSION INTRAMUSCULAR; INTRAVENOUS at 17:15

## 2020-04-30 RX ADMIN — Medication 250 MILLIGRAM(S): at 17:24

## 2020-04-30 RX ADMIN — ALBUTEROL 2 PUFF(S): 90 AEROSOL, METERED ORAL at 06:35

## 2020-04-30 RX ADMIN — SODIUM CHLORIDE 1000 MILLILITER(S): 9 INJECTION, SOLUTION INTRAVENOUS at 00:14

## 2020-04-30 RX ADMIN — AMPICILLIN SODIUM AND SULBACTAM SODIUM 200 GRAM(S): 250; 125 INJECTION, POWDER, FOR SUSPENSION INTRAMUSCULAR; INTRAVENOUS at 06:30

## 2020-04-30 RX ADMIN — Medication 250 MILLIGRAM(S): at 06:34

## 2020-04-30 RX ADMIN — APIXABAN 2.5 MILLIGRAM(S): 2.5 TABLET, FILM COATED ORAL at 06:34

## 2020-04-30 RX ADMIN — SENNA PLUS 2 TABLET(S): 8.6 TABLET ORAL at 23:36

## 2020-04-30 RX ADMIN — Medication 20 MILLIGRAM(S): at 06:34

## 2020-04-30 RX ADMIN — OXYCODONE AND ACETAMINOPHEN 2 TABLET(S): 5; 325 TABLET ORAL at 23:36

## 2020-04-30 NOTE — DISCHARGE NOTE PROVIDER - HOSPITAL COURSE
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 LLE > RLE. Assoc with erythema, pain and warmth. Her symptoms started after her cat scratched her about a week ago. Denies fevers, chills, n/v. She also states that her pmd recently decreased her Eliquis dose from 2.5mg bid to ?1.5mg bid. Pt states her son is a pharmacist and he advised her to take a lower dose due to her age and body weight. She does not want her son to be called tonight as he is "busy working". Denies chest pain, sob, palpitations, nasal congestion, cough.         ED course: Was noted to have fever with temp 100.4, mildly tachycardic with hr 106, satting well on RA and hemodynamically stable. +leukocytosis with wbc 13.2 with neutrophilic shift. pt given ancef 2g and pain medications. 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 LLE > RLE. Assoc with erythema, pain and warmth. Her symptoms started after her cat scratched her about a week ago. Denies fevers, chills, n/v. She also states that her pmd recently decreased her Eliquis dose from 2.5mg bid to ?1.5mg bid. Pt states her son is a pharmacist and he advised her to take a lower dose due to her age and body weight. She does not want her son to be called tonight as he is "busy working". Denies chest pain, sob, palpitations, nasal congestion, cough.         ED course: Was noted to have fever with temp 100.4, mildly tachycardic with hr 106, satting well on RA and hemodynamically stable. +leukocytosis with wbc 13.2 with neutrophilic shift. pt given ancef 2g and pain medications.        d/c home with Augmentin.     VS stable    outpt f/u with  ortho for L shoulder

## 2020-04-30 NOTE — DISCHARGE NOTE PROVIDER - NSDCFUADDINST_GEN_ALL_CORE_FT
ORTHOPEDIC RECOMMENDATIONS:   There is no surgical orthopedic intervention at this time. Patient is WEIGHT BEARING as TOLERATED of Left upper extremity. Follow-up in the office in 7 days for re-evaluation. Pain control. DVT ppx as per primary team.

## 2020-04-30 NOTE — DISCHARGE NOTE PROVIDER - NSDCCPCAREPLAN_GEN_ALL_CORE_FT
PRINCIPAL DISCHARGE DIAGNOSIS  Diagnosis: Cellulitis and abscess of left lower extremity  Assessment and Plan of Treatment:       SECONDARY DISCHARGE DIAGNOSES  Diagnosis: Fever  Assessment and Plan of Treatment:     Diagnosis: Leukocytosis  Assessment and Plan of Treatment:

## 2020-04-30 NOTE — CONSULT NOTE ADULT - ATTENDING COMMENTS
Ortho Trauma Attending:  Agree with above PA note.  Note edited where necessary.  Patient has severe end stage rotator cuff arthropathy. No intervention at this time. Pain management per primary team. No Ortho Trauma follow up required.    Sajan Rojas MD  Orthopaedic Trauma Surgery

## 2020-04-30 NOTE — DISCHARGE NOTE PROVIDER - CARE PROVIDER_API CALL
PMD,   Phone: (   )    -  Fax: (   )    -  Follow Up Time: STEPHANIE,   Phone: (   )    -  Fax: (   )    -  Follow Up Time:     Sajan Rojas)  Orthopaedic Surgery  [address] [place] [id number]  Phone: [phone]  Fax: [phone]  Follow Up Time:

## 2020-04-30 NOTE — CONSULT NOTE ADULT - SUBJECTIVE AND OBJECTIVE BOX
Pt Name: SARA TRIANA  MRN: [id number]    Patient is a 88y Female admitted for b/l LE cellulitis. Asked to consult on patient from medical team for L AC separation. Patient seen at the bedside complaining of L shoulder pain. Patient is poor historian. Denies numbness/tingling to LUE.     REVIEW OF SYSTEMS    General: Alert, responsive, in NAD    Skin/Breast: No rashes, no pruritis     Musculoskeletal: SEE HPI.    Neurological: No sensory or motor changes.         PAST MEDICAL & SURGICAL HISTORY:  PAST MEDICAL & SURGICAL HISTORY:  Polymyalgia rheumatica  Diverticulosis  Asthma  S/P knee replacement, left  S/P hysterectomy: 1982  S/P appendectomy: 1962      Allergies: dust (Unknown)  No Known Drug Allergies  provide [facility] soft diet (Unknown)      Medications: acetaminophen   Tablet .. 650 milliGRAM(s) Oral every 6 hours PRN  ALBUTerol    90 MICROgram(s) HFA Inhaler 2 Puff(s) Inhalation every 6 hours PRN  ampicillin/sulbactam  IVPB 3 Gram(s) IV Intermittent every 6 hours  apixaban 2.5 milliGRAM(s) Oral every 12 hours  budesonide  80 MICROgram(s)/formoterol 4.5 MICROgram(s) Inhaler 2 Puff(s) Inhalation two times a day  cyanocobalamin 1000 MICROGram(s) Oral daily  multiple electrolytes Injection Type 1 1000 milliLiter(s) IV Continuous <Continuous>  multivitamin 1 Tablet(s) Oral daily  oxycodone    5 mG/acetaminophen 325 mG 1 Tablet(s) Oral every 6 hours PRN  oxycodone    5 mG/acetaminophen 325 mG 2 Tablet(s) Oral every 6 hours PRN  polyethylene glycol 3350 17 Gram(s) Oral daily  predniSONE   Tablet 20 milliGRAM(s) Oral daily  saccharomyces boulardii 250 milliGRAM(s) Oral two times a day  senna 2 Tablet(s) Oral at bedtime    FAMILY HISTORY:  Family history of stroke  : non-contributory    Social History:     Ambulation: Walking independently [ ] With Cane [ ] With Walker [ ]  Bedbound [ ]                           11.6   10.66 )-----------( 248      ( 30 Apr 2020 06:58 )             36.1       04-30    136  |  96<L>  |  21.0<H>  ----------------------------<  87  4.2   |  26.0  |  0.60    Ca    9.0      30 Apr 2020 06:58    TPro  6.4<L>  /  Alb  3.7  /  TBili  0.7  /  DBili  x   /  AST  25  /  ALT  19  /  AlkPhos  112  04-29      Vital Signs Last 24 Hrs  T(C): 37.2 (30 Apr 2020 02:23), Max: 38 (29 Apr 2020 15:43)  T(F): 98.9 (30 Apr 2020 02:23), Max: 100.4 (29 Apr 2020 15:43)  HR: 107 (30 Apr 2020 02:23) (106 - 115)  BP: 164/81 (30 Apr 2020 02:23) (131/82 - 165/95)  BP(mean): --  RR: 24 (30 Apr 2020 02:23) (18 - 24)  SpO2: 96% (30 Apr 2020 02:23) (94% - 98%)    Daily Height in cm: 149.86 (29 Apr 2020 10:01)    Daily       PHYSICAL EXAM:      Appearance: Alert, responsive, in no acute distress.    Neurological: Sensation is grossly intact to light touch. No focal deficits or weaknesses found.    Skin: no rash on visible skin. Skin is clean, dry and intact. No bleeding. No abrasions. No ulcerations.    Vascular: 2+ distal pulses. Cap refill < 2 sec. No signs of venous insufficiency or stasis. No extremity ulcerations. No cyanosis.    Musculoskeletal:         Left Upper Extremity: Distal Clavicle/Shoulder: + TTP, decreased ROM secondary to pain. Elbow: NTTP, FROM. EE/EF intact. Wrist: NTTP, FROM. WE/WF intact. Hand: NTTP throughout, FROM. Abduction/adduction/flexion/extension of digits 1-5 intact. Compartments soft compressible. Sensation intact to light touch. Sling placed.        Right Upper Extremity: motor/sensation intact. Warm perfused extremity.       Imaging Studies:  < from: Xray Shoulder 2 Views, Left (04.29.20 @ 15:23) >   EXAM:  SHOULDER COMP  MIN 2 VIEWS-LT                          PROCEDURE DATE:  04/29/2020          INTERPRETATION:  Left shoulder    HISTORY: Pain    Comparison: 9/7/2019      Three views of the left shoulder show no evidence of acute fracture. The joint spaces show widening of the acromioclavicular space and higher positioning of the humeral head indicating either laxity of the shoulder capsule or rotator cuff tear.     IMPRESSION: 1. Shoulder separation  2. Ligamentous laxity or rotator cuffdamage. Clinical correlation is suggested. MRI of the left shoulder may provide further evaluation.    Thank you for this referral.                SERINA LUNA M.D., ATTENDING RADIOLOGIST  This document has been electronically signed. Apr 29 2020  3:32PM        < end of copied text >      A/P:  Pt is a  88y Female admitted for B/L LE cellulitis  found to have L AC separation found on xray.     PLAN:   - pain control   - NWB LUE  - Continue sling as applied  - Plan to be d/w and finalized after discussion with Dr. Rojas   - continue care per primary team Pt Name: SARA TRIANA  MRN: [id number]    Patient is a 88y Female admitted for b/l LE cellulitis. Asked to consult on patient from medical team for L AC separation. Patient seen at the bedside complaining of L shoulder pain. Patient is poor historian. Denies numbness/tingling to LUE.     REVIEW OF SYSTEMS    General: Alert, responsive, in NAD    Skin/Breast: No rashes, no pruritis     Musculoskeletal: SEE HPI.    Neurological: No sensory or motor changes.         PAST MEDICAL & SURGICAL HISTORY:  PAST MEDICAL & SURGICAL HISTORY:  Polymyalgia rheumatica  Diverticulosis  Asthma  S/P knee replacement, left  S/P hysterectomy: 1982  S/P appendectomy: 1962      Allergies: dust (Unknown)  No Known Drug Allergies  provide [facility] soft diet (Unknown)      Medications: acetaminophen   Tablet .. 650 milliGRAM(s) Oral every 6 hours PRN  ALBUTerol    90 MICROgram(s) HFA Inhaler 2 Puff(s) Inhalation every 6 hours PRN  ampicillin/sulbactam  IVPB 3 Gram(s) IV Intermittent every 6 hours  apixaban 2.5 milliGRAM(s) Oral every 12 hours  budesonide  80 MICROgram(s)/formoterol 4.5 MICROgram(s) Inhaler 2 Puff(s) Inhalation two times a day  cyanocobalamin 1000 MICROGram(s) Oral daily  multiple electrolytes Injection Type 1 1000 milliLiter(s) IV Continuous <Continuous>  multivitamin 1 Tablet(s) Oral daily  oxycodone    5 mG/acetaminophen 325 mG 1 Tablet(s) Oral every 6 hours PRN  oxycodone    5 mG/acetaminophen 325 mG 2 Tablet(s) Oral every 6 hours PRN  polyethylene glycol 3350 17 Gram(s) Oral daily  predniSONE   Tablet 20 milliGRAM(s) Oral daily  saccharomyces boulardii 250 milliGRAM(s) Oral two times a day  senna 2 Tablet(s) Oral at bedtime    FAMILY HISTORY:  Family history of stroke  : non-contributory    Social History:     Ambulation: Walking independently [ ] With Cane [ ] With Walker [ ]  Bedbound [ ]                           11.6   10.66 )-----------( 248      ( 30 Apr 2020 06:58 )             36.1       04-30    136  |  96<L>  |  21.0<H>  ----------------------------<  87  4.2   |  26.0  |  0.60    Ca    9.0      30 Apr 2020 06:58    TPro  6.4<L>  /  Alb  3.7  /  TBili  0.7  /  DBili  x   /  AST  25  /  ALT  19  /  AlkPhos  112  04-29      Vital Signs Last 24 Hrs  T(C): 37.2 (30 Apr 2020 02:23), Max: 38 (29 Apr 2020 15:43)  T(F): 98.9 (30 Apr 2020 02:23), Max: 100.4 (29 Apr 2020 15:43)  HR: 107 (30 Apr 2020 02:23) (106 - 115)  BP: 164/81 (30 Apr 2020 02:23) (131/82 - 165/95)  BP(mean): --  RR: 24 (30 Apr 2020 02:23) (18 - 24)  SpO2: 96% (30 Apr 2020 02:23) (94% - 98%)    Daily Height in cm: 149.86 (29 Apr 2020 10:01)    Daily       PHYSICAL EXAM:      Appearance: Alert, responsive, in no acute distress.    Neurological: Sensation is grossly intact to light touch. No focal deficits or weaknesses found.    Skin: no rash on visible skin. Skin is clean, dry and intact. No bleeding. No abrasions. No ulcerations.    Vascular: 2+ distal pulses. Cap refill < 2 sec. No signs of venous insufficiency or stasis. No extremity ulcerations. No cyanosis.    Musculoskeletal:         Left Upper Extremity: Distal Clavicle/Shoulder: + TTP, decreased ROM secondary to pain. Elbow: NTTP, FROM. EE/EF intact. Wrist: NTTP, FROM. WE/WF intact. Hand: NTTP throughout, FROM. Abduction/adduction/flexion/extension of digits 1-5 intact. Compartments soft compressible. Sensation intact to light touch. Sling placed.        Right Upper Extremity: motor/sensation intact. Warm perfused extremity.       Imaging Studies:  < from: Xray Shoulder 2 Views, Left (04.29.20 @ 15:23) >   EXAM:  SHOULDER COMP  MIN 2 VIEWS-LT                          PROCEDURE DATE:  04/29/2020          INTERPRETATION:  Left shoulder    HISTORY: Pain    Comparison: 9/7/2019      Three views of the left shoulder show no evidence of acute fracture. The joint spaces show widening of the acromioclavicular space and higher positioning of the humeral head indicating either laxity of the shoulder capsule or rotator cuff tear.     IMPRESSION: 1. Shoulder separation  2. Ligamentous laxity or rotator cuffdamage. Clinical correlation is suggested. MRI of the left shoulder may provide further evaluation.    Thank you for this referral.                SERINA LUNA M.D., ATTENDING RADIOLOGIST  This document has been electronically signed. Apr 29 2020  3:32PM        < end of copied text >      A/P:  Pt is a  88y Female admitted for B/L LE cellulitis  found to have L AC separation found on xray.     PLAN:   - pain control   - WBAT LUE  - Case/images d/w Dr. Rojas  - No further orthopedic intervention needed at this time. f/u in the office in 1-2 weeks  - continue care per primary team Pt Name: SARA TRIANA  MRN: [id number]    Patient is a 88y Female admitted for b/l LE cellulitis. Asked to consult on patient from medical team for L AC separation. Patient seen at the bedside complaining of L shoulder pain. Patient is poor historian. Denies numbness/tingling to LUE.     REVIEW OF SYSTEMS    General: Alert, responsive, in NAD    Skin/Breast: No rashes, no pruritis     Musculoskeletal: SEE HPI.    Neurological: No sensory or motor changes.         PAST MEDICAL & SURGICAL HISTORY:  PAST MEDICAL & SURGICAL HISTORY:  Polymyalgia rheumatica  Diverticulosis  Asthma  S/P knee replacement, left  S/P hysterectomy: 1982  S/P appendectomy: 1962      Allergies: dust (Unknown)  No Known Drug Allergies  provide [facility] soft diet (Unknown)      Medications: acetaminophen   Tablet .. 650 milliGRAM(s) Oral every 6 hours PRN  ALBUTerol    90 MICROgram(s) HFA Inhaler 2 Puff(s) Inhalation every 6 hours PRN  ampicillin/sulbactam  IVPB 3 Gram(s) IV Intermittent every 6 hours  apixaban 2.5 milliGRAM(s) Oral every 12 hours  budesonide  80 MICROgram(s)/formoterol 4.5 MICROgram(s) Inhaler 2 Puff(s) Inhalation two times a day  cyanocobalamin 1000 MICROGram(s) Oral daily  multiple electrolytes Injection Type 1 1000 milliLiter(s) IV Continuous <Continuous>  multivitamin 1 Tablet(s) Oral daily  oxycodone    5 mG/acetaminophen 325 mG 1 Tablet(s) Oral every 6 hours PRN  oxycodone    5 mG/acetaminophen 325 mG 2 Tablet(s) Oral every 6 hours PRN  polyethylene glycol 3350 17 Gram(s) Oral daily  predniSONE   Tablet 20 milliGRAM(s) Oral daily  saccharomyces boulardii 250 milliGRAM(s) Oral two times a day  senna 2 Tablet(s) Oral at bedtime    FAMILY HISTORY:  Family history of stroke  : non-contributory    Social History:     Ambulation: Walking independently [ ] With Cane [ ] With Walker [ ]  Bedbound [ ]                           11.6   10.66 )-----------( 248      ( 30 Apr 2020 06:58 )             36.1       04-30    136  |  96<L>  |  21.0<H>  ----------------------------<  87  4.2   |  26.0  |  0.60    Ca    9.0      30 Apr 2020 06:58    TPro  6.4<L>  /  Alb  3.7  /  TBili  0.7  /  DBili  x   /  AST  25  /  ALT  19  /  AlkPhos  112  04-29      Vital Signs Last 24 Hrs  T(C): 37.2 (30 Apr 2020 02:23), Max: 38 (29 Apr 2020 15:43)  T(F): 98.9 (30 Apr 2020 02:23), Max: 100.4 (29 Apr 2020 15:43)  HR: 107 (30 Apr 2020 02:23) (106 - 115)  BP: 164/81 (30 Apr 2020 02:23) (131/82 - 165/95)  BP(mean): --  RR: 24 (30 Apr 2020 02:23) (18 - 24)  SpO2: 96% (30 Apr 2020 02:23) (94% - 98%)    Daily Height in cm: 149.86 (29 Apr 2020 10:01)    Daily       PHYSICAL EXAM:      Appearance: Alert, responsive, in no acute distress.    Neurological: Sensation is grossly intact to light touch. No focal deficits or weaknesses found.    Skin: no rash on visible skin. Skin is clean, dry and intact. No bleeding. No abrasions. No ulcerations.    Vascular: 2+ distal pulses. Cap refill < 2 sec. No signs of venous insufficiency or stasis. No extremity ulcerations. No cyanosis.    Musculoskeletal:         Left Upper Extremity: Distal Clavicle/Shoulder: + TTP, decreased ROM secondary to pain. Elbow: NTTP, FROM. EE/EF intact. Wrist: NTTP, FROM. WE/WF intact. Hand: NTTP throughout, FROM. Abduction/adduction/flexion/extension of digits 1-5 intact. Compartments soft compressible. Sensation intact to light touch. Sling placed.        Right Upper Extremity: motor/sensation intact. Warm perfused extremity.       Imaging Studies:  < from: Xray Shoulder 2 Views, Left (04.29.20 @ 15:23) >   EXAM:  SHOULDER COMP  MIN 2 VIEWS-LT                          PROCEDURE DATE:  04/29/2020          INTERPRETATION:  Left shoulder    HISTORY: Pain    Comparison: 9/7/2019      Three views of the left shoulder show no evidence of acute fracture. The joint spaces show widening of the acromioclavicular space and higher positioning of the humeral head indicating either laxity of the shoulder capsule or rotator cuff tear.     IMPRESSION: 1. Shoulder separation  2. Ligamentous laxity or rotator cuffdamage. Clinical correlation is suggested. MRI of the left shoulder may provide further evaluation.    Thank you for this referral.                SERINA LUNA M.D., ATTENDING RADIOLOGIST  This document has been electronically signed. Apr 29 2020  3:32PM        < end of copied text >      A/P:  Pt is a  88y Female admitted for B/L LE cellulitis  found to have Left rotator cuff arthropathy found on xray.     PLAN:   - pain control   - WBAT LUE  - Case/images d/w Dr. Rojas  - No further orthopedic intervention needed at this time. f/u in the office in 1-2 weeks  - continue care per primary team

## 2020-04-30 NOTE — DISCHARGE NOTE PROVIDER - PROVIDER TOKENS
FREE:[LAST:[PMD],PHONE:[(   )    -],FAX:[(   )    -]] FREE:[LAST:[PMD],PHONE:[(   )    -],FAX:[(   )    -]],PROVIDER:[TOKEN:[[id number]]]

## 2020-04-30 NOTE — DISCHARGE NOTE PROVIDER - NSDCMRMEDTOKEN_GEN_ALL_CORE_FT
acetaminophen 325 mg oral tablet: 2 tab(s) orally every 8 hours, As needed, Severe Pain (7 - 10)  albuterol 4 mg oral tablet: 1 tab(s) orally once a day (at bedtime)  apixaban 5 mg oral tablet: 1 tab(s) orally every 12 hours  calcium-vitamin D 500 mg-200 intl units oral tablet: 1 tab(s) orally 2 times a day  celecoxib 200 mg oral capsule: 1 cap(s) orally once a day (before a meal), As Needed  lidocaine 5% topical film: Apply topically to affected area once a day; on for 12 hours, off for 12 hours  predniSONE 20 mg oral tablet: 1 tab(s) orally once a day  senna oral tablet: 2 tab(s) orally once a day (at bedtime)  Symbicort 160 mcg-4.5 mcg/inh inhalation aerosol: 2 puff(s) inhaled 2 times a day albuterol 4 mg oral tablet: 1 tab(s) orally once a day (at bedtime)  apixaban 2.5 mg oral tablet: 1 tab(s) orally 2 times a day  Augmentin 500 mg-125 mg oral tablet: 1 tab(s) orally 2 times a day   calcium-vitamin D 500 mg-200 intl units oral tablet: 1 tab(s) orally 2 times a day  lidocaine 5% topical film: Apply topically to affected area once a day; on for 12 hours, off for 12 hours  predniSONE 20 mg oral tablet: 1 tab(s) orally once a day  senna oral tablet: 2 tab(s) orally once a day (at bedtime)  Symbicort 160 mcg-4.5 mcg/inh inhalation aerosol: 2 puff(s) inhaled 2 times a day albuterol 4 mg oral tablet: 1 tab(s) orally once a day (at bedtime)  apixaban 2.5 mg oral tablet: 1 tab(s) orally 2 times a day  Augmentin 500 mg-125 mg oral tablet: 1 tab(s) orally 2 times a day   calcium-vitamin D 500 mg-200 intl units oral tablet: 1 tab(s) orally 2 times a day  lidocaine 5% topical film: Apply topically to affected area once a day; on for 12 hours, off for 12 hours  Percocet 5/325 oral tablet: 1 tab(s) orally every 6 hours  predniSONE 20 mg oral tablet: 1 tab(s) orally once a day  senna oral tablet: 2 tab(s) orally once a day (at bedtime)  Symbicort 160 mcg-4.5 mcg/inh inhalation aerosol: 2 puff(s) inhaled 2 times a day

## 2020-05-01 ENCOUNTER — TRANSCRIPTION ENCOUNTER (OUTPATIENT)
Age: 85
End: 2020-05-01

## 2020-05-01 VITALS
HEART RATE: 97 BPM | SYSTOLIC BLOOD PRESSURE: 129 MMHG | RESPIRATION RATE: 21 BRPM | TEMPERATURE: 98 F | DIASTOLIC BLOOD PRESSURE: 80 MMHG | OXYGEN SATURATION: 97 %

## 2020-05-01 LAB
BASOPHILS # BLD AUTO: 0.03 K/UL — SIGNIFICANT CHANGE UP (ref 0–0.2)
BASOPHILS NFR BLD AUTO: 0.3 % — SIGNIFICANT CHANGE UP (ref 0–2)
EOSINOPHIL # BLD AUTO: 0.14 K/UL — SIGNIFICANT CHANGE UP (ref 0–0.5)
EOSINOPHIL NFR BLD AUTO: 1.6 % — SIGNIFICANT CHANGE UP (ref 0–6)
HCT VFR BLD CALC: 35.6 % — SIGNIFICANT CHANGE UP (ref 34.5–45)
HGB BLD-MCNC: 11.6 G/DL — SIGNIFICANT CHANGE UP (ref 11.5–15.5)
IMM GRANULOCYTES NFR BLD AUTO: 2.2 % — HIGH (ref 0–1.5)
LYMPHOCYTES # BLD AUTO: 0.41 K/UL — LOW (ref 1–3.3)
LYMPHOCYTES # BLD AUTO: 4.6 % — LOW (ref 13–44)
MCHC RBC-ENTMCNC: 30.9 PG — SIGNIFICANT CHANGE UP (ref 27–34)
MCHC RBC-ENTMCNC: 32.6 GM/DL — SIGNIFICANT CHANGE UP (ref 32–36)
MCV RBC AUTO: 94.9 FL — SIGNIFICANT CHANGE UP (ref 80–100)
MONOCYTES # BLD AUTO: 0.69 K/UL — SIGNIFICANT CHANGE UP (ref 0–0.9)
MONOCYTES NFR BLD AUTO: 7.8 % — SIGNIFICANT CHANGE UP (ref 2–14)
NEUTROPHILS # BLD AUTO: 7.37 K/UL — SIGNIFICANT CHANGE UP (ref 1.8–7.4)
NEUTROPHILS NFR BLD AUTO: 83.5 % — HIGH (ref 43–77)
PLATELET # BLD AUTO: 240 K/UL — SIGNIFICANT CHANGE UP (ref 150–400)
RBC # BLD: 3.75 M/UL — LOW (ref 3.8–5.2)
RBC # FLD: 15 % — HIGH (ref 10.3–14.5)
WBC # BLD: 8.83 K/UL — SIGNIFICANT CHANGE UP (ref 3.8–10.5)
WBC # FLD AUTO: 8.83 K/UL — SIGNIFICANT CHANGE UP (ref 3.8–10.5)

## 2020-05-01 PROCEDURE — 80048 BASIC METABOLIC PNL TOTAL CA: CPT

## 2020-05-01 PROCEDURE — 85730 THROMBOPLASTIN TIME PARTIAL: CPT

## 2020-05-01 PROCEDURE — 83605 ASSAY OF LACTIC ACID: CPT

## 2020-05-01 PROCEDURE — 85027 COMPLETE CBC AUTOMATED: CPT

## 2020-05-01 PROCEDURE — 96375 TX/PRO/DX INJ NEW DRUG ADDON: CPT

## 2020-05-01 PROCEDURE — 93970 EXTREMITY STUDY: CPT

## 2020-05-01 PROCEDURE — 87635 SARS-COV-2 COVID-19 AMP PRB: CPT

## 2020-05-01 PROCEDURE — 99285 EMERGENCY DEPT VISIT HI MDM: CPT | Mod: 25

## 2020-05-01 PROCEDURE — 94640 AIRWAY INHALATION TREATMENT: CPT

## 2020-05-01 PROCEDURE — 80053 COMPREHEN METABOLIC PANEL: CPT

## 2020-05-01 PROCEDURE — 71045 X-RAY EXAM CHEST 1 VIEW: CPT

## 2020-05-01 PROCEDURE — 73030 X-RAY EXAM OF SHOULDER: CPT

## 2020-05-01 PROCEDURE — 36415 COLL VENOUS BLD VENIPUNCTURE: CPT

## 2020-05-01 PROCEDURE — 96374 THER/PROPH/DIAG INJ IV PUSH: CPT

## 2020-05-01 PROCEDURE — 85610 PROTHROMBIN TIME: CPT

## 2020-05-01 RX ORDER — LIDOCAINE 4 G/100G
1 CREAM TOPICAL DAILY
Refills: 0 | Status: DISCONTINUED | OUTPATIENT
Start: 2020-05-01 | End: 2020-05-01

## 2020-05-01 RX ORDER — ACETAMINOPHEN 500 MG
2 TABLET ORAL
Qty: 30 | Refills: 0
Start: 2020-05-01

## 2020-05-01 RX ORDER — ACETAMINOPHEN 500 MG
2 TABLET ORAL
Qty: 18 | Refills: 0
Start: 2020-05-01

## 2020-05-01 RX ORDER — CELECOXIB 200 MG/1
1 CAPSULE ORAL
Qty: 30 | Refills: 0
Start: 2020-05-01

## 2020-05-01 RX ADMIN — LIDOCAINE 1 PATCH: 4 CREAM TOPICAL at 10:11

## 2020-05-01 RX ADMIN — AMPICILLIN SODIUM AND SULBACTAM SODIUM 200 GRAM(S): 250; 125 INJECTION, POWDER, FOR SUSPENSION INTRAMUSCULAR; INTRAVENOUS at 06:27

## 2020-05-01 RX ADMIN — ALBUTEROL 2 PUFF(S): 90 AEROSOL, METERED ORAL at 00:43

## 2020-05-01 RX ADMIN — POLYETHYLENE GLYCOL 3350 17 GRAM(S): 17 POWDER, FOR SOLUTION ORAL at 12:45

## 2020-05-01 RX ADMIN — ALBUTEROL 2 PUFF(S): 90 AEROSOL, METERED ORAL at 00:45

## 2020-05-01 RX ADMIN — APIXABAN 2.5 MILLIGRAM(S): 2.5 TABLET, FILM COATED ORAL at 06:24

## 2020-05-01 RX ADMIN — Medication 250 MILLIGRAM(S): at 06:24

## 2020-05-01 RX ADMIN — OXYCODONE AND ACETAMINOPHEN 2 TABLET(S): 5; 325 TABLET ORAL at 06:45

## 2020-05-01 RX ADMIN — PREGABALIN 1000 MICROGRAM(S): 225 CAPSULE ORAL at 12:45

## 2020-05-01 RX ADMIN — AMPICILLIN SODIUM AND SULBACTAM SODIUM 200 GRAM(S): 250; 125 INJECTION, POWDER, FOR SUSPENSION INTRAMUSCULAR; INTRAVENOUS at 00:18

## 2020-05-01 RX ADMIN — Medication 1 TABLET(S): at 12:45

## 2020-05-01 RX ADMIN — Medication 20 MILLIGRAM(S): at 06:24

## 2020-05-01 RX ADMIN — AMPICILLIN SODIUM AND SULBACTAM SODIUM 200 GRAM(S): 250; 125 INJECTION, POWDER, FOR SUSPENSION INTRAMUSCULAR; INTRAVENOUS at 12:44

## 2020-05-01 NOTE — PROGRESS NOTE ADULT - SUBJECTIVE AND OBJECTIVE BOX
HPI:  89 yo female with PMH of DVT (11/2019) on Eliquis, intermittent asthma (never intubated/no icu stays), polymyalgia rheumatica, osteoporosis with multiple vertebral compression fxs, presents with lower ext swelling x 1 week. LLE > RLE. Assoc with erythema, pain and warmth. Her symptoms started after her cat scratched her about a week ago. Denies fevers, chills, n/v. She also states that her pmd recently decreased her Eliquis dose from 2.5mg bid to ?1.5mg bid. Pt states her son is a pharmacist and he advised her to take a lower dose due to her age and body weight. She does not want her son to be called tonight as he is "busy working". Denies chest pain, sob, palpitations, nasal congestion, cough.     ED course: Was noted to have fever with temp 100.4, mildly tachycardic with hr 106, satting well on RA and hemodynamically stable. +leukocytosis with wbc 13.2 with neutrophilic shift. pt given ancef 2g and pain medications. (29 Apr 2020 21:01)      PAST MEDICAL & SURGICAL HISTORY:  Polymyalgia rheumatica  Diverticulosis  Asthma  S/P knee replacement, left  S/P hysterectomy: 1982  S/P appendectomy: 1962      ANTIMICROBIAL:  ampicillin/sulbactam  IVPB 3 Gram(s) IV Intermittent every 6 hours      PULMONARY:  ALBUTerol    90 MICROgram(s) HFA Inhaler 2 Puff(s) Inhalation every 6 hours PRN  budesonide  80 MICROgram(s)/formoterol 4.5 MICROgram(s) Inhaler 2 Puff(s) Inhalation two times a day    NEUROLOGIC:  acetaminophen   Tablet .. 650 milliGRAM(s) Oral every 6 hours PRN  oxycodone    5 mG/acetaminophen 325 mG 1 Tablet(s) Oral every 6 hours PRN  oxycodone    5 mG/acetaminophen 325 mG 2 Tablet(s) Oral every 6 hours PRN        HEMATOLOGIC:  apixaban 2.5 milliGRAM(s) Oral every 12 hours    GATROINTESTINAL:  polyethylene glycol 3350 17 Gram(s) Oral daily  senna 2 Tablet(s) Oral at bedtime      ENDO/METABOLIC:  predniSONE   Tablet 20 milliGRAM(s) Oral daily    IV FLUID/NUTRITION:  cyanocobalamin 1000 MICROGram(s) Oral daily  multivitamin 1 Tablet(s) Oral daily      IMMUNOLOGIC & OTHER  saccharomyces boulardii 250 milliGRAM(s) Oral two times a day        Allergies    dust (Unknown)  No Known Drug Allergies    Intolerances    provide [facility] soft diet (Unknown)      SOCIAL HISTORY:    FAMILY HISTORY:  Family history of stroke      Vital Signs Last 24 Hrs  T(C): 36.8 (01 May 2020 08:23), Max: 37.1 (30 Apr 2020 11:32)  T(F): 98.2 (01 May 2020 08:23), Max: 98.8 (30 Apr 2020 11:32)  HR: 97 (01 May 2020 08:23) (92 - 118)  BP: 129/80 (01 May 2020 08:23) (117/74 - 141/86)  BP(mean): --  RR: 21 (01 May 2020 08:23) (18 - 21)  SpO2: 97% (01 May 2020 08:23) (93% - 100%)          REVIEW OF SYSTEMS:    CONSTITUTIONAL: No fever, weight loss, or fatigue  NECK: No pain or stiffness  RESPIRATORY: No cough, wheezing, chills or hemoptysis; No shortness of breath  CARDIOVASCULAR: No chest pain, palpitations, dizziness, or leg swelling  GASTROINTESTINAL: No abdominal or epigastric pain. No nausea, vomiting, or hematemesis; No diarrhea or constipation. No melena or hematochezia.  GENITOURINARY: No dysuria, frequency, hematuria, or incontinence  NEUROLOGICAL: No headaches, memory loss, loss of strength, numbness, or tremors  SKIN: LLE cellulitis much improved   LYMPH NODES: No enlarged glands  ENDOCRINE: No heat or cold intolerance; No hair loss  MUSCULOSKELETAL: No joint pain or swelling; No muscle, back, or extremity pain      PHYSICAL EXAM:    GENERAL: NAD, well-groomed, well-developed  HEAD:  Atraumatic, Normocephalic  ENMT: No tonsillar erythema, exudates, or enlargement; Moist mucous membranes, Good dentition, No lesions  NECK: Supple, No JVD, Normal thyroid  NERVOUS SYSTEM: AAO x 3   CHEST/LUNG: Clear to percussion bilaterally; No rales, rhonchi, wheezing, or rubs  HEART: Regular rate and rhythm; No murmurs, rubs, or gallops  ABDOMEN: Soft, Nontender, Nondistended; Bowel sounds present  EXTREMITIES:  2+ Peripheral Pulses, No clubbing, cyanosis, or edema  LLE cellulitis much improved   SKIN: No rashes or lesions      LABS:                                     11.6   8.83  )-----------( 240      ( 01 May 2020 06:39 )             35.6           04-30    136  |  96<L>  |  21.0<H>  ----------------------------<  87  4.2   |  26.0  |  0.60    Ca    9.0      30 Apr 2020 06:58        RADIOLOGY & ADDITIONAL STUDIES:

## 2020-05-01 NOTE — DISCHARGE NOTE NURSING/CASE MANAGEMENT/SOCIAL WORK - PATIENT PORTAL LINK FT
You can access the FollowMyHealth Patient Portal offered by [facility] by registering at the following website: [email or web address]. By joining [email or web address]’s FollowMyHealth portal, you will also be able to view your health information using other applications (apps) compatible with our system.

## 2020-05-07 ENCOUNTER — APPOINTMENT (OUTPATIENT)
Dept: FAMILY MEDICINE | Facility: CLINIC | Age: 85
End: 2020-05-07
Payer: MEDICARE

## 2020-05-07 DIAGNOSIS — Z09 ENCOUNTER FOR FOLLOW-UP EXAMINATION AFTER COMPLETED TREATMENT FOR CONDITIONS OTHER THAN MALIGNANT NEOPLASM: ICD-10-CM

## 2020-05-07 PROCEDURE — 99496 TRANSJ CARE MGMT HIGH F2F 7D: CPT

## 2020-05-07 NOTE — HISTORY OF PRESENT ILLNESS
[Verbal consent obtained from patient] : the patient, [unfilled] [Admitted on: ___] : The patient was admitted on [unfilled] [Post-hospitalization from ___ Hospital] : Post-hospitalization from [unfilled] Hospital [Discharged on ___] : discharged on [unfilled] [Discharge Summary] : discharge summary [FreeTextEntry2] : chart reviewed for patient.\par \par patient is stating her legs are still swollen however she has finished antibiotics.\par she is doing well on other aspects

## 2020-05-07 NOTE — ASSESSMENT
[FreeTextEntry1] : cellulitis/ hospital discharge- patient states redness is down but swelling is still there\par advise elevation.\par \par continue current

## 2020-05-08 ENCOUNTER — APPOINTMENT (OUTPATIENT)
Dept: FAMILY MEDICINE | Facility: CLINIC | Age: 85
End: 2020-05-08

## 2020-05-11 ENCOUNTER — APPOINTMENT (OUTPATIENT)
Dept: FAMILY MEDICINE | Facility: CLINIC | Age: 85
End: 2020-05-11

## 2020-05-14 ENCOUNTER — APPOINTMENT (OUTPATIENT)
Dept: FAMILY MEDICINE | Facility: CLINIC | Age: 85
End: 2020-05-14
Payer: MEDICARE

## 2020-05-14 VITALS
DIASTOLIC BLOOD PRESSURE: 84 MMHG | SYSTOLIC BLOOD PRESSURE: 132 MMHG | HEIGHT: 56 IN | TEMPERATURE: 99.1 F | HEART RATE: 80 BPM

## 2020-05-14 DIAGNOSIS — L03.90 CELLULITIS, UNSPECIFIED: ICD-10-CM

## 2020-05-14 PROCEDURE — 99214 OFFICE O/P EST MOD 30 MIN: CPT

## 2020-05-14 RX ORDER — TORSEMIDE 5 MG/1
5 TABLET ORAL
Qty: 30 | Refills: 0 | Status: ACTIVE | COMMUNITY
Start: 2020-05-14 | End: 1900-01-01

## 2020-05-18 RX ORDER — HYDROCODONE BITARTRATE AND ACETAMINOPHEN 5; 300 MG/1; MG/1
5-300 TABLET ORAL
Qty: 90 | Refills: 0 | Status: ACTIVE | COMMUNITY
Start: 2020-05-18 | End: 1900-01-01

## 2020-05-19 RX ORDER — HYDROCODONE BITARTRATE AND ACETAMINOPHEN 5; 300 MG/1; MG/1
5-300 TABLET ORAL
Qty: 10 | Refills: 0 | Status: ACTIVE | COMMUNITY
Start: 1900-01-01 | End: 1900-01-01

## 2020-05-22 NOTE — PLAN
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

## 2020-05-22 NOTE — PHYSICAL EXAM
[Well Nourished] : well nourished [No Acute Distress] : no acute distress [Well-Appearing] : well-appearing [Well Developed] : well developed [Normal Sclera/Conjunctiva] : normal sclera/conjunctiva [PERRL] : pupils equal round and reactive to light [Normal Outer Ear/Nose] : the outer ears and nose were normal in appearance [Normal Oropharynx] : the oropharynx was normal [EOMI] : extraocular movements intact [No JVD] : no jugular venous distention [No Lymphadenopathy] : no lymphadenopathy [No Respiratory Distress] : no respiratory distress  [Thyroid Normal, No Nodules] : the thyroid was normal and there were no nodules present [Supple] : supple [Clear to Auscultation] : lungs were clear to auscultation bilaterally [No Accessory Muscle Use] : no accessory muscle use [Normal Rate] : normal rate  [Regular Rhythm] : with a regular rhythm [Normal S1, S2] : normal S1 and S2 [No Murmur] : no murmur heard [No Abdominal Bruit] : a ~M bruit was not heard ~T in the abdomen [No Carotid Bruits] : no carotid bruits [No Edema] : there was no peripheral edema [Pedal Pulses Present] : the pedal pulses are present [No Varicosities] : no varicosities [No Palpable Aorta] : no palpable aorta [No Extremity Clubbing/Cyanosis] : no extremity clubbing/cyanosis [Non-distended] : non-distended [Non Tender] : non-tender [Soft] : abdomen soft [No HSM] : no HSM [No Masses] : no abdominal mass palpated [Normal Posterior Cervical Nodes] : no posterior cervical lymphadenopathy [Normal Bowel Sounds] : normal bowel sounds [Normal Anterior Cervical Nodes] : no anterior cervical lymphadenopathy [No CVA Tenderness] : no CVA  tenderness [No Spinal Tenderness] : no spinal tenderness [No Joint Swelling] : no joint swelling [Grossly Normal Strength/Tone] : grossly normal strength/tone [No Rash] : no rash [Coordination Grossly Intact] : coordination grossly intact [No Focal Deficits] : no focal deficits [Normal Gait] : normal gait [Deep Tendon Reflexes (DTR)] : deep tendon reflexes were 2+ and symmetric [Normal Affect] : the affect was normal [de-identified] : severe Kyphosis/ Bil[place]la leg swellings/ no cellulitis/ multiple bruises [Normal Insight/Judgement] : insight and judgment were intact

## 2020-05-22 NOTE — HISTORY OF PRESENT ILLNESS
[FreeTextEntry8] : patient here c/o back pain and legs are swollen.  Patient states she has several fractures in her back.\par here for follow up and her cellulitis has improved, she is here in person after her hospital discharge . still taking antibiotics.

## 2020-05-26 ENCOUNTER — APPOINTMENT (OUTPATIENT)
Dept: FAMILY MEDICINE | Facility: CLINIC | Age: 85
End: 2020-05-26
Payer: MEDICARE

## 2020-05-26 VITALS
HEIGHT: 56 IN | WEIGHT: 80 LBS | SYSTOLIC BLOOD PRESSURE: 140 MMHG | BODY MASS INDEX: 18 KG/M2 | HEART RATE: 84 BPM | TEMPERATURE: 99.4 F | DIASTOLIC BLOOD PRESSURE: 88 MMHG

## 2020-05-26 DIAGNOSIS — R30.0 DYSURIA: ICD-10-CM

## 2020-05-26 DIAGNOSIS — R60.0 LOCALIZED EDEMA: ICD-10-CM

## 2020-05-26 LAB
BILIRUB UR QL STRIP: NEGATIVE
CLARITY UR: CLEAR
GLUCOSE UR-MCNC: NEGATIVE
HCG UR QL: 0.2 EU/DL
HGB UR QL STRIP.AUTO: ABNORMAL
KETONES UR-MCNC: NEGATIVE
LEUKOCYTE ESTERASE UR QL STRIP: NEGATIVE
NITRITE UR QL STRIP: POSITIVE
PH UR STRIP: 7
PROT UR STRIP-MCNC: ABNORMAL
SP GR UR STRIP: 1.02

## 2020-05-26 PROCEDURE — 99214 OFFICE O/P EST MOD 30 MIN: CPT | Mod: 25

## 2020-05-26 PROCEDURE — 81003 URINALYSIS AUTO W/O SCOPE: CPT | Mod: QW

## 2020-05-26 NOTE — HISTORY OF PRESENT ILLNESS
[FreeTextEntry8] : patient states burning and  pain for a week during urination\par No fever, eating well. no lower back pain\par \par stopped taking torsemide and she still has leg swelling

## 2020-05-26 NOTE — PHYSICAL EXAM
[No Acute Distress] : no acute distress [Well Nourished] : well nourished [Well Developed] : well developed [Well-Appearing] : well-appearing [Normal Sclera/Conjunctiva] : normal sclera/conjunctiva [PERRL] : pupils equal round and reactive to light [EOMI] : extraocular movements intact [Normal Outer Ear/Nose] : the outer ears and nose were normal in appearance [Normal Oropharynx] : the oropharynx was normal [No JVD] : no jugular venous distention [No Lymphadenopathy] : no lymphadenopathy [Supple] : supple [Thyroid Normal, No Nodules] : the thyroid was normal and there were no nodules present [No Accessory Muscle Use] : no accessory muscle use [No Respiratory Distress] : no respiratory distress  [Clear to Auscultation] : lungs were clear to auscultation bilaterally [Regular Rhythm] : with a regular rhythm [Normal Rate] : normal rate  [Normal S1, S2] : normal S1 and S2 [No Carotid Bruits] : no carotid bruits [No Murmur] : no murmur heard [No Abdominal Bruit] : a ~M bruit was not heard ~T in the abdomen [Pedal Pulses Present] : the pedal pulses are present [No Varicosities] : no varicosities [No Palpable Aorta] : no palpable aorta [No Edema] : there was no peripheral edema [No Extremity Clubbing/Cyanosis] : no extremity clubbing/cyanosis [Soft] : abdomen soft [Non Tender] : non-tender [Non-distended] : non-distended [No Masses] : no abdominal mass palpated [No HSM] : no HSM [Normal Bowel Sounds] : normal bowel sounds [Normal Anterior Cervical Nodes] : no anterior cervical lymphadenopathy [Normal Posterior Cervical Nodes] : no posterior cervical lymphadenopathy [No CVA Tenderness] : no CVA  tenderness [No Spinal Tenderness] : no spinal tenderness [Grossly Normal Strength/Tone] : grossly normal strength/tone [No Joint Swelling] : no joint swelling [No Rash] : no rash [Coordination Grossly Intact] : coordination grossly intact [No Focal Deficits] : no focal deficits [Normal Gait] : normal gait [Normal Affect] : the affect was normal [Deep Tendon Reflexes (DTR)] : deep tendon reflexes were 2+ and symmetric [Normal Insight/Judgement] : insight and judgment were intact

## 2020-05-26 NOTE — PLAN
[FreeTextEntry1] : UTI- start macrobid \par urine culture\par leg swelling- advise elevation of leg/ may take torsemide as directed

## 2020-05-29 DIAGNOSIS — Z87.440 PERSONAL HISTORY OF URINARY (TRACT) INFECTIONS: ICD-10-CM

## 2020-05-29 RX ORDER — NITROFURANTOIN (MONOHYDRATE/MACROCRYSTALS) 25; 75 MG/1; MG/1
100 CAPSULE ORAL
Qty: 14 | Refills: 0 | Status: DISCONTINUED | COMMUNITY
Start: 2020-05-26 | End: 2020-05-29

## 2020-05-29 RX ORDER — CIPROFLOXACIN HYDROCHLORIDE 250 MG/1
250 TABLET, FILM COATED ORAL
Qty: 14 | Refills: 0 | Status: ACTIVE | COMMUNITY
Start: 2020-05-29 | End: 1900-01-01

## 2020-05-29 RX ORDER — NITROFURANTOIN MACROCRYSTALS 100 MG/1
100 CAPSULE ORAL
Qty: 14 | Refills: 0 | Status: DISCONTINUED | COMMUNITY
Start: 2019-09-11 | End: 2020-05-29

## 2020-06-07 ENCOUNTER — INPATIENT (INPATIENT)
Facility: HOSPITAL | Age: 85
LOS: 4 days | Discharge: HOME | DRG: 378 | End: 2020-06-12
Attending: FAMILY MEDICINE | Admitting: INTERNAL MEDICINE
Payer: MEDICARE

## 2020-06-07 VITALS
HEART RATE: 100 BPM | DIASTOLIC BLOOD PRESSURE: 57 MMHG | WEIGHT: 85.1 LBS | SYSTOLIC BLOOD PRESSURE: 95 MMHG | OXYGEN SATURATION: 98 % | RESPIRATION RATE: 35 BRPM | TEMPERATURE: 98 F

## 2020-06-07 LAB
ACANTHOCYTES BLD QL SMEAR: SLIGHT — SIGNIFICANT CHANGE UP
ALBUMIN SERPL ELPH-MCNC: 3.6 G/DL — SIGNIFICANT CHANGE UP (ref 3.3–5.2)
ALP SERPL-CCNC: 122 U/L — HIGH (ref 40–120)
ALT FLD-CCNC: 3888 U/L — HIGH
ANION GAP SERPL CALC-SCNC: 19 MMOL/L — HIGH (ref 5–17)
APAP SERPL-MCNC: 18 UG/ML — SIGNIFICANT CHANGE UP (ref 10–26)
APTT BLD: 32.4 SEC — SIGNIFICANT CHANGE UP (ref 27.5–36.3)
AST SERPL-CCNC: 2830 U/L — HIGH
BILIRUB SERPL-MCNC: 0.9 MG/DL — SIGNIFICANT CHANGE UP (ref 0.4–2)
BLD GP AB SCN SERPL QL: SIGNIFICANT CHANGE UP
BUN SERPL-MCNC: 107 MG/DL — HIGH (ref 8–20)
CALCIUM SERPL-MCNC: 9.5 MG/DL — SIGNIFICANT CHANGE UP (ref 8.6–10.2)
CHLORIDE SERPL-SCNC: 105 MMOL/L — SIGNIFICANT CHANGE UP (ref 98–107)
CK SERPL-CCNC: 97 U/L — SIGNIFICANT CHANGE UP (ref 25–170)
CO2 SERPL-SCNC: 15 MMOL/L — LOW (ref 22–29)
CREAT SERPL-MCNC: 1.67 MG/DL — HIGH (ref 0.5–1.3)
GLUCOSE SERPL-MCNC: 211 MG/DL — HIGH (ref 70–99)
HCT VFR BLD CALC: 32.3 % — LOW (ref 34.5–45)
HGB BLD-MCNC: 9.5 G/DL — LOW (ref 11.5–15.5)
INR BLD: 3.13 RATIO — HIGH (ref 0.88–1.16)
LIDOCAIN IGE QN: 102 U/L — HIGH (ref 22–51)
LYMPHOCYTES # BLD AUTO: 3 % — LOW (ref 13–44)
MACROCYTES BLD QL: SLIGHT — SIGNIFICANT CHANGE UP
MCHC RBC-ENTMCNC: 29.4 GM/DL — LOW (ref 32–36)
MCHC RBC-ENTMCNC: 30.9 PG — SIGNIFICANT CHANGE UP (ref 27–34)
MCV RBC AUTO: 105.2 FL — HIGH (ref 80–100)
MONOCYTES NFR BLD AUTO: 1 % — LOW (ref 2–14)
NEUTROPHILS NFR BLD AUTO: 96 % — HIGH (ref 43–77)
NEUTS HYPERSEG # BLD: PRESENT — SIGNIFICANT CHANGE UP
OVALOCYTES BLD QL SMEAR: SLIGHT — SIGNIFICANT CHANGE UP
PLAT MORPH BLD: NORMAL — SIGNIFICANT CHANGE UP
PLATELET # BLD AUTO: 236 K/UL — SIGNIFICANT CHANGE UP (ref 150–400)
POIKILOCYTOSIS BLD QL AUTO: SLIGHT — SIGNIFICANT CHANGE UP
POTASSIUM SERPL-MCNC: 4.8 MMOL/L — SIGNIFICANT CHANGE UP (ref 3.5–5.3)
POTASSIUM SERPL-SCNC: 4.8 MMOL/L — SIGNIFICANT CHANGE UP (ref 3.5–5.3)
PROT SERPL-MCNC: 5.7 G/DL — LOW (ref 6.6–8.7)
PROTHROM AB SERPL-ACNC: 36.6 SEC — HIGH (ref 10–12.9)
RBC # BLD: 3.07 M/UL — LOW (ref 3.8–5.2)
RBC # FLD: 15.7 % — HIGH (ref 10.3–14.5)
RBC BLD AUTO: ABNORMAL
SALICYLATES SERPL-MCNC: <0.6 MG/DL — LOW (ref 10–20)
SCHISTOCYTES BLD QL AUTO: SLIGHT — SIGNIFICANT CHANGE UP
SODIUM SERPL-SCNC: 139 MMOL/L — SIGNIFICANT CHANGE UP (ref 135–145)
TROPONIN T SERPL-MCNC: 0.07 NG/ML — HIGH (ref 0–0.06)
TSH SERPL-MCNC: 1.36 UIU/ML — SIGNIFICANT CHANGE UP (ref 0.27–4.2)
WBC # BLD: 19.22 K/UL — HIGH (ref 3.8–10.5)
WBC # FLD AUTO: 19.22 K/UL — HIGH (ref 3.8–10.5)

## 2020-06-07 PROCEDURE — 93010 ELECTROCARDIOGRAM REPORT: CPT

## 2020-06-07 PROCEDURE — 99291 CRITICAL CARE FIRST HOUR: CPT | Mod: CS

## 2020-06-07 PROCEDURE — 71045 X-RAY EXAM CHEST 1 VIEW: CPT | Mod: 26,76

## 2020-06-07 RX ORDER — SODIUM CHLORIDE 9 MG/ML
1000 INJECTION, SOLUTION INTRAVENOUS ONCE
Refills: 0 | Status: COMPLETED | OUTPATIENT
Start: 2020-06-07 | End: 2020-06-07

## 2020-06-07 RX ADMIN — SODIUM CHLORIDE 2000 MILLILITER(S): 9 INJECTION, SOLUTION INTRAVENOUS at 22:15

## 2020-06-07 NOTE — ED PROVIDER NOTE - GASTROINTESTINAL, MLM
Abdomen soft, non-tender, incontinent of stool, melanotic stool present in underwear Abdomen soft, non-tender, incontinent of stool Rectal Exam: good tone, melanotic stool, no active bleeding

## 2020-06-07 NOTE — ED PROVIDER NOTE - CARDIAC, MLM
Tachycardic rate, regular rhythm.  Heart sounds S1, S2.  No murmurs, rubs or gallops. 4+ pitting edema to JIMMY lower extremities

## 2020-06-07 NOTE — ED ADULT NURSE NOTE - PMH
Asthma    Atrial fibrillation    CHF (congestive heart failure)    Chronic back pain    Diverticulosis    Polymyalgia rheumatica

## 2020-06-07 NOTE — ED PROVIDER NOTE - PROGRESS NOTE DETAILS
Aware of pt's creatinine level, discussed with pt and she agrees to my orders when explained risk and benefit of pathology including PE and mesenteric ischemia that we will eval for on these studies. Pt agrees with this testing and assumes the risk of CHAD. in light of transamniitis and acet level present reviewed acet use with pt states taking 5 to 6 500 mg pills everyday fro back pain combined with unknown OTC pain pills she found at home, concern for possible stage 2/3 liver issues acet toxicity with transaminitis and increased INR. will coevr with NAC Gi consult placed for am . pt agrees with plan of care

## 2020-06-07 NOTE — ED PROVIDER NOTE - SECONDARY DIAGNOSIS.
Acetaminophen toxicity, accidental or unintentional, initial encounter Acute uremia Fracture of lumbar vertebra, compression, closed, initial encounter

## 2020-06-07 NOTE — ED ADULT NURSE REASSESSMENT NOTE - NS ED NURSE REASSESS COMMENT FT1
Patient covered in black, dried feces, incontinence care performed. Noted to have small stage 2 pressure ulcer to sacrum. Dr. Kahlil artis.

## 2020-06-07 NOTE — ED PROVIDER NOTE - CARE PLAN
Principal Discharge DX:	Acute GI bleeding  Secondary Diagnosis:	Acetaminophen toxicity, accidental or unintentional, initial encounter  Secondary Diagnosis:	Acute uremia  Secondary Diagnosis:	Fracture of lumbar vertebra, compression, closed, initial encounter

## 2020-06-07 NOTE — ED ADULT NURSE NOTE - CHIEF COMPLAINT QUOTE
Patient alert and confused worsening from baseline with hx of dementia per son. coming from home ems states sob with generalized weakness time s 1 day. 98% on RA at home with respirations 35 per minute. on eliquis for hx of blood clots.

## 2020-06-07 NOTE — ED PROVIDER NOTE - OBJECTIVE STATEMENT
89 y/o F pt BIBA with hx of asthma, Afib, CHF, chronic back pain, appendectomy, and hysterectomy presents to ED c/o SOB, generalized weakness and back pain. Pt states SOB worsened acutely today. Pt states she is normally able to ambulate with a walker but more recently has been bed bound. Pt states she lives at home and gets help "every once in awhile." denies fever. denies HA or neck pain. no chest pain. no abd pain. no n/v/d. no urinary f/u/d. no motor or sensory deficits. denies illicit drug use. no recent travel. no rash. no other acute issues symptoms or concerns.

## 2020-06-07 NOTE — ED ADULT NURSE NOTE - OBJECTIVE STATEMENT
Patient alert to self, place & situation, anxious. Patient complaining of back pain, chronic in nature. EMS reports patients family concerned for increase in confusion from baseline & shortness of breath. Respirations labored, 100% room air. Cardiac monitor in place, rapid a-fib. BLLE 4+ pitting edema.

## 2020-06-08 ENCOUNTER — TRANSCRIPTION ENCOUNTER (OUTPATIENT)
Age: 85
End: 2020-06-08

## 2020-06-08 DIAGNOSIS — D64.9 ANEMIA, UNSPECIFIED: ICD-10-CM

## 2020-06-08 DIAGNOSIS — R94.5 ABNORMAL RESULTS OF LIVER FUNCTION STUDIES: ICD-10-CM

## 2020-06-08 DIAGNOSIS — K75.9 INFLAMMATORY LIVER DISEASE, UNSPECIFIED: ICD-10-CM

## 2020-06-08 DIAGNOSIS — R93.3 ABNORMAL FINDINGS ON DIAGNOSTIC IMAGING OF OTHER PARTS OF DIGESTIVE TRACT: ICD-10-CM

## 2020-06-08 DIAGNOSIS — K92.2 GASTROINTESTINAL HEMORRHAGE, UNSPECIFIED: ICD-10-CM

## 2020-06-08 LAB
ALBUMIN SERPL ELPH-MCNC: 2.9 G/DL — LOW (ref 3.3–5.2)
ALBUMIN SERPL ELPH-MCNC: 3 G/DL — LOW (ref 3.3–5.2)
ALP SERPL-CCNC: 109 U/L — SIGNIFICANT CHANGE UP (ref 40–120)
ALP SERPL-CCNC: 99 U/L — SIGNIFICANT CHANGE UP (ref 40–120)
ALT FLD-CCNC: 3159 U/L — HIGH
ALT FLD-CCNC: 3361 U/L — HIGH
ANION GAP SERPL CALC-SCNC: 14 MMOL/L — SIGNIFICANT CHANGE UP (ref 5–17)
ANION GAP SERPL CALC-SCNC: 19 MMOL/L — HIGH (ref 5–17)
ANISOCYTOSIS BLD QL: SLIGHT — SIGNIFICANT CHANGE UP
APAP SERPL-MCNC: 8.4 UG/ML — LOW (ref 10–26)
APAP SERPL-MCNC: <7.5 UG/ML — LOW (ref 10–26)
APPEARANCE UR: CLEAR — SIGNIFICANT CHANGE UP
APTT BLD: 31.3 SEC — SIGNIFICANT CHANGE UP (ref 27.5–36.3)
AST SERPL-CCNC: 1668 U/L — HIGH
AST SERPL-CCNC: 2043 U/L — HIGH
BACTERIA # UR AUTO: ABNORMAL
BASOPHILS # BLD AUTO: 0 K/UL — SIGNIFICANT CHANGE UP (ref 0–0.2)
BASOPHILS NFR BLD AUTO: 0 % — SIGNIFICANT CHANGE UP (ref 0–2)
BILIRUB SERPL-MCNC: 0.6 MG/DL — SIGNIFICANT CHANGE UP (ref 0.4–2)
BILIRUB SERPL-MCNC: 1 MG/DL — SIGNIFICANT CHANGE UP (ref 0.4–2)
BILIRUB UR-MCNC: NEGATIVE — SIGNIFICANT CHANGE UP
BUN SERPL-MCNC: 85 MG/DL — HIGH (ref 8–20)
BUN SERPL-MCNC: 98 MG/DL — HIGH (ref 8–20)
CALCIUM SERPL-MCNC: 8.7 MG/DL — SIGNIFICANT CHANGE UP (ref 8.6–10.2)
CALCIUM SERPL-MCNC: 8.8 MG/DL — SIGNIFICANT CHANGE UP (ref 8.6–10.2)
CHLORIDE SERPL-SCNC: 103 MMOL/L — SIGNIFICANT CHANGE UP (ref 98–107)
CHLORIDE SERPL-SCNC: 104 MMOL/L — SIGNIFICANT CHANGE UP (ref 98–107)
CMV IGM FLD-ACNC: <8 AU/ML — SIGNIFICANT CHANGE UP
CMV IGM SERPL QL: NEGATIVE — SIGNIFICANT CHANGE UP
CO2 SERPL-SCNC: 15 MMOL/L — LOW (ref 22–29)
CO2 SERPL-SCNC: 17 MMOL/L — LOW (ref 22–29)
COLOR SPEC: YELLOW — SIGNIFICANT CHANGE UP
CREAT SERPL-MCNC: 1.14 MG/DL — SIGNIFICANT CHANGE UP (ref 0.5–1.3)
CREAT SERPL-MCNC: 1.36 MG/DL — HIGH (ref 0.5–1.3)
DIFF PNL FLD: ABNORMAL
EBV EA AB SER IA-ACNC: <5 U/ML — SIGNIFICANT CHANGE UP
EBV EA AB TITR SER IF: NEGATIVE — SIGNIFICANT CHANGE UP
EBV EA IGG SER-ACNC: NEGATIVE — SIGNIFICANT CHANGE UP
EBV NA IGG SER IA-ACNC: 5.6 U/ML — SIGNIFICANT CHANGE UP
EBV PATRN SPEC IB-IMP: SIGNIFICANT CHANGE UP
EBV VCA IGG AVIDITY SER QL IA: POSITIVE
EBV VCA IGM SER IA-ACNC: 95.2 U/ML — HIGH
EBV VCA IGM SER IA-ACNC: <10 U/ML — SIGNIFICANT CHANGE UP
EBV VCA IGM TITR FLD: NEGATIVE — SIGNIFICANT CHANGE UP
EOSINOPHIL # BLD AUTO: 0 K/UL — SIGNIFICANT CHANGE UP (ref 0–0.5)
EOSINOPHIL NFR BLD AUTO: 0 % — SIGNIFICANT CHANGE UP (ref 0–6)
EPI CELLS # UR: SIGNIFICANT CHANGE UP
GAS PNL BLDV: SIGNIFICANT CHANGE UP
GLUCOSE SERPL-MCNC: 118 MG/DL — HIGH (ref 70–99)
GLUCOSE SERPL-MCNC: 161 MG/DL — HIGH (ref 70–99)
GLUCOSE UR QL: NEGATIVE MG/DL — SIGNIFICANT CHANGE UP
HAV IGG SER QL IA: SIGNIFICANT CHANGE UP
HAV IGM SER-ACNC: SIGNIFICANT CHANGE UP
HAV IGM SER-ACNC: SIGNIFICANT CHANGE UP
HBV CORE AB SER-ACNC: SIGNIFICANT CHANGE UP
HBV CORE IGM SER-ACNC: SIGNIFICANT CHANGE UP
HBV CORE IGM SER-ACNC: SIGNIFICANT CHANGE UP
HBV SURFACE AB SER-ACNC: SIGNIFICANT CHANGE UP
HBV SURFACE AG SER-ACNC: SIGNIFICANT CHANGE UP
HBV SURFACE AG SER-ACNC: SIGNIFICANT CHANGE UP
HCO3 BLDV-SCNC: 20 MMOL/L — LOW (ref 20–26)
HCT VFR BLD CALC: 20.7 % — CRITICAL LOW (ref 34.5–45)
HCT VFR BLD CALC: 30.4 % — LOW (ref 34.5–45)
HCV AB S/CO SERPL IA: 0.04 S/CO — SIGNIFICANT CHANGE UP (ref 0–0.99)
HCV AB S/CO SERPL IA: 0.04 S/CO — SIGNIFICANT CHANGE UP (ref 0–0.99)
HCV AB SERPL-IMP: SIGNIFICANT CHANGE UP
HCV AB SERPL-IMP: SIGNIFICANT CHANGE UP
HGB BLD-MCNC: 10.2 G/DL — LOW (ref 11.5–15.5)
HGB BLD-MCNC: 6.9 G/DL — CRITICAL LOW (ref 11.5–15.5)
HYPOCHROMIA BLD QL: SLIGHT — SIGNIFICANT CHANGE UP
INR BLD: 1.68 RATIO — HIGH (ref 0.88–1.16)
INR BLD: 2.54 RATIO — HIGH (ref 0.88–1.16)
KETONES UR-MCNC: ABNORMAL
LACTATE BLDV-MCNC: 1.7 MMOL/L — SIGNIFICANT CHANGE UP (ref 0.5–2)
LACTATE BLDV-MCNC: 1.8 MMOL/L — SIGNIFICANT CHANGE UP (ref 0.5–2)
LEUKOCYTE ESTERASE UR-ACNC: ABNORMAL
LYMPHOCYTES # BLD AUTO: 0.67 K/UL — LOW (ref 1–3.3)
LYMPHOCYTES # BLD AUTO: 4.3 % — LOW (ref 13–44)
MACROCYTES BLD QL: SLIGHT — SIGNIFICANT CHANGE UP
MAGNESIUM SERPL-MCNC: 2.2 MG/DL — SIGNIFICANT CHANGE UP (ref 1.6–2.6)
MANUAL SMEAR VERIFICATION: SIGNIFICANT CHANGE UP
MCHC RBC-ENTMCNC: 31.1 PG — SIGNIFICANT CHANGE UP (ref 27–34)
MCHC RBC-ENTMCNC: 33.3 GM/DL — SIGNIFICANT CHANGE UP (ref 32–36)
MCV RBC AUTO: 93.2 FL — SIGNIFICANT CHANGE UP (ref 80–100)
MONOCYTES # BLD AUTO: 0 K/UL — SIGNIFICANT CHANGE UP (ref 0–0.9)
MONOCYTES NFR BLD AUTO: 0 % — LOW (ref 2–14)
NEUTROPHILS # BLD AUTO: 14.92 K/UL — HIGH (ref 1.8–7.4)
NEUTROPHILS NFR BLD AUTO: 88.7 % — HIGH (ref 43–77)
NEUTS BAND # BLD: 7 % — SIGNIFICANT CHANGE UP (ref 0–8)
NITRITE UR-MCNC: NEGATIVE — SIGNIFICANT CHANGE UP
NRBC # BLD: 3 /100 — HIGH (ref 0–0)
PCO2 BLDV: 37 MMHG — SIGNIFICANT CHANGE UP (ref 35–50)
PH BLDV: 7.34 — SIGNIFICANT CHANGE UP (ref 7.32–7.43)
PH UR: 5 — SIGNIFICANT CHANGE UP (ref 5–8)
PHOSPHATE SERPL-MCNC: 3.7 MG/DL — SIGNIFICANT CHANGE UP (ref 2.4–4.7)
PLAT MORPH BLD: NORMAL — SIGNIFICANT CHANGE UP
PLATELET # BLD AUTO: 192 K/UL — SIGNIFICANT CHANGE UP (ref 150–400)
PO2 BLDV: 34 MMHG — SIGNIFICANT CHANGE UP (ref 25–45)
POTASSIUM SERPL-MCNC: 3.6 MMOL/L — SIGNIFICANT CHANGE UP (ref 3.5–5.3)
POTASSIUM SERPL-MCNC: 3.9 MMOL/L — SIGNIFICANT CHANGE UP (ref 3.5–5.3)
POTASSIUM SERPL-SCNC: 3.6 MMOL/L — SIGNIFICANT CHANGE UP (ref 3.5–5.3)
POTASSIUM SERPL-SCNC: 3.9 MMOL/L — SIGNIFICANT CHANGE UP (ref 3.5–5.3)
PROT SERPL-MCNC: 4.6 G/DL — LOW (ref 6.6–8.7)
PROT SERPL-MCNC: 5.1 G/DL — LOW (ref 6.6–8.7)
PROT UR-MCNC: 30 MG/DL
PROTHROM AB SERPL-ACNC: 19.3 SEC — HIGH (ref 10–12.9)
PROTHROM AB SERPL-ACNC: 29.6 SEC — HIGH (ref 10–12.9)
RBC # BLD: 2.22 M/UL — LOW (ref 3.8–5.2)
RBC # FLD: 15.3 % — HIGH (ref 10.3–14.5)
RBC BLD AUTO: ABNORMAL
RBC CASTS # UR COMP ASSIST: ABNORMAL /HPF (ref 0–4)
SAO2 % BLDV: 64 % — SIGNIFICANT CHANGE UP
SARS-COV-2 RNA SPEC QL NAA+PROBE: SIGNIFICANT CHANGE UP
SODIUM SERPL-SCNC: 135 MMOL/L — SIGNIFICANT CHANGE UP (ref 135–145)
SODIUM SERPL-SCNC: 137 MMOL/L — SIGNIFICANT CHANGE UP (ref 135–145)
SP GR SPEC: 1.01 — SIGNIFICANT CHANGE UP (ref 1.01–1.02)
UROBILINOGEN FLD QL: NEGATIVE MG/DL — SIGNIFICANT CHANGE UP
WBC # BLD: 15.59 K/UL — HIGH (ref 3.8–10.5)
WBC # FLD AUTO: 15.59 K/UL — HIGH (ref 3.8–10.5)
WBC UR QL: SIGNIFICANT CHANGE UP

## 2020-06-08 PROCEDURE — 99223 1ST HOSP IP/OBS HIGH 75: CPT

## 2020-06-08 PROCEDURE — 71275 CT ANGIOGRAPHY CHEST: CPT | Mod: 26

## 2020-06-08 PROCEDURE — 74177 CT ABD & PELVIS W/CONTRAST: CPT | Mod: 26

## 2020-06-08 RX ORDER — CEFTRIAXONE 500 MG/1
1000 INJECTION, POWDER, FOR SOLUTION INTRAMUSCULAR; INTRAVENOUS EVERY 24 HOURS
Refills: 0 | Status: DISCONTINUED | OUTPATIENT
Start: 2020-06-08 | End: 2020-06-12

## 2020-06-08 RX ORDER — PANTOPRAZOLE SODIUM 20 MG/1
40 TABLET, DELAYED RELEASE ORAL ONCE
Refills: 0 | Status: COMPLETED | OUTPATIENT
Start: 2020-06-08 | End: 2020-06-08

## 2020-06-08 RX ORDER — MORPHINE SULFATE 50 MG/1
1 CAPSULE, EXTENDED RELEASE ORAL ONCE
Refills: 0 | Status: DISCONTINUED | OUTPATIENT
Start: 2020-06-08 | End: 2020-06-08

## 2020-06-08 RX ORDER — PANTOPRAZOLE SODIUM 20 MG/1
8 TABLET, DELAYED RELEASE ORAL
Qty: 80 | Refills: 0 | Status: DISCONTINUED | OUTPATIENT
Start: 2020-06-08 | End: 2020-06-10

## 2020-06-08 RX ORDER — METRONIDAZOLE 500 MG
TABLET ORAL
Refills: 0 | Status: DISCONTINUED | OUTPATIENT
Start: 2020-06-08 | End: 2020-06-12

## 2020-06-08 RX ORDER — ACETYLCYSTEINE 200 MG/ML
3.9 VIAL (ML) MISCELLANEOUS ONCE
Refills: 0 | Status: COMPLETED | OUTPATIENT
Start: 2020-06-08 | End: 2020-06-08

## 2020-06-08 RX ORDER — CEFTRIAXONE 500 MG/1
1000 INJECTION, POWDER, FOR SOLUTION INTRAMUSCULAR; INTRAVENOUS ONCE
Refills: 0 | Status: COMPLETED | OUTPATIENT
Start: 2020-06-08 | End: 2020-06-08

## 2020-06-08 RX ORDER — SODIUM BICARBONATE 1 MEQ/ML
0.27 SYRINGE (ML) INTRAVENOUS
Qty: 150 | Refills: 0 | Status: DISCONTINUED | OUTPATIENT
Start: 2020-06-08 | End: 2020-06-09

## 2020-06-08 RX ORDER — LIDOCAINE 4 G/100G
1 CREAM TOPICAL ONCE
Refills: 0 | Status: COMPLETED | OUTPATIENT
Start: 2020-06-08 | End: 2020-06-09

## 2020-06-08 RX ORDER — ACETYLCYSTEINE 200 MG/ML
6 VIAL (ML) MISCELLANEOUS ONCE
Refills: 0 | Status: COMPLETED | OUTPATIENT
Start: 2020-06-08 | End: 2020-06-08

## 2020-06-08 RX ORDER — ACETYLCYSTEINE 200 MG/ML
1.9 VIAL (ML) MISCELLANEOUS ONCE
Refills: 0 | Status: COMPLETED | OUTPATIENT
Start: 2020-06-08 | End: 2020-06-08

## 2020-06-08 RX ORDER — SODIUM CHLORIDE 9 MG/ML
500 INJECTION, SOLUTION INTRAVENOUS ONCE
Refills: 0 | Status: COMPLETED | OUTPATIENT
Start: 2020-06-08 | End: 2020-06-08

## 2020-06-08 RX ORDER — METRONIDAZOLE 500 MG
500 TABLET ORAL EVERY 8 HOURS
Refills: 0 | Status: DISCONTINUED | OUTPATIENT
Start: 2020-06-08 | End: 2020-06-12

## 2020-06-08 RX ORDER — METRONIDAZOLE 500 MG
500 TABLET ORAL ONCE
Refills: 0 | Status: COMPLETED | OUTPATIENT
Start: 2020-06-08 | End: 2020-06-08

## 2020-06-08 RX ADMIN — PANTOPRAZOLE SODIUM 40 MILLIGRAM(S): 20 TABLET, DELAYED RELEASE ORAL at 02:58

## 2020-06-08 RX ADMIN — SODIUM CHLORIDE 500 MILLILITER(S): 9 INJECTION, SOLUTION INTRAVENOUS at 02:46

## 2020-06-08 RX ADMIN — Medication 64.88 GRAM(S): at 05:53

## 2020-06-08 RX ADMIN — MORPHINE SULFATE 1 MILLIGRAM(S): 50 CAPSULE, EXTENDED RELEASE ORAL at 15:32

## 2020-06-08 RX ADMIN — Medication 100 MILLIGRAM(S): at 11:33

## 2020-06-08 RX ADMIN — PANTOPRAZOLE SODIUM 10 MG/HR: 20 TABLET, DELAYED RELEASE ORAL at 09:53

## 2020-06-08 RX ADMIN — Medication 70 MEQ/KG/HR: at 16:57

## 2020-06-08 RX ADMIN — Medication 32.47 GRAM(S): at 09:59

## 2020-06-08 RX ADMIN — SODIUM CHLORIDE 1500 MILLILITER(S): 9 INJECTION, SOLUTION INTRAVENOUS at 02:02

## 2020-06-08 RX ADMIN — Medication 130 GRAM(S): at 04:17

## 2020-06-08 RX ADMIN — PANTOPRAZOLE SODIUM 10 MG/HR: 20 TABLET, DELAYED RELEASE ORAL at 03:34

## 2020-06-08 RX ADMIN — Medication 100 MILLIGRAM(S): at 21:09

## 2020-06-08 RX ADMIN — Medication 100 MILLIGRAM(S): at 02:57

## 2020-06-08 RX ADMIN — CEFTRIAXONE 100 MILLIGRAM(S): 500 INJECTION, POWDER, FOR SOLUTION INTRAMUSCULAR; INTRAVENOUS at 02:02

## 2020-06-08 NOTE — DISCHARGE NOTE PROVIDER - HOSPITAL COURSE
87 y/o F with PMHX DVT (11/2019) on Eliquis, intermittent asthma (never intubated/no icu stays), polymyalgia rheumatica (chronic prednisone), osteoporosis with multiple vertebral compression fxs BIBEMS after she called 911. EMS noted patient was covered in melena on arrival. In ER she was tachycardic, mildl hypotensive, and noted to have multiple abnormal labs concerning for acute upper GI bleed with Hgb drop compared to prior admission 1 month ago (9.5 from ~11) and elevated BUN:Cr ratio. Patient also noted to have significant transaminitis and upon inquery admits to using frequent tylenol approx 6 normal strength 325mg PO tablets per day on top of another "pain pill" she cannot remember. Patient was discharged on Percocet last admission 1 month ago for acute/chronic pain. Also takes Prednisone 20mg PO daily chronically for polymyalgia rheumatica and has old charts showing Celecoxib, as well as Eliquis for LE DVT. Patient given 2L IVFB in ER with some improvement in BP. CTA C/A/P obtained to r/o mesenteric ischemia and/or other pathology including Acute PE given tachypnea but was negative however did show pancolitis. Tylenol level noted to be elevated and given history patient was started on Protonix gtt and Acetadote IV gtt.     Pt lives alone. Normally ambulates with walker but more recently bed bound due to pain.     She denies F/C, N/V, abd pain, admits to melena, denies cough, +SOB, no urinary complaints, +bone pain (Chronic, andrea L shoulder and spine). All other systems negative unless mentioned above.             1.  Symptomatic Anemia, Upper GI Bleed    -trend INR and H&H q6    - on clears     -Protonix 40mg IV     -hold ASA/NSAIDs/Eliquis/Steroids    -GI Consult noted and appreciated             2.   Coagulopathy with Transaminitis     - Likely  ischemic hepatitis -- pt was hypotensive on admission     - ? OD on tylenol in addition to ischemia     -6 Tylenol pills/day +/- 1-2 additional "Pain" pills ?Percocet?   -Acetadode IV gtt to start now            3.  Leukocytosis with Colitis    -Continue Ceftriaxone 1g IV q24 and Flagyl 500mg IV q8 for empiric colitis coverage     if patient spikes fever use cooling blanket        4.   Chronic LE DVT    -Hold Eliquis with GIB    -DVT diagnosed 11/2019 would consider risks/benefits of longterm AC post-GI bleed.        5.   Polymyalgia Rheumatica    -Hold Prednisone for now        6.   Osteoporosis with multiple vertebral fractures    -exacerbated by chronic steroid use. outpatient f/u.         7.   Mild-Moderate Intermittent Asthma    -Continue Symbicort/Albuterol PRN    -O2 via NC if needed PRN titrate FIO2 >90% and <96%        8.   Tachycardia due to anemia     cardio called for pat Rodríguez         9.  Acute renal failure     elevated BUN    nephrology called 87 y/o F with PMHX DVT (11/2019) on Eliquis, intermittent asthma (never intubated/no icu stays), polymyalgia rheumatica (chronic prednisone), osteoporosis with multiple vertebral compression fxs BIBEMS after she called 911. EMS noted patient was covered in melena on arrival. In ER she was tachycardic, mildl hypotensive, and noted to have multiple abnormal labs concerning for acute upper GI bleed with Hgb drop compared to prior admission 1 month ago (9.5 from ~11) and elevated BUN:Cr ratio. Patient also noted to have significant transaminitis and upon inquery admits to using frequent tylenol approx 6 normal strength 325mg PO tablets per day on top of another "pain pill" she cannot remember. Patient was discharged on Percocet last admission 1 month ago for acute/chronic pain. Also takes Prednisone 20mg PO daily chronically for polymyalgia rheumatica and has old charts showing Celecoxib, as well as Eliquis for LE DVT. Patient given 2L IVFB in ER with some improvement in BP. CTA C/A/P obtained to r/o mesenteric ischemia and/or other pathology including Acute PE given tachypnea but was negative however did show pancolitis. Tylenol level noted to be elevated and given history patient was started on Protonix gtt and Acetadote IV gtt.     Pt lives alone. Normally ambulates with walker but more recently bed bound due to pain.     She denies F/C, N/V, abd pain, admits to melena, denies cough, +SOB, no urinary complaints, +bone pain (Chronic, andrea L shoulder and spine). All other systems negative unless mentioned above.         · Assessment	    1.  Symptomatic Anemia, Upper GI Bleed    H/H stable     - on regular diet     -Protonix 40mg po     -hold ASA/NSAIDs/Eliquis/    restarted Steroids    -GI Consult noted and appreciated             2.   Coagulopathy with Transaminitis     - Likely  ischemic hepatitis -- pt was hypotensive on admission     Tylenol levels normal pt was taking 6 Tylenol pills/day +/- 1-2 additional "Pain" pills ?Percocet?              3.  Leukocytosis with Colitis    -Continue Ceftriaxone 1g IV q24 and Flagyl 500mg IV q8 for empiric colitis coverage            4.   Chronic LE DVT    -Hold Eliquis with GIB    -DVT diagnosed 11/2019 would consider risks/benefits of longterm AC post-GI bleed.    no AC at this time the risks > benefits         5.   Polymyalgia Rheumatica and T spine and L spine Fx    steroids and oxy po         6.   Osteoporosis with multiple vertebral fractures    -exacerbated by chronic steroid use. outpatient f/u.         7.   Mild-Moderate Intermittent Asthma    -Continue Symbicort/Albuterol PRN    -O2 via NC if needed PRN titrate FIO2 >90% and <96%        8.   Tachycardia due to anemia     cardio called for pat Rodríguez         9.  Acute renal failure     elevated BUN    nephrology called 89 y/o F with PMHX DVT (11/2019) on Eliquis, intermittent asthma (never intubated/no icu stays), polymyalgia rheumatica (chronic prednisone), osteoporosis with multiple vertebral compression fxs BIBEMS after she called 911. EMS noted patient was covered in melena on arrival. In ER she was tachycardic, mildl hypotensive, and noted to have multiple abnormal labs concerning for acute upper GI bleed with Hgb drop compared to prior admission 1 month ago (9.5 from ~11) and elevated BUN:Cr ratio. Patient also noted to have significant transaminitis and upon inquery admits to using frequent tylenol approx 6 normal strength 325mg PO tablets per day on top of another "pain pill" she cannot remember. Patient was discharged on Percocet last admission 1 month ago for acute/chronic pain. Also takes Prednisone 20mg PO daily chronically for polymyalgia rheumatica and has old charts showing Celecoxib, as well as Eliquis for LE DVT. Patient given 2L IVFB in ER with some improvement in BP. CTA C/A/P obtained to r/o mesenteric ischemia and/or other pathology including Acute PE given tachypnea but was negative however did show pancolitis. Tylenol level noted to be elevated and given history patient was started on Protonix gtt and Acetadote IV gtt.     Pt lives alone. Normally ambulates with walker but more recently bed bound due to pain.     She denies F/C, N/V, abd pain, admits to melena, denies cough, +SOB, no urinary complaints, +bone pain (Chronic, andrea L shoulder and spine). All other systems negative unless mentioned above.         · Assessment	    1.  Symptomatic Anemia, Upper GI Bleed    H/H stable     - on regular diet     -Protonix 40mg po bid lifelong     -hold ASA/NSAIDs/Eliquis/    restarted Steroids    -GI Consult noted and appreciated             2.   Coagulopathy with Transaminitis     - Likely  ischemic hepatitis -- pt was hypotensive on admission     Tylenol levels normal pt was taking 6 Tylenol pills/day +/- 1-2 additional "Pain" pills ?Percocet?              3.  Leukocytosis with Colitis    -Continue Ceftriaxone 1g IV q24 and Flagyl 500mg IV q8 for empiric colitis coverage 7 days completed    elevated WBC due to steroids             4.   Chronic LE DVT    -Hold Eliquis with GIB    -DVT diagnosed 11/2019 would consider risks/benefits of longterm AC post-GI bleed.    no AC at this time the risks > benefits         5.   Polymyalgia Rheumatica and T spine and L spine Fx    steroids and oxy po         6.   Osteoporosis with multiple vertebral fractures    -exacerbated by chronic steroid use. outpatient f/u.         7.   Mild-Moderate Intermittent Asthma    -Continue Symbicort/Albuterol PRN    -O2 via NC if needed PRN titrate FIO2 >90% and <96%        8.   Tachycardia due to anemia     cardio called for pat Rodríguez         9.  Acute renal failure     elevated BUN    nephrology called

## 2020-06-08 NOTE — PROGRESS NOTE ADULT - ASSESSMENT
1.  Symptomatic Anemia, Upper GI Bleed  -trend INR and H&H q6  - on clears   -Protonix 40mg IV   -hold ASA/NSAIDs/Eliquis/Steroids  -GI Consult noted and appreciated       2.   Coagulopathy with Transaminitis   - Likely  ischemic hepatitis -- pt was hypotensive on admission   - ? OD on tylenol in addition to ischemia   -6 Tylenol pills/day +/- 1-2 additional "Pain" pills ?Percocet?   -Acetadode IV gtt to start now      3.  Leukocytosis with Colitis  -Continue Ceftriaxone 1g IV q24 and Flagyl 500mg IV q8 for empiric colitis coverage   if patient spikes fever use cooling blanket    4.   Chronic LE DVT  -Hold Eliquis with GIB  -DVT diagnosed 11/2019 would consider risks/benefits of longterm AC post-GI bleed.    5.   Polymyalgia Rheumatica  -Hold Prednisone for now    6.   Osteoporosis with multiple vertebral fractures  -exacerbated by chronic steroid use. outpatient f/u.     7.   Mild-Moderate Intermittent Asthma  -Continue Symbicort/Albuterol PRN  -O2 via NC if needed PRN titrate FIO2 >90% and <96%    8.   Tachycardia due to anemia   cardio called for pat Rodríguez     9.  Acute renal failure   elevated BUN  nephrology called

## 2020-06-08 NOTE — CONSULT NOTE ADULT - PROBLEM SELECTOR RECOMMENDATION 3
Pt with long segment colitis. Likely responsible for her abdominal pain and may be a contributing factor to her anemia. On IV antibiotics. Stool for GI PCR ordered. Pt with long segment colitis possibly infectious in etiology. Likely responsible for her abdominal pain and may be a contributing factor to her anemia. On IV antibiotics. Stool for GI PCR ordered.

## 2020-06-08 NOTE — H&P ADULT - HISTORY OF PRESENT ILLNESS
87 y/o F with PMHX DVT (11/2019) on Eliquis, intermittent asthma (never intubated/no icu stays), polymyalgia rheumatica (chronic prednisone), osteoporosis with multiple vertebral compression fxs BIBEMS after she called 911. EMS noted patient was covered in melena on arrival. In ER she was tachycardic, mildl hypotensive, and noted to have multiple abnormal labs concerning for acute upper GI bleed with Hgb drop compared to prior admission 1 month ago (9.5 from ~11) and elevated BUN:Cr ratio. Patient also noted to have significant transaminitis and upon inquery admits to using frequent tylenol approx 6 normal strength 325mg PO tablets per day on top of another "pain pill" she cannot remember. Patient was discharged on Percocet last admission 1 month ago for acute/chronic pain. Also takes Prednisone 20mg PO daily chronically for polymyalgia rheumatica and has old charts showing Celecoxib, as well as Eliquis for LE DVT. Patient given 2L IVFB in ER with some improvement in BP. CTA C/A/P obtained to r/o mesenteric ischemia and/or other pathology including Acute PE given tachypnea but was negative however did show pancolitis. Tylenol level noted to be elevated and given history patient was started on Protonix gtt and Acetadote IV gtt.   Pt lives alone. Normally ambulates with walker but more recently bed bound due to pain.   She denies F/C, N/V, abd pain, admits to melena, denies cough, +SOB, no urinary complaints, +bone pain (Chronic, andrea L shoulder and spine). All other systems negative unless mentioned above.

## 2020-06-08 NOTE — DISCHARGE NOTE PROVIDER - PROVIDER TOKENS
FREE:[LAST:[PMD],PHONE:[(   )    -],FAX:[(   )    -]],PROVIDER:[TOKEN:[[id number]]],PROVIDER:[TOKEN:[[id number]]],PROVIDER:[TOKEN:[[id number]]]

## 2020-06-08 NOTE — CONSULT NOTE ADULT - SUBJECTIVE AND OBJECTIVE BOX
[facility]                              [address] [address]                                                 PHONE: [phone]                                                 FAX: [phone]  ------------------------------------------------------------------------------------------------    88y Female with past medical history as under [employer] after she called 911. EMS noted patient was covered in melena on arrival. In ER she was tachycardic, mildly hypotensive, and noted to have multiple abnormal labs concerning for acute upper GI bleed with Hgb drop compared to prior admission 1 month ago (9.5 from ~11) and elevated BUN:Cr ratio. Patient also noted to have significant transaminitis At the time of evaluation, pt reports feeling tired and thirsty. Review of records show pt has been maintained on Eliquis for DVT in 11/2019.   Planned for GI w/u.     PAST MEDICAL & SURGICAL HISTORY:  Chronic back pain  CHF (congestive heart failure)  Atrial fibrillation  Polymyalgia rheumatica  Diverticulosis  Asthma  S/P knee replacement, left  S/P hysterectomy: 1982  S/P appendectomy: 1962    dust (Unknown)  No Known Drug Allergies  [facility] (Unknown)      Review of Systems:  Positive for fatigue  Rest of the systems were reviewed and was negative.     Family history:   No pertinent family history in first degree relative of early CAD, sudden cardiac death or  congenital heart disease    Social History:  No smoking   No alcohol  No other drug use    Vital Signs Last 24 Hrs  T(C): 37.2 (08 Jun 2020 09:25), Max: 37.2 (08 Jun 2020 09:25)  T(F): 98.9 (08 Jun 2020 09:25), Max: 98.9 (08 Jun 2020 09:25)  HR: 114 (08 Jun 2020 09:25) (97 - 126)  BP: 127/78 (08 Jun 2020 09:25) (95/57 - 128/85)  BP(mean): --  RR: 20 (08 Jun 2020 07:29) (18 - 35)  SpO2: 97% (08 Jun 2020 07:29) (97% - 100%)    PHYSICAL EXAM:  Constitutional: Thin frail female in bed  HEENT:     Head: Normocephalic and atraumatic  Eyes: Conjunctiva normal, No scleral icterus  Neck: Supple, No JVD  Mouth/Throat: Mucous membranes are normal. Mucosa are not pale or dry.  Cardiovascular: regular S1, S2 + ESM  Respiratory: Lungs clear to auscultation; no crepitations, no wheeze  Gastrointestinal:  Soft, Non-tender, + BS	  Musculoskeletal: Normal range of motion. No edema  Skin: Warm and dry. No cyanosis . No diaphoresis. + bruising  Neurologic: Alert oriented to time place and person. Normal strength and no tremor.  Psychiatric: Normal mood and affect, Speech is normal and behavior is normal.        LABS:                        6.9    15.59 )-----------( 192      ( 08 Jun 2020 04:24 )             20.7     06-08    135  |  104  |  98.0<H>  ----------------------------<  118<H>  3.9   |  17.0<L>  |  1.36<H>    Ca    8.7      08 Jun 2020 04:24  Phos  3.7     06-08  Mg     2.2     06-08    TPro  4.6<L>  /  Alb  2.9<L>  /  TBili  0.6  /  DBili  x   /  AST  2043<H>  /  ALT  3159<H>  /  AlkPhos  99  06-08    CARDIAC MARKERS ( 07 Jun 2020 22:16 )  x     / 0.07 ng/mL / 97 U/L / x     / x          PT/INR - ( 08 Jun 2020 04:24 )   PT: 29.6 sec;   INR: 2.54 ratio         PTT - ( 08 Jun 2020 04:24 )  PTT:31.3 sec    RADIOLOGY & ADDITIONAL STUDIES: (reviewed)  CXR was independently reviewed and demonstrated: clear lungs    CARDIOLOGY TESTING:(reviewed)     ECG (Independent visualization): sinus tachycardia with nonspecific ST changes    ECHOCARDIOGRAM :   1. Left ventricular ejection fraction, by visual estimation, is 50 to   55%.   2. Mildly decreased global left ventricular systolic function.   3. Mildly increased LV wall thickness.   4. Spectral Doppler shows impaired relaxation pattern of left   ventricular myocardial filling (Grade I diastolic dysfunction).   5. Mild thickening of the anterior and posterior mitral valve leaflets.   6. Moderate mitral valve regurgitation.   7. Sclerotic aortic valve with normal opening.   8. Peak transaortic gradient equals 7.2 mmHg, mean transaortic gradient   equals 3.7 mmHg, the calculated aortic valve area equals 2.06 cm² by the   continuity equation consistent with mild aortic stenosis.    MEDICATIONS:(reviewed)  Home Medications:  Home Medications:  albuterol 4 mg oral tablet: 1 tab(s) orally once a day (at bedtime) (30 Apr 2020 16:53)  apixaban 2.5 mg oral tablet: 1 tab(s) orally 2 times a day (30 Apr 2020 16:53)  calcium-vitamin D 500 mg-200 intl units oral tablet: 1 tab(s) orally 2 times a day (30 Apr 2020 16:53)  lidocaine 5% topical film: Apply topically to affected area once a day; on for 12 hours, off for 12 hours (30 Apr 2020 16:53)  Percocet 5/325 oral tablet: 1 tab(s) orally every 6 hours (01 May 2020 09:24)  predniSONE 20 mg oral tablet: 1 tab(s) orally once a day (10 Dar 2020 21:35)  senna oral tablet: 2 tab(s) orally once a day (at bedtime) (10 Dar 2020 21:35)  Symbicort 160 mcg-4.5 mcg/inh inhalation aerosol: 2 puff(s) inhaled 2 times a day (10 Dar 2020 21:35)    MEDICATIONS  (STANDING):  cefTRIAXone   IVPB 1000 milliGRAM(s) IV Intermittent every 24 hours  metroNIDAZOLE  IVPB      metroNIDAZOLE  IVPB 500 milliGRAM(s) IV Intermittent every 8 hours  morphine  - Injectable 1 milliGRAM(s) IV Push once  pantoprazole Infusion 8 mG/Hr (10 mL/Hr) IV Continuous <Continuous>    MEDICATIONS  (PRN):

## 2020-06-08 NOTE — CONSULT NOTE ADULT - SUBJECTIVE AND OBJECTIVE BOX
Patient is a 88y old  Female who presents with a chief complaint of Melena, Acute GI Bleed, Coagulopathy, Transaminitis (2020 10:52)      HPI:  89 y/o F with PMHX DVT (2019) on Eliquis, intermittent asthma (never intubated/no icu stays), polymyalgia rheumatica (chronic prednisone), osteoporosis with multiple vertebral compression fxs BIBEMS after she called 911. EMS noted patient was covered in melena on arrival. In ER she was tachycardic, mildl hypotensive, and noted to have multiple abnormal labs concerning for acute upper GI bleed with Hgb drop compared to prior admission 1 month ago (9.5 from ~11) and elevated BUN:Cr ratio. Patient also noted to have significant transaminitis and upon inquery admits to using frequent tylenol approx 6 normal strength 325mg PO tablets per day on top of another "pain pill" she cannot remember. Patient was discharged on Percocet last admission 1 month ago for acute/chronic pain. Also takes Prednisone 20mg PO daily chronically for polymyalgia rheumatica and has old charts showing Celecoxib, as well as Eliquis for LE DVT. Patient given 2L IVFB in ER with some improvement in BP. CTA C/A/P obtained to r/o mesenteric ischemia and/or other pathology including Acute PE given tachypnea but was negative however did show pancolitis. Tylenol level noted to be elevated and given history patient was started on Protonix gtt and Acetadote IV gtt.   Pt lives alone. Normally ambulates with walker but more recently bed bound due to pain.   She denies F/C, N/V, abd pain, admits to melena, denies cough, +SOB, no urinary complaints, +bone pain (Chronic, andrea L shoulder and spine). All other systems negative unless mentioned above. (2020 03:39)      Above noted   GI consult noted   Started on Abx for colitis seen on CT   Started on n-acetylcysteine for suspect acetominophen toxicity   BUN / creat in April was 21/0.6   + Hypotension which responded to volume   Awaits potential EGD , had melena on arrival   + Protonix drip   S/P transfusion         PAST MEDICAL & SURGICAL HISTORY:  Chronic back pain  CHF (congestive heart failure)  Atrial fibrillation  Polymyalgia rheumatica  Diverticulosis  Asthma  S/P knee replacement, left  S/P hysterectomy: 1982  S/P appendectomy: 1962      FAMILY HISTORY:  Family history of stroke      Social History:    MEDICATIONS  (STANDING):  cefTRIAXone   IVPB 1000 milliGRAM(s) IV Intermittent every 24 hours  metroNIDAZOLE  IVPB      metroNIDAZOLE  IVPB 500 milliGRAM(s) IV Intermittent every 8 hours  morphine  - Injectable 1 milliGRAM(s) IV Push once  pantoprazole Infusion 8 mG/Hr (10 mL/Hr) IV Continuous <Continuous>    MEDICATIONS  (PRN):      Allergies    dust (Unknown)  No Known Drug Allergies    Intolerances    provide mech soft diet (Unknown)    Vital Signs Last 24 Hrs  T(C): 37.1 (2020 15:19), Max: 37.2 (2020 09:25)  T(F): 98.8 (2020 15:19), Max: 98.9 (2020 09:25)  HR: 101 (2020 15:19) (97 - 126)  BP: 137/68 (2020 15:19) (95/57 - 137/68)  BP(mean): --  RR: 20 (2020 11:06) (18 - 35)  SpO2: 100% (2020 11:06) (97% - 100%)  Daily     Daily   I&O's Detail    I&O's Summary      PHYSICAL EXAM:    GENERAL: comfortable flat. Looks chronically ill   HEAD:  Atraumatic, No edema , dry membranes   NECK: Supple, No JVD,   CHEST/LUNG: EAE , No wheeze   HEART: Irregula , No gallop or rub   ABDOMEN: Soft, No rigidity or rebound . + diaper   EXTREMITIES:  multiple areas of ecchymoses, muscle wasting , min edema         LABS:                        10.2   x     )-----------( x        ( 2020 12:47 )             30.4     06-08    137  |  103  |  85.0<H>  ----------------------------<  161<H>  3.6   |  15.0<L>  |  1.14    Ca    8.8      2020 12:24  Phos  3.7     06-08  Mg     2.2     06-08    TPro  5.1<L>  /  Alb  3.0<L>  /  TBili  1.0  /  DBili  x   /  AST  1668<H>  /  ALT  3361<H>  /  AlkPhos  109      PT/INR - ( 2020 12:47 )   PT: 19.3 sec;   INR: 1.68 ratio         PTT - ( 2020 04:24 )  PTT:31.3 sec    Magnesium, Serum: 2.2 mg/dL ( @ 04:24)  Phosphorus Level, Serum: 3.7 mg/dL ( @ 04:24)             EXAM:  CT ANGIO CHEST (W)AW IC                         EXAM:  CT ABDOMEN AND PELVIS IC                          *** ADDENDUM 2020  ***    Body of the report should include distended gallbladder without evidence of wall thickening or pericholecystic inflammation.      *** END OF ADDENDUM 2020  ***      PROCEDURE DATE:  2020          INTERPRETATION:  CLINICAL INFORMATION: Shortness of breath, tachycardia, melena.    COMPARISON: 1/10/2020    PROCEDURE:   CT Angiography of the Chest was performed followed by portal venous phase imaging of the Abdomen and Pelvis.  IV Contrast: 94 ml of Omnipaque 300 was injected intravenously. 6 ml were discarded.  Oral contrast: None.  Sagittal and coronal reformats were performed as well as 3D (MIP) reconstructions.    FINDINGS:  CHEST:   LUNGS AND LARGE AIRWAYS: Patent central airways. Mild bibasilar atelectasis. Stable 4 mm left lower lobe nodule (6, 62). Biapical scarring.  PLEURA: No pleural effusion.  VESSELS: No pulmonary embolism.Atherosclerotic changes of the aorta. Stable ectasia of the ascending aorta up to 3.8 cm.  HEART: Heart size is normal. No pericardial effusion.  MEDIASTINUM AND GERARDO: No lymphadenopathy.  CHEST WALL AND LOWER NECK: Within normal limits.    ABDOMEN AND PELVIS:  LIVER: Stable hepatic cysts including 5.8 cm left hepatic lobe cyst with calcified septations.  BILE DUCTS: Normal caliber.  GALLBLADDER: Within normal limits.  SPLEEN: Within normal limits.  PANCREAS: Within normal limits.  ADRENALS: Within normal limits.  KIDNEYS/URETERS: No hydronephrosis. Symmetric renal enhancement. Bilateral subcentimeter hypodensities too small to characterize. Indeterminate 8 mm left upper pole lesion which appears to be soft tissue density (12, 21).    BLADDER: Distended.  REPRODUCTIVE ORGANS: Uterus removed or atrophic. No adnexal mass.    BOWEL: No bowel obstruction. Appendix is not visualized. No evidence of inflammation in the pericecal region. Colonic diverticulosis. Long segment colonic wall thickening extending from the ascending colon to the sigmoid colon, compatible with colitis.  PERITONEUM: No ascites.  VESSELS: Atherosclerotic changes.  RETROPERITONEUM/LYMPH NODES: No lymphadenopathy.    ABDOMINAL WALL: Cachexia.  BONES: Stable chronic deformity of the right glenoid. Multiple chronic bilateral rib fractures. Degenerative changes of the spine, hips and glenohumeral joints. Stable anterolisthesis at C7-T1, retrolisthesis at L2-L3, retrolisthesis at L3-L4 and anterolisthesis at L4-L5. Interval development of mild compression deformity at T4. Stable severe compression deformities of T7 and T11. Interval development of mild compression deformity of T12. Mild interval progression of now moderate compression deformity at L1. Stable appearance of right iliac wing fracture deformity. Chronic fracture deformities of the bilateral pubic rami also noted.    IMPRESSION:     1. No pulmonary embolism.  2. Long segment colitis.  3. Indeterminate 8 mm left upper pole renal lesion. Nonurgent ultrasound is recommended for further evaluation.  4. Interval development of mild compression deformities at T4 and T12. Interval progression of now moderate compression deformity of L1.          ***Please see the addendum at the top of this report. It may contain additional important information or changes.****          JOSÉ MIGUEL WU M.D., ATTENDING RADIOLOGIST  This document has been electronically signed. 2020  1:14AM  Addend:  JOSÉ MIGUEL WU M.D., ATTENDING RADIOLOGIST  This addendum was electronically signed on: 2020  2:04AM.             EXAM:  XR CHEST AP OR PA 1V                          PROCEDURE DATE:  2020          INTERPRETATION:  Chest one view    HISTORY: Shortness of breath    COMPARISON STUDY: Earlier the same evening    Frontal expiratory view of the chest shows the heart to be similar in size. The lungs are clear and there is no evidence of pneumothorax nor pleural effusion. Old right rib fractures are again noted.    IMPRESSION:  No active pulmonary disease.    Further information may be obtained from the patient's subsequent CT of the chest.    Thank you for the courtesy of this referral.                SERINA LUNA M.D., ATTENDING RADIOLOGIST  This document has been electronically signed. 2020 10:26AM              EXAM:  ECHO TRANSTHORACIC COMP W DOPP      PROCEDURE DATE:  2019   .      INTERPRETATION:  REPORT:    TRANSTHORACIC ECHOCARDIOGRAM REPORT         Patient Name:   SARA TRIANA Patient Location: [facility]  Medical Rec #:  [id number] Accession #:      [id number]  Account #:      [id number]          Height:           59.0 in 149.9 cm  YOB: 1932         Weight:           80.5 lb 36.50 kg  Patient Age:    87 years          BSA:              1.25 m²  Patient Gender: F             BP:               136/82 mmHg       Date of Exam:        2019 8:09:03 AM  Sonographer:         Lisa Pandey  Referring Physician: Roshan Storm MD    Procedure:     2D Echo/Doppler/Color Doppler Complete.  Indications:   Dyspnea, unspecified - R06.00  Diagnosis:     Dyspnea, unspecified - R06.00  Study Details: Technically adequate study.         2D AND M-MODE MEASUREMENTS (normal ranges within parentheses):  Left                 Normal   Aorta/Left            Normal  Ventricle:                    Atrium:  IVSd (2D):    0.99  (0.7-1.1) Left Atrium  3.70 cm (1.9-4.0)                 cm             (2D):  LVPWd (2D):   0.95  (0.7-1.1) LA Volume     34.4                 cm             Index         ml/m²  LVIDd (2D):   4.07 (3.4-5.7) Right Ventricle:                 cm             RVd (2D):        3.10 cm  LVIDs (2D):   3.19                 cm  LV FS (2D):   21.6   (>25%)                  %  Relative Wall 0.47   (<0.42)  Thickness    LV SYSTOLIC FUNCTION BY 2D PLANIMETRY (MOD):  EF-Biplane: 50 %    LV DIASTOLIC FUNCTION:  MV Peak E: 0.59 m/s e', MV Ping: 0.06 m/s  MV Peak A: 1.06 m/s E/e' Ratio: 9.25  E/A Ratio: 0.56    SPECTRAL DOPPLER ANALYSIS (where applicable):  Aortic Valve: AoV Max Robert: 1.34 m/s AoV Peak P.2 mmHg AoV Mean PG:   3.7 mmHg    LVOT Vmax: 0.90 m/s LVOT VTI: 0.176 m LVOT Diameter: 1.89 cm    AoV Area, Vmax: 1.88 cm² AoV Area, VTI: 2.06 cm² AoV Area, Vmn: 1.81 cm²  Ao VTI: 0.240  Tricuspid Valve and PA/RV Systolic Pressure: TR Max Velocity: 2.53 m/s RA   Pressure: 8 mmHg RVSP/PASP: 33.6 mmHg       PHYSICIAN INTERPRETATION:  Left Ventricle: The left ventricular internal cavity size is normal. Left   ventricular wall thickness is mildly increased.  Global LV systolic function was mildly decreased. Left ventricular   ejection fraction, by visual estimation, is 50 to 55%. Spectral Doppler   shows impaired relaxation pattern of left ventricular myocardial filling   (Grade I diastolic dysfunction).  Right Ventricle: Normal right ventricularsize and function.  Left Atrium: Mildly enlarged left atrium.  Right Atrium: Normal right atrial size.  Pericardium: There is no evidence of pericardial effusion.  Mitral Valve: Mild thickening of the anterior and posterior mitral valve   leaflets. Moderate mitral valve regurgitation is seen.  Aortic Valve: The aortic valve is trileaflet. Sclerotic aortic valve with   normal opening. Peak transaortic gradient equals 7.2 mmHg, mean   transaortic gradient equals 3.7 mmHg, the calculated aortic valve area   equals 2.06 cm² by the continuity equation consistent with mild aortic   stenosis. No evidence of aortic valve regurgitation is seen.  Pulmonic Valve: Structurally normal pulmonic valve, with normal leaflet   excursion. Trace pulmonic valve regurgitation.  Aorta: The aortic root is normal in size and structure.  Pulmonary Artery: The main pulmonary artery is normal in size.  Venous: The inferior vena cava was normal sized, with respiratory size   variation less than 50%.       Summary:   1. Left ventricular ejection fraction, by visual estimation, is 50 to   55%.   2. Mildly decreased global left ventricular systolic function.   3. Mildly increased LV wall thickness.   4. Spectral Doppler shows impaired relaxation pattern of left   ventricular myocardial filling (Grade I diastolic dysfunction).   5. Mild thickening of the anterior and posterior mitral valve leaflets.   6. Moderate mitral valve regurgitation.   7. Sclerotic aortic valve with normal opening.   8. Peak transaortic gradient equals 7.2 mmHg, mean transaortic gradient   equals 3.7 mmHg, the calculated aortic valve area equals 2.06 cm² by the   continuity equation consistent with mild aortic stenosis.    MD Raymon Electronically signed on 2019 at 8:06:53 PM              *** Final ***

## 2020-06-08 NOTE — PROGRESS NOTE ADULT - SUBJECTIVE AND OBJECTIVE BOX
HPI:  89 y/o F with PMHX DVT (11/2019) on Eliquis, intermittent asthma (never intubated/no icu stays), polymyalgia rheumatica (chronic prednisone), osteoporosis with multiple vertebral compression fxs BIBEMS after she called 911. EMS noted patient was covered in melena on arrival. In ER she was tachycardic, mildl hypotensive, and noted to have multiple abnormal labs concerning for acute upper GI bleed with Hgb drop compared to prior admission 1 month ago (9.5 from ~11) and elevated BUN:Cr ratio. Patient also noted to have significant transaminitis and upon inquery admits to using frequent tylenol approx 6 normal strength 325mg PO tablets per day on top of another "pain pill" she cannot remember. Patient was discharged on Percocet last admission 1 month ago for acute/chronic pain. Also takes Prednisone 20mg PO daily chronically for polymyalgia rheumatica and has old charts showing Celecoxib, as well as Eliquis for LE DVT. Patient given 2L IVFB in ER with some improvement in BP. CTA C/A/P obtained to r/o mesenteric ischemia and/or other pathology including Acute PE given tachypnea but was negative however did show pancolitis. Tylenol level noted to be elevated and given history patient was started on Protonix gtt and Acetadote IV gtt.   Pt lives alone. Normally ambulates with walker but more recently bed bound due to pain.   She denies F/C, N/V, abd pain, admits to melena, denies cough, +SOB, no urinary complaints, +bone pain (Chronic, andrea L shoulder and spine). All other systems negative unless mentioned above. (08 Jun 2020 03:39)      PAST MEDICAL & SURGICAL HISTORY:  Chronic back pain  CHF (congestive heart failure)  Atrial fibrillation  Polymyalgia rheumatica  Diverticulosis  Asthma  S/P knee replacement, left  S/P hysterectomy: 1982  S/P appendectomy: 1962      ANTIMICROBIAL:  cefTRIAXone   IVPB 1000 milliGRAM(s) IV Intermittent every 24 hours  metroNIDAZOLE  IVPB      metroNIDAZOLE  IVPB 500 milliGRAM(s) IV Intermittent every 8 hours        NEUROLOGIC:  morphine  - Injectable 1 milliGRAM(s) IV Push once        GATROINTESTINAL:  pantoprazole Infusion 8 mG/Hr IV Continuous <Continuous>        IMMUNOLOGIC & OTHER  acetylcysteine IVPB 3.9 Gram(s) IV Intermittent once        Allergies    dust (Unknown)  No Known Drug Allergies    Intolerances    provide mech soft diet (Unknown)      SOCIAL HISTORY:    FAMILY HISTORY:  Family history of stroke      Vital Signs Last 24 Hrs  T(C): 36.8 (08 Jun 2020 07:29), Max: 36.8 (08 Jun 2020 05:59)  T(F): 98.3 (08 Jun 2020 07:29), Max: 98.3 (08 Jun 2020 07:29)  HR: 119 (08 Jun 2020 07:29) (97 - 126)  BP: 115/79 (08 Jun 2020 07:29) (95/57 - 128/85)  BP(mean): --  RR: 20 (08 Jun 2020 07:29) (18 - 35)  SpO2: 97% (08 Jun 2020 07:29) (97% - 100%)          REVIEW OF SYSTEMS:    unable to obtain  sound asleep this morning         PHYSICAL EXAM:    GENERAL: NAD, well-groomed, well-developed  HEAD:  Atraumatic, Normocephalic  NERVOUS SYSTEM: sound asleep   CHEST/LUNG: Clear course sounds at bases   HEART: Regular tachy   ABDOMEN: Soft, Nontender, Nondistended; Bowel sounds present  EXTREMITIES:  2+ Peripheral Pulses, +1 edema ??        LABS:                        6.9    15.59 )-----------( 192      ( 08 Jun 2020 04:24 )             20.7     06-08    135  |  104  |  98.0<H>  ----------------------------<  118<H>  3.9   |  17.0<L>  |  1.36<H>    Ca    8.7      08 Jun 2020 04:24  Phos  3.7     06-08  Mg     2.2     06-08    TPro  4.6<L>  /  Alb  2.9<L>  /  TBili  0.6  /  DBili  x   /  AST  2043<H>  /  ALT  3159<H>  /  AlkPhos  99  06-08    PT/INR - ( 08 Jun 2020 04:24 )   PT: 29.6 sec;   INR: 2.54 ratio         PTT - ( 08 Jun 2020 04:24 )  PTT:31.3 sec        PT/INR - ( 08 Jun 2020 04:24 )   PT: 29.6 sec;   INR: 2.54 ratio         PTT - ( 08 Jun 2020 04:24 )  PTT:31.3 sec    RADIOLOGY & ADDITIONAL STUDIES:    IMPRESSION:     1. No pulmonary embolism.  2. Long segment colitis.  3. Indeterminate 8 mm left upper pole renal lesion. Nonurgent ultrasound is recommended for further evaluation.  4. Interval development of mild compression deformities at T4 and T12. Interval progression of now moderate compression deformity of L1.

## 2020-06-08 NOTE — DISCHARGE NOTE PROVIDER - NSDCFUADDINST_GEN_ALL_CORE_FT
will need GI follow up with Dr Damon in 2-4 wks  pt with bleeding duod ulcer   NO NSAIDS.   ANTICOAGULATION USE WITH CAUTION   PPI LIFELONG will need GI follow up with Dr Damon in 2-4 wks  pt with bleeding duod ulcer   NO NSAIDS.   ANTICOAGULATION USE WITH CAUTION   PPI LIFELONG    WBC  elevated due to chronic steroids    MOLST done     LFTs trending down

## 2020-06-08 NOTE — DISCHARGE NOTE PROVIDER - NSDCCPCAREPLAN_GEN_ALL_CORE_FT
PRINCIPAL DISCHARGE DIAGNOSIS  Diagnosis: Acute GI bleeding  Assessment and Plan of Treatment:       SECONDARY DISCHARGE DIAGNOSES  Diagnosis: Acute uremia  Assessment and Plan of Treatment:     Diagnosis: Acetaminophen toxicity, accidental or unintentional, initial encounter  Assessment and Plan of Treatment:

## 2020-06-08 NOTE — H&P ADULT - NSICDXPASTMEDICALHX_GEN_ALL_CORE_FT
PAST MEDICAL HISTORY:  Asthma     Atrial fibrillation     CHF (congestive heart failure)     Chronic back pain     Diverticulosis     Polymyalgia rheumatica

## 2020-06-08 NOTE — DISCHARGE NOTE PROVIDER - NSDCMRMEDTOKEN_GEN_ALL_CORE_FT
albuterol 4 mg oral tablet: 1 tab(s) orally once a day (at bedtime)  apixaban 2.5 mg oral tablet: 1 tab(s) orally 2 times a day  calcium-vitamin D 500 mg-200 intl units oral tablet: 1 tab(s) orally 2 times a day  lidocaine 5% topical film: Apply topically to affected area once a day; on for 12 hours, off for 12 hours  Percocet 5/325 oral tablet: 1 tab(s) orally every 6 hours  predniSONE 20 mg oral tablet: 1 tab(s) orally once a day  senna oral tablet: 2 tab(s) orally once a day (at bedtime)  Symbicort 160 mcg-4.5 mcg/inh inhalation aerosol: 2 puff(s) inhaled 2 times a day albuterol 4 mg oral tablet: 1 tab(s) orally once a day (at bedtime)  calcium-vitamin D 500 mg-200 intl units oral tablet: 1 tab(s) orally 2 times a day  lidocaine 5% topical film: Apply topically to affected area once a day; on for 12 hours, off for 12 hours  Percocet 5/325 oral tablet: 1 tab(s) orally every 6 hours  predniSONE 20 mg oral tablet: 1 tab(s) orally once a day  Protonix 40 mg oral delayed release tablet: orally 2 times a day  senna oral tablet: 2 tab(s) orally once a day (at bedtime)  Symbicort 160 mcg-4.5 mcg/inh inhalation aerosol: 2 puff(s) inhaled 2 times a day albuterol 4 mg oral tablet: 1 tab(s) orally once a day (at bedtime)  lidocaine 5% topical film: Apply topically to affected area once a day; on for 12 hours, off for 12 hours  oxyCODONE 10 mg oral tablet, extended release: 1 tab(s) orally every 12 hours  predniSONE 20 mg oral tablet: 1 tab(s) orally once a day  Protonix 40 mg oral delayed release tablet: orally 2 times a day  senna oral tablet: 2 tab(s) orally once a day (at bedtime)  Symbicort 160 mcg-4.5 mcg/inh inhalation aerosol: 2 puff(s) inhaled 2 times a day

## 2020-06-08 NOTE — H&P ADULT - NSHPPHYSICALEXAM_GEN_ALL_CORE
VS: HR 97, /77, RR20, 97.8F, O2sat 100% RA  Constitutional: frail, elderly, cachetic appearing  Head: NC/AT  Eyes: PERRL, EOMI  ENT: Normal Pharynx, no exudate  Neck: Supple, Non-tender  Chest: tenderness to palpation,   Cardio: sinus tachycardia, s1/s2, no appreciable murmurs/rubs/gallops  Resp: Tachypneic, decreased BS throughout  Abd: Soft, Non-tender, Non-distended, no rebound/guarding/rigidity  Vascular: palpable distal pulses, good capillary refill  Psych: appropriate, cooperative  Neuro: CN II-XII grossly intact, no focal deficits  MSK: moving all extremities, good ROM, +tenderness to ext/neck/L shoulder (chronic)  Ext: +extremity edema bilaterally 1+ upper and 2+ lower ext  Skin: Warm/Dry. No rashes.

## 2020-06-08 NOTE — DISCHARGE NOTE PROVIDER - CARE PROVIDER_API CALL
STEPHANIE,   Phone: (   )    -  Fax: (   )    -  Follow Up Time:     Neil Damon  GASTROENTEROLOGY  [address] [place] [address]  Phone: [phone]  Fax: [phone]  Follow Up Time:     Philip Simmons  MEDICINE  [address] [place] [address]  Phone: [phone]  Fax: [phone]  Follow Up Time:     Gael Mcfarland  CARDIOLOGY  [address] [place] [address]  Phone: [phone]  Fax: [phone]  Follow Up Time:

## 2020-06-08 NOTE — CONSULT NOTE ADULT - PROBLEM SELECTOR RECOMMENDATION 2
Possible secondary to APAP. Possibly secondary to ischemic hepatopathy as she was hypotensive on admission. Hepatitis serologies ordered. Trend LFT's while here. Normal appearing liver on CT scan. Distended GB likely incidental finding and not indicative of acute cholecystitis.

## 2020-06-08 NOTE — CONSULT NOTE ADULT - PROBLEM SELECTOR RECOMMENDATION 9
With dark stools and increased BUN r/o UGI bleed. Pt not actively bleeding presently. Transfuse to Hb of 8 grams or higher, Cardiac evaluation and clearance for eventual EGD. IV Pantoprazole. Renal consult for markedly elevated BUN which may or may not be from UGI bleed. Repeat labs ordered. OK for clear liquids today. Hold Eliquis. Will proceed with EGD when INR 1.5 or less.

## 2020-06-08 NOTE — CONSULT NOTE ADULT - ASSESSMENT
Improved ELAINE , Azotemia   Elevated BUN related to GIB , acute hepatitis   ? Celebrex PTA ---> Held   No hydro on CT , ? Indeterminate 8 mmm renal lesion ----> will have to be followed   Colitis noted on CT ---> Flagyl / Ceftriaxone , stool studies pending  N-Acetyl-cysteine and Protonix drip   GI Plans noted   ECHO 11/19 noted   Will add IV Bicarb infusion   Follow trend in labs

## 2020-06-08 NOTE — H&P ADULT - ASSESSMENT
Symptomatic Anemia, Upper GI Bleed  -admit to medicine  -trend INR and H&H q6  -NPO  -s/p 2L IVFB in ER hold additional IVF for now  -Protonix 40mg IV x1 in ER  -Continue Protonix gtt  -hold ASA/NSAIDs/Eliquis/Steroids  -GI Consult  -Transfer to Dr. Llanes's service in AM    Coagulopathy with Transaminitis   -Concerning for possible unintentional Tylenol Overdose  -6 Tylenol pills/day +/- 1-2 additional "Pain" pills ?Percocet?  -Trend Tylenol level q6  -Acetadode IV gtt to start now  -Trend INR/LFTs  -Check Acute Hepatitis Panel   -hold AC for DVT  -avoid ASA/NSAIDs  -GI Consult    Leukocytosis with Colitis  -Continue Ceftriaxone 1g IV q24 and Flagyl 500mg IV q8 for empiric colitis coverage  -trend CBC  -IVF as above  -no tylenol/NSAIDs if patient spikes fever use cooling blanket    Chronic LE DVT  -Hold Eliquis with GIB  -DVT diagnosed 11/2019 would consider risks/benefits of longterm AC post-GI bleed.    Polymyalgia Rheumatica  -Hold Prednisone for now    Osteoporosis with multiple vertebral fractures  -exacerbated by chronic steroid use. outpatient f/u.     Mild-Moderate Intermittent Asthma  -Continue Symbicort/Albuterol PRN  -O2 via NC if needed PRN titrate FIO2 >90% and <96%

## 2020-06-08 NOTE — DISCHARGE NOTE PROVIDER - CARE PROVIDERS DIRECT ADDRESSES
,DirectAddress_Unknown,[email or web address],[email or web address],[email or web address]

## 2020-06-08 NOTE — CONSULT NOTE ADULT - ASSESSMENT
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 EMS noted patient was covered in melena on arrival. In ER she was tachycardic, mildly hypotensive, and noted to have multiple abnormal labs concerning for acute upper GI bleed with Hgb drop compared to prior admission 1 month ago (9.5 from ~11) and elevated BUN:Cr ratio.    Pre-op cardiovascular evaluation  - Can proceed for planned endoscopy with moderate- high perioperative risk without cardiac contraindications once INR acceptable  - Eliquis may be held at this time  - Pt seems to be poor candidate for long term anticoagulation  - Close monitoring of BP and volume status  - Post-op ECG and telemetry monitoring    HTN  - BP controlled

## 2020-06-08 NOTE — CONSULT NOTE ADULT - SUBJECTIVE AND OBJECTIVE BOX
Patient is a 88y old  Female who presents with a chief complaint of Melena, Acute GI Bleed, Coagulopathy, Transaminitis (08 Jun 2020 03:39)      HPI:  87 y/o F with PMHX DVT (11/2019) on Eliquis, intermittent asthma (never intubated/no icu stays), polymyalgia rheumatica (chronic prednisone), osteoporosis with multiple vertebral compression fxs BIBEMS after she called 911. EMS noted patient was covered in melena on arrival. In ER she was tachycardic, mildl hypotensive, and noted to have multiple abnormal labs concerning for acute upper GI bleed with Hgb drop compared to prior admission 1 month ago (9.5 from ~11) and elevated BUN:Cr ratio. Patient also noted to have significant transaminitis and upon inquery admits to using frequent tylenol approx 6 normal strength 325mg PO tablets per day on top of another "pain pill" she cannot remember. Patient was discharged on Percocet last admission 1 month ago for acute/chronic pain. Also takes Prednisone 20mg PO daily chronically for polymyalgia rheumatica and has old charts showing Celecoxib, as well as Eliquis for LE DVT. Patient given 2L IVFB in ER with some improvement in BP. CTA C/A/P obtained to r/o mesenteric ischemia and/or other pathology including Acute PE given tachypnea but was negative however did show pancolitis. Tylenol level noted to be elevated and given history patient was started on Protonix gtt and Acetadote IV gtt. Pt lives alone. Normally ambulates with walker but more recently bed bound due to pain.   She denies F/C, N/V, abd pain, admits to melena, denies cough, +SOB, no urinary complaints, +bone pain (Chronic, andrea L shoulder and spine). All other systems negative unless mentioned above. (08 Jun 2020 03:39). Unknown whether she has had prior endoscopies or colonoscopies. No known h/o ETOH abuse. She is c/o diffuse abdominal pain.      REVIEW OF SYSTEMS:  Constitutional: No fever, weight loss or fatigue  ENMT:  No difficulty hearing, tinnitus, vertigo; No sinus or throat pain  Respiratory: No cough, wheezing, chills or hemoptysis  Cardiovascular: No chest pain, palpitations, dizziness or leg swelling  Gastrointestinal: AS per HPI.  Skin: No itching, burning, rashes or lesions   Musculoskeletal: Diffuse joint pain secondary to PMR  Patient has no cardiopulmonary, peripheral vascular, dermatological, neurological, gynecological or psychological symptoms or complaints at this time.    PAST MEDICAL & SURGICAL HISTORY:  Chronic back pain  CHF (congestive heart failure)  Atrial fibrillation  Polymyalgia rheumatica  Diverticulosis  Asthma  DVT  S/P knee replacement, left  S/P hysterectomy: 1982  S/P appendectomy: 1962      FAMILY HISTORY:  Family history of stroke      SOCIAL HISTORY:  Smoking Status: [ ] Current, [ ] Former, [ x] Never  Pack Years: N/A. No known ETOH or drug abuse history    MEDICATIONS:  MEDICATIONS  (STANDING):  acetylcysteine IVPB 3.9 Gram(s) IV Intermittent once  cefTRIAXone   IVPB 1000 milliGRAM(s) IV Intermittent every 24 hours  metroNIDAZOLE  IVPB      metroNIDAZOLE  IVPB 500 milliGRAM(s) IV Intermittent every 8 hours  morphine  - Injectable 1 milliGRAM(s) IV Push once  pantoprazole Infusion 8 mG/Hr (10 mL/Hr) IV Continuous <Continuous>    MEDICATIONS  (PRN):      Allergies    dust (Unknown)  No Known Drug Allergies    Intolerances    provide mech soft diet (Unknown)      Vital Signs Last 24 Hrs  T(C): 36.5 (08 Jun 2020 06:40), Max: 36.8 (08 Jun 2020 05:59)  T(F): 97.7 (08 Jun 2020 06:40), Max: 98.2 (08 Jun 2020 05:59)  HR: 118 (08 Jun 2020 06:40) (97 - 126)  BP: 128/85 (08 Jun 2020 06:40) (95/57 - 128/85)  BP(mean): --  RR: 18 (08 Jun 2020 06:40) (18 - 35)  SpO2: 98% (08 Jun 2020 06:40) (98% - 100%)        PHYSICAL EXAM:    General: Well developed; frail, cachectib in moderate distress,  HEENT: MMM, conjunctiva pale and sclera anicteric.  Lungs: Clear bilaterally,  Cor: Irregular rhythm, rate roughly 100  Gastrointestinal: Abdomen: Softly distended, + moderate diffuse tenderness, Normal bowel sounds; No rebound or guarding or HSM.  VINITA: Decreased sphincter tone, dark brown stool in rectal vault, not melenic. No blood.  Extremities: Normal range of motion, No clubbing, cyanosis or edema  Neurological: Alert and oriented x3  Skin: Warm and dry. No obvious rash      LABS:                        6.9    15.59 )-----------( 192      ( 08 Jun 2020 04:24 )             20.7     06-08    135  |  104  |  98.0<H>  ----------------------------<  118<H>  3.9   |  17.0<L>  |  1.36<H>    Ca    8.7      08 Jun 2020 04:24  Phos  3.7     06-08  Mg     2.2     06-08    TPro  4.6<L>  /  Alb  2.9<L>  /  TBili  0.6  /  DBili  x   /  AST  2043<H>  /  ALT  3159<H>  /  AlkPhos  99  06-08          RADIOLOGY & ADDITIONAL STUDIES:   CTA results noted above; + colitis, + distended GB, Normal appearing liver Patient is a 88y old  Female who presents with a chief complaint of Melena, Acute GI Bleed, Coagulopathy, Transaminitis (08 Jun 2020 03:39)      HPI:  89 y/o F with PMHX DVT (11/2019) on Eliquis, intermittent asthma (never intubated/no icu stays), polymyalgia rheumatica (chronic prednisone), osteoporosis with multiple vertebral compression fxs BIBEMS after she called 911. EMS noted patient was covered in melena on arrival. In ER she was tachycardic, mildl hypotensive, and noted to have multiple abnormal labs concerning for acute upper GI bleed with Hgb drop compared to prior admission 1 month ago (9.5 from ~11) and elevated BUN:Cr ratio. Patient also noted to have significant transaminitis and upon inquery admits to using frequent tylenol approx 6 normal strength 325mg PO tablets per day on top of another "pain pill" she cannot remember. Patient was discharged on Percocet last admission 1 month ago for acute/chronic pain. Also takes Prednisone 20mg PO daily chronically for polymyalgia rheumatica and has old charts showing Celecoxib, as well as Eliquis for LE DVT. Patient given 2L IVFB in ER with some improvement in BP. CTA C/A/P obtained to r/o mesenteric ischemia and/or other pathology including Acute PE given tachypnea but was negative however did show pancolitis. Tylenol level noted to be elevated and given history patient was started on Protonix gtt and Acetadote IV gtt. Pt lives alone. Normally ambulates with walker but more recently bed bound due to pain.   She denies F/C, N/V, abd pain, admits to melena, denies cough, +SOB, no urinary complaints, +bone pain (Chronic, andrea L shoulder and spine). All other systems negative unless mentioned above. (08 Jun 2020 03:39). Unknown whether she has had prior endoscopies or colonoscopies. No known h/o ETOH abuse. She is c/o diffuse abdominal pain.      REVIEW OF SYSTEMS:  Constitutional: No fever, weight loss or fatigue  ENMT:  No difficulty hearing, tinnitus, vertigo; No sinus or throat pain  Respiratory: No cough, wheezing, chills or hemoptysis  Cardiovascular: No chest pain, palpitations, dizziness or leg swelling  Gastrointestinal: AS per HPI.  Skin: No itching, burning, rashes or lesions   Musculoskeletal: Diffuse joint pain secondary to PMR  Patient has no cardiopulmonary, peripheral vascular, dermatological, neurological, gynecological or psychological symptoms or complaints at this time.    PAST MEDICAL & SURGICAL HISTORY:  Chronic back pain  CHF (congestive heart failure)  Atrial fibrillation  Polymyalgia rheumatica  Diverticulosis  Asthma  DVT  S/P knee replacement, left  S/P hysterectomy: 1982  S/P appendectomy: 1962      FAMILY HISTORY:  Family history of stroke      SOCIAL HISTORY:  Smoking Status: [ ] Current, [ ] Former, [ x] Never  Pack Years: N/A. No known ETOH or drug abuse history    MEDICATIONS:  MEDICATIONS  (STANDING):  acetylcysteine IVPB 3.9 Gram(s) IV Intermittent once  cefTRIAXone   IVPB 1000 milliGRAM(s) IV Intermittent every 24 hours  metroNIDAZOLE  IVPB      metroNIDAZOLE  IVPB 500 milliGRAM(s) IV Intermittent every 8 hours  morphine  - Injectable 1 milliGRAM(s) IV Push once  pantoprazole Infusion 8 mG/Hr (10 mL/Hr) IV Continuous <Continuous>    MEDICATIONS  (PRN):      Allergies    dust (Unknown)  No Known Drug Allergies    Intolerances    provide mech soft diet (Unknown)      Vital Signs Last 24 Hrs  T(C): 36.5 (08 Jun 2020 06:40), Max: 36.8 (08 Jun 2020 05:59)  T(F): 97.7 (08 Jun 2020 06:40), Max: 98.2 (08 Jun 2020 05:59)  HR: 118 (08 Jun 2020 06:40) (97 - 126)  BP: 128/85 (08 Jun 2020 06:40) (95/57 - 128/85)  BP(mean): --  RR: 18 (08 Jun 2020 06:40) (18 - 35)  SpO2: 98% (08 Jun 2020 06:40) (98% - 100%)        PHYSICAL EXAM:    General: Well developed; frail, cachectic in moderate distress,  HEENT: MMM, conjunctiva pale and sclera anicteric.  Lungs: Clear bilaterally,  Cor: Irregular rhythm, rate roughly 100  Gastrointestinal: Abdomen: Softly distended, + moderate diffuse tenderness, Normal bowel sounds; No rebound or guarding or HSM.  VINITA: Decreased sphincter tone, dark brown stool in rectal vault, not melenic. No blood.  Extremities: Normal range of motion, No clubbing, cyanosis or edema  Neurological: Alert and oriented x3  Skin: Warm and dry. No obvious rash      LABS:                        6.9    15.59 )-----------( 192      ( 08 Jun 2020 04:24 )             20.7     06-08    135  |  104  |  98.0<H>  ----------------------------<  118<H>  3.9   |  17.0<L>  |  1.36<H>    Ca    8.7      08 Jun 2020 04:24  Phos  3.7     06-08  Mg     2.2     06-08    TPro  4.6<L>  /  Alb  2.9<L>  /  TBili  0.6  /  DBili  x   /  AST  2043<H>  /  ALT  3159<H>  /  AlkPhos  99  06-08          RADIOLOGY & ADDITIONAL STUDIES:   CTA results noted above; + colitis, + distended GB, Normal appearing liver. + diverticulosis. No active GI bleeding. Small liver cyst

## 2020-06-09 LAB
ALBUMIN SERPL ELPH-MCNC: 3 G/DL — LOW (ref 3.3–5.2)
ALP SERPL-CCNC: 102 U/L — SIGNIFICANT CHANGE UP (ref 40–120)
ALT FLD-CCNC: 1893 U/L — HIGH
ANION GAP SERPL CALC-SCNC: 10 MMOL/L — SIGNIFICANT CHANGE UP (ref 5–17)
ANION GAP SERPL CALC-SCNC: 11 MMOL/L — SIGNIFICANT CHANGE UP (ref 5–17)
AST SERPL-CCNC: 457 U/L — HIGH
BASOPHILS # BLD AUTO: 0.03 K/UL — SIGNIFICANT CHANGE UP (ref 0–0.2)
BASOPHILS NFR BLD AUTO: 0.4 % — SIGNIFICANT CHANGE UP (ref 0–2)
BILIRUB SERPL-MCNC: 0.8 MG/DL — SIGNIFICANT CHANGE UP (ref 0.4–2)
BUN SERPL-MCNC: 26 MG/DL — HIGH (ref 8–20)
BUN SERPL-MCNC: 41 MG/DL — HIGH (ref 8–20)
CALCIUM SERPL-MCNC: 7.8 MG/DL — LOW (ref 8.6–10.2)
CALCIUM SERPL-MCNC: 8.6 MG/DL — SIGNIFICANT CHANGE UP (ref 8.6–10.2)
CHLORIDE SERPL-SCNC: 104 MMOL/L — SIGNIFICANT CHANGE UP (ref 98–107)
CHLORIDE SERPL-SCNC: 107 MMOL/L — SIGNIFICANT CHANGE UP (ref 98–107)
CO2 SERPL-SCNC: 23 MMOL/L — SIGNIFICANT CHANGE UP (ref 22–29)
CO2 SERPL-SCNC: 24 MMOL/L — SIGNIFICANT CHANGE UP (ref 22–29)
CREAT SERPL-MCNC: 0.7 MG/DL — SIGNIFICANT CHANGE UP (ref 0.5–1.3)
CREAT SERPL-MCNC: 0.7 MG/DL — SIGNIFICANT CHANGE UP (ref 0.5–1.3)
EOSINOPHIL # BLD AUTO: 0.1 K/UL — SIGNIFICANT CHANGE UP (ref 0–0.5)
EOSINOPHIL NFR BLD AUTO: 1.2 % — SIGNIFICANT CHANGE UP (ref 0–6)
GLUCOSE SERPL-MCNC: 107 MG/DL — HIGH (ref 70–99)
GLUCOSE SERPL-MCNC: 132 MG/DL — HIGH (ref 70–99)
HCT VFR BLD CALC: 25.6 % — LOW (ref 34.5–45)
HETEROPH AB TITR SER AGGL: NEGATIVE — SIGNIFICANT CHANGE UP
HGB BLD-MCNC: 8.8 G/DL — LOW (ref 11.5–15.5)
IMM GRANULOCYTES NFR BLD AUTO: 2.8 % — HIGH (ref 0–1.5)
INR BLD: 1.33 RATIO — HIGH (ref 0.88–1.16)
LYMPHOCYTES # BLD AUTO: 0.65 K/UL — LOW (ref 1–3.3)
LYMPHOCYTES # BLD AUTO: 7.7 % — LOW (ref 13–44)
MCHC RBC-ENTMCNC: 31 PG — SIGNIFICANT CHANGE UP (ref 27–34)
MCHC RBC-ENTMCNC: 34.4 GM/DL — SIGNIFICANT CHANGE UP (ref 32–36)
MCV RBC AUTO: 90.1 FL — SIGNIFICANT CHANGE UP (ref 80–100)
MONOCYTES # BLD AUTO: 0.41 K/UL — SIGNIFICANT CHANGE UP (ref 0–0.9)
MONOCYTES NFR BLD AUTO: 4.9 % — SIGNIFICANT CHANGE UP (ref 2–14)
NEUTROPHILS # BLD AUTO: 7.02 K/UL — SIGNIFICANT CHANGE UP (ref 1.8–7.4)
NEUTROPHILS NFR BLD AUTO: 83 % — HIGH (ref 43–77)
NRBC # BLD: 1 /100 WBCS — HIGH (ref 0–0)
PLATELET # BLD AUTO: 165 K/UL — SIGNIFICANT CHANGE UP (ref 150–400)
POTASSIUM SERPL-MCNC: 2.9 MMOL/L — CRITICAL LOW (ref 3.5–5.3)
POTASSIUM SERPL-MCNC: 4 MMOL/L — SIGNIFICANT CHANGE UP (ref 3.5–5.3)
POTASSIUM SERPL-SCNC: 2.9 MMOL/L — CRITICAL LOW (ref 3.5–5.3)
POTASSIUM SERPL-SCNC: 4 MMOL/L — SIGNIFICANT CHANGE UP (ref 3.5–5.3)
PROT SERPL-MCNC: 4.9 G/DL — LOW (ref 6.6–8.7)
PROTHROM AB SERPL-ACNC: 15.2 SEC — HIGH (ref 10–12.9)
RBC # BLD: 2.84 M/UL — LOW (ref 3.8–5.2)
RBC # FLD: 15.1 % — HIGH (ref 10.3–14.5)
SODIUM SERPL-SCNC: 139 MMOL/L — SIGNIFICANT CHANGE UP (ref 135–145)
SODIUM SERPL-SCNC: 140 MMOL/L — SIGNIFICANT CHANGE UP (ref 135–145)
WBC # BLD: 8.45 K/UL — SIGNIFICANT CHANGE UP (ref 3.8–10.5)
WBC # FLD AUTO: 8.45 K/UL — SIGNIFICANT CHANGE UP (ref 3.8–10.5)

## 2020-06-09 PROCEDURE — 43235 EGD DIAGNOSTIC BRUSH WASH: CPT

## 2020-06-09 RX ORDER — POTASSIUM CHLORIDE 20 MEQ
40 PACKET (EA) ORAL ONCE
Refills: 0 | Status: COMPLETED | OUTPATIENT
Start: 2020-06-09 | End: 2020-06-09

## 2020-06-09 RX ORDER — MORPHINE SULFATE 50 MG/1
1 CAPSULE, EXTENDED RELEASE ORAL EVERY 6 HOURS
Refills: 0 | Status: DISCONTINUED | OUTPATIENT
Start: 2020-06-09 | End: 2020-06-12

## 2020-06-09 RX ORDER — APIXABAN 2.5 MG/1
1 TABLET, FILM COATED ORAL
Qty: 0 | Refills: 0 | DISCHARGE

## 2020-06-09 RX ORDER — DEXTROSE MONOHYDRATE, SODIUM CHLORIDE, AND POTASSIUM CHLORIDE 50; .745; 4.5 G/1000ML; G/1000ML; G/1000ML
1000 INJECTION, SOLUTION INTRAVENOUS
Refills: 0 | Status: DISCONTINUED | OUTPATIENT
Start: 2020-06-09 | End: 2020-06-12

## 2020-06-09 RX ORDER — POTASSIUM CHLORIDE 20 MEQ
10 PACKET (EA) ORAL
Refills: 0 | Status: COMPLETED | OUTPATIENT
Start: 2020-06-09 | End: 2020-06-09

## 2020-06-09 RX ADMIN — Medication 40 MILLIEQUIVALENT(S): at 11:26

## 2020-06-09 RX ADMIN — CEFTRIAXONE 100 MILLIGRAM(S): 500 INJECTION, POWDER, FOR SOLUTION INTRAMUSCULAR; INTRAVENOUS at 21:57

## 2020-06-09 RX ADMIN — LIDOCAINE 1 PATCH: 4 CREAM TOPICAL at 12:35

## 2020-06-09 RX ADMIN — Medication 100 MILLIGRAM(S): at 22:34

## 2020-06-09 RX ADMIN — LIDOCAINE 1 PATCH: 4 CREAM TOPICAL at 00:09

## 2020-06-09 RX ADMIN — LIDOCAINE 1 PATCH: 4 CREAM TOPICAL at 08:17

## 2020-06-09 RX ADMIN — Medication 100 MILLIEQUIVALENT(S): at 13:15

## 2020-06-09 RX ADMIN — MORPHINE SULFATE 1 MILLIGRAM(S): 50 CAPSULE, EXTENDED RELEASE ORAL at 23:17

## 2020-06-09 RX ADMIN — Medication 100 MILLIEQUIVALENT(S): at 11:26

## 2020-06-09 RX ADMIN — Medication 100 MILLIGRAM(S): at 14:55

## 2020-06-09 RX ADMIN — Medication 100 MILLIGRAM(S): at 06:32

## 2020-06-09 RX ADMIN — DEXTROSE MONOHYDRATE, SODIUM CHLORIDE, AND POTASSIUM CHLORIDE 70 MILLILITER(S): 50; .745; 4.5 INJECTION, SOLUTION INTRAVENOUS at 16:49

## 2020-06-09 NOTE — BRIEF OPERATIVE NOTE - NSICDXBRIEFPREOP_GEN_ALL_CORE_FT
PRE-OP DIAGNOSIS:  Elevated INR 09-Jun-2020 09:32:58  ANIL Villasenor  Acute blood loss anemia 09-Jun-2020 09:32:45  ANIL Villasenor  Melena 09-Jun-2020 09:32:38  ANIL Villasenor

## 2020-06-09 NOTE — PROGRESS NOTE ADULT - ASSESSMENT
· Assessment		  Improved ELAINE , Azotemia   Elevated BUN related to GIB , acute hepatitis . EGD noted   ? Celebrex PTA ---> Held . Continue to avoid , as well as NSAIDS  No hydro on CT , ? Indeterminate 8 mmm renal lesion ----> will have to be followed with time   Colitis noted on CT ---> Flagyl / Ceftriaxone , stool studies pending  N-Acetyl-cysteine was given , and Protonix drip   LFT's getting better   GI Plans noted   ECHO 11/19 noted     Hypokalemia - Likely related to transcellular shift/ correction of pH with bicarb drip   Supplements given in ER noted   Will adjust IVF   hold bicarb   BMP and Mag level 7 pm

## 2020-06-09 NOTE — PHYSICAL THERAPY INITIAL EVALUATION ADULT - CRITERIA FOR SKILLED THERAPEUTIC INTERVENTIONS
impairments found/rehab potential/functional limitations in following categories/anticipated discharge recommendation/therapy frequency/predicted duration of therapy intervention

## 2020-06-09 NOTE — PROGRESS NOTE ADULT - SUBJECTIVE AND OBJECTIVE BOX
HPI:  89 y/o F with PMHX DVT (11/2019) on Eliquis, intermittent asthma (never intubated/no icu stays), polymyalgia rheumatica (chronic prednisone), osteoporosis with multiple vertebral compression fxs BIBEMS after she called 911. EMS noted patient was covered in melena on arrival. In ER she was tachycardic, mildl hypotensive, and noted to have multiple abnormal labs concerning for acute upper GI bleed with Hgb drop compared to prior admission 1 month ago (9.5 from ~11) and elevated BUN:Cr ratio. Patient also noted to have significant transaminitis and upon inquery admits to using frequent tylenol approx 6 normal strength 325mg PO tablets per day on top of another "pain pill" she cannot remember. Patient was discharged on Percocet last admission 1 month ago for acute/chronic pain. Also takes Prednisone 20mg PO daily chronically for polymyalgia rheumatica and has old charts showing Celecoxib, as well as Eliquis for LE DVT. Patient given 2L IVFB in ER with some improvement in BP. CTA C/A/P obtained to r/o mesenteric ischemia and/or other pathology including Acute PE given tachypnea but was negative however did show pancolitis. Tylenol level noted to be elevated and given history patient was started on Protonix gtt and Acetadote IV gtt.   Pt lives alone. Normally ambulates with walker but more recently bed bound due to pain.   She denies F/C, N/V, abd pain, admits to melena, denies cough, +SOB, no urinary complaints, +bone pain (Chronic, andrea L shoulder and spine). All other systems negative unless mentioned above. (08 Jun 2020 03:39)      PAST MEDICAL & SURGICAL HISTORY:  Chronic back pain  CHF (congestive heart failure)  Atrial fibrillation  Polymyalgia rheumatica  Diverticulosis  Asthma  S/P knee replacement, left  S/P hysterectomy: 1982  S/P appendectomy: 1962      ANTIMICROBIAL:  cefTRIAXone   IVPB 1000 milliGRAM(s) IV Intermittent every 24 hours  metroNIDAZOLE  IVPB      metroNIDAZOLE  IVPB 500 milliGRAM(s) IV Intermittent every 8 hours        NEUROLOGIC:  morphine  - Injectable 1 milliGRAM(s) IV Push once        GATROINTESTINAL:  pantoprazole Infusion 8 mG/Hr IV Continuous <Continuous>        IMMUNOLOGIC & OTHER  acetylcysteine IVPB 3.9 Gram(s) IV Intermittent once        Allergies    dust (Unknown)  No Known Drug Allergies    Intolerances    provide mech soft diet (Unknown)      SOCIAL HISTORY:    FAMILY HISTORY:  Family history of stroke      ICU Vital Signs Last 24 Hrs  T(C): 36.9 (09 Jun 2020 07:50), Max: 37.2 (08 Jun 2020 09:25)  T(F): 98.5 (09 Jun 2020 07:50), Max: 98.9 (08 Jun 2020 09:25)  HR: 108 (09 Jun 2020 07:50) (95 - 114)  BP: 132/73 (09 Jun 2020 07:50) (127/78 - 149/78)  BP(mean): --  ABP: --  ABP(mean): --  RR: 20 (09 Jun 2020 07:50) (19 - 20)  SpO2: 100% (09 Jun 2020 07:50) (100% - 100%)            REVIEW OF SYSTEMS:    c/o pain   no CP  no SOB          PHYSICAL EXAM:    GENERAL: NAD, well-groomed, well-developed  HEAD:  Atraumatic, Normocephalic  NERVOUS SYSTEM:  sleeping but arousable    CHEST/LUNG: Clear   HEART: Regular tachy  ABDOMEN: Soft, Nontender, Nondistended; Bowel sounds present  EXTREMITIES:  2+ Peripheral Pulses, +1 edema ??        LABS:                                       8.8    8.45  )-----------( 165      ( 09 Jun 2020 08:07 )             25.6          06-08    137  |  103  |  85.0<H>  ----------------------------<  161<H>  3.6   |  15.0<L>  |  1.14    Ca    8.8      08 Jun 2020 12:24  Phos  3.7     06-08  Mg     2.2     06-08    TPro  5.1<L>  /  Alb  3.0<L>  /  TBili  1.0  /  DBili  x   /  AST  1668<H>  /  ALT  3361<H>  /  AlkPhos  109  06-08      RADIOLOGY & ADDITIONAL STUDIES:    IMPRESSION:     1. No pulmonary embolism.  2. Long segment colitis.  3. Indeterminate 8 mm left upper pole renal lesion. Nonurgent ultrasound is recommended for further evaluation.  4. Interval development of mild compression deformities at T4 and T12. Interval progression of now moderate compression deformity of L1.

## 2020-06-09 NOTE — PROGRESS NOTE ADULT - ASSESSMENT
1.  Symptomatic Anemia, Upper GI Bleed  H/H stable   - on clears   -Protonix 40mg IV   -hold ASA/NSAIDs/Eliquis/Steroids  -GI Consult noted and appreciated       2.   Coagulopathy with Transaminitis   - Likely  ischemic hepatitis -- pt was hypotensive on admission   Tylenol levels normal pt was taking 6 Tylenol pills/day +/- 1-2 additional "Pain" pills ?Percocet?        3.  Leukocytosis with Colitis  -Continue Ceftriaxone 1g IV q24 and Flagyl 500mg IV q8 for empiric colitis coverage   if patient spikes fever use cooling blanket    4.   Chronic LE DVT  -Hold Eliquis with GIB  -DVT diagnosed 11/2019 would consider risks/benefits of longterm AC post-GI bleed.    5.   Polymyalgia Rheumatica and T spine and L spine Fx  -Hold Prednisone for now  will likely give Fentanyl patch     6.   Osteoporosis with multiple vertebral fractures  -exacerbated by chronic steroid use. outpatient f/u.     7.   Mild-Moderate Intermittent Asthma  -Continue Symbicort/Albuterol PRN  -O2 via NC if needed PRN titrate FIO2 >90% and <96%    8.   Tachycardia due to anemia   cardio called for pat Rodríguez     9.  Acute renal failure   elevated BUN  nephrology called

## 2020-06-09 NOTE — PHYSICAL THERAPY INITIAL EVALUATION ADULT - PERTINENT HX OF CURRENT PROBLEM, REHAB EVAL
pt presents to [facility] due to acute GI bleed, anemia, mild compression deformity T4 and T12, moderate L1 compression deformity

## 2020-06-09 NOTE — PHYSICAL THERAPY INITIAL EVALUATION ADULT - GAIT PATTERN USED, PT EVAL
decreased gait velocity and activity tolerance, unsteadiness c turns, pt soiled herself limiting ambulation

## 2020-06-09 NOTE — BRIEF OPERATIVE NOTE - NSICDXBRIEFPOSTOP_GEN_ALL_CORE_FT
POST-OP DIAGNOSIS:  Multiple duodenal ulcers 09-Jun-2020 09:33:45  ANIL Villasenor  Chronic Josh lesion 09-Jun-2020 09:33:35  ANIL Villasenor  Gastritis 09-Jun-2020 09:33:20  ANIL Villasenor  Large hiatal hernia 09-Jun-2020 09:33:09  ANIL Villasenor

## 2020-06-09 NOTE — PROGRESS NOTE ADULT - PROVIDER SPECIALTY LIST ADULT
"[employer]  Presents for PPE for football  Doing well, no complaints.  Vitals: /74  Pulse 82  Ht 1.905 m (6' 3\")  Wt 98.9 kg (218 lb)  BMI 27.25 kg/m2  BMI= Body mass index is 27.25 kg/(m^2).  Sport(s): Football    Vision: Right Eye: 20/20 Left Eye: 20/20 Both Eyes: 20/20  Correction: glasses and contacts  Pupils: equal    Sickle Cell Trait: Discussed  Concussions: Concussion fact sheet reviewed. Student Athlete gave written and verbal agreement to report any suspected concussions.    General/Medical  Eyes/Vision: Normal  Ears/Hearing: Normal  Nose: Normal  Mouth/Dental: Normal  Throat: Normal  Thyroid: Normal  Lymph Nodes: Normal  Lungs: Normal  Abdomen: Normal  Genitourinary deferred  Skin: Normal    Musculoskeletal/Orthopaedic  Neck/Cervical: Normal  Thoracic/Lumbar: Normal  Shoulder/Upper Arm: Normal  Elbow/Forearm: Normal  Wrist/Hand/Fingers: Normal  Hip/Thigh: Normal  Knee/Patella: Normal  Lower Leg/Ankles: Normal  Foot/Toes: Normal    Cardiovascular Screening  RRR, no murmurs  Heart Murmur:No Grade: NA  Symmetric Femoral pulses: Yes    Stigmata of Marfan's Syndrome - if appropriate:  Not applicable    Assessment: 16 yo male for sports physical    COMMENTS, RECOMMENDATIONS and PARTICIPATION STATUS  Cleared  Dr Tejada    " Nephrology

## 2020-06-09 NOTE — PROGRESS NOTE ADULT - SUBJECTIVE AND OBJECTIVE BOX
NEPHROLOGY INTERVAL HPI/OVERNIGHT EVENTS:    Feels better   GI and Cardio follow up noted   Bicarb drip noted   EGD findings noted         MEDICATIONS  (STANDING):  cefTRIAXone   IVPB 1000 milliGRAM(s) IV Intermittent every 24 hours  metroNIDAZOLE  IVPB      metroNIDAZOLE  IVPB 500 milliGRAM(s) IV Intermittent every 8 hours  pantoprazole Infusion 8 mG/Hr (10 mL/Hr) IV Continuous <Continuous>  sodium bicarbonate  Infusion 0.272 mEq/kG/Hr (70 mL/Hr) IV Continuous <Continuous>    MEDICATIONS  (PRN):  morphine  - Injectable 1 milliGRAM(s) IV Push every 6 hours PRN Moderate Pain (4 - 6)      Allergies    dust (Unknown)  No Known Drug Allergies    Intolerances    provide [facility] (Unknown)      Vital Signs Last 24 Hrs  T(C): 36.8 (2020 11:35), Max: 37.1 (2020 15:19)  T(F): 98.2 (2020 11:35), Max: 98.8 (2020 15:19)  HR: 68 (2020 11:35) (68 - 108)  BP: 145/79 (2020 11:35) (132/73 - 149/78)  BP(mean): --  RR: 20 (2020 11:35) (19 - 20)  SpO2: 96% (2020 11:35) (96% - 100%)  Daily     Daily   I&O's Detail    I&O's Summary      PHYSICAL EXAM:    GENERAL: comfortable flat. Looks chronically ill   HEAD:  Atraumatic, No edema , dry membranes   NECK: Supple, No JVD,   CHEST/LUNG: EAE , No wheeze   HEART: Irregula , No gallop or rub   ABDOMEN: Soft, No rigidity or rebound . + diaper   EXTREMITIES:  multiple areas of ecchymoses, muscle wasting , min edema       LABS:                        8.8    8.45  )-----------( 165      ( 2020 08:07 )             25.6     06-09    139  |  104  |  41.0<H>  ----------------------------<  132<H>  2.9<LL>   |  24.0  |  0.70    Ca    8.6      2020 08:07  Phos  3.7     06-08  Mg     2.2     06-08    TPro  4.9<L>  /  Alb  3.0<L>  /  TBili  0.8  /  DBili  x   /  AST  457<H>  /  ALT  1893<H>  /  AlkPhos  102      PT/INR - ( 2020 08:07 )   PT: 15.2 sec;   INR: 1.33 ratio         PTT - ( 2020 04:24 )  PTT:31.3 sec  Urinalysis Basic - ( 2020 20:30 )    Color: Yellow / Appearance: Clear / S.015 / pH: x  Gluc: x / Ketone: Trace  / Bili: Negative / Urobili: Negative mg/dL   Blood: x / Protein: 30 mg/dL / Nitrite: Negative   Leuk Esterase: Small / RBC: 3-5 /HPF / WBC 3-5   Sq Epi: x / Non Sq Epi: Occasional / Bacteria: Occasional               EXAM:  CT ANGIO CHEST (W)AW IC                         EXAM:  CT ABDOMEN AND PELVIS IC                          *** ADDENDUM 2020  ***    Body of the report should include distended gallbladder without evidence of wall thickening or pericholecystic inflammation.      *** END OF ADDENDUM 2020  ***      PROCEDURE DATE:  2020          INTERPRETATION:  CLINICAL INFORMATION: Shortness of breath, tachycardia, melena.    COMPARISON: 1/10/2020    PROCEDURE:   CT Angiography of the Chest was performed followed by portal venous phase imaging of the Abdomen and Pelvis.  IV Contrast: 94 ml of Omnipaque 300 was injected intravenously. 6 ml were discarded.  Oral contrast: None.  Sagittal and coronal reformats were performed as well as 3D (MIP) reconstructions.    FINDINGS:  CHEST:   LUNGS AND LARGE AIRWAYS: Patent central airways. Mild bibasilar atelectasis. Stable 4 mm left lower lobe nodule (6, 62). Biapical scarring.  PLEURA: No pleural effusion.  VESSELS: No pulmonary embolism.Atherosclerotic changes of the aorta. Stable ectasia of the ascending aorta up to 3.8 cm.  HEART: Heart size is normal. No pericardial effusion.  MEDIASTINUM AND GERARDO: No lymphadenopathy.  CHEST WALL AND LOWER NECK: Within normal limits.    ABDOMEN AND PELVIS:  LIVER: Stable hepatic cysts including 5.8 cm left hepatic lobe cyst with calcified septations.  BILE DUCTS: Normal caliber.  GALLBLADDER: Within normal limits.  SPLEEN: Within normal limits.  PANCREAS: Within normal limits.  ADRENALS: Within normal limits.  KIDNEYS/URETERS: No hydronephrosis. Symmetric renal enhancement. Bilateral subcentimeter hypodensities too small to characterize. Indeterminate 8 mm left upper pole lesion which appears to be soft tissue density (12, 21).    BLADDER: Distended.  REPRODUCTIVE ORGANS: Uterus removed or atrophic. No adnexal mass.    BOWEL: No bowel obstruction. Appendix is not visualized. No evidence of inflammation in the pericecal region. Colonic diverticulosis. Long segment colonic wall thickening extending from the ascending colon to the sigmoid colon, compatible with colitis.  PERITONEUM: No ascites.  VESSELS: Atherosclerotic changes.  RETROPERITONEUM/LYMPH NODES: No lymphadenopathy.    ABDOMINAL WALL: Cachexia.  BONES: Stable chronic deformity of the right glenoid. Multiple chronic bilateral rib fractures. Degenerative changes of the spine, hips and glenohumeral joints. Stable anterolisthesis at C7-T1, retrolisthesis at L2-L3, retrolisthesis at L3-L4 and anterolisthesis at L4-L5. Interval development of mild compression deformity at T4. Stable severe compression deformities of T7 and T11. Interval development of mild compression deformity of T12. Mild interval progression of now moderate compression deformity at L1. Stable appearance of right iliac wing fracture deformity. Chronic fracture deformities of the bilateral pubic rami also noted.    IMPRESSION:     1. No pulmonary embolism.  2. Long segment colitis.  3. Indeterminate 8 mm left upper pole renal lesion. Nonurgent ultrasound is recommended for further evaluation.  4. Interval development of mild compression deformities at T4 and T12. Interval progression of now moderate compression deformity of L1.          ***Please see the addendum at the top of this report. It may contain additional important information or changes.****          JOSÉ MIGUEL WU M.D., ATTENDING RADIOLOGIST  This document has been electronically signed. 2020  1:14AM  Addend:  JOSÉ MIGUEL WU M.D., ATTENDING RADIOLOGIST  This addendum was electronically signed on: 2020  2:04AM.

## 2020-06-09 NOTE — PROGRESS NOTE ADULT - SUBJECTIVE AND OBJECTIVE BOX
[facility] - [facility], THE HEART CENTER                                   [address] [place] [address]                                                      PHONE: [phone]                                                         FAX: [phone]  [email or web address]  ---------------------------------------------------------------------------------------------------------------------------------    Overnight events/patient complaints:  Patient feeling well but having some back pain.     PAST MEDICAL & SURGICAL HISTORY:  Chronic back pain  CHF (congestive heart failure)  Atrial fibrillation  Polymyalgia rheumatica  Diverticulosis  Asthma  S/P knee replacement, left  S/P hysterectomy:   S/P appendectomy:       dust (Unknown)  No Known Drug Allergies  provide [facility] soft diet (Unknown)    MEDICATIONS  (STANDING):  cefTRIAXone   IVPB 1000 milliGRAM(s) IV Intermittent every 24 hours  metroNIDAZOLE  IVPB 500 milliGRAM(s) IV Intermittent every 8 hours  metroNIDAZOLE  IVPB      pantoprazole Infusion 8 mG/Hr (10 mL/Hr) IV Continuous <Continuous>  potassium chloride    Tablet ER 40 milliEquivalent(s) Oral once  potassium chloride  10 mEq/100 mL IVPB 10 milliEquivalent(s) IV Intermittent every 1 hour  sodium bicarbonate  Infusion 0.272 mEq/kG/Hr (70 mL/Hr) IV Continuous <Continuous>    MEDICATIONS  (PRN):  morphine  - Injectable 1 milliGRAM(s) IV Push every 6 hours PRN Moderate Pain (4 - 6)      Vital Signs Last 24 Hrs  T(C): 36.9 (2020 07:50), Max: 37.2 (2020 11:06)  T(F): 98.5 (2020 07:50), Max: 98.9 (2020 11:06)  HR: 108 (2020 07:50) (95 - 108)  BP: 132/73 (2020 07:50) (129/91 - 149/78)  BP(mean): --  RR: 20 (2020 07:50) (19 - 20)  SpO2: 100% (2020 07:50) (100% - 100%)  ICU Vital Signs Last 24 Hrs  SARA TRIANA  I&O's Detail    I&O's Summary    Drug Dosing Weight  SARA TRIANA      PHYSICAL EXAM:  General: Appears well developed, well nourished alert and cooperative.  HEENT: Head; normocephalic, atraumatic.  Eyes: Pupils reactive, cornea wnl.  Neck: Supple, no nodes adenopathy, no NVD or carotid bruit or thyromegaly.  CARDIOVASCULAR: Normal S1 and S2, No murmur, rub, gallop or lift.   LUNGS: No rales, rhonchi or wheeze. Normal breath sounds bilaterally.  ABDOMEN: Soft, nontender without mass or organomegaly. bowel sounds normoactive.  EXTREMITIES: No clubbing, cyanosis or edema. Distal pulses wnl.   SKIN: warm and dry with normal turgor.  NEURO: Alert/oriented x 3/normal motor exam.   PSYCH: normal affect.        LABS:                        8.8    8.45  )-----------( 165      ( 2020 08:07 )             25.6     06-09    139  |  104  |  41.0<H>  ----------------------------<  132<H>  2.9<LL>   |  24.0  |  0.70    Ca    8.6      2020 08:07  Phos  3.7     06-08  Mg     2.2     06-08    TPro  4.9<L>  /  Alb  3.0<L>  /  TBili  0.8  /  DBili  x   /  AST  457<H>  /  ALT  1893<H>  /  AlkPhos  102  06-09    SARA TRIANA  CARDIAC MARKERS ( 2020 22:16 )  x     / 0.07 ng/mL / 97 U/L / x     / x          PT/INR - ( 2020 08:07 )   PT: 15.2 sec;   INR: 1.33 ratio         PTT - ( 2020 04:24 )  PTT:31.3 sec  Urinalysis Basic - ( 2020 20:30 )    Color: Yellow / Appearance: Clear / S.015 / pH: x  Gluc: x / Ketone: Trace  / Bili: Negative / Urobili: Negative mg/dL   Blood: x / Protein: 30 mg/dL / Nitrite: Negative   Leuk Esterase: Small / RBC: 3-5 /HPF / WBC 3-5   Sq Epi: x / Non Sq Epi: Occasional / Bacteria: Occasional        RADIOLOGY & ADDITIONAL STUDIES:    INTERPRETATION OF TELEMETRY (personally reviewed):    ECG:    ECHO:    STRESS TEST:    CARDIAC CATHETERIZATION:    ASSESSMENT AND PLAN:  In summary, SARA TRIANA is an 88y Female with 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     Pre-op cardiovascular evaluation  - Can proceed for planned endoscopy with moderate- high perioperative risk without cardiac contraindications   - Eliquis may be held at this time  - Pt seems to be poor candidate for long term anticoagulation  -can consider watchman  - Close monitoring of BP and volume status  - Post-op ECG and telemetry monitoring    HTN  - BP controlled      Thank you for allowing [facility] to participate in the care of this patient.  Please feel free to call with any questions or concerns.

## 2020-06-09 NOTE — BRIEF OPERATIVE NOTE - COMMENTS
Patient's bleeding source likely secondary to Josh lesions and duodenal ulcers.  Recommend holding NSAIDs indefinitely and consider holding anticoagulation as well.  Lifelong PPI.  Advance diet as tolerated.

## 2020-06-09 NOTE — BRIEF OPERATIVE NOTE - OPERATION/FINDINGS
Informed consent was obtained from the patient in the preprocedure area.  The procedure, its risks, benefits, and alternatives were discussed.  All questions were answered.  The patient was brought to the endoscopy suite and placed in the left lateral decubitus position.  A safety timeout was performed, confirming the patient by name, date of birth and medical record number.  All discrepancies were addressed, confirmed by all present personnel.  The patient was sedated with IV sedation provided by the anesthesiologist.  The Olympus GIFH-190 video endoscope was inserted into the oropharynx and guided under direct vision into the esophagus, stomach and duodenum.  The anatomy and mucosa of the esophagus, gastroesophageal junction, stomach, pylorus, and small bowel were all carefully inspected.  The duodenal bulb was notable for severe duodenitis and three subcentimeter, clean-based ulcers.  The second portion and third portions were grossly unremarkable.  The scope was withdrawn to the stomach and retroflexed.  There were several small nonbleeding Josh lesions in the proximal stomach.  The endoscope and insufflated air were slowly removed from the upper GI tract.  The scope was withdrawn to the esophagus.  There was a large hiatal hernia with a large portion of the stomach proximal to the hiatus.  The Z-line was regular.  There was no Crooks’s epithelium or esophagitis seen.  The patient tolerated the procedure well and after a brief recovery period in the endoscopy suite was transferred to the recovery area in good condition.  There were no apparent complications, and the blood loss from the maneuver was minimal.

## 2020-06-10 DIAGNOSIS — K92.2 GASTROINTESTINAL HEMORRHAGE, UNSPECIFIED: ICD-10-CM

## 2020-06-10 LAB
-  AMIKACIN: SIGNIFICANT CHANGE UP
-  AMPICILLIN/SULBACTAM: SIGNIFICANT CHANGE UP
-  AMPICILLIN: SIGNIFICANT CHANGE UP
-  AZTREONAM: SIGNIFICANT CHANGE UP
-  CEFAZOLIN: SIGNIFICANT CHANGE UP
-  CEFEPIME: SIGNIFICANT CHANGE UP
-  CEFOXITIN: SIGNIFICANT CHANGE UP
-  CEFTRIAXONE: SIGNIFICANT CHANGE UP
-  CIPROFLOXACIN: SIGNIFICANT CHANGE UP
-  ERTAPENEM: SIGNIFICANT CHANGE UP
-  GENTAMICIN: SIGNIFICANT CHANGE UP
-  IMIPENEM: SIGNIFICANT CHANGE UP
-  LEVOFLOXACIN: SIGNIFICANT CHANGE UP
-  MEROPENEM: SIGNIFICANT CHANGE UP
-  NITROFURANTOIN: SIGNIFICANT CHANGE UP
-  PIPERACILLIN/TAZOBACTAM: SIGNIFICANT CHANGE UP
-  TIGECYCLINE: SIGNIFICANT CHANGE UP
-  TOBRAMYCIN: SIGNIFICANT CHANGE UP
-  TRIMETHOPRIM/SULFAMETHOXAZOLE: SIGNIFICANT CHANGE UP
ALBUMIN SERPL ELPH-MCNC: 2.9 G/DL — LOW (ref 3.3–5.2)
ALP SERPL-CCNC: 94 U/L — SIGNIFICANT CHANGE UP (ref 40–120)
ALT FLD-CCNC: 1185 U/L — HIGH
ANION GAP SERPL CALC-SCNC: 10 MMOL/L — SIGNIFICANT CHANGE UP (ref 5–17)
AST SERPL-CCNC: 198 U/L — HIGH
BASOPHILS # BLD AUTO: 0.02 K/UL — SIGNIFICANT CHANGE UP (ref 0–0.2)
BASOPHILS NFR BLD AUTO: 0.5 % — SIGNIFICANT CHANGE UP (ref 0–2)
BILIRUB SERPL-MCNC: 0.6 MG/DL — SIGNIFICANT CHANGE UP (ref 0.4–2)
BUN SERPL-MCNC: 19 MG/DL — SIGNIFICANT CHANGE UP (ref 8–20)
CALCIUM SERPL-MCNC: 8.2 MG/DL — LOW (ref 8.6–10.2)
CHLORIDE SERPL-SCNC: 105 MMOL/L — SIGNIFICANT CHANGE UP (ref 98–107)
CO2 SERPL-SCNC: 24 MMOL/L — SIGNIFICANT CHANGE UP (ref 22–29)
CREAT SERPL-MCNC: 0.6 MG/DL — SIGNIFICANT CHANGE UP (ref 0.5–1.3)
CULTURE RESULTS: SIGNIFICANT CHANGE UP
EOSINOPHIL # BLD AUTO: 0.06 K/UL — SIGNIFICANT CHANGE UP (ref 0–0.5)
EOSINOPHIL NFR BLD AUTO: 1.4 % — SIGNIFICANT CHANGE UP (ref 0–6)
GLUCOSE SERPL-MCNC: 88 MG/DL — SIGNIFICANT CHANGE UP (ref 70–99)
HCT VFR BLD CALC: 24.9 % — LOW (ref 34.5–45)
HGB BLD-MCNC: 8.2 G/DL — LOW (ref 11.5–15.5)
IMM GRANULOCYTES NFR BLD AUTO: 4.2 % — HIGH (ref 0–1.5)
LYMPHOCYTES # BLD AUTO: 0.55 K/UL — LOW (ref 1–3.3)
LYMPHOCYTES # BLD AUTO: 12.8 % — LOW (ref 13–44)
MAGNESIUM SERPL-MCNC: 1.7 MG/DL — SIGNIFICANT CHANGE UP (ref 1.6–2.6)
MCHC RBC-ENTMCNC: 30.7 PG — SIGNIFICANT CHANGE UP (ref 27–34)
MCHC RBC-ENTMCNC: 32.9 GM/DL — SIGNIFICANT CHANGE UP (ref 32–36)
MCV RBC AUTO: 93.3 FL — SIGNIFICANT CHANGE UP (ref 80–100)
METHOD TYPE: SIGNIFICANT CHANGE UP
MONOCYTES # BLD AUTO: 0.4 K/UL — SIGNIFICANT CHANGE UP (ref 0–0.9)
MONOCYTES NFR BLD AUTO: 9.3 % — SIGNIFICANT CHANGE UP (ref 2–14)
NEUTROPHILS # BLD AUTO: 3.09 K/UL — SIGNIFICANT CHANGE UP (ref 1.8–7.4)
NEUTROPHILS NFR BLD AUTO: 71.8 % — SIGNIFICANT CHANGE UP (ref 43–77)
ORGANISM # SPEC MICROSCOPIC CNT: SIGNIFICANT CHANGE UP
ORGANISM # SPEC MICROSCOPIC CNT: SIGNIFICANT CHANGE UP
PLATELET # BLD AUTO: 174 K/UL — SIGNIFICANT CHANGE UP (ref 150–400)
POTASSIUM SERPL-MCNC: 3.8 MMOL/L — SIGNIFICANT CHANGE UP (ref 3.5–5.3)
POTASSIUM SERPL-SCNC: 3.8 MMOL/L — SIGNIFICANT CHANGE UP (ref 3.5–5.3)
PROT SERPL-MCNC: 4.7 G/DL — LOW (ref 6.6–8.7)
RBC # BLD: 2.67 M/UL — LOW (ref 3.8–5.2)
RBC # FLD: 15.4 % — HIGH (ref 10.3–14.5)
SODIUM SERPL-SCNC: 139 MMOL/L — SIGNIFICANT CHANGE UP (ref 135–145)
SPECIMEN SOURCE: SIGNIFICANT CHANGE UP
WBC # BLD: 4.3 K/UL — SIGNIFICANT CHANGE UP (ref 3.8–10.5)
WBC # FLD AUTO: 4.3 K/UL — SIGNIFICANT CHANGE UP (ref 3.8–10.5)

## 2020-06-10 PROCEDURE — 99233 SBSQ HOSP IP/OBS HIGH 50: CPT

## 2020-06-10 RX ORDER — OXYCODONE HYDROCHLORIDE 5 MG/1
10 TABLET ORAL EVERY 12 HOURS
Refills: 0 | Status: DISCONTINUED | OUTPATIENT
Start: 2020-06-10 | End: 2020-06-12

## 2020-06-10 RX ORDER — PANTOPRAZOLE SODIUM 20 MG/1
40 TABLET, DELAYED RELEASE ORAL EVERY 12 HOURS
Refills: 0 | Status: DISCONTINUED | OUTPATIENT
Start: 2020-06-10 | End: 2020-06-12

## 2020-06-10 RX ORDER — SENNA PLUS 8.6 MG/1
2 TABLET ORAL AT BEDTIME
Refills: 0 | Status: DISCONTINUED | OUTPATIENT
Start: 2020-06-10 | End: 2020-06-12

## 2020-06-10 RX ADMIN — Medication 100 MILLIGRAM(S): at 21:54

## 2020-06-10 RX ADMIN — MORPHINE SULFATE 1 MILLIGRAM(S): 50 CAPSULE, EXTENDED RELEASE ORAL at 17:16

## 2020-06-10 RX ADMIN — Medication 20 MILLIGRAM(S): at 05:34

## 2020-06-10 RX ADMIN — MORPHINE SULFATE 1 MILLIGRAM(S): 50 CAPSULE, EXTENDED RELEASE ORAL at 10:02

## 2020-06-10 RX ADMIN — PANTOPRAZOLE SODIUM 10 MG/HR: 20 TABLET, DELAYED RELEASE ORAL at 06:28

## 2020-06-10 RX ADMIN — OXYCODONE HYDROCHLORIDE 10 MILLIGRAM(S): 5 TABLET ORAL at 21:27

## 2020-06-10 RX ADMIN — Medication 100 MILLIGRAM(S): at 14:54

## 2020-06-10 RX ADMIN — DEXTROSE MONOHYDRATE, SODIUM CHLORIDE, AND POTASSIUM CHLORIDE 70 MILLILITER(S): 50; .745; 4.5 INJECTION, SOLUTION INTRAVENOUS at 06:26

## 2020-06-10 RX ADMIN — MORPHINE SULFATE 1 MILLIGRAM(S): 50 CAPSULE, EXTENDED RELEASE ORAL at 04:11

## 2020-06-10 RX ADMIN — Medication 100 MILLIGRAM(S): at 05:34

## 2020-06-10 RX ADMIN — CEFTRIAXONE 100 MILLIGRAM(S): 500 INJECTION, POWDER, FOR SOLUTION INTRAMUSCULAR; INTRAVENOUS at 21:27

## 2020-06-10 RX ADMIN — SENNA PLUS 2 TABLET(S): 8.6 TABLET ORAL at 21:27

## 2020-06-10 RX ADMIN — PANTOPRAZOLE SODIUM 40 MILLIGRAM(S): 20 TABLET, DELAYED RELEASE ORAL at 17:20

## 2020-06-10 NOTE — PROGRESS NOTE ADULT - SUBJECTIVE AND OBJECTIVE BOX
Patient is a 88y old  Female who presents with a chief complaint of Melena, Acute GI Bleed, Coagulopathy, Transaminitis (09 Jun 2020 14:06)      HPI:  89 y/o F with PMHX DVT (11/2019) on Eliquis, intermittent asthma (never intubated/no icu stays), polymyalgia rheumatica (chronic prednisone), osteoporosis with multiple vertebral compression fxs BIBEMS after she called 911. EMS noted patient was covered in melena on arrival. In ER she was tachycardic, mildl hypotensive, and noted to have multiple abnormal labs concerning for acute upper GI bleed with Hgb drop compared to prior admission 1 month ago (9.5 from ~11) and elevated BUN:Cr ratio. Patient also noted to have significant transaminitis and upon inquery admits to using frequent tylenol approx 6 normal strength 325mg PO tablets per day on top of another "pain pill" she cannot remember. Patient was discharged on Percocet last admission 1 month ago for acute/chronic pain. Also takes Prednisone 20mg PO daily chronically for polymyalgia rheumatica and has old charts showing Celecoxib, as well as Eliquis for LE DVT. Patient given 2L IVFB in ER with some improvement in BP. CTA C/A/P obtained to r/o mesenteric ischemia and/or other pathology including Acute PE given tachypnea but was negative however did show pancolitis. Tylenol level noted to be elevated and given history patient was started on Protonix gtt and Acetadote IV gtt.   Pt lives alone. Normally ambulates with walker but more recently bed bound due to pain.        Patient had EGD which showed hiatal hernia with jaren erosions, duodenitis and non bleeding DU's.  Pt is sleepy , but arousable. P atient states she did not have BM yesterday. NO abdominal pain. Tolerated clear liquids. Hgb stable    REVIEW OF SYSTEMS:  Constitutional: No fever, weight loss or fatigue  ENMT:  No difficulty hearing, tinnitus, vertigo; No sinus or throat pain  Respiratory: No cough, wheezing, chills or hemoptysis  Cardiovascular: No chest pain, palpitations, dizziness or leg swelling  Gastrointestinal: No abdominal or epigastric pain. No nausea, vomiting or hematemesis; No diarrhea or constipation. No melena or hematochezia.  Skin: No itching, burning, rashes or lesions   Musculoskeletal: No joint pain or swelling; No muscle, back or extremity pain    PAST MEDICAL & SURGICAL HISTORY:  Chronic back pain  CHF (congestive heart failure)  Atrial fibrillation  Polymyalgia rheumatica  Diverticulosis  Asthma  S/P knee replacement, left  S/P hysterectomy: 1982  S/P appendectomy: 1962      FAMILY HISTORY:  Family history of stroke      SOCIAL HISTORY:  Smoking Status: [ ] Current, [ ] Former, [ ] Never  Pack Years:  [  ] EtOH-no  [  ] IVDA    MEDICATIONS:  MEDICATIONS  (STANDING):  cefTRIAXone   IVPB 1000 milliGRAM(s) IV Intermittent every 24 hours  metroNIDAZOLE  IVPB      metroNIDAZOLE  IVPB 500 milliGRAM(s) IV Intermittent every 8 hours  pantoprazole Infusion 8 mG/Hr (10 mL/Hr) IV Continuous <Continuous>  predniSONE   Tablet 20 milliGRAM(s) Oral daily  sodium chloride 0.45% with potassium chloride 20 mEq/L 1000 milliLiter(s) (70 mL/Hr) IV Continuous <Continuous>    MEDICATIONS  (PRN):  morphine  - Injectable 1 milliGRAM(s) IV Push every 6 hours PRN Moderate Pain (4 - 6)      Allergies    dust (Unknown)  No Known Drug Allergies    Intolerances    provide [facility] soft diet (Unknown)      Vital Signs Last 24 Hrs  T(C): 36.8 (10 Masoud 2020 05:31), Max: 37.1 (09 Jun 2020 15:28)  T(F): 98.2 (10 Masoud 2020 05:31), Max: 98.7 (09 Jun 2020 15:28)  HR: 90 (10 Masoud 2020 08:53) (68 - 100)  BP: 142/72 (10 Masoud 2020 05:31) (113/70 - 145/79)  BP(mean): --  RR: 16 (10 Masoud 2020 08:53) (16 - 20)  SpO2: 100% (10 Masoud 2020 08:53) (96% - 100%)    06-09 @ 07:01  -  06-10 @ 07:00  --------------------------------------------------------  IN: 1170 mL / OUT: 200 mL / NET: 970 mL          PHYSICAL EXAM:    General: Well developed; well nourished; in no acute distress  HEENT: MMM, conjunctiva and sclera clear  H- RRR  L- CTA  Gastrointestinal: Soft, non-tender non-distended; Normal bowel sounds; No rebound or guarding  Extremities: Normal range of motion, No clubbing, cyanosis or edema  Neurological: Alert and oriented x3  Skin: Warm and dry. No obvious rash      LABS:                        8.2    4.30  )-----------( 174      ( 10 Masoud 2020 06:17 )             24.9     10 Masoud 2020 06:17    139    |  105    |  19.0   ----------------------------<  88     3.8     |  24.0   |  0.60     Ca    8.2        10 Masoud 2020 06:17  Mg     1.7       10 Masoud 2020 06:17    TPro  4.7    /  Alb  2.9    /  TBili  0.6    /  DBili  x      /  AST  198    /  ALT  1185   /  AlkPhos  94     / Amylase x      /Lipase x      10 Masoud 2020 06:17              RADIOLOGY & ADDITIONAL STUDIES:     < from: CT Abdomen and Pelvis w/ IV Cont (06.08.20 @ 00:48) >  MPRESSION:     1. No pulmonary embolism.  2. Long segment colitis.  3. Indeterminate 8 mm left upper pole renal lesion. Nonurgent ultrasound is recommended for further evaluation.  4. Interval development of mild compression deformities at T4 and T12. Interval progression of now moderate compression deformity of L1.    < end of copied text >

## 2020-06-10 NOTE — PROGRESS NOTE ADULT - ASSESSMENT
ELAINE: resolved azotemia  - avoid potential nephrotoxins  - adjust IVF as needed  - monitor labs    : L 8mm renal lesion  - will need serial follow up    Will no longer follow; please recall if needed

## 2020-06-10 NOTE — PROGRESS NOTE ADULT - SUBJECTIVE AND OBJECTIVE BOX
[place] [facility] - [facility], THE [facility]                                   [address] [place] [address]                                                      PHONE: [phone]                                                         FAX: [phone]  [email or web address]  ---------------------------------------------------------------------------------------------------------------------------------    Overnight events/patient complaints:  NAD feeling well today     dust (Unknown)  No Known Drug Allergies  provide [facility] soft diet (Unknown)    MEDICATIONS  (STANDING):  cefTRIAXone   IVPB 1000 milliGRAM(s) IV Intermittent every 24 hours  metroNIDAZOLE  IVPB      metroNIDAZOLE  IVPB 500 milliGRAM(s) IV Intermittent every 8 hours  pantoprazole Infusion 8 mG/Hr (10 mL/Hr) IV Continuous <Continuous>  predniSONE   Tablet 20 milliGRAM(s) Oral daily  sodium chloride 0.45% with potassium chloride 20 mEq/L 1000 milliLiter(s) (70 mL/Hr) IV Continuous <Continuous>    MEDICATIONS  (PRN):  morphine  - Injectable 1 milliGRAM(s) IV Push every 6 hours PRN Moderate Pain (4 - 6)      Vital Signs Last 24 Hrs  T(C): 36.8 (10 Masoud 2020 05:31), Max: 37.1 (2020 15:28)  T(F): 98.2 (10 Masoud 2020 05:31), Max: 98.7 (2020 15:28)  HR: 90 (10 Masoud 2020 08:53) (68 - 100)  BP: 142/72 (10 Masoud 2020 05:31) (113/70 - 145/79)  BP(mean): --  RR: 16 (10 Masoud 2020 08:53) (16 - 20)  SpO2: 100% (10 Masoud 2020 08:53) (96% - 100%)  ICU Vital Signs Last 24 Hrs  SARA TRIANA  I&O's Detail    2020 07:  -  10 Masoud 2020 07:00  --------------------------------------------------------  IN:    pantoprazole Infusion: 100 mL    sodium chloride 0.45% with potassium chloride 20 mEq/L: 770 mL    Solution: 200 mL    Solution: 100 mL  Total IN: 1170 mL    OUT:    Voided: 200 mL  Total OUT: 200 mL    Total NET: 970 mL        I&O's Summary    2020 07:01  -  10 Masoud 2020 07:00  --------------------------------------------------------  IN: 1170 mL / OUT: 200 mL / NET: 970 mL      Drug Dosing Weight  SARA TRIANA      PHYSICAL EXAM:  General: Appears well developed, well nourished alert and cooperative.  HEENT: Head; normocephalic, atraumatic.  Eyes: Pupils reactive, cornea wnl.  Neck: Supple, no nodes adenopathy, no NVD or carotid bruit or thyromegaly.  CARDIOVASCULAR: Normal S1 and S2, 1/6 murmur, rub, gallop or lift.   LUNGS: No rales, rhonchi or wheeze. Normal breath sounds bilaterally.  ABDOMEN: Soft, nontender without mass or organomegaly. bowel sounds normoactive.  EXTREMITIES: No clubbing, cyanosis or edema. Distal pulses wnl.   SKIN: warm and dry with normal turgor.  NEURO: Alert/oriented x 3/normal motor exam. No pathologic reflexes.    PSYCH: normal affect.        LABS:                        8.2    4.30  )-----------( 174      ( 10 Masoud 2020 06:17 )             24.9     06-10    139  |  105  |  19.0  ----------------------------<  88  3.8   |  24.0  |  0.60    Ca    8.2<L>      10 Masoud 2020 06:17  Mg     1.7     06-10    TPro  4.7<L>  /  Alb  2.9<L>  /  TBili  0.6  /  DBili  x   /  AST  198<H>  /  ALT  1185<H>  /  AlkPhos  94  06-10    SARA REDA      PT/INR - ( 2020 08:07 )   PT: 15.2 sec;   INR: 1.33 ratio           Urinalysis Basic - ( 2020 20:30 )    Color: Yellow / Appearance: Clear / S.015 / pH: x  Gluc: x / Ketone: Trace  / Bili: Negative / Urobili: Negative mg/dL   Blood: x / Protein: 30 mg/dL / Nitrite: Negative   Leuk Esterase: Small / RBC: 3-5 /HPF / WBC 3-5   Sq Epi: x / Non Sq Epi: Occasional / Bacteria: Occasional        RADIOLOGY & ADDITIONAL STUDIES:    INTERPRETATION OF TELEMETRY (personally reviewed):    88y Female with prior history of DVT (2019) on Eliquis, intermittent asthma (never intubated/no icu stays), polymyalgia rheumatica (chronic prednisone), osteoporosis with multiple vertebral compression fxs presents with melena s/p EGD without any complication + ulcers no long on AC     please recall if needed

## 2020-06-10 NOTE — CONSULT NOTE ADULT - SUBJECTIVE AND OBJECTIVE BOX
HPI:  87 y/o F with PMHX DVT (11/2019) on Eliquis, intermittent asthma (never intubated/no icu stays), polymyalgia rheumatica (chronic prednisone), osteoporosis with multiple vertebral compression fxs BIBEMS after she called 911. EMS noted patient was covered in melena on arrival. In ER she was tachycardic, mildl hypotensive, and noted to have multiple abnormal labs concerning for acute upper GI bleed with Hgb drop compared to prior admission 1 month ago (9.5 from ~11) and elevated BUN:Cr ratio. Patient also noted to have significant transaminitis and upon inquery admits to using frequent tylenol approx 6 normal strength 325mg PO tablets per day on top of another "pain pill" she cannot remember. Patient was discharged on Percocet last admission 1 month ago for acute/chronic pain. Also takes Prednisone 20mg PO daily chronically for polymyalgia rheumatica and has old charts showing Celecoxib, as well as Eliquis for LE DVT. Patient given 2L IVFB in ER with some improvement in BP. CTA C/A/P obtained to r/o mesenteric ischemia and/or other pathology including Acute PE given tachypnea but was negative however did show pancolitis. Tylenol level noted to be elevated and given history patient was started on Protonix gtt and Acetadote IV gtt.   Pt lives alone. Normally ambulates with walker but more recently bed bound due to pain.   She denies F/C, N/V, abd pain, admits to melena, denies cough, +SOB, no urinary complaints, +bone pain (Chronic, andrea L shoulder and spine). All other systems negative unless mentioned above. (08 Jun 2020 03:39)      PERTINENT PMH REVIEWED:  [x ] YES [ ] NO           SOCIAL HISTORY:  Significant other/partner:                     Children:              son             Subtance hx:           no            Tobacco hx:               no         Alcohol hx:        no         Home Opiod hx:    FAMILY HISTORY:  Family history of stroke      Baseline ADLs (prior to admission):  Independent [x ] moderately [ ] fully   Dependent   [ ] moderately [ ]fully    ADVANCE DIRECTIVES:  [ x] YES [ ] NO   DNR [ x] YES [ ] NO  Completed on:                     MOLST  [x ] YES [ ] NO   Completed on:  Living Will  [ ] YES [ x] NO   Completed on:      MEDICATIONS  (STANDING):  cefTRIAXone   IVPB 1000 milliGRAM(s) IV Intermittent every 24 hours  metroNIDAZOLE  IVPB      metroNIDAZOLE  IVPB 500 milliGRAM(s) IV Intermittent every 8 hours  oxyCODONE  ER Tablet 10 milliGRAM(s) Oral every 12 hours  pantoprazole    Tablet 40 milliGRAM(s) Oral every 12 hours  predniSONE   Tablet 20 milliGRAM(s) Oral daily  sodium chloride 0.45% with potassium chloride 20 mEq/L 1000 milliLiter(s) (70 mL/Hr) IV Continuous <Continuous>    MEDICATIONS  (PRN):  morphine  - Injectable 1 milliGRAM(s) IV Push every 6 hours PRN Moderate Pain (4 - 6)      Allergies    dust (Unknown)  No Known Drug Allergies    Intolerances    provide [facility] soft diet (Unknown)      REVIEW OF SYSTEMS      General: in Pain    Skin/Breast: normal  	  Ophthalmologic: good  	  Respiratory and Thorax:  	  Cardiovascular:	No Pain    Gastrointestinal:	occ constipation     Genitourinary:	no dysuria    Musculoskeletal:	 + Back pain    Neurological:	Low ext weakness    Psychiatric:	no depression      LABS:                        8.2    4.30  )-----------( 174      ( 10 Masoud 2020 06:17 )             24.9     06-10    139  |  105  |  19.0  ----------------------------<  88  3.8   |  24.0  |  0.60    Ca    8.2<L>      10 Masoud 2020 06:17  Mg     1.7     06-10    TPro  4.7<L>  /  Alb  2.9<L>  /  TBili  0.6  /  DBili  x   /  AST  198<H>  /  ALT  1185<H>  /  AlkPhos  94  06-10    PT/INR - ( 09 Jun 2020 08:07 )   PT: 15.2 sec;   INR: 1.33 ratio             I&O's Summary    09 Jun 2020 07:01  -  10 Masoud 2020 07:00  --------------------------------------------------------  IN: 1170 mL / OUT: 200 mL / NET: 970 mL    10 Masoud 2020 07:01  -  10 Masoud 2020 21:06  --------------------------------------------------------  IN: 590 mL / OUT: 0 mL / NET: 590 mL          Vital Signs Last 24 Hrs  T(C): 36.5 (10 Masoud 2020 15:40), Max: 36.8 (10 Masoud 2020 05:31)  T(F): 97.7 (10 Masoud 2020 15:40), Max: 98.2 (10 Masoud 2020 05:31)  HR: 99 (10 Masoud 2020 17:23) (90 - 110)  BP: 102/64 (10 Masoud 2020 15:40) (102/64 - 142/72)  BP(mean): --  RR: 16 (10 Masoud 2020 17:23) (16 - 17)  SpO2: 100% (10 Masoud 2020 17:23) (94% - 100%)    PHYSICAL EXAM:    Patient in constant pain  No CP, No SOB, No N/V + Back pain   HEENT: PEARLA  Neck: Supple  Cardio: S1 S2 No Murmur  Pulm: CTA No Rales or Ronchi  Back - diffuse tenderness  Abd: Soft NT ND BS+  Ext: No DCT LE weakness  Neuro: Awake Pleasant pleasant     Failure to thrive - KATIE trail but likely  Chronic Back Pain - add long acting Oxycontin to PRN morphine will montior for confusion add Senakot for constipation potential   Osteoporosis with compression fracture - bisphosphonates can help pain

## 2020-06-10 NOTE — PROGRESS NOTE ADULT - ASSESSMENT
1.  Symptomatic Anemia, Upper GI Bleed  H/H stable   - on regular diet   -Protonix 40mg po   -hold ASA/NSAIDs/Eliquis/  restarted Steroids  -GI Consult noted and appreciated       2.   Coagulopathy with Transaminitis   - Likely  ischemic hepatitis -- pt was hypotensive on admission   Tylenol levels normal pt was taking 6 Tylenol pills/day +/- 1-2 additional "Pain" pills ?Percocet?        3.  Leukocytosis with Colitis  -Continue Ceftriaxone 1g IV q24 and Flagyl 500mg IV q8 for empiric colitis coverage      4.   Chronic LE DVT  -Hold Eliquis with GIB  -DVT diagnosed 11/2019 would consider risks/benefits of longterm AC post-GI bleed.  no AC at this time the risks > benefits     5.   Polymyalgia Rheumatica and T spine and L spine Fx  steroids and oxy po     6.   Osteoporosis with multiple vertebral fractures  -exacerbated by chronic steroid use. outpatient f/u.     7.   Mild-Moderate Intermittent Asthma  -Continue Symbicort/Albuterol PRN  -O2 via NC if needed PRN titrate FIO2 >90% and <96%    8.   Tachycardia due to anemia   cardio called for pat Rodríguez     9.  Acute renal failure   elevated BUN  nephrology called

## 2020-06-10 NOTE — PROGRESS NOTE ADULT - PROBLEM SELECTOR PLAN 1
duodenal ulcers, duodentitis, hiatal hernia with jaren erosions  needs BID PPI indefinitely  hemoglobin stable  advance diet to solids  please inquire with cardiology if pt must hav elaquis as the jaren erosions  will be a chronic issue ( because o f the hiatal hernia ). Hold NSAIDS  - if pt tolerates solid diet, she maybe discharged home  - F/U with  DR scott in  2- 4 weeks

## 2020-06-10 NOTE — PROGRESS NOTE ADULT - SUBJECTIVE AND OBJECTIVE BOX
NEPHROLOGY INTERVAL HPI/OVERNIGHT EVENTS:  pt lying flat, resting comfortably when seen earlier  no acute distress noted    MEDICATIONS  (STANDING):  cefTRIAXone   IVPB 1000 milliGRAM(s) IV Intermittent every 24 hours  metroNIDAZOLE  IVPB      metroNIDAZOLE  IVPB 500 milliGRAM(s) IV Intermittent every 8 hours  pantoprazole    Tablet 40 milliGRAM(s) Oral every 12 hours  predniSONE   Tablet 20 milliGRAM(s) Oral daily  sodium chloride 0.45% with potassium chloride 20 mEq/L 1000 milliLiter(s) (70 mL/Hr) IV Continuous <Continuous>    MEDICATIONS  (PRN):  morphine  - Injectable 1 milliGRAM(s) IV Push every 6 hours PRN Moderate Pain (4 - 6)      Allergies    dust (Unknown)  No Known Drug Allergies        Vital Signs Last 24 Hrs  T(C): 36.8 (10 Masoud 2020 05:31), Max: 37.1 (2020 15:28)  T(F): 98.2 (10 Masoud 2020 05:31), Max: 98.7 (2020 15:28)  HR: 110 (10 Masoud 2020 10:10) (68 - 110)  BP: 142/72 (10 Masoud 2020 05:31) (113/70 - 145/79)  BP(mean): --  RR: 16 (10 Masoud 2020 10:10) (16 - 20)  SpO2: 100% (10 Masoud 2020 10:10) (96% - 100%)    PHYSICAL EXAM:  GENERAL: Weak, frail   HEAD:  Atraumatic, No edema , dry membranes   NECK: Supple, No JVD,   CHEST/LUNG: Diminished BS at bases  HEART: Irregular , No rub  ABDOMEN: Soft, NT +BS  EXTREMITIES: No edema; muscle wasting    LABS:                        8.2    4.30  )-----------( 174      ( 10 Masoud 2020 06:17 )             24.9     Hemoglobin: 8.8 g/dL (20 @ 08:07)        06-10    139  |  105  |  19.0  ----------------------------<  88  3.8   |  24.0  |  0.60    Ca    8.2<L>      10 Masoud 2020 06:17  Mg     1.7     06-10    TPro  4.7<L>  /  Alb  2.9<L>  /  TBili  0.6  /  DBili  x   /  AST  198<H>  /  ALT  1185<H>  /  AlkPhos  94  06-10    PT/INR - ( 2020 08:07 )   PT: 15.2 sec;   INR: 1.33 ratio           Urinalysis Basic - ( 2020 20:30 )    Color: Yellow / Appearance: Clear / S.015 / pH: x  Gluc: x / Ketone: Trace  / Bili: Negative / Urobili: Negative mg/dL   Blood: x / Protein: 30 mg/dL / Nitrite: Negative   Leuk Esterase: Small / RBC: 3-5 /HPF / WBC 3-5   Sq Epi: x / Non Sq Epi: Occasional / Bacteria: Occasional      Magnesium, Serum: 1.7 mg/dL (06-10 @ 06:17)      RADIOLOGY & ADDITIONAL TESTS:  < from: CT Abdomen and Pelvis w/ IV Cont (20 @ 00:48) >     EXAM:  CT ANGIO CHEST (W)AW IC                         EXAM:  CT ABDOMEN AND PELVIS IC                          *** ADDENDUM 2020  ***    Body of the report should include distended gallbladder without evidence of wall thickening or pericholecystic inflammation.      *** END OF ADDENDUM 2020  ***      PROCEDURE DATE:  2020          INTERPRETATION:  CLINICAL INFORMATION: Shortness of breath, tachycardia, melena.    COMPARISON: 1/10/2020    PROCEDURE:   CT Angiography of the Chest was performed followed by portal venous phase imaging of the Abdomen and Pelvis.  IV Contrast: 94 ml of Omnipaque 300 was injected intravenously. 6 ml were discarded.  Oral contrast: None.  Sagittal and coronal reformats were performed as well as 3D (MIP) reconstructions.    FINDINGS:  CHEST:   LUNGS AND LARGE AIRWAYS: Patent central airways. Mild bibasilar atelectasis. Stable 4 mm left lower lobe nodule (6, 62). Biapical scarring.  PLEURA: No pleural effusion.  VESSELS: No pulmonary embolism.Atherosclerotic changes of the aorta. Stable ectasia of the ascending aorta up to 3.8 cm.  HEART: Heart size is normal. No pericardial effusion.  MEDIASTINUM AND GERARDO: No lymphadenopathy.  CHEST WALL AND LOWER NECK: Within normal limits.    ABDOMEN AND PELVIS:  LIVER: Stable hepatic cysts including 5.8 cm left hepatic lobe cyst with calcified septations.  BILE DUCTS: Normal caliber.  GALLBLADDER: Within normal limits.  SPLEEN: Within normal limits.  PANCREAS: Within normal limits.  ADRENALS: Within normal limits.  KIDNEYS/URETERS: No hydronephrosis. Symmetric renal enhancement. Bilateral subcentimeter hypodensities too small to characterize. Indeterminate 8 mm left upper pole lesion which appears to be soft tissue density (12, 21).    BLADDER: Distended.  REPRODUCTIVE ORGANS: Uterus removed or atrophic. No adnexal mass.    BOWEL: No bowel obstruction. Appendix is not visualized. No evidence of inflammation in the pericecal region. Colonic diverticulosis. Long segment colonic wall thickening extending from the ascending colon to the sigmoid colon, compatible with colitis.  PERITONEUM: No ascites.  VESSELS: Atherosclerotic changes.  RETROPERITONEUM/LYMPH NODES: No lymphadenopathy.    ABDOMINAL WALL: Cachexia.  BONES: Stable chronic deformity of the right glenoid. Multiple chronic bilateral rib fractures. Degenerative changes of the spine, hips and glenohumeral joints. Stable anterolisthesis at C7-T1, retrolisthesis at L2-L3, retrolisthesis at L3-L4 and anterolisthesis at L4-L5. Interval development of mild compression deformity at T4. Stable severe compression deformities of T7 and T11. Interval development of mild compression deformity of T12. Mild interval progression of now moderate compression deformity at L1. Stable appearance of right iliac wing fracture deformity. Chronic fracture deformities of the bilateral pubic rami also noted.    IMPRESSION:     1. No pulmonary embolism.  2. Long segment colitis.  3. Indeterminate 8 mm left upper pole renal lesion. Nonurgent ultrasound is recommended for further evaluation.  4. Interval development of mild compression deformities at T4 and T12. Interval progression of now moderate compression deformity of L1.    < end of copied text >

## 2020-06-10 NOTE — PROGRESS NOTE ADULT - SUBJECTIVE AND OBJECTIVE BOX
HPI:  89 y/o F with PMHX DVT (11/2019) on Eliquis, intermittent asthma (never intubated/no icu stays), polymyalgia rheumatica (chronic prednisone), osteoporosis with multiple vertebral compression fxs BIBEMS after she called 911. EMS noted patient was covered in melena on arrival. In ER she was tachycardic, mildl hypotensive, and noted to have multiple abnormal labs concerning for acute upper GI bleed with Hgb drop compared to prior admission 1 month ago (9.5 from ~11) and elevated BUN:Cr ratio. Patient also noted to have significant transaminitis and upon inquery admits to using frequent tylenol approx 6 normal strength 325mg PO tablets per day on top of another "pain pill" she cannot remember. Patient was discharged on Percocet last admission 1 month ago for acute/chronic pain. Also takes Prednisone 20mg PO daily chronically for polymyalgia rheumatica and has old charts showing Celecoxib, as well as Eliquis for LE DVT. Patient given 2L IVFB in ER with some improvement in BP. CTA C/A/P obtained to r/o mesenteric ischemia and/or other pathology including Acute PE given tachypnea but was negative however did show pancolitis. Tylenol level noted to be elevated and given history patient was started on Protonix gtt and Acetadote IV gtt.   Pt lives alone. Normally ambulates with walker but more recently bed bound due to pain.   She denies F/C, N/V, abd pain, admits to melena, denies cough, +SOB, no urinary complaints, +bone pain (Chronic, andera L shoulder and spine). All other systems negative unless mentioned above. (08 Jun 2020 03:39)      PAST MEDICAL & SURGICAL HISTORY:  Chronic back pain  CHF (congestive heart failure)  Atrial fibrillation  Polymyalgia rheumatica  Diverticulosis  Asthma  S/P knee replacement, left  S/P hysterectomy: 1982  S/P appendectomy: 1962      ANTIMICROBIAL:  cefTRIAXone   IVPB 1000 milliGRAM(s) IV Intermittent every 24 hours  metroNIDAZOLE  IVPB      metroNIDAZOLE  IVPB 500 milliGRAM(s) IV Intermittent every 8 hours        NEUROLOGIC:  morphine  - Injectable 1 milliGRAM(s) IV Push once        GATROINTESTINAL:  pantoprazole Infusion 8 mG/Hr IV Continuous <Continuous>        IMMUNOLOGIC & OTHER  acetylcysteine IVPB 3.9 Gram(s) IV Intermittent once        Allergies    dust (Unknown)  No Known Drug Allergies    Intolerances    provide [facility] soft diet (Unknown)      SOCIAL HISTORY:    FAMILY HISTORY:  Family history of stroke      Vital Signs Last 24 Hrs  T(C): 36.8 (10 Masoud 2020 05:31), Max: 37.1 (09 Jun 2020 15:28)  T(F): 98.2 (10 Masoud 2020 05:31), Max: 98.7 (09 Jun 2020 15:28)  HR: 90 (10 Masoud 2020 08:53) (68 - 100)  BP: 142/72 (10 Masoud 2020 05:31) (113/70 - 145/79)  BP(mean): --  RR: 16 (10 Masoud 2020 08:53) (16 - 20)  SpO2: 100% (10 Masoud 2020 08:53) (96% - 100%)          REVIEW OF SYSTEMS:    no pain   no CP  no SOB          PHYSICAL EXAM:    GENERAL: NAD, well-groomed, well-developed  HEAD:  Atraumatic, Normocephalic  NERVOUS SYSTEM:  sleeping but arousable    CHEST/LUNG: Clear   HEART: Regular tachy  ABDOMEN: Soft, Nontender, Nondistended; Bowel sounds present  EXTREMITIES:  2+ Peripheral Pulses, +1 edema ??        LABS:                                                  8.2    4.30  )-----------( 174      ( 10 Masoud 2020 06:17 )             24.9       06-10    139  |  105  |  19.0  ----------------------------<  88  3.8   |  24.0  |  0.60    Ca    8.2<L>      10 Masoud 2020 06:17  Mg     1.7     06-10    TPro  4.7<L>  /  Alb  2.9<L>  /  TBili  0.6  /  DBili  x   /  AST  198<H>  /  ALT  1185<H>  /  AlkPhos  94  06-10        RADIOLOGY & ADDITIONAL STUDIES:    IMPRESSION:     1. No pulmonary embolism.  2. Long segment colitis.  3. Indeterminate 8 mm left upper pole renal lesion. Nonurgent ultrasound is recommended for further evaluation.  4. Interval development of mild compression deformities at T4 and T12. Interval progression of now moderate compression deformity of L1.

## 2020-06-10 NOTE — CONSULT NOTE ADULT - REASON FOR ADMISSION
Melena, Acute GI Bleed, Coagulopathy, Transaminitis

## 2020-06-11 PROCEDURE — 99233 SBSQ HOSP IP/OBS HIGH 50: CPT

## 2020-06-11 RX ORDER — SUCRALFATE 1 G
1 TABLET ORAL EVERY 6 HOURS
Refills: 0 | Status: DISCONTINUED | OUTPATIENT
Start: 2020-06-11 | End: 2020-06-12

## 2020-06-11 RX ADMIN — Medication 20 MILLIGRAM(S): at 05:53

## 2020-06-11 RX ADMIN — PANTOPRAZOLE SODIUM 40 MILLIGRAM(S): 20 TABLET, DELAYED RELEASE ORAL at 05:53

## 2020-06-11 RX ADMIN — Medication 100 MILLIGRAM(S): at 05:52

## 2020-06-11 RX ADMIN — Medication 100 MILLIGRAM(S): at 14:24

## 2020-06-11 RX ADMIN — SENNA PLUS 2 TABLET(S): 8.6 TABLET ORAL at 20:47

## 2020-06-11 RX ADMIN — OXYCODONE HYDROCHLORIDE 10 MILLIGRAM(S): 5 TABLET ORAL at 09:15

## 2020-06-11 RX ADMIN — Medication 1 GRAM(S): at 18:19

## 2020-06-11 RX ADMIN — Medication 1 GRAM(S): at 14:25

## 2020-06-11 RX ADMIN — Medication 1 GRAM(S): at 23:33

## 2020-06-11 RX ADMIN — CEFTRIAXONE 100 MILLIGRAM(S): 500 INJECTION, POWDER, FOR SOLUTION INTRAMUSCULAR; INTRAVENOUS at 21:10

## 2020-06-11 RX ADMIN — MORPHINE SULFATE 1 MILLIGRAM(S): 50 CAPSULE, EXTENDED RELEASE ORAL at 03:11

## 2020-06-11 RX ADMIN — PANTOPRAZOLE SODIUM 40 MILLIGRAM(S): 20 TABLET, DELAYED RELEASE ORAL at 18:19

## 2020-06-11 RX ADMIN — Medication 100 MILLIGRAM(S): at 22:05

## 2020-06-11 RX ADMIN — OXYCODONE HYDROCHLORIDE 10 MILLIGRAM(S): 5 TABLET ORAL at 20:47

## 2020-06-11 NOTE — PROGRESS NOTE ADULT - PROBLEM SELECTOR PLAN 1
Resolved. Likely secondary to DU's seen on EGD. Continue current Pantoprazole therapy. Diet as tolerated. Trend cbc's while here.

## 2020-06-11 NOTE — PROGRESS NOTE ADULT - SUBJECTIVE AND OBJECTIVE BOX
HPI:  89 y/o F with PMHX DVT (11/2019) on Eliquis, intermittent asthma (never intubated/no icu stays), polymyalgia rheumatica (chronic prednisone), osteoporosis with multiple vertebral compression fxs BIBEMS after she called 911. EMS noted patient was covered in melena on arrival. In ER she was tachycardic, mildl hypotensive, and noted to have multiple abnormal labs concerning for acute upper GI bleed with Hgb drop compared to prior admission 1 month ago (9.5 from ~11) and elevated BUN:Cr ratio. Patient also noted to have significant transaminitis and upon inquery admits to using frequent tylenol approx 6 normal strength 325mg PO tablets per day on top of another "pain pill" she cannot remember. Patient was discharged on Percocet last admission 1 month ago for acute/chronic pain. Also takes Prednisone 20mg PO daily chronically for polymyalgia rheumatica and has old charts showing Celecoxib, as well as Eliquis for LE DVT. Patient given 2L IVFB in ER with some improvement in BP. CTA C/A/P obtained to r/o mesenteric ischemia and/or other pathology including Acute PE given tachypnea but was negative however did show pancolitis. Tylenol level noted to be elevated and given history patient was started on Protonix gtt and Acetadote IV gtt.   Pt lives alone. Normally ambulates with walker but more recently bed bound due to pain.   She denies F/C, N/V, abd pain, admits to melena, denies cough, +SOB, no urinary complaints, +bone pain (Chronic, andrea L shoulder and spine). All other systems negative unless mentioned above. (08 Jun 2020 03:39)     Allergies    dust (Unknown)  No Known Drug Allergies    Intolerances    provide [facility] soft diet (Unknown)    Chronic back pain  CHF (congestive heart failure)  Atrial fibrillation  Polymyalgia rheumatica  Diverticulosis  Asthma    MEDICATIONS  (STANDING):  cefTRIAXone   IVPB 1000 milliGRAM(s) IV Intermittent every 24 hours  metroNIDAZOLE  IVPB      metroNIDAZOLE  IVPB 500 milliGRAM(s) IV Intermittent every 8 hours  oxyCODONE  ER Tablet 10 milliGRAM(s) Oral every 12 hours  pantoprazole    Tablet 40 milliGRAM(s) Oral every 12 hours  predniSONE   Tablet 20 milliGRAM(s) Oral daily  senna 2 Tablet(s) Oral at bedtime  sodium chloride 0.45% with potassium chloride 20 mEq/L 1000 milliLiter(s) (70 mL/Hr) IV Continuous <Continuous>  sucralfate suspension 1 Gram(s) Oral every 6 hours    MEDICATIONS  (PRN):  morphine  - Injectable 1 milliGRAM(s) IV Push every 6 hours PRN Moderate Pain (4 - 6)                           8.2    4.30  )-----------( 174      ( 10 Masoud 2020 06:17 )             24.9     06-10    139  |  105  |  19.0  ----------------------------<  88  3.8   |  24.0  |  0.60    Ca    8.2<L>      10 Masoud 2020 06:17  Mg     1.7     06-10    TPro  4.7<L>  /  Alb  2.9<L>  /  TBili  0.6  /  DBili  x   /  AST  198<H>  /  ALT  1185<H>  /  AlkPhos  94  06-10      ;  Vital Signs Last 24 Hrs  T(C): 36.6 (11 Jun 2020 15:39), Max: 36.6 (11 Jun 2020 10:30)  T(F): 97.8 (11 Jun 2020 15:39), Max: 97.8 (11 Jun 2020 10:30)  HR: 110 (11 Jun 2020 17:10) (80 - 119)  BP: 112/73 (11 Jun 2020 15:39) (109/69 - 141/93)  BP(mean): --  RR: 18 (11 Jun 2020 15:39) (16 - 19)  SpO2: 98% (11 Jun 2020 17:10) (95% - 100%)  CAPILLARY BLOOD GLUCOSE      06-10 @ 07:01  -  06-11 @ 07:00  --------------------------------------------------------  IN: 790 mL / OUT: 0 mL / NET: 790 mL        Patient in constant pain  No CP, No SOB, No N/V min Back pain   HEENT: PEARLA  Neck: Supple  Cardio: S1 S2 No Murmur  Pulm: CTA No Rales or Ronchi  Back - diffuse tenderness  Abd: Soft NT ND BS+  Ext: No DCT LE weakness  Neuro: Awake Pleasant pleasant     Failure to thrive - KATIE trail but likely  Chronic Back Pain -  long acting Oxycontin to PRN morphine no  confusion add Senakot for constipation potential improved   Osteoporosis with compression fracture - bisphosphonates can help pain

## 2020-06-11 NOTE — PROGRESS NOTE ADULT - SUBJECTIVE AND OBJECTIVE BOX
HPI:  89 y/o F with PMHX DVT (11/2019) on Eliquis, intermittent asthma (never intubated/no icu stays), polymyalgia rheumatica (chronic prednisone), osteoporosis with multiple vertebral compression fxs BIBEMS after she called 911. EMS noted patient was covered in melena on arrival. In ER she was tachycardic, mildl hypotensive, and noted to have multiple abnormal labs concerning for acute upper GI bleed with Hgb drop compared to prior admission 1 month ago (9.5 from ~11) and elevated BUN:Cr ratio. Patient also noted to have significant transaminitis and upon inquery admits to using frequent tylenol approx 6 normal strength 325mg PO tablets per day on top of another "pain pill" she cannot remember. Patient was discharged on Percocet last admission 1 month ago for acute/chronic pain. Also takes Prednisone 20mg PO daily chronically for polymyalgia rheumatica and has old charts showing Celecoxib, as well as Eliquis for LE DVT. Patient given 2L IVFB in ER with some improvement in BP. CTA C/A/P obtained to r/o mesenteric ischemia and/or other pathology including Acute PE given tachypnea but was negative however did show pancolitis. Tylenol level noted to be elevated and given history patient was started on Protonix gtt and Acetadote IV gtt.   Pt lives alone. Normally ambulates with walker but more recently bed bound due to pain.   She denies F/C, N/V, abd pain, admits to melena, denies cough, +SOB, no urinary complaints, +bone pain (Chronic, andrea L shoulder and spine). All other systems negative unless mentioned above. (08 Jun 2020 03:39)      PAST MEDICAL & SURGICAL HISTORY:  Chronic back pain  CHF (congestive heart failure)  Atrial fibrillation  Polymyalgia rheumatica  Diverticulosis  Asthma  S/P knee replacement, left  S/P hysterectomy: 1982  S/P appendectomy: 1962      ANTIMICROBIAL:  cefTRIAXone   IVPB 1000 milliGRAM(s) IV Intermittent every 24 hours  metroNIDAZOLE  IVPB      metroNIDAZOLE  IVPB 500 milliGRAM(s) IV Intermittent every 8 hours        NEUROLOGIC:  morphine  - Injectable 1 milliGRAM(s) IV Push prn   Oxycodone 10mg Q12 hrs         GATROINTESTINAL:  pantoprazole Infusion 8 mG/Hr IV Continuous <Continuous>        IMMUNOLOGIC & OTHER  acetylcysteine IVPB 3.9 Gram(s) IV Intermittent once        Allergies    dust (Unknown)  No Known Drug Allergies    Intolerances    provide [facility] soft diet (Unknown)      SOCIAL HISTORY:    FAMILY HISTORY:  Family history of stroke    Vital Signs Last 24 Hrs  T(C): 36.4 (11 Jun 2020 05:51), Max: 36.7 (10 Masoud 2020 10:02)  T(F): 97.6 (11 Jun 2020 05:51), Max: 98 (10 Masoud 2020 10:02)  HR: 90 (11 Jun 2020 08:49) (80 - 110)  BP: 139/77 (11 Jun 2020 05:51) (102/64 - 139/77)  BP(mean): --  RR: 16 (11 Jun 2020 08:49) (16 - 17)  SpO2: 97% (11 Jun 2020 08:49) (94% - 100%)        REVIEW OF SYSTEMS:    no pain   no CP  no SOB          PHYSICAL EXAM:    GENERAL: NAD, well-groomed, well-developed  HEAD:  Atraumatic, Normocephalic  NERVOUS SYSTEM:  sleeping but arousable    CHEST/LUNG: Clear   HEART: Regular tachy  ABDOMEN: Soft, Nontender, Nondistended; Bowel sounds present  EXTREMITIES:  2+ Peripheral Pulses, +1 edema ??        LABS:                                                  8.2    4.30  )-----------( 174      ( 10 Masoud 2020 06:17 )             24.9       06-10    139  |  105  |  19.0  ----------------------------<  88  3.8   |  24.0  |  0.60    Ca    8.2<L>      10 Amsoud 2020 06:17  Mg     1.7     06-10    TPro  4.7<L>  /  Alb  2.9<L>  /  TBili  0.6  /  DBili  x   /  AST  198<H>  /  ALT  1185<H>  /  AlkPhos  94  06-10          RADIOLOGY & ADDITIONAL STUDIES:    IMPRESSION:     1. No pulmonary embolism.  2. Long segment colitis.  3. Indeterminate 8 mm left upper pole renal lesion. Nonurgent ultrasound is recommended for further evaluation.  4. Interval development of mild compression deformities at T4 and T12. Interval progression of now moderate compression deformity of L1.

## 2020-06-11 NOTE — PROGRESS NOTE ADULT - ASSESSMENT
1.  Symptomatic Anemia, Upper GI Bleed  H/H stable   - on regular diet   -Protonix 40mg po   -hold ASA/NSAIDs/Eliquis/  restarted Steroids  -GI Consult noted and appreciated       2.   Coagulopathy with Transaminitis   - Likely  ischemic hepatitis -- pt was hypotensive on admission   Tylenol levels normal pt was taking 6 Tylenol pills/day +/- 1-2 additional "Pain" pills ?Percocet?        3.  Leukocytosis with Colitis  -Continue Ceftriaxone 1g IV q24 and Flagyl 500mg IV q8 for empiric colitis coverage      4.   Chronic LE DVT  -Hold Eliquis with GIB  -DVT diagnosed 11/2019 would consider risks/benefits of longterm AC post-GI bleed.  no AC at this time the risks > benefits     5.   Polymyalgia Rheumatica and T spine and L spine Fx  steroids and oxy po     6.   Osteoporosis with multiple vertebral fractures  -exacerbated by chronic steroid use. outpatient f/u.     7.   Mild-Moderate Intermittent Asthma  -Continue Symbicort/Albuterol PRN  -O2 via NC if needed PRN titrate FIO2 >90% and <96%    8.   Tachycardia due to anemia   cardio called for pat Rodríguez     9.  Acute renal failure   elevated BUN  nephrology called     pt DNR/DNI   palliative note appreciated and adjusted pain meds

## 2020-06-11 NOTE — PROGRESS NOTE ADULT - SUBJECTIVE AND OBJECTIVE BOX
Pt seen and examined. She has some c/o upper abdominal pain but has no evidence of GI bleeding.     REVIEW OF SYSTEMS:  Constitutional: No fever, weight loss or fatigue  Cardiovascular: No chest pain, palpitations, dizziness or leg swelling  Gastrointestinal: As noted above.  Skin: No itching, burning, rashes or lesions       MEDICATIONS:  MEDICATIONS  (STANDING):  cefTRIAXone   IVPB 1000 milliGRAM(s) IV Intermittent every 24 hours  metroNIDAZOLE  IVPB      metroNIDAZOLE  IVPB 500 milliGRAM(s) IV Intermittent every 8 hours  oxyCODONE  ER Tablet 10 milliGRAM(s) Oral every 12 hours  pantoprazole    Tablet 40 milliGRAM(s) Oral every 12 hours  predniSONE   Tablet 20 milliGRAM(s) Oral daily  senna 2 Tablet(s) Oral at bedtime  sodium chloride 0.45% with potassium chloride 20 mEq/L 1000 milliLiter(s) (70 mL/Hr) IV Continuous <Continuous>    MEDICATIONS  (PRN):  morphine  - Injectable 1 milliGRAM(s) IV Push every 6 hours PRN Moderate Pain (4 - 6)      Allergies    dust (Unknown)  No Known Drug Allergies    Intolerances    provide mech soft diet (Unknown)      Vital Signs Last 24 Hrs  T(C): 36.4 (11 Jun 2020 05:51), Max: 36.7 (10 Masoud 2020 10:02)  T(F): 97.6 (11 Jun 2020 05:51), Max: 98 (10 Masoud 2020 10:02)  HR: 100 (11 Jun 2020 09:22) (80 - 110)  BP: 139/77 (11 Jun 2020 05:51) (102/64 - 139/77)  BP(mean): --  RR: 16 (11 Jun 2020 09:22) (16 - 17)  SpO2: 98% (11 Jun 2020 09:22) (94% - 100%)    06-10 @ 07:01  -  06-11 @ 07:00  --------------------------------------------------------  IN: 790 mL / OUT: 0 mL / NET: 790 mL        PHYSICAL EXAM:    General: Well developed; well nourished; in no acute distress  HEENT: MMM, conjunctiva pink and sclera anicteric.  Lungs: clear to auscultation bilaterally  Cor: RRR S1, S2 only.  Gastrointestinal: Abdomen: Soft, mild epigastric tenderness to deep palpation, non-distended; Normal bowel sounds; No hepatosplenomegaly.  Extremities: no cyanosis, clubbing or edema.  Skin: Warm and dry. No obvious rash  Neuro: Pt. a + o x 3    LABS:  CBC Full  -  ( 10 Masoud 2020 06:17 )  WBC Count : 4.30 K/uL  RBC Count : 2.67 M/uL  Hemoglobin : 8.2 g/dL  Hematocrit : 24.9 %  Platelet Count - Automated : 174 K/uL  Mean Cell Volume : 93.3 fl  Mean Cell Hemoglobin : 30.7 pg  Mean Cell Hemoglobin Concentration : 32.9 gm/dL  Auto Neutrophil # : 3.09 K/uL  Auto Lymphocyte # : 0.55 K/uL  Auto Monocyte # : 0.40 K/uL  Auto Eosinophil # : 0.06 K/uL  Auto Basophil # : 0.02 K/uL  Auto Neutrophil % : 71.8 %  Auto Lymphocyte % : 12.8 %  Auto Monocyte % : 9.3 %  Auto Eosinophil % : 1.4 %  Auto Basophil % : 0.5 %    06-10    139  |  105  |  19.0  ----------------------------<  88  3.8   |  24.0  |  0.60    Ca    8.2<L>      10 Masoud 2020 06:17  Mg     1.7     06-10    TPro  4.7<L>  /  Alb  2.9<L>  /  TBili  0.6  /  DBili  x   /  AST  198<H>  /  ALT  1185<H>  /  AlkPhos  94  06-10                      RADIOLOGY & ADDITIONAL STUDIES (The following images were personally reviewed):

## 2020-06-12 ENCOUNTER — TRANSCRIPTION ENCOUNTER (OUTPATIENT)
Age: 85
End: 2020-06-12

## 2020-06-12 VITALS
SYSTOLIC BLOOD PRESSURE: 145 MMHG | DIASTOLIC BLOOD PRESSURE: 76 MMHG | TEMPERATURE: 98 F | HEART RATE: 102 BPM | RESPIRATION RATE: 18 BRPM | OXYGEN SATURATION: 98 %

## 2020-06-12 LAB
ALBUMIN SERPL ELPH-MCNC: 3.1 G/DL — LOW (ref 3.3–5.2)
ALP SERPL-CCNC: 100 U/L — SIGNIFICANT CHANGE UP (ref 40–120)
ALT FLD-CCNC: 555 U/L — HIGH
ANION GAP SERPL CALC-SCNC: 10 MMOL/L — SIGNIFICANT CHANGE UP (ref 5–17)
AST SERPL-CCNC: 65 U/L — HIGH
BASOPHILS # BLD AUTO: 0.01 K/UL — SIGNIFICANT CHANGE UP (ref 0–0.2)
BASOPHILS NFR BLD AUTO: 0.1 % — SIGNIFICANT CHANGE UP (ref 0–2)
BILIRUB SERPL-MCNC: 0.5 MG/DL — SIGNIFICANT CHANGE UP (ref 0.4–2)
BUN SERPL-MCNC: 14 MG/DL — SIGNIFICANT CHANGE UP (ref 8–20)
CALCIUM SERPL-MCNC: 8.6 MG/DL — SIGNIFICANT CHANGE UP (ref 8.6–10.2)
CHLORIDE SERPL-SCNC: 103 MMOL/L — SIGNIFICANT CHANGE UP (ref 98–107)
CO2 SERPL-SCNC: 23 MMOL/L — SIGNIFICANT CHANGE UP (ref 22–29)
CREAT SERPL-MCNC: 0.74 MG/DL — SIGNIFICANT CHANGE UP (ref 0.5–1.3)
EOSINOPHIL # BLD AUTO: 0.11 K/UL — SIGNIFICANT CHANGE UP (ref 0–0.5)
EOSINOPHIL NFR BLD AUTO: 1 % — SIGNIFICANT CHANGE UP (ref 0–6)
GLUCOSE SERPL-MCNC: 92 MG/DL — SIGNIFICANT CHANGE UP (ref 70–99)
HCT VFR BLD CALC: 27.6 % — LOW (ref 34.5–45)
HGB BLD-MCNC: 8.8 G/DL — LOW (ref 11.5–15.5)
IMM GRANULOCYTES NFR BLD AUTO: 1.6 % — HIGH (ref 0–1.5)
LYMPHOCYTES # BLD AUTO: 0.79 K/UL — LOW (ref 1–3.3)
LYMPHOCYTES # BLD AUTO: 7.2 % — LOW (ref 13–44)
MCHC RBC-ENTMCNC: 30.9 PG — SIGNIFICANT CHANGE UP (ref 27–34)
MCHC RBC-ENTMCNC: 31.9 GM/DL — LOW (ref 32–36)
MCV RBC AUTO: 96.8 FL — SIGNIFICANT CHANGE UP (ref 80–100)
MONOCYTES # BLD AUTO: 0.87 K/UL — SIGNIFICANT CHANGE UP (ref 0–0.9)
MONOCYTES NFR BLD AUTO: 8 % — SIGNIFICANT CHANGE UP (ref 2–14)
NEUTROPHILS # BLD AUTO: 8.95 K/UL — HIGH (ref 1.8–7.4)
NEUTROPHILS NFR BLD AUTO: 82.1 % — HIGH (ref 43–77)
PLATELET # BLD AUTO: 254 K/UL — SIGNIFICANT CHANGE UP (ref 150–400)
POTASSIUM SERPL-MCNC: 4 MMOL/L — SIGNIFICANT CHANGE UP (ref 3.5–5.3)
POTASSIUM SERPL-SCNC: 4 MMOL/L — SIGNIFICANT CHANGE UP (ref 3.5–5.3)
PROT SERPL-MCNC: 4.9 G/DL — LOW (ref 6.6–8.7)
RBC # BLD: 2.85 M/UL — LOW (ref 3.8–5.2)
RBC # FLD: 16.7 % — HIGH (ref 10.3–14.5)
SODIUM SERPL-SCNC: 136 MMOL/L — SIGNIFICANT CHANGE UP (ref 135–145)
WBC # BLD: 10.91 K/UL — HIGH (ref 3.8–10.5)
WBC # FLD AUTO: 10.91 K/UL — HIGH (ref 3.8–10.5)

## 2020-06-12 PROCEDURE — 86663 EPSTEIN-BARR ANTIBODY: CPT

## 2020-06-12 PROCEDURE — 86850 RBC ANTIBODY SCREEN: CPT

## 2020-06-12 PROCEDURE — 86709 HEPATITIS A IGM ANTIBODY: CPT

## 2020-06-12 PROCEDURE — 81001 URINALYSIS AUTO W/SCOPE: CPT

## 2020-06-12 PROCEDURE — 97110 THERAPEUTIC EXERCISES: CPT

## 2020-06-12 PROCEDURE — P9016: CPT

## 2020-06-12 PROCEDURE — U0003: CPT

## 2020-06-12 PROCEDURE — 86803 HEPATITIS C AB TEST: CPT

## 2020-06-12 PROCEDURE — 86308 HETEROPHILE ANTIBODY SCREEN: CPT

## 2020-06-12 PROCEDURE — 36430 TRANSFUSION BLD/BLD COMPNT: CPT

## 2020-06-12 PROCEDURE — 86901 BLOOD TYPING SEROLOGIC RH(D): CPT

## 2020-06-12 PROCEDURE — 99232 SBSQ HOSP IP/OBS MODERATE 35: CPT

## 2020-06-12 PROCEDURE — 86664 EPSTEIN-BARR NUCLEAR ANTIGEN: CPT

## 2020-06-12 PROCEDURE — 36415 COLL VENOUS BLD VENIPUNCTURE: CPT

## 2020-06-12 PROCEDURE — 86706 HEP B SURFACE ANTIBODY: CPT

## 2020-06-12 PROCEDURE — 87040 BLOOD CULTURE FOR BACTERIA: CPT

## 2020-06-12 PROCEDURE — 83605 ASSAY OF LACTIC ACID: CPT

## 2020-06-12 PROCEDURE — 85027 COMPLETE CBC AUTOMATED: CPT

## 2020-06-12 PROCEDURE — 83690 ASSAY OF LIPASE: CPT

## 2020-06-12 PROCEDURE — 97163 PT EVAL HIGH COMPLEX 45 MIN: CPT

## 2020-06-12 PROCEDURE — 87186 SC STD MICRODIL/AGAR DIL: CPT

## 2020-06-12 PROCEDURE — 86708 HEPATITIS A ANTIBODY: CPT

## 2020-06-12 PROCEDURE — 86923 COMPATIBILITY TEST ELECTRIC: CPT

## 2020-06-12 PROCEDURE — 99285 EMERGENCY DEPT VISIT HI MDM: CPT | Mod: 25

## 2020-06-12 PROCEDURE — 85730 THROMBOPLASTIN TIME PARTIAL: CPT

## 2020-06-12 PROCEDURE — 85610 PROTHROMBIN TIME: CPT

## 2020-06-12 PROCEDURE — 84443 ASSAY THYROID STIM HORMONE: CPT

## 2020-06-12 PROCEDURE — 80053 COMPREHEN METABOLIC PANEL: CPT

## 2020-06-12 PROCEDURE — 86665 EPSTEIN-BARR CAPSID VCA: CPT

## 2020-06-12 PROCEDURE — 84484 ASSAY OF TROPONIN QUANT: CPT

## 2020-06-12 PROCEDURE — 83735 ASSAY OF MAGNESIUM: CPT

## 2020-06-12 PROCEDURE — 96374 THER/PROPH/DIAG INJ IV PUSH: CPT

## 2020-06-12 PROCEDURE — 82550 ASSAY OF CK (CPK): CPT

## 2020-06-12 PROCEDURE — 85014 HEMATOCRIT: CPT

## 2020-06-12 PROCEDURE — 84100 ASSAY OF PHOSPHORUS: CPT

## 2020-06-12 PROCEDURE — 80048 BASIC METABOLIC PNL TOTAL CA: CPT

## 2020-06-12 PROCEDURE — 97116 GAIT TRAINING THERAPY: CPT

## 2020-06-12 PROCEDURE — 80074 ACUTE HEPATITIS PANEL: CPT

## 2020-06-12 PROCEDURE — 93005 ELECTROCARDIOGRAM TRACING: CPT

## 2020-06-12 PROCEDURE — 86645 CMV ANTIBODY IGM: CPT

## 2020-06-12 PROCEDURE — 71275 CT ANGIOGRAPHY CHEST: CPT

## 2020-06-12 PROCEDURE — 85018 HEMOGLOBIN: CPT

## 2020-06-12 PROCEDURE — 82803 BLOOD GASES ANY COMBINATION: CPT

## 2020-06-12 PROCEDURE — 86704 HEP B CORE ANTIBODY TOTAL: CPT

## 2020-06-12 PROCEDURE — 86900 BLOOD TYPING SEROLOGIC ABO: CPT

## 2020-06-12 PROCEDURE — 97530 THERAPEUTIC ACTIVITIES: CPT

## 2020-06-12 PROCEDURE — 74177 CT ABD & PELVIS W/CONTRAST: CPT

## 2020-06-12 PROCEDURE — 87340 HEPATITIS B SURFACE AG IA: CPT

## 2020-06-12 PROCEDURE — 71045 X-RAY EXAM CHEST 1 VIEW: CPT

## 2020-06-12 PROCEDURE — 87086 URINE CULTURE/COLONY COUNT: CPT

## 2020-06-12 PROCEDURE — 86705 HEP B CORE ANTIBODY IGM: CPT

## 2020-06-12 PROCEDURE — 80307 DRUG TEST PRSMV CHEM ANLYZR: CPT

## 2020-06-12 RX ORDER — OXYCODONE HYDROCHLORIDE 5 MG/1
1 TABLET ORAL
Qty: 0 | Refills: 0 | DISCHARGE
Start: 2020-06-12

## 2020-06-12 RX ADMIN — Medication 1 GRAM(S): at 05:43

## 2020-06-12 RX ADMIN — Medication 100 MILLIGRAM(S): at 05:43

## 2020-06-12 RX ADMIN — OXYCODONE HYDROCHLORIDE 10 MILLIGRAM(S): 5 TABLET ORAL at 08:24

## 2020-06-12 RX ADMIN — PANTOPRAZOLE SODIUM 40 MILLIGRAM(S): 20 TABLET, DELAYED RELEASE ORAL at 05:43

## 2020-06-12 RX ADMIN — Medication 20 MILLIGRAM(S): at 05:43

## 2020-06-12 RX ADMIN — Medication 1 GRAM(S): at 11:33

## 2020-06-12 RX ADMIN — MORPHINE SULFATE 1 MILLIGRAM(S): 50 CAPSULE, EXTENDED RELEASE ORAL at 11:32

## 2020-06-12 NOTE — DIETITIAN INITIAL EVALUATION ADULT. - MALNUTRITION
Severe-Chronic; NFPE: Severe muscle mass loss in temple, clavicle, shoulder regions and severe fat loss in orbit, buccal, triceps, thoracic regions Severe-Chronic

## 2020-06-12 NOTE — DIETITIAN INITIAL EVALUATION ADULT. - OTHER INFO
Pt is a 88 year old Female with PMHX DVT (11/2019) on Eliquis, intermittent asthma,  polymyalgia rheumatica, osteoporosis with multiple vertebral compression fxs BIBEMS after she called 911. EMS noted patient was covered in melena on arrival. In ER she was tachycardic, mildly hypotensive, and noted to have multiple abnormal labs concerning for acute upper GI bleed with Hgb drop compared to prior admission 1 month ago  and elevated BUN:Cr ratio. Patient also noted to have significant transaminitis and upon inquiry admits to using frequent tylenol approx 6 normal strength 325mg PO tablets per day on top of another "pain pill" she cannot remember. Patient was discharged on Percocet last admission 1 month ago for acute/chronic pain. Also takes Prednisone 20mg PO daily chronically for polymyalgia rheumatica and has old charts showing Celecoxib, as well as Eliquis for LE DVT. Patient given 2L IVFB in ER with some improvement in BP. CTA C/A/P obtained to r/o mesenteric ischemia and/or other pathology including Acute PE given tachypnea but was negative however did show pancolitis. Tylenol level noted to be elevated and given history patient was started on Protonix gtt and Acetadote IV gtt.   Pt lives alone. Normally ambulates with walker but more recently bed bound due to pain.   She denies F/C, N/V, abd pain, admits to melena, denies cough, +SOB, no urinary complaints, +bone pain (Chronic, andrea L shoulder and spine). All other systems negative unless mentioned above.   pt reports eating fairly well at home, poor historian; however wt obtained from previous admission, (8# wt loss over pst 5 months noted). NFPE conducted.

## 2020-06-12 NOTE — DISCHARGE NOTE NURSING/CASE MANAGEMENT/SOCIAL WORK - PATIENT PORTAL LINK FT
You can access the FollowMyHealth Patient Portal offered by [facility] by registering at the following website: [email or web address]. By joining [facility]’s FollowMyHealth portal, you will also be able to view your health information using other applications (apps) compatible with our system.

## 2020-06-12 NOTE — PROGRESS NOTE ADULT - ASSESSMENT
1.  Symptomatic Anemia, Upper GI Bleed  H/H stable   - on regular diet   -Protonix 40mg po bid  -hold ASA/NSAIDs/Eliquis/  restarted Steroids  -GI Consult noted and appreciated       2.   Coagulopathy with Transaminitis   - Likely  ischemic hepatitis -- pt was hypotensive on admission   Tylenol levels normal pt was taking 6 Tylenol pills/day +/- 1-2 additional "Pain" pills ?Percocet?        3.  Leukocytosis with Colitis  -Continue Ceftriaxone 1g IV q24 and Flagyl 500mg IV q8 for empiric colitis coverage. Today last day of Abx  elevated WBC due to steroids       4.   Chronic LE DVT  -Hold Eliquis with GIB  -DVT diagnosed 11/2019 would consider risks/benefits of longterm AC post-GI bleed.  no AC at this time the risks > benefits     5.   Polymyalgia Rheumatica and T spine and L spine Fx  steroids and oxy po     6.   Osteoporosis with multiple vertebral fractures  -exacerbated by chronic steroid use. outpatient f/u.     7.   Mild-Moderate Intermittent Asthma  -Continue Symbicort/Albuterol PRN  -O2 via NC if needed PRN titrate FIO2 >90% and <96%    8.   Tachycardia due to anemia   cardio called for pat Rodríguez     9.  Acute renal failure   elevated BUN  nephrology called     pt DNR/DNI   palliative note appreciated and adjusted pain meds

## 2020-06-12 NOTE — PROGRESS NOTE ADULT - SUBJECTIVE AND OBJECTIVE BOX
Patient is a 88y old  Female who presents with a chief complaint of Melena, Acute GI Bleed, Coagulopathy, Transaminitis (12 Jun 2020 10:48)      HPI:  89 y/o F with PMHX DVT (11/2019) on Eliquis, intermittent asthma (never intubated/no icu stays), polymyalgia rheumatica (chronic prednisone), osteoporosis with multiple vertebral compression fxs .     Patient with hiatal hernia, jaren erosions, duodenitis, several Du'S. No melena now. hemoglobin stable.  No abdominal pain.       REVIEW OF SYSTEMS:  Constitutional: No fever, weight loss or fatigue  ENMT:  No difficulty hearing, tinnitus, vertigo; No sinus or throat pain  Respiratory: No cough, wheezing, chills or hemoptysis  Cardiovascular: No chest pain, palpitations, dizziness or leg swelling  Gastrointestinal: No abdominal or epigastric pain. No nausea, vomiting or hematemesis; No diarrhea or constipation. No melena or hematochezia.  Skin: No itching, burning, rashes or lesions   Musculoskeletal: No joint pain or swelling; No muscle, back or extremity pain    PAST MEDICAL & SURGICAL HISTORY:  Chronic back pain  CHF (congestive heart failure)  Atrial fibrillation  Polymyalgia rheumatica  Diverticulosis  Asthma  S/P knee replacement, left  S/P hysterectomy: 1982  S/P appendectomy: 1962      FAMILY HISTORY:  Family history of stroke      SOCIAL HISTORY:  Smoking Status: [ ] Current, [ ] Former, [ ] Never  Pack Years:  [  ] EtOH-no  [  ] IVDA    MEDICATIONS:  MEDICATIONS  (STANDING):  cefTRIAXone   IVPB 1000 milliGRAM(s) IV Intermittent every 24 hours  metroNIDAZOLE  IVPB      metroNIDAZOLE  IVPB 500 milliGRAM(s) IV Intermittent every 8 hours  oxyCODONE  ER Tablet 10 milliGRAM(s) Oral every 12 hours  pantoprazole    Tablet 40 milliGRAM(s) Oral every 12 hours  predniSONE   Tablet 20 milliGRAM(s) Oral daily  senna 2 Tablet(s) Oral at bedtime  sodium chloride 0.45% with potassium chloride 20 mEq/L 1000 milliLiter(s) (70 mL/Hr) IV Continuous <Continuous>  sucralfate suspension 1 Gram(s) Oral every 6 hours    MEDICATIONS  (PRN):  morphine  - Injectable 1 milliGRAM(s) IV Push every 6 hours PRN Moderate Pain (4 - 6)      Allergies    dust (Unknown)  No Known Drug Allergies    Intolerances    provide mech soft diet (Unknown)      Vital Signs Last 24 Hrs  T(C): 36.9 (12 Jun 2020 13:35), Max: 36.9 (12 Jun 2020 13:35)  T(F): 98.4 (12 Jun 2020 13:35), Max: 98.4 (12 Jun 2020 13:35)  HR: 102 (12 Jun 2020 13:35) (90 - 113)  BP: 145/76 (12 Jun 2020 13:35) (108/74 - 145/76)  BP(mean): --  RR: 18 (12 Jun 2020 13:35) (17 - 18)  SpO2: 98% (12 Jun 2020 13:35) (93% - 100%)    06-11 @ 07:01  -  06-12 @ 07:00  --------------------------------------------------------  IN: 200 mL / OUT: 0 mL / NET: 200 mL          PHYSICAL EXAM:    General:thin, frail   HEENT: MMM, conjunctiva and sclera clear  H- RRR  L- CTA  Gastrointestinal: Soft, non-tender non-distended; Normal bowel sounds; No rebound or guarding  Extremities: Normal range of motion, No clubbing, cyanosis or edema  Neurological: Alert and oriented x3  Skin: Warm and dry. No obvious rash      LABS:                        8.8    10.91 )-----------( 254      ( 12 Jun 2020 06:27 )             27.6     12 Jun 2020 06:27    136    |  103    |  14.0   ----------------------------<  92     4.0     |  23.0   |  0.74     Ca    8.6        12 Jun 2020 06:27    TPro  4.9    /  Alb  3.1    /  TBili  0.5    /  DBili  x      /  AST  65     /  ALT  555    /  AlkPhos  100    / Amylase x      /Lipase x      12 Jun 2020 06:27              RADIOLOGY & ADDITIONAL STUDIES:

## 2020-06-12 NOTE — PROGRESS NOTE ADULT - REASON FOR ADMISSION
Melena, Acute GI Bleed, Coagulopathy, Transaminitis

## 2020-06-12 NOTE — PROGRESS NOTE ADULT - SUBJECTIVE AND OBJECTIVE BOX
HPI:  89 y/o F with PMHX DVT (11/2019) on Eliquis, intermittent asthma (never intubated/no icu stays), polymyalgia rheumatica (chronic prednisone), osteoporosis with multiple vertebral compression fxs BIBEMS after she called 911. EMS noted patient was covered in melena on arrival. In ER she was tachycardic, mildl hypotensive, and noted to have multiple abnormal labs concerning for acute upper GI bleed with Hgb drop compared to prior admission 1 month ago (9.5 from ~11) and elevated BUN:Cr ratio. Patient also noted to have significant transaminitis and upon inquery admits to using frequent tylenol approx 6 normal strength 325mg PO tablets per day on top of another "pain pill" she cannot remember. Patient was discharged on Percocet last admission 1 month ago for acute/chronic pain. Also takes Prednisone 20mg PO daily chronically for polymyalgia rheumatica and has old charts showing Celecoxib, as well as Eliquis for LE DVT. Patient given 2L IVFB in ER with some improvement in BP. CTA C/A/P obtained to r/o mesenteric ischemia and/or other pathology including Acute PE given tachypnea but was negative however did show pancolitis. Tylenol level noted to be elevated and given history patient was started on Protonix gtt and Acetadote IV gtt.   Pt lives alone. Normally ambulates with walker but more recently bed bound due to pain.   She denies F/C, N/V, abd pain, admits to melena, denies cough, +SOB, no urinary complaints, +bone pain (Chronic, andrea L shoulder and spine). All other systems negative unless mentioned above. (08 Jun 2020 03:39)      PAST MEDICAL & SURGICAL HISTORY:  Chronic back pain  CHF (congestive heart failure)  Atrial fibrillation  Polymyalgia rheumatica  Diverticulosis  Asthma  S/P knee replacement, left  S/P hysterectomy: 1982  S/P appendectomy: 1962      ANTIMICROBIAL:  cefTRIAXone   IVPB 1000 milliGRAM(s) IV Intermittent every 24 hours  metroNIDAZOLE  IVPB      metroNIDAZOLE  IVPB 500 milliGRAM(s) IV Intermittent every 8 hours        NEUROLOGIC:  morphine  - Injectable 1 milliGRAM(s) IV Push prn   Oxycodone 10mg Q12 hrs         GATROINTESTINAL:  pantoprazole Infusion 8 mG/Hr IV Continuous <Continuous>        IMMUNOLOGIC & OTHER  acetylcysteine IVPB 3.9 Gram(s) IV Intermittent once        Allergies    dust (Unknown)  No Known Drug Allergies    Intolerances    provide [facility] soft diet (Unknown)      SOCIAL HISTORY:    FAMILY HISTORY:  Family history of stroke        Vital Signs Last 24 Hrs  T(C): 36.6 (12 Jun 2020 05:40), Max: 36.6 (11 Jun 2020 15:39)  T(F): 97.8 (12 Jun 2020 05:40), Max: 97.8 (11 Jun 2020 15:39)  HR: 97 (12 Jun 2020 05:40) (90 - 110)  BP: 125/56 (12 Jun 2020 08:20) (112/73 - 139/77)  BP(mean): --  RR: 18 (12 Jun 2020 08:20) (17 - 18)  SpO2: 93% (12 Jun 2020 08:20) (93% - 100%)        REVIEW OF SYSTEMS:    no pain   no CP  no SOB          PHYSICAL EXAM:    GENERAL: NAD, well-groomed, well-developed  HEAD:  Atraumatic, Normocephalic  NERVOUS SYSTEM:  sleeping but arousable    CHEST/LUNG: Clear   HEART: Regular   ABDOMEN: Soft, Nontender, Nondistended; Bowel sounds present  EXTREMITIES:  2+ Peripheral Pulses, +1 edema ??        LABS:                                                  8.8    10.91 )-----------( 254      ( 12 Jun 2020 06:27 )             27.6       06-12    136  |  103  |  14.0  ----------------------------<  92  4.0   |  23.0  |  0.74    Ca    8.6      12 Jun 2020 06:27    TPro  4.9<L>  /  Alb  3.1<L>  /  TBili  0.5  /  DBili  x   /  AST  65<H>  /  ALT  555<H>  /  AlkPhos  100  06-12            RADIOLOGY & ADDITIONAL STUDIES:    IMPRESSION:     1. No pulmonary embolism.  2. Long segment colitis.  3. Indeterminate 8 mm left upper pole renal lesion. Nonurgent ultrasound is recommended for further evaluation.  4. Interval development of mild compression deformities at T4 and T12. Interval progression of now moderate compression deformity of L1.

## 2020-06-12 NOTE — DIETITIAN INITIAL EVALUATION ADULT. - ETIOLOGY
Related to inability to meet sufficient protein-energy requirements in setting of advanced age, admitted with GI Bleed and Transaminitis

## 2020-06-13 LAB
CULTURE RESULTS: SIGNIFICANT CHANGE UP
CULTURE RESULTS: SIGNIFICANT CHANGE UP
SPECIMEN SOURCE: SIGNIFICANT CHANGE UP
SPECIMEN SOURCE: SIGNIFICANT CHANGE UP

## 2020-06-29 NOTE — DIETITIAN INITIAL EVALUATION ADULT. - NS FNS CHANGE IN WEIGHT
Loss Quality 110: Preventive Care And Screening: Influenza Immunization: Influenza Immunization previously received during influenza season Detail Level: Detailed

## 2020-07-02 NOTE — ED ADULT TRIAGE NOTE - MEANS OF ARRIVAL
stretcher Trilobed Flap Text: The defect edges were debeveled with a #15 scalpel blade.  Given the location of the defect and the proximity to free margins a trilobed flap was deemed most appropriate.  Using a sterile surgical marker, an appropriate trilobed flap drawn around the defect.    The area thus outlined was incised deep to adipose tissue with a #15 scalpel blade.  The skin margins were undermined to an appropriate distance in all directions utilizing iris scissors.

## 2020-07-04 ENCOUNTER — INPATIENT (INPATIENT)
Facility: HOSPITAL | Age: 85
LOS: 22 days | Discharge: HOME | DRG: 871 | End: 2020-07-27
Attending: FAMILY MEDICINE | Admitting: HOSPITALIST
Payer: MEDICARE

## 2020-07-04 VITALS
TEMPERATURE: 100 F | HEART RATE: 108 BPM | SYSTOLIC BLOOD PRESSURE: 161 MMHG | OXYGEN SATURATION: 97 % | DIASTOLIC BLOOD PRESSURE: 84 MMHG | RESPIRATION RATE: 24 BRPM

## 2020-07-04 DIAGNOSIS — N30.00 ACUTE CYSTITIS WITHOUT HEMATURIA: ICD-10-CM

## 2020-07-04 LAB
ALBUMIN SERPL ELPH-MCNC: 3.3 G/DL — SIGNIFICANT CHANGE UP (ref 3.3–5.2)
ALP SERPL-CCNC: 86 U/L — SIGNIFICANT CHANGE UP (ref 40–120)
ALT FLD-CCNC: 21 U/L — SIGNIFICANT CHANGE UP
ANION GAP SERPL CALC-SCNC: 14 MMOL/L — SIGNIFICANT CHANGE UP (ref 5–17)
APPEARANCE UR: CLEAR — SIGNIFICANT CHANGE UP
APTT BLD: 22.3 SEC — LOW (ref 27.5–35.5)
AST SERPL-CCNC: 23 U/L — SIGNIFICANT CHANGE UP
BASOPHILS # BLD AUTO: 0 K/UL — SIGNIFICANT CHANGE UP (ref 0–0.2)
BASOPHILS NFR BLD AUTO: 0 % — SIGNIFICANT CHANGE UP (ref 0–2)
BILIRUB SERPL-MCNC: 0.4 MG/DL — SIGNIFICANT CHANGE UP (ref 0.4–2)
BILIRUB UR-MCNC: NEGATIVE — SIGNIFICANT CHANGE UP
BUN SERPL-MCNC: 17 MG/DL — SIGNIFICANT CHANGE UP (ref 8–20)
CALCIUM SERPL-MCNC: 7.2 MG/DL — LOW (ref 8.6–10.2)
CHLORIDE SERPL-SCNC: 81 MMOL/L — LOW (ref 98–107)
CO2 SERPL-SCNC: 25 MMOL/L — SIGNIFICANT CHANGE UP (ref 22–29)
COLOR SPEC: YELLOW — SIGNIFICANT CHANGE UP
CREAT SERPL-MCNC: 0.55 MG/DL — SIGNIFICANT CHANGE UP (ref 0.5–1.3)
DIFF PNL FLD: ABNORMAL
EOSINOPHIL # BLD AUTO: 0.13 K/UL — SIGNIFICANT CHANGE UP (ref 0–0.5)
EOSINOPHIL NFR BLD AUTO: 0.9 % — SIGNIFICANT CHANGE UP (ref 0–6)
GLUCOSE BLDC GLUCOMTR-MCNC: 50 MG/DL — LOW (ref 70–99)
GLUCOSE SERPL-MCNC: 636 MG/DL — CRITICAL HIGH (ref 70–99)
GLUCOSE UR QL: NEGATIVE MG/DL — SIGNIFICANT CHANGE UP
HCT VFR BLD CALC: 38.9 % — SIGNIFICANT CHANGE UP (ref 34.5–45)
HGB BLD-MCNC: 12.4 G/DL — SIGNIFICANT CHANGE UP (ref 11.5–15.5)
INR BLD: 1.04 RATIO — SIGNIFICANT CHANGE UP (ref 0.88–1.16)
KETONES UR-MCNC: NEGATIVE — SIGNIFICANT CHANGE UP
LACTATE BLDV-MCNC: 0.6 MMOL/L — SIGNIFICANT CHANGE UP (ref 0.5–2)
LEUKOCYTE ESTERASE UR-ACNC: ABNORMAL
LYMPHOCYTES # BLD AUTO: 0.62 K/UL — LOW (ref 1–3.3)
LYMPHOCYTES # BLD AUTO: 4.3 % — LOW (ref 13–44)
MCHC RBC-ENTMCNC: 29.3 PG — SIGNIFICANT CHANGE UP (ref 27–34)
MCHC RBC-ENTMCNC: 31.9 GM/DL — LOW (ref 32–36)
MCV RBC AUTO: 92 FL — SIGNIFICANT CHANGE UP (ref 80–100)
MONOCYTES # BLD AUTO: 0.37 K/UL — SIGNIFICANT CHANGE UP (ref 0–0.9)
MONOCYTES NFR BLD AUTO: 2.6 % — SIGNIFICANT CHANGE UP (ref 2–14)
NEUTROPHILS # BLD AUTO: 13.14 K/UL — HIGH (ref 1.8–7.4)
NEUTROPHILS NFR BLD AUTO: 91.3 % — HIGH (ref 43–77)
NITRITE UR-MCNC: NEGATIVE — SIGNIFICANT CHANGE UP
PH UR: 7 — SIGNIFICANT CHANGE UP (ref 5–8)
PLATELET # BLD AUTO: 161 K/UL — SIGNIFICANT CHANGE UP (ref 150–400)
POTASSIUM SERPL-MCNC: 2.9 MMOL/L — CRITICAL LOW (ref 3.5–5.3)
POTASSIUM SERPL-SCNC: 2.9 MMOL/L — CRITICAL LOW (ref 3.5–5.3)
PROT SERPL-MCNC: 5.1 G/DL — LOW (ref 6.6–8.7)
PROT UR-MCNC: 15 MG/DL
PROTHROM AB SERPL-ACNC: 12 SEC — SIGNIFICANT CHANGE UP (ref 10.6–13.6)
RBC # BLD: 4.23 M/UL — SIGNIFICANT CHANGE UP (ref 3.8–5.2)
RBC # FLD: 16.2 % — HIGH (ref 10.3–14.5)
SODIUM SERPL-SCNC: 120 MMOL/L — CRITICAL LOW (ref 135–145)
SP GR SPEC: 1.01 — SIGNIFICANT CHANGE UP (ref 1.01–1.02)
TROPONIN T SERPL-MCNC: 0.02 NG/ML — SIGNIFICANT CHANGE UP (ref 0–0.06)
UROBILINOGEN FLD QL: NEGATIVE MG/DL — SIGNIFICANT CHANGE UP
WBC # BLD: 14.39 K/UL — HIGH (ref 3.8–10.5)
WBC # FLD AUTO: 14.39 K/UL — HIGH (ref 3.8–10.5)

## 2020-07-04 PROCEDURE — 99291 CRITICAL CARE FIRST HOUR: CPT | Mod: CS

## 2020-07-04 PROCEDURE — 93010 ELECTROCARDIOGRAM REPORT: CPT

## 2020-07-04 PROCEDURE — 99223 1ST HOSP IP/OBS HIGH 75: CPT

## 2020-07-04 PROCEDURE — 71045 X-RAY EXAM CHEST 1 VIEW: CPT | Mod: 26

## 2020-07-04 RX ORDER — PANTOPRAZOLE SODIUM 20 MG/1
0 TABLET, DELAYED RELEASE ORAL
Qty: 0 | Refills: 0 | DISCHARGE

## 2020-07-04 RX ORDER — POTASSIUM CHLORIDE 20 MEQ
10 PACKET (EA) ORAL
Refills: 0 | Status: COMPLETED | OUTPATIENT
Start: 2020-07-04 | End: 2020-07-04

## 2020-07-04 RX ORDER — BUDESONIDE AND FORMOTEROL FUMARATE DIHYDRATE 160; 4.5 UG/1; UG/1
2 AEROSOL RESPIRATORY (INHALATION)
Qty: 0 | Refills: 0 | DISCHARGE

## 2020-07-04 RX ORDER — DILTIAZEM HCL 120 MG
10 CAPSULE, EXT RELEASE 24 HR ORAL ONCE
Refills: 0 | Status: COMPLETED | OUTPATIENT
Start: 2020-07-04 | End: 2020-07-04

## 2020-07-04 RX ORDER — DEXTROSE 50 % IN WATER 50 %
15 SYRINGE (ML) INTRAVENOUS ONCE
Refills: 0 | Status: DISCONTINUED | OUTPATIENT
Start: 2020-07-04 | End: 2020-07-08

## 2020-07-04 RX ORDER — DEXTROSE 50 % IN WATER 50 %
50 SYRINGE (ML) INTRAVENOUS ONCE
Refills: 0 | Status: COMPLETED | OUTPATIENT
Start: 2020-07-04 | End: 2020-07-04

## 2020-07-04 RX ORDER — BUDESONIDE AND FORMOTEROL FUMARATE DIHYDRATE 160; 4.5 UG/1; UG/1
2 AEROSOL RESPIRATORY (INHALATION)
Refills: 0 | Status: DISCONTINUED | OUTPATIENT
Start: 2020-07-04 | End: 2020-07-27

## 2020-07-04 RX ORDER — SODIUM CHLORIDE 9 MG/ML
1000 INJECTION, SOLUTION INTRAVENOUS
Refills: 0 | Status: DISCONTINUED | OUTPATIENT
Start: 2020-07-04 | End: 2020-07-08

## 2020-07-04 RX ORDER — AZITHROMYCIN 500 MG/1
500 TABLET, FILM COATED ORAL ONCE
Refills: 0 | Status: COMPLETED | OUTPATIENT
Start: 2020-07-04 | End: 2020-07-04

## 2020-07-04 RX ORDER — ACETAMINOPHEN 500 MG
975 TABLET ORAL ONCE
Refills: 0 | Status: COMPLETED | OUTPATIENT
Start: 2020-07-04 | End: 2020-07-04

## 2020-07-04 RX ORDER — DEXTROSE 50 % IN WATER 50 %
25 SYRINGE (ML) INTRAVENOUS ONCE
Refills: 0 | Status: DISCONTINUED | OUTPATIENT
Start: 2020-07-04 | End: 2020-07-08

## 2020-07-04 RX ORDER — DEXTROSE MONOHYDRATE, SODIUM CHLORIDE, AND POTASSIUM CHLORIDE 50; .745; 4.5 G/1000ML; G/1000ML; G/1000ML
1000 INJECTION, SOLUTION INTRAVENOUS
Refills: 0 | Status: DISCONTINUED | OUTPATIENT
Start: 2020-07-04 | End: 2020-07-05

## 2020-07-04 RX ORDER — INSULIN HUMAN 100 [IU]/ML
8 INJECTION, SOLUTION SUBCUTANEOUS ONCE
Refills: 0 | Status: COMPLETED | OUTPATIENT
Start: 2020-07-04 | End: 2020-07-04

## 2020-07-04 RX ORDER — DEXTROSE 50 % IN WATER 50 %
12.5 SYRINGE (ML) INTRAVENOUS ONCE
Refills: 0 | Status: DISCONTINUED | OUTPATIENT
Start: 2020-07-04 | End: 2020-07-08

## 2020-07-04 RX ORDER — GLUCAGON INJECTION, SOLUTION 0.5 MG/.1ML
1 INJECTION, SOLUTION SUBCUTANEOUS ONCE
Refills: 0 | Status: DISCONTINUED | OUTPATIENT
Start: 2020-07-04 | End: 2020-07-08

## 2020-07-04 RX ORDER — CEFTRIAXONE 500 MG/1
1000 INJECTION, POWDER, FOR SOLUTION INTRAMUSCULAR; INTRAVENOUS ONCE
Refills: 0 | Status: COMPLETED | OUTPATIENT
Start: 2020-07-04 | End: 2020-07-04

## 2020-07-04 RX ORDER — SODIUM CHLORIDE 9 MG/ML
1250 INJECTION INTRAMUSCULAR; INTRAVENOUS; SUBCUTANEOUS ONCE
Refills: 0 | Status: COMPLETED | OUTPATIENT
Start: 2020-07-04 | End: 2020-07-04

## 2020-07-04 RX ORDER — ALBUTEROL 90 UG/1
1 AEROSOL, METERED ORAL
Qty: 0 | Refills: 0 | DISCHARGE

## 2020-07-04 RX ORDER — CEFTRIAXONE 500 MG/1
1000 INJECTION, POWDER, FOR SOLUTION INTRAMUSCULAR; INTRAVENOUS EVERY 24 HOURS
Refills: 0 | Status: COMPLETED | OUTPATIENT
Start: 2020-07-04 | End: 2020-07-09

## 2020-07-04 RX ADMIN — Medication 10 MILLIGRAM(S): at 19:51

## 2020-07-04 RX ADMIN — AZITHROMYCIN 255 MILLIGRAM(S): 500 TABLET, FILM COATED ORAL at 19:50

## 2020-07-04 RX ADMIN — CEFTRIAXONE 100 MILLIGRAM(S): 500 INJECTION, POWDER, FOR SOLUTION INTRAMUSCULAR; INTRAVENOUS at 18:45

## 2020-07-04 RX ADMIN — SODIUM CHLORIDE 1250 MILLILITER(S): 9 INJECTION INTRAMUSCULAR; INTRAVENOUS; SUBCUTANEOUS at 18:45

## 2020-07-04 RX ADMIN — Medication 100 MILLIEQUIVALENT(S): at 21:53

## 2020-07-04 RX ADMIN — INSULIN HUMAN 8 UNIT(S): 100 INJECTION, SOLUTION SUBCUTANEOUS at 20:48

## 2020-07-04 RX ADMIN — SODIUM CHLORIDE 1250 MILLILITER(S): 9 INJECTION INTRAMUSCULAR; INTRAVENOUS; SUBCUTANEOUS at 20:45

## 2020-07-04 RX ADMIN — Medication 50 MILLILITER(S): at 21:53

## 2020-07-04 RX ADMIN — Medication 10 MILLIEQUIVALENT(S): at 21:04

## 2020-07-04 RX ADMIN — AZITHROMYCIN 500 MILLIGRAM(S): 500 TABLET, FILM COATED ORAL at 19:45

## 2020-07-04 RX ADMIN — Medication 100 MILLIEQUIVALENT(S): at 20:48

## 2020-07-04 RX ADMIN — CEFTRIAXONE 1000 MILLIGRAM(S): 500 INJECTION, POWDER, FOR SOLUTION INTRAMUSCULAR; INTRAVENOUS at 19:45

## 2020-07-04 RX ADMIN — Medication 975 MILLIGRAM(S): at 18:45

## 2020-07-04 NOTE — H&P ADULT - NSHPLABSRESULTS_GEN_ALL_CORE
12.4   14.39 )-----------( 161      ( 04 Jul 2020 18:55 )             38.9       07-04    120<LL>  |  81<L>  |  17.0  ----------------------------<  636<HH>  2.9<LL>   |  25.0  |  0.55    Ca    7.2<L>      04 Jul 2020 20:03    TPro  5.1<L>  /  Alb  3.3  /  TBili  0.4  /  DBili  x   /  AST  23  /  ALT  21  /  AlkPhos  86  07-04

## 2020-07-04 NOTE — ED PROVIDER NOTE - PHYSICAL EXAMINATION
Constitutional - well-developed; cachectic Head - NCAT. Airway patent. Eyes - PERRL. CV - tachy regular. no murmur. no edema. Pulm - CTAB. Abd - soft, nt. no rebound. no guarding. Neuro - Alert. Skin - No rash. MSK - normal ROM.

## 2020-07-04 NOTE — H&P ADULT - HISTORY OF PRESENT ILLNESS
An 87 yo Female patient with PMHx significant for DVT (11/2019) not AC, intermittent asthma (never intubated/no icu stays), polymyalgia rheumatica (chronic prednisone), dementia, osteoporosis with multiple vertebral compression, presents today BIBEMS due to chest pain and ZUNILDA.   Patient lethargic, unable to obtained detail history, most of information obtained from chart review. As per EMS documentation patient reported chest pain and SOB after having a family argument. Of note patient was dc today from rehab.   She was recently admitted at [facility] on June 2020, due to GI bleed.   At the ED was found to be hyperglycemic (636) 8 units of regular insulin; blood glucose dropped to 66, dextrose was given.  Also noted to have a fever Tmax 102 rectally and a + UA An 89 yo Female patient with PMHx significant for DVT (11/2019) not AC, intermittent asthma (never intubated/no icu stays), polymyalgia rheumatica (chronic prednisone), dementia, osteoporosis with multiple vertebral compression, presents today BIBEMS due to chest pain and ZUNILDA.   Patient lethargic, unable to obtained detail history, most of information obtained from chart review. As per EMS documentation patient reported chest pain and SOB after having a family argument. Of note patient was dc today from rehab.   She was recently admitted at [facility] on June 2020, due to GI bleed.   At the ED was found to be hyperglycemic (636) 8 units of regular insulin; blood glucose dropped to 66, dextrose was given.  Also noted to have a fever Tmax 102 rectally and a + UA    Attempted to speak to her Partha, but did not answer his phone An 87 yo Female patient with PMHx significant for DVT (11/2019) not AC, intermittent asthma (never intubated/no icu stays), polymyalgia rheumatica (chronic prednisone), dementia, osteoporosis with multiple vertebral compression, presents today BIBEMS due to chest pain and ZUNILDA.   Patient lethargic, unable to obtained detail history, most of information obtained from chart review. As per EMS documentation patient reported chest pain and SOB after having a family argument. Of note patient was dc today from rehab.   She was recently admitted at [facility] on June 2020, due to GI bleed found to have multiple duodenal ulcers.   At the ED was found to be hyperglycemic (636) 8 units of regular insulin; blood glucose dropped to 66, dextrose was given.  Also noted to have a fever Tmax 102 rectally and a + UA.     Attempted to speak to her Partha, but did not answer his phone

## 2020-07-04 NOTE — ED PROVIDER NOTE - CARE PLAN
Principal Discharge DX:	Acute cystitis without hematuria  Secondary Diagnosis:	Hyperglycemia  Secondary Diagnosis:	Hypokalemia

## 2020-07-04 NOTE — ED ADULT NURSE NOTE - NSIMPLEMENTINTERV_GEN_ALL_ED
Implemented All Fall Risk Interventions:  [place] to call system. Call bell, personal items and telephone within reach. Instruct patient to call for assistance. Room bathroom lighting operational. Non-slip footwear when patient is off stretcher. Physically safe environment: no spills, clutter or unnecessary equipment. Stretcher in lowest position, wheels locked, appropriate side rails in place. Provide visual cue, wrist band, yellow gown, etc. Monitor gait and stability. Monitor for mental status changes and reorient to person, place, and time. Review medications for side effects contributing to fall risk. Reinforce activity limits and safety measures with patient and family.

## 2020-07-04 NOTE — ED PROVIDER NOTE - OBJECTIVE STATEMENT
Pt is an 87 yo F BIBEMS for cp/sob.  Pt with very limited hx 2/2 dementia. Pt was recently admitted to the hospital for FTT and was discharged to rehab. Pt was discharged from rehab today and according to EMS got into an argument with family and then started to complain of cp and was sent to the ER.  Pt primary complaint sob here. Pt unaware of fever until rectal temp.

## 2020-07-04 NOTE — ED ADULT NURSE REASSESSMENT NOTE - NS ED NURSE REASSESS COMMENT FT1
assumed care of pt from previous RN. Pt awake, alert. respirations even and unlabored. cm in place pt originally with HR of 140, afib. medicated as ordered. pt now with HR of 108. pt with critical labs reported to MD. pt medicated with IV insulin. repeat FS 66. given dextrose as ordered. VSS. pt awakens to verbal stimuli.

## 2020-07-04 NOTE — H&P ADULT - ASSESSMENT
An 87 yo Female patient with PMHx significant for DVT (11/2019) not AC, intermittent asthma (never intubated/no icu stays), polymyalgia rheumatica (chronic prednisone), dementia, osteoporosis with multiple vertebral compression, presents today BIBEMS due to chest pain and SOB. Being admitted for chest pain, rule out ACS  At the ED was found to be hyperglycemic ([phone] 8 units of regular insulin; blood glucose dropped to 66, dextrose was given. Also noted to have a fever Tmax 102 rectally and a + UA    > Admit to medicine    > Bed: Monitor and   > Diet: NPO    Chest Pain, rule out ACS  Admit to monitor and  bed   Trops -x1, will trend two more  EKG no ST elevatio, Afib with RVR    Afib RVR  S/p Cardizem IVP x1  Current rate controlled HR:   CHADSVASC: 4  Not on AC due to GI bleed    Lethargy secondary to Iatrogenic Hypoglycemia   Glucose of 636 on BMP  Pt was given 8 U of regular insulin, glucose dropped to 66  Will avoid Insulin at this time   Will keep NPO  Accu-Cheks q4h while NPO  Hypoglycemic protocol in place    Hyponatremia  Corrected Na: 129  Will start D5+NS 75cc x12 hours    UTI   +UA   Noted Leukocytosis w/ Neutrophilia   S/p Rocephin and Zythromax at the ED  Will c/w Rocephin for 3 days   q24 and Flagyl 500mg IV q8 for empiric colitis coverage  IVF as above    Polymyalgia Rheumatica  Hold Prednisone while NPO      Mild-Moderate Intermittent Asthma  Continue Symbicort/Albuterol PRN  O2 via NC if needed PRN titrate FIO2 >90% and <96%    Failure to thrive   Patient cachectic   Nutritional consult pending   Will keep NPO for now due to lethargy     DVT Prophylaxis   Intermittent compression An 87 yo Female patient with PMHx significant for DVT (11/2019) not AC, intermittent asthma (never intubated/no icu stays), polymyalgia rheumatica (chronic prednisone), dementia, osteoporosis with multiple vertebral compression, presents today BIBEMS due to chest pain and SOB. Being admitted for chest pain, rule out ACS  At the ED was found to be hyperglycemic ([phone] 8 units of regular insulin; blood glucose dropped to 66, dextrose was given. Also noted to have a fever Tmax 102 rectally and a + UA    > Admit to medicine    > Bed: Monitor and   > Diet: NPO    Chest Pain, rule out ACS  Admit to monitor and  bed   Trops -x1, will trend two more  EKG no ST elevatio, Afib with RVR    Afib RVR  S/p Cardizem IVP x1  Current rate controlled HR:   CHADSVASC: 4  Not on AC due to GI bleed    Lethargy secondary to Iatrogenic Hypoglycemia   Glucose of 636 on BMP  Pt was given 8 U of regular insulin, glucose dropped to 66  Will avoid Insulin at this time   Will keep NPO  Accu-Cheks q4h while NPO  Hypoglycemic protocol in place    Hyponatremia  Corrected Na: 129  Will start D5+NS 75cc + 20 meq KCL x12 hours    Hypokalemia   K: 2.9 on admission   S/p K raiders x2   Will trend      UTI   +UA   Noted Leukocytosis w/ Neutrophilia   S/p Rocephin and Zythromax at the ED  Will c/w Rocephin for 3 days   q24 and Flagyl 500mg IV q8 for empiric colitis coverage  IVF as above    Polymyalgia Rheumatica  Hold Prednisone while NPO      Mild-Moderate Intermittent Asthma  Continue Symbicort/Albuterol PRN  O2 via NC if needed PRN titrate FIO2 >90% and <96%    Failure to thrive   Patient cachectic   Nutritional consult pending   Will keep NPO for now due to lethargy     DVT Prophylaxis   Intermittent compression An 87 yo Female patient with PMHx significant for DVT (11/2019) not AC, intermittent asthma (never intubated/no icu stays), polymyalgia rheumatica (chronic prednisone), dementia, osteoporosis with multiple vertebral compression, presents today BIBEMS due to chest pain and SOB. Being admitted for chest pain, rule out ACS  At the ED was found to be hyperglycemic ([phone] 8 units of regular insulin; blood glucose dropped to 66, dextrose was given. Also noted to have a fever Tmax 102 rectally and a + UA    > Admit to medicine    > Bed: SDU   > Diet: NPO      Lethargy secondary to Iatrogenic Induced Hypoglycemia   Glucose of 636 on BMP  Pt was given 8 U of regular insulin, glucose dropped to 66  Will avoid Insulin at this time   Will keep NPO  Accu-Cheks q2h while NPO  Hypoglycemic protocol in place    Sepsis secondary to UTI   +UA   No other source identified at this time, patient tender to palpation on her abdomen, and given history of Colitis will order a CT abdomen   Noted Leukocytosis w/ Neutrophilia   S/p Rocephin and Zythromax at the ED  Will c/w Rocephin for 3 days   q24 and Flagyl 500mg IV q8 for empiric colitis coverage  IVF as above    Chest Pain, rule out ACS  Admit to monitor and  bed   Trops -x1, will trend two more  EKG no ST elevatio, Afib with RVR    Afib RVR  S/p Cardizem IVP x1  Current rate controlled HR:   CHADSVASC: 4  Not on AC due to GI bleed      Hyponatremia  Corrected Na: 129  Will start D5+NS 75cc + 20 meq KCL x12 hours    Hypokalemia   K: 2.9 on admission   S/p K raiders x2   Will trend        Polymyalgia Rheumatica  Hold Prednisone while NPO      Mild-Moderate Intermittent Asthma  Continue Symbicort/Albuterol PRN  O2 via NC if needed PRN titrate FIO2 >90% and <96%    Failure to thrive   Patient cachectic   Nutritional consult pending   Will keep NPO for now due to lethargy     DVT Prophylaxis   Intermittent compression     Code status  Attempted to speak to her Partha, but did not answer his phone  Pending [facility] discussion An 89 yo Female patient with PMHx significant for DVT (11/2019) not AC, intermittent asthma (never intubated/no icu stays), polymyalgia rheumatica (chronic prednisone), dementia, osteoporosis with multiple vertebral compression, presents today BIBEMS due to chest pain and SOB. Being admitted for chest pain, rule out ACS  At the ED was found to be hyperglycemic (636) 8 units of regular insulin; blood glucose dropped to 66, dextrose was given. Also noted to have a fever Tmax 102 rectally and a + UA    > Admit to medicine    > Bed: SDU   > Diet: NPO        Sepsis secondary to UTI   +UA   Patient with severe abdominal pain and distention, recently with colitis, will get ct a/p to check for source.   -follow up blood and urine cultures.   Noted Leukocytosis w/ Neutrophilia   S/p Rocephin and Zythromax at the ED  Will c/w ceftriaxone for now    Lethargy secondary to Iatrogenic Induced Hypoglycemia   Glucose of 636 on BMP, unlikely real  Pt was given 8 U of regular insulin by ED staff, glucose dropped to 66, pt required D50% 25 mg x2  Will avoid Insulin at this time   Will keep NPO as patient lethargic.   Accu-Cheks q2h while NPO  Hypoglycemic protocol in place    Chest Pain, rule out ACS  Admit to monitor and  bed   Trops -x1, will trend two more  EKG no ST elevatio, Afib with RVR    Afib RVR  S/p Cardizem IVP x1  Current rate controlled HR:   CHADSVASC: 4  Not on AC due to GI bleed    Duodenal ulcers  -recent gib w/ ulcers found on EGD  -will start protonix 40 bid iv while npo    Hyponatremia  Unlikely real, repeat showing normal na, continue to monitor    Hypokalemia   K: 2.9 on admission   S/p K raiders x2   Will trend        Polymyalgia Rheumatica  Hold Prednisone while NPO  if bp drops consider stress dose steroids.       Mild-Moderate Intermittent Asthma  Continue Symbicort/Albuterol PRN  O2 via NC if needed PRN titrate FIO2 >90% and <96%    Failure to thrive   Patient cachectic   Nutritional consult pending   Will keep NPO for now due to lethargy     DVT Prophylaxis   Intermittent compression     Code status  Attempted to speak to her Partha, but did not answer his phone  Pending [facility] discussion

## 2020-07-04 NOTE — H&P ADULT - NSHPPHYSICALEXAM_GEN_ALL_CORE
Vital Signs Last 24 Hrs  T(C): 37.2 (04 Jul 2020 21:11), Max: 38.9 (04 Jul 2020 18:10)  T(F): 99 (04 Jul 2020 21:11), Max: 102 (04 Jul 2020 18:10)  HR: 110 (04 Jul 2020 21:11) (98 - 110)  BP: 137/77 (04 Jul 2020 21:11) (137/77 - 161/84)  RR: 20 (04 Jul 2020 18:10) (20 - 24)  SpO2: 100% (04 Jul 2020 21:11) (97% - 100%)    General: Lethargic, cachectic elderly woman  Head:  Normocephalic, atraumatic  ENT:  Mucosa moist, no ulcerations  Neck:  Supple, no sinuses or palpable masses  Respiratory: Decrease entry of air b/l  CV: Irregularly irregular, S1S2, no murmur  Abdominal: Soft, NT, ND no palpable mass  Extremities: No edema, + peripheral pulses,   Neurology: Unable to asses, patient lethargic

## 2020-07-04 NOTE — ED ADULT NURSE NOTE - OBJECTIVE STATEMENT
Assumed care @1800, pt A&OX3, c/o severe pain to posterior right shoulder (chronic).  Pt c/o generally not feeling well.  CM in place, ST on monitor.  Clear bsb, abd nondistended, nontender, moving all ext well.  Skin intact.  2LNC in place, O2 sat of 96%.

## 2020-07-04 NOTE — ED ADULT TRIAGE NOTE - CHIEF COMPLAINT QUOTE
Pt arrives from home after arguing with family and c/o chest pain radiating to mid back and SOB. Pt was dc'd from rehab earlier today.

## 2020-07-04 NOTE — ED PROVIDER NOTE - CLINICAL SUMMARY MEDICAL DECISION MAKING FREE TEXT BOX
labs and imaging reviewed.  Pt with fever and UTI.  Pt also had episode of AF that resolved with cardizem 10 mg.  Pt with hyperglycemia that dropped to 66 after IV insulin.  D50 given. case d/w Dr. Dawn who will admit for further management.

## 2020-07-04 NOTE — INPATIENT CERTIFICATION FOR MEDICARE PATIENTS - RISKS OF ADVERSE EVENTS
Concern for worsening infectious process/Concern for renal deterioration/Concern for cardiopulmonary deterioration/Concern for delay in diagnosis and treatment/Concern for neurologic deterioration

## 2020-07-05 ENCOUNTER — TRANSCRIPTION ENCOUNTER (OUTPATIENT)
Age: 85
End: 2020-07-05

## 2020-07-05 DIAGNOSIS — K92.2 GASTROINTESTINAL HEMORRHAGE, UNSPECIFIED: ICD-10-CM

## 2020-07-05 DIAGNOSIS — R53.83 OTHER FATIGUE: ICD-10-CM

## 2020-07-05 PROBLEM — I50.9 HEART FAILURE, UNSPECIFIED: Chronic | Status: ACTIVE | Noted: 2020-06-07

## 2020-07-05 PROBLEM — M54.9 DORSALGIA, UNSPECIFIED: Chronic | Status: ACTIVE | Noted: 2020-06-07

## 2020-07-05 PROBLEM — I48.91 UNSPECIFIED ATRIAL FIBRILLATION: Chronic | Status: ACTIVE | Noted: 2020-06-07

## 2020-07-05 LAB
A1C WITH ESTIMATED AVERAGE GLUCOSE RESULT: 4.9 % — SIGNIFICANT CHANGE UP (ref 4–5.6)
ANION GAP SERPL CALC-SCNC: 11 MMOL/L — SIGNIFICANT CHANGE UP (ref 5–17)
ANION GAP SERPL CALC-SCNC: 11 MMOL/L — SIGNIFICANT CHANGE UP (ref 5–17)
ANION GAP SERPL CALC-SCNC: 8 MMOL/L — SIGNIFICANT CHANGE UP (ref 5–17)
BASOPHILS # BLD AUTO: 0.04 K/UL — SIGNIFICANT CHANGE UP (ref 0–0.2)
BASOPHILS NFR BLD AUTO: 0.2 % — SIGNIFICANT CHANGE UP (ref 0–2)
BUN SERPL-MCNC: 16 MG/DL — SIGNIFICANT CHANGE UP (ref 8–20)
BUN SERPL-MCNC: 16 MG/DL — SIGNIFICANT CHANGE UP (ref 8–20)
BUN SERPL-MCNC: 19 MG/DL — SIGNIFICANT CHANGE UP (ref 8–20)
CALCIUM SERPL-MCNC: 6.8 MG/DL — LOW (ref 8.6–10.2)
CALCIUM SERPL-MCNC: 8.2 MG/DL — LOW (ref 8.6–10.2)
CALCIUM SERPL-MCNC: 8.5 MG/DL — LOW (ref 8.6–10.2)
CHLORIDE SERPL-SCNC: 104 MMOL/L — SIGNIFICANT CHANGE UP (ref 98–107)
CHLORIDE SERPL-SCNC: 95 MMOL/L — LOW (ref 98–107)
CHLORIDE SERPL-SCNC: 97 MMOL/L — LOW (ref 98–107)
CO2 SERPL-SCNC: 23 MMOL/L — SIGNIFICANT CHANGE UP (ref 22–29)
CO2 SERPL-SCNC: 29 MMOL/L — SIGNIFICANT CHANGE UP (ref 22–29)
CO2 SERPL-SCNC: 30 MMOL/L — HIGH (ref 22–29)
CREAT SERPL-MCNC: 0.52 MG/DL — SIGNIFICANT CHANGE UP (ref 0.5–1.3)
CREAT SERPL-MCNC: 0.56 MG/DL — SIGNIFICANT CHANGE UP (ref 0.5–1.3)
CREAT SERPL-MCNC: 0.67 MG/DL — SIGNIFICANT CHANGE UP (ref 0.5–1.3)
EOSINOPHIL # BLD AUTO: 0.14 K/UL — SIGNIFICANT CHANGE UP (ref 0–0.5)
EOSINOPHIL NFR BLD AUTO: 0.7 % — SIGNIFICANT CHANGE UP (ref 0–6)
ESTIMATED AVERAGE GLUCOSE: 94 MG/DL — SIGNIFICANT CHANGE UP (ref 68–114)
GLUCOSE BLDC GLUCOMTR-MCNC: 117 MG/DL — HIGH (ref 70–99)
GLUCOSE BLDC GLUCOMTR-MCNC: 130 MG/DL — HIGH (ref 70–99)
GLUCOSE BLDC GLUCOMTR-MCNC: 139 MG/DL — HIGH (ref 70–99)
GLUCOSE BLDC GLUCOMTR-MCNC: 146 MG/DL — HIGH (ref 70–99)
GLUCOSE BLDC GLUCOMTR-MCNC: 146 MG/DL — HIGH (ref 70–99)
GLUCOSE BLDC GLUCOMTR-MCNC: 150 MG/DL — HIGH (ref 70–99)
GLUCOSE BLDC GLUCOMTR-MCNC: 156 MG/DL — HIGH (ref 70–99)
GLUCOSE BLDC GLUCOMTR-MCNC: 180 MG/DL — HIGH (ref 70–99)
GLUCOSE SERPL-MCNC: 112 MG/DL — HIGH (ref 70–99)
GLUCOSE SERPL-MCNC: 122 MG/DL — HIGH (ref 70–99)
GLUCOSE SERPL-MCNC: 707 MG/DL — CRITICAL HIGH (ref 70–99)
HCT VFR BLD CALC: 34.3 % — LOW (ref 34.5–45)
HCT VFR BLD CALC: 38.2 % — SIGNIFICANT CHANGE UP (ref 34.5–45)
HGB BLD-MCNC: 10.9 G/DL — LOW (ref 11.5–15.5)
HGB BLD-MCNC: 12 G/DL — SIGNIFICANT CHANGE UP (ref 11.5–15.5)
IMM GRANULOCYTES NFR BLD AUTO: 1.8 % — HIGH (ref 0–1.5)
LYMPHOCYTES # BLD AUTO: 0.63 K/UL — LOW (ref 1–3.3)
LYMPHOCYTES # BLD AUTO: 3.3 % — LOW (ref 13–44)
MAGNESIUM SERPL-MCNC: 1.6 MG/DL — SIGNIFICANT CHANGE UP (ref 1.6–2.6)
MAGNESIUM SERPL-MCNC: 1.7 MG/DL — LOW (ref 1.8–2.6)
MAGNESIUM SERPL-MCNC: 2 MG/DL — SIGNIFICANT CHANGE UP (ref 1.6–2.6)
MCHC RBC-ENTMCNC: 29.1 PG — SIGNIFICANT CHANGE UP (ref 27–34)
MCHC RBC-ENTMCNC: 29.3 PG — SIGNIFICANT CHANGE UP (ref 27–34)
MCHC RBC-ENTMCNC: 31.4 GM/DL — LOW (ref 32–36)
MCHC RBC-ENTMCNC: 31.8 GM/DL — LOW (ref 32–36)
MCV RBC AUTO: 91.5 FL — SIGNIFICANT CHANGE UP (ref 80–100)
MCV RBC AUTO: 93.4 FL — SIGNIFICANT CHANGE UP (ref 80–100)
MONOCYTES # BLD AUTO: 1.1 K/UL — HIGH (ref 0–0.9)
MONOCYTES NFR BLD AUTO: 5.8 % — SIGNIFICANT CHANGE UP (ref 2–14)
NEUTROPHILS # BLD AUTO: 16.64 K/UL — HIGH (ref 1.8–7.4)
NEUTROPHILS NFR BLD AUTO: 88.2 % — HIGH (ref 43–77)
PHOSPHATE SERPL-MCNC: 2.9 MG/DL — SIGNIFICANT CHANGE UP (ref 2.4–4.7)
PHOSPHATE SERPL-MCNC: 3.3 MG/DL — SIGNIFICANT CHANGE UP (ref 2.4–4.7)
PHOSPHATE SERPL-MCNC: 3.7 MG/DL — SIGNIFICANT CHANGE UP (ref 2.4–4.7)
PLATELET # BLD AUTO: 174 K/UL — SIGNIFICANT CHANGE UP (ref 150–400)
PLATELET # BLD AUTO: 217 K/UL — SIGNIFICANT CHANGE UP (ref 150–400)
POTASSIUM SERPL-MCNC: 3.9 MMOL/L — SIGNIFICANT CHANGE UP (ref 3.5–5.3)
POTASSIUM SERPL-MCNC: 3.9 MMOL/L — SIGNIFICANT CHANGE UP (ref 3.5–5.3)
POTASSIUM SERPL-MCNC: 6.2 MMOL/L — CRITICAL HIGH (ref 3.5–5.3)
POTASSIUM SERPL-SCNC: 3.9 MMOL/L — SIGNIFICANT CHANGE UP (ref 3.5–5.3)
POTASSIUM SERPL-SCNC: 3.9 MMOL/L — SIGNIFICANT CHANGE UP (ref 3.5–5.3)
POTASSIUM SERPL-SCNC: 6.2 MMOL/L — CRITICAL HIGH (ref 3.5–5.3)
RBC # BLD: 3.75 M/UL — LOW (ref 3.8–5.2)
RBC # BLD: 4.09 M/UL — SIGNIFICANT CHANGE UP (ref 3.8–5.2)
RBC # FLD: 16 % — HIGH (ref 10.3–14.5)
RBC # FLD: 16.2 % — HIGH (ref 10.3–14.5)
SARS-COV-2 IGG SERPL QL IA: NEGATIVE — SIGNIFICANT CHANGE UP
SARS-COV-2 IGM SERPL IA-ACNC: <0.1 INDEX — SIGNIFICANT CHANGE UP
SARS-COV-2 RNA SPEC QL NAA+PROBE: SIGNIFICANT CHANGE UP
SODIUM SERPL-SCNC: 135 MMOL/L — SIGNIFICANT CHANGE UP (ref 135–145)
SODIUM SERPL-SCNC: 135 MMOL/L — SIGNIFICANT CHANGE UP (ref 135–145)
SODIUM SERPL-SCNC: 138 MMOL/L — SIGNIFICANT CHANGE UP (ref 135–145)
TROPONIN T SERPL-MCNC: 0.01 NG/ML — SIGNIFICANT CHANGE UP (ref 0–0.06)
TROPONIN T SERPL-MCNC: 0.01 NG/ML — SIGNIFICANT CHANGE UP (ref 0–0.06)
TROPONIN T SERPL-MCNC: <0.01 NG/ML — SIGNIFICANT CHANGE UP (ref 0–0.06)
WBC # BLD: 12.22 K/UL — HIGH (ref 3.8–10.5)
WBC # BLD: 18.89 K/UL — HIGH (ref 3.8–10.5)
WBC # FLD AUTO: 12.22 K/UL — HIGH (ref 3.8–10.5)
WBC # FLD AUTO: 18.89 K/UL — HIGH (ref 3.8–10.5)

## 2020-07-05 PROCEDURE — 74177 CT ABD & PELVIS W/CONTRAST: CPT | Mod: 26

## 2020-07-05 RX ORDER — PANTOPRAZOLE SODIUM 20 MG/1
40 TABLET, DELAYED RELEASE ORAL
Refills: 0 | Status: DISCONTINUED | OUTPATIENT
Start: 2020-07-05 | End: 2020-07-27

## 2020-07-05 RX ORDER — MORPHINE SULFATE 50 MG/1
2 CAPSULE, EXTENDED RELEASE ORAL EVERY 4 HOURS
Refills: 0 | Status: DISCONTINUED | OUTPATIENT
Start: 2020-07-05 | End: 2020-07-07

## 2020-07-05 RX ORDER — MORPHINE SULFATE 50 MG/1
2 CAPSULE, EXTENDED RELEASE ORAL ONCE
Refills: 0 | Status: DISCONTINUED | OUTPATIENT
Start: 2020-07-05 | End: 2020-07-05

## 2020-07-05 RX ORDER — SODIUM CHLORIDE 9 MG/ML
1000 INJECTION, SOLUTION INTRAVENOUS
Refills: 0 | Status: DISCONTINUED | OUTPATIENT
Start: 2020-07-05 | End: 2020-07-05

## 2020-07-05 RX ORDER — SODIUM CHLORIDE 9 MG/ML
1000 INJECTION, SOLUTION INTRAVENOUS
Refills: 0 | Status: DISCONTINUED | OUTPATIENT
Start: 2020-07-05 | End: 2020-07-08

## 2020-07-05 RX ADMIN — SODIUM CHLORIDE 75 MILLILITER(S): 9 INJECTION, SOLUTION INTRAVENOUS at 16:09

## 2020-07-05 RX ADMIN — CEFTRIAXONE 100 MILLIGRAM(S): 500 INJECTION, POWDER, FOR SOLUTION INTRAMUSCULAR; INTRAVENOUS at 18:33

## 2020-07-05 RX ADMIN — PANTOPRAZOLE SODIUM 40 MILLIGRAM(S): 20 TABLET, DELAYED RELEASE ORAL at 05:07

## 2020-07-05 RX ADMIN — MORPHINE SULFATE 2 MILLIGRAM(S): 50 CAPSULE, EXTENDED RELEASE ORAL at 05:07

## 2020-07-05 RX ADMIN — BUDESONIDE AND FORMOTEROL FUMARATE DIHYDRATE 2 PUFF(S): 160; 4.5 AEROSOL RESPIRATORY (INHALATION) at 20:24

## 2020-07-05 RX ADMIN — PANTOPRAZOLE SODIUM 40 MILLIGRAM(S): 20 TABLET, DELAYED RELEASE ORAL at 18:33

## 2020-07-05 RX ADMIN — DEXTROSE MONOHYDRATE, SODIUM CHLORIDE, AND POTASSIUM CHLORIDE 75 MILLILITER(S): 50; .745; 4.5 INJECTION, SOLUTION INTRAVENOUS at 09:04

## 2020-07-05 RX ADMIN — MORPHINE SULFATE 2 MILLIGRAM(S): 50 CAPSULE, EXTENDED RELEASE ORAL at 20:50

## 2020-07-05 RX ADMIN — MORPHINE SULFATE 2 MILLIGRAM(S): 50 CAPSULE, EXTENDED RELEASE ORAL at 15:23

## 2020-07-05 RX ADMIN — DEXTROSE MONOHYDRATE, SODIUM CHLORIDE, AND POTASSIUM CHLORIDE 75 MILLILITER(S): 50; .745; 4.5 INJECTION, SOLUTION INTRAVENOUS at 00:20

## 2020-07-05 RX ADMIN — Medication 5 MILLIGRAM(S): at 23:40

## 2020-07-05 NOTE — DISCHARGE NOTE PROVIDER - INSTRUCTIONS
diabetic regular diet Dysphagia 2, mechanical soft, nectar consistency fluids Dysphagia 2, mechanical soft, nectar consistency fluids  Ensure pudding tid

## 2020-07-05 NOTE — DISCHARGE NOTE PROVIDER - NSDCCPCAREPLAN_GEN_ALL_CORE_FT
PRINCIPAL DISCHARGE DIAGNOSIS  Diagnosis: Sepsis  Assessment and Plan of Treatment:       SECONDARY DISCHARGE DIAGNOSES  Diagnosis: Hypokalemia  Assessment and Plan of Treatment:     Diagnosis: Lethargy  Assessment and Plan of Treatment:     Diagnosis: Hyperglycemia  Assessment and Plan of Treatment:

## 2020-07-05 NOTE — DISCHARGE NOTE PROVIDER - NSDCMRMEDTOKEN_GEN_ALL_CORE_FT
albuterol 4 mg oral tablet: 1 tab(s) orally once a day (at bedtime)  lidocaine 5% topical film: Apply topically to affected area once a day; on for 12 hours, off for 12 hours  oxyCODONE 10 mg oral tablet, extended release: 1 tab(s) orally every 12 hours  predniSONE 20 mg oral tablet: 1 tab(s) orally once a day  Protonix 40 mg oral delayed release tablet: orally 2 times a day  senna oral tablet: 2 tab(s) orally once a day (at bedtime)  Symbicort 160 mcg-4.5 mcg/inh inhalation aerosol: 2 puff(s) inhaled 2 times a day albuterol 4 mg oral tablet: 1 tab(s) orally once a day (at bedtime)  digoxin 250 mcg (0.25 mg) oral tablet: 1 tab(s) orally once a day  dilTIAZem 60 mg oral tablet: 1 tab(s) orally every 8 hours  lidocaine 5% topical film: Apply topically to affected area once a day; on for 12 hours, off for 12 hours  morphine 20 mg/mL oral concentrate: 0.25 milliliter(s) orally every 4 hours, As needed, Moderate Pain (4 - 6)  oxyCODONE 10 mg oral tablet, extended release: 1 tab(s) orally every 12 hours  polyethylene glycol 3350 oral powder for reconstitution: 17 gram(s) orally once a day (at bedtime)  predniSONE 20 mg oral tablet: 1 tab(s) orally once a day  Protonix 40 mg oral delayed release tablet: orally 2 times a day  senna oral tablet: 2 tab(s) orally once a day (at bedtime)  Symbicort 160 mcg-4.5 mcg/inh inhalation aerosol: 2 puff(s) inhaled 2 times a day  tamsulosin 0.4 mg oral capsule: 1 cap(s) orally once a day (at bedtime) albuterol 4 mg oral tablet: 1 tab(s) orally once a day (at bedtime)  digoxin 250 mcg (0.25 mg) oral tablet: 1 tab(s) orally once a day  dilTIAZem 60 mg oral tablet: 1 tab(s) orally every 8 hours  lidocaine 5% topical film: Apply topically to affected area once a day; on for 12 hours, off for 12 hours  morphine 20 mg/mL oral concentrate: 0.25 milliliter(s) orally every 4 hours, As needed, Moderate Pain (4 - 6)  oxyCODONE 10 mg oral tablet, extended release: 1 tab(s) orally every 12 hours  polyethylene glycol 3350 oral powder for reconstitution: 17 gram(s) orally once a day (at bedtime)  predniSONE 10 mg oral tablet: 1 tab(s) orally 2 times a day  Protonix 40 mg oral delayed release tablet: orally 2 times a day  senna oral tablet: 2 tab(s) orally once a day (at bedtime)  Symbicort 160 mcg-4.5 mcg/inh inhalation aerosol: 2 puff(s) inhaled 2 times a day  tamsulosin 0.4 mg oral capsule: 1 cap(s) orally once a day (at bedtime) albuterol 4 mg oral tablet: 1 tab(s) orally once a day (at bedtime)  digoxin 250 mcg (0.25 mg) oral tablet: 1 tab(s) orally once a day  dilTIAZem 60 mg oral tablet: 1 tab(s) orally every 8 hours  morphine 20 mg/mL oral concentrate: 0.25 milliliter(s) orally every 4 hours, As needed, Moderate Pain (4 - 6)  oxyCODONE 10 mg oral tablet, extended release: 1 tab(s) orally every 12 hours  polyethylene glycol 3350 oral powder for reconstitution: 17 gram(s) orally once a day (at bedtime)  predniSONE 10 mg oral tablet: 1 tab(s) orally 2 times a day  Protonix 40 mg oral delayed release tablet: orally 2 times a day  senna oral tablet: 2 tab(s) orally once a day (at bedtime)  Symbicort 160 mcg-4.5 mcg/inh inhalation aerosol: 2 puff(s) inhaled 2 times a day  tamsulosin 0.4 mg oral capsule: 1 cap(s) orally once a day (at bedtime) albuterol 4 mg oral tablet: 1 tab(s) orally once a day (at bedtime)  digoxin 250 mcg (0.25 mg) oral tablet: 1 tab(s) orally once a day  dilTIAZem 60 mg oral tablet: 1 tab(s) orally every 8 hours  ertapenem 1 g injection: 1 gram(s) injectable once a day until JUly 30, 2020  morphine 20 mg/mL oral concentrate: 0.25 milliliter(s) orally every 4 hours, As needed, Moderate Pain (4 - 6)  oxyCODONE 10 mg oral tablet, extended release: 1 tab(s) orally every 12 hours  polyethylene glycol 3350 oral powder for reconstitution: 17 gram(s) orally once a day (at bedtime)  predniSONE 10 mg oral tablet: 1 tab(s) orally 2 times a day  Protonix 40 mg oral delayed release tablet: orally 2 times a day  senna oral tablet: 2 tab(s) orally once a day (at bedtime)  Symbicort 160 mcg-4.5 mcg/inh inhalation aerosol: 2 puff(s) inhaled 2 times a day  tamsulosin 0.4 mg oral capsule: 1 cap(s) orally once a day (at bedtime)

## 2020-07-05 NOTE — DISCHARGE NOTE PROVIDER - HOSPITAL COURSE
Sepsis secondary to UTI     +UA     Patient with severe abdominal pain and distention, recently with colitis    -follow up blood and urine cultures.     S/p Rocephin and Zythromax at the ED    Will c/w ceftriaxone for now        Lethargy secondary to Iatrogenic Induced Hypoglycemia     Glucose of 636 on BMP, unlikely real    Pt was given 8 U of regular insulin by ED staff, glucose dropped to 66, pt required D50% 25 mg x2    Will avoid Insulin at this time     Will keep NPO as patient lethargic.     Accu-Cheks q2h while NPO    Hypoglycemic protocol in place        Chest Pain, rule out ACS    Admit to monitor and  bed     Trops -x1, will trend two more    EKG no ST elevatio, Afib with RVR        Afib RVR    S/p Cardizem IVP x1    Current rate controlled HR:     CHADSVASC: 4    Not on AC due to GI bleed        Duodenal ulcers    -recent gib w/ ulcers found on EGD    -will start protonix 40 bid iv while npo        Hyponatremia    Unlikely real, repeat showing normal na, continue to monitor        Hypokalemia     K: 2.9 on admission     S/p K raiders x2     Will trend              Polymyalgia Rheumatica    Hold Prednisone while NPO    if bp drops consider stress dose steroids.             Mild-Moderate Intermittent Asthma    Continue Symbicort/Albuterol PRN    O2 via NC if needed PRN titrate FIO2 >90% and <96%        Failure to thrive     Patient cachectic     Nutritional consult pending Sepsis secondary to UTI     +UA     Patient with severe abdominal pain and distention, recently with colitis    blood cul negative     urine cul + E coli     was on Rocephin x 5 days         Lethargy secondary to Iatrogenic Induced Hypoglycemia     Glucose of 636 on BMP,due to steroids         Chest pain     due to aflutter     seen by cardio    toponins negative     placed on dig and Cardizem            Afib RVR    Current rate controlled HR:     CHADSVASC: 4    Not on AC due to GI bleed        Duodenal ulcers    -recent gib w/ ulcers found on EGD     protonix 40 bid        Hyponatremia    Unlikely real, repeat showing normal na, continue to monitor        Hypokalemia                  Polymyalgia Rheumatica    on morphine and steroids             Mild-Moderate Intermittent Asthma    Continue Symbicort/Albuterol PRN            Failure to thrive     Patient cachectic Sepsis secondary to UTI     +UA     Patient with severe abdominal pain and distention, recently with colitis    blood cul negative     urine cul + E coli     was on Rocephin x 5 days         Lethargy secondary to Iatrogenic Induced Hypoglycemia     Glucose of 636 on BMP,due to steroids         Chest pain     due to aflutter     seen by cardio    toponins negative     placed on dig and Cardizem            Afib RVR    Current rate controlled HR:     CHADSVASC: 4    Not on AC due to GI bleed        Duodenal ulcers    -recent gib w/ ulcers found on EGD     protonix 40 bid        Hyponatremia    Unlikely real, repeat showing normal na, continue to monitor        Hypokalemia                  Polymyalgia Rheumatica    on morphine and steroids             Mild-Moderate Intermittent Asthma    Continue Symbicort/Albuterol PRN            Failure to thrive     Patient cachectic             MOLST done    pt DNR/ DNI Sepsis secondary to UTI     +UA     Patient with severe abdominal pain and distention, recently with colitis    blood cul negative     urine cul + E coli     was on Rocephin x 5 days         Lethargy secondary to Iatrogenic Induced Hypoglycemia     Glucose of 636 on BMP,due to steroids         Chest pain     due to aflutter     seen by cardio    toponins negative     placed on dig and Cardizem            Afib RVR    Current rate controlled HR:     CHADSVASC: 4    Not on AC due to GI bleed        Duodenal ulcers    -recent gib w/ ulcers found on EGD     protonix 40 bid        Hyponatremia    Unlikely real, repeat showing normal na, continue to monitor        Hypokalemia                  Polymyalgia Rheumatica    on morphine and steroids             Mild-Moderate Intermittent Asthma    Continue Symbicort/Albuterol PRN            Failure to thrive     Patient cachectic             MOLST done    pt DNR/ DNI            Dysphagia     nectar thickened fluids and mech soft diet     + pleasure feeding Sepsis secondary to UTI     +UA     Patient with severe abdominal pain and distention, recently with colitis    blood cul negative     urine cul + E coli     was on Rocephin x 5 days     then July 21, 2020 pt had fever and Blood cul + Klebsiella. ID was called Zosyn started and then changed to Merum         Lethargy secondary to Iatrogenic Induced Hypoglycemia     Glucose of 636 on BMP,due to steroids         Chest pain     due to aflutter     seen by cardio    toponins negative     placed on dig and Cardizem            Afib RVR    Current rate controlled HR:     CHADSVASC: 4    Not on AC due to GI bleed        Duodenal ulcers    -recent gib w/ ulcers found on EGD     protonix 40 bid        Hyponatremia    Unlikely real, repeat showing normal na, continue to monitor        Hypokalemia                  Polymyalgia Rheumatica    on morphine and steroids             Mild-Moderate Intermittent Asthma    Continue Symbicort/Albuterol PRN            Failure to thrive     Patient cachectic             MOLST done    pt DNR/ DNI            Dysphagia     nectar thickened fluids and mech soft diet     + pleasure feeding Sepsis secondary to UTI     +UA     Patient with severe abdominal pain and distention, recently with colitis    blood cul negative     urine cul + E coli     was on Rocephin x 5 days     then July 21, 2020 pt had fever and Blood cul + Klebsiella. ID was called Zosyn started and then changed to Merum     repeat blood cul negative and second w/ containment     pt will continue with Merum at nursing facility         Lethargy secondary to Iatrogenic Induced Hypoglycemia     Glucose of 636 on BMP,due to steroids         Chest pain     due to aflutter     seen by cardio    toponins negative     placed on dig and Cardizem            Afib RVR    Current rate controlled HR:     CHADSVASC: 4    Not on AC due to GI bleed        Duodenal ulcers    -recent gib w/ ulcers found on EGD     protonix 40 bid        Hyponatremia    Unlikely real, repeat showing normal na, continue to monitor        Hypokalemia                  Polymyalgia Rheumatica    on morphine and steroids             Mild-Moderate Intermittent Asthma    Continue Symbicort/Albuterol PRN            Failure to thrive     Patient cachectic             MOLST done    pt DNR/ DNI            Dysphagia     nectar thickened fluids and mech soft diet     + pleasure feeding Sepsis secondary to UTI     +UA     Patient with severe abdominal pain and distention, recently with colitis    blood cul negative     urine cul + E coli     was on Rocephin x 5 days     then July 21, 2020 pt had fever and Blood cul + Klebsiella. ID was called Zosyn started and then changed to Merum     repeat blood cul negative and second w/ containment     pt will continue with [employer] 1 gm at nursing facility until July 30, 2020        Lethargy secondary to Iatrogenic Induced Hypoglycemia     Glucose of 636 on BMP,due to steroids         Chest pain     due to aflutter     seen by cardio    toponins negative     placed on dig and Cardizem            Afib RVR    Current rate controlled HR:     CHADSVASC: 4    Not on AC due to GI bleed        Duodenal ulcers    -recent gib w/ ulcers found on EGD     protonix 40 bid        Hyponatremia    Unlikely real, repeat showing normal na, continue to monitor        Hypokalemia                  Polymyalgia Rheumatica    on morphine and steroids             Mild-Moderate Intermittent Asthma    Continue Symbicort/Albuterol PRN            Failure to thrive     Patient cachectic             MOLST done    pt DNR/ DNI            Dysphagia     nectar thickened fluids and mech soft diet     + pleasure feeding

## 2020-07-05 NOTE — CONSULT NOTE ADULT - SUBJECTIVE AND OBJECTIVE BOX
HPI:  An 89 yo Female patient with PMHx significant for DVT (2019) not AC, intermittent asthma (never intubated/no icu stays), polymyalgia rheumatica (chronic prednisone), dementia, osteoporosis with multiple vertebral compression, presents today BIBEMS due to chest pain and ZUNILDA.   Patient lethargic, unable to obtained detail history, most of information obtained from chart review. As per EMS documentation patient reported chest pain and SOB after having a family argument. Of note patient was dc today from rehab.   She was recently admitted at [facility] on 2020, due to GI bleed found to have multiple duodenal ulcers.   At the ED was found to be hyperglycemic [phone] 8 units of regular insulin; blood glucose dropped to 66, dextrose was given.  Also noted to have a fever Tmax 102 rectally and a + UA.     Attempted to speak to her Partha, but did not answer his phone (2020 22:50)      PAST MEDICAL & SURGICAL HISTORY:  Chronic back pain  CHF (congestive heart failure)  Atrial fibrillation  Polymyalgia rheumatica  Diverticulosis  Asthma  S/P knee replacement, left  S/P hysterectomy:   S/P appendectomy:     budesonide 160 MICROgram(s)/formoterol 4.5 MICROgram(s) Inhaler 2 Puff(s) Inhalation two times a day  cefTRIAXone   IVPB 1000 milliGRAM(s) IV Intermittent every 24 hours  dextrose 40% Gel 15 Gram(s) Oral once PRN  dextrose 5% + sodium chloride 0.9%. 1000 milliLiter(s) IV Continuous <Continuous>  dextrose 5%. 1000 milliLiter(s) IV Continuous <Continuous>  dextrose 50% Injectable 12.5 Gram(s) IV Push once  dextrose 50% Injectable 25 Gram(s) IV Push once  dextrose 50% Injectable 25 Gram(s) IV Push once  glucagon  Injectable 1 milliGRAM(s) IntraMuscular once PRN  pantoprazole  Injectable 40 milliGRAM(s) IV Push two times a day    MEDICATIONS  (STANDING):  budesonide 160 MICROgram(s)/formoterol 4.5 MICROgram(s) Inhaler 2 Puff(s) Inhalation two times a day  cefTRIAXone   IVPB 1000 milliGRAM(s) IV Intermittent every 24 hours  dextrose 5% + sodium chloride 0.9%. 1000 milliLiter(s) (75 mL/Hr) IV Continuous <Continuous>  dextrose 5%. 1000 milliLiter(s) (50 mL/Hr) IV Continuous <Continuous>  dextrose 50% Injectable 12.5 Gram(s) IV Push once  dextrose 50% Injectable 25 Gram(s) IV Push once  dextrose 50% Injectable 25 Gram(s) IV Push once  pantoprazole  Injectable 40 milliGRAM(s) IV Push two times a day    MEDICATIONS  (PRN):  dextrose 40% Gel 15 Gram(s) Oral once PRN Blood Glucose LESS THAN 70 milliGRAM(s)/deciliter  glucagon  Injectable 1 milliGRAM(s) IntraMuscular once PRN Glucose LESS THAN 70 milligrams/deciliter      Allergies    dust (Unknown)  No Known Drug Allergies    Intolerances    provide [facility] soft diet (Unknown)      SOCIAL HISTORY:    FAMILY HISTORY:  Family history of stroke      LABS:                        10.9   18.89 )-----------( 174      ( 2020 07:16 )             34.3     07-05    135  |  95<L>  |  16.0  ----------------------------<  122<H>  3.9   |  29.0  |  0.56    Ca    8.2<L>      2020 07:16  Phos  3.3     07-05  Mg     1.7     07-05    TPro  5.1<L>  /  Alb  3.3  /  TBili  0.4  /  DBili  x   /  AST  23  /  ALT  21  /  AlkPhos  86  07-04    PT/INR - ( 2020 20:25 )   PT: 12.0 sec;   INR: 1.04 ratio         PTT - ( 2020 20:25 )  PTT:22.3 sec  Urinalysis Basic - ( 2020 19:03 )    Color: Yellow / Appearance: Clear / S.010 / pH: x  Gluc: x / Ketone: Negative  / Bili: Negative / Urobili: Negative mg/dL   Blood: x / Protein: 15 mg/dL / Nitrite: Negative   Leuk Esterase: Trace / RBC: 6-10 /HPF / WBC 11-25   Sq Epi: x / Non Sq Epi: Occasional / Bacteria: Many          ROS unobtainable [id number] to lethargy   - Headache  - Neck Stiffness  + Chest Pain according to history  - SOB  - Abd pain  - Pelvic Pain  - Leg Pain        Vital Signs Last 24 Hrs  T(C): 39.3 (2020 13:10), Max: 39.3 (2020 13:10)  T(F): 102.7 (2020 13:10), Max: 102.7 (2020 13:10)  HR: 93 (2020 13:10) (84 - 115)  BP: 119/82 (2020 13:10) (119/82 - 175/95)  BP(mean): 96 (2020 13:10) (96 - 96)  RR: 39 (2020 13:10) (19 - 39)  SpO2: 100% (2020 13:10) (95% - 100%)    HEENT: PEARLA  Neck: Supple  Cardio: S1 S2 No Murmur  Pulm: CTA No Rales or Ronchi  Abd: Soft NT ND BS+ + Suprapubic tenderness   Rectal - refused  Ext: No DCT  Skin: No Rash  Neuro: lethargic moans in pain     Urinary Sepsis - pan cultures and Rocephin   Urinary Retention - Crandall   Failure to thrive - continue supportive care   Chronic Back Pain -   PRN morphine   Osteoporosis with compression fracture - bisphosphonates can help pain   MOLST form competed last admission DNR DNI HPI:  An 87 yo Female patient with PMHx significant for DVT (2019) not AC, intermittent asthma (never intubated/no icu stays), polymyalgia rheumatica (chronic prednisone), dementia, osteoporosis with multiple vertebral compression, presents today BIBEMS due to chest pain and ZUNILDA.   Patient lethargic, unable to obtained detail history, most of information obtained from chart review. As per EMS documentation patient reported chest pain and SOB after having a family argument. Of note patient was dc today from rehab.   She was recently admitted at [facility] on 2020, due to GI bleed found to have multiple duodenal ulcers.   At the ED was found to be hyperglycemic (636) 8 units of regular insulin; blood glucose dropped to 66, dextrose was given.  Also noted to have a fever Tmax 102 rectally and a + UA.     Attempted to speak to her Partha, but did not answer his phone (2020 22:50)      PAST MEDICAL & SURGICAL HISTORY:  Chronic back pain  CHF (congestive heart failure)  Atrial fibrillation  Polymyalgia rheumatica  Diverticulosis  Asthma  S/P knee replacement, left  S/P hysterectomy:   S/P appendectomy:     budesonide 160 MICROgram(s)/formoterol 4.5 MICROgram(s) Inhaler 2 Puff(s) Inhalation two times a day  cefTRIAXone   IVPB 1000 milliGRAM(s) IV Intermittent every 24 hours  dextrose 40% Gel 15 Gram(s) Oral once PRN  dextrose 5% + sodium chloride 0.9%. 1000 milliLiter(s) IV Continuous <Continuous>  dextrose 5%. 1000 milliLiter(s) IV Continuous <Continuous>  dextrose 50% Injectable 12.5 Gram(s) IV Push once  dextrose 50% Injectable 25 Gram(s) IV Push once  dextrose 50% Injectable 25 Gram(s) IV Push once  glucagon  Injectable 1 milliGRAM(s) IntraMuscular once PRN  pantoprazole  Injectable 40 milliGRAM(s) IV Push two times a day    MEDICATIONS  (STANDING):  budesonide 160 MICROgram(s)/formoterol 4.5 MICROgram(s) Inhaler 2 Puff(s) Inhalation two times a day  cefTRIAXone   IVPB 1000 milliGRAM(s) IV Intermittent every 24 hours  dextrose 5% + sodium chloride 0.9%. 1000 milliLiter(s) (75 mL/Hr) IV Continuous <Continuous>  dextrose 5%. 1000 milliLiter(s) (50 mL/Hr) IV Continuous <Continuous>  dextrose 50% Injectable 12.5 Gram(s) IV Push once  dextrose 50% Injectable 25 Gram(s) IV Push once  dextrose 50% Injectable 25 Gram(s) IV Push once  pantoprazole  Injectable 40 milliGRAM(s) IV Push two times a day    MEDICATIONS  (PRN):  dextrose 40% Gel 15 Gram(s) Oral once PRN Blood Glucose LESS THAN 70 milliGRAM(s)/deciliter  glucagon  Injectable 1 milliGRAM(s) IntraMuscular once PRN Glucose LESS THAN 70 milligrams/deciliter      Allergies    dust (Unknown)  No Known Drug Allergies    Intolerances    provide [facility] soft diet (Unknown)      SOCIAL HISTORY:    FAMILY HISTORY:  Family history of stroke      LABS:                        10.9   18.89 )-----------( 174      ( 2020 07:16 )             34.3     07-05    135  |  95<L>  |  16.0  ----------------------------<  122<H>  3.9   |  29.0  |  0.56    Ca    8.2<L>      2020 07:16  Phos  3.3     07-05  Mg     1.7     07-05    TPro  5.1<L>  /  Alb  3.3  /  TBili  0.4  /  DBili  x   /  AST  23  /  ALT  21  /  AlkPhos  86  07-04    PT/INR - ( 2020 20:25 )   PT: 12.0 sec;   INR: 1.04 ratio         PTT - ( 2020 20:25 )  PTT:22.3 sec  Urinalysis Basic - ( 2020 19:03 )    Color: Yellow / Appearance: Clear / S.010 / pH: x  Gluc: x / Ketone: Negative  / Bili: Negative / Urobili: Negative mg/dL   Blood: x / Protein: 15 mg/dL / Nitrite: Negative   Leuk Esterase: Trace / RBC: 6-10 /HPF / WBC 11-25   Sq Epi: x / Non Sq Epi: Occasional / Bacteria: Many          ROS unobtainable [id number] to lethargy   - Headache  - Neck Stiffness  + Chest Pain according to history  - SOB  - Abd pain  - Pelvic Pain  - Leg Pain        Vital Signs Last 24 Hrs  T(C): 39.3 (2020 13:10), Max: 39.3 (2020 13:10)  T(F): 102.7 (2020 13:10), Max: 102.7 (2020 13:10)  HR: 93 (2020 13:10) (84 - 115)  BP: 119/82 (2020 13:10) (119/82 - 175/95)  BP(mean): 96 (2020 13:10) (96 - 96)  RR: 39 (2020 13:10) (19 - 39)  SpO2: 100% (2020 13:10) (95% - 100%)    HEENT: PEARLA  Neck: Supple  Cardio: S1 S2 No Murmur  Pulm: CTA No Rales or Ronchi  Abd: Soft NT ND BS+ + Suprapubic tenderness   Rectal - refused  Ext: No DCT  Skin: No Rash  Neuro: lethargic moans in pain     Urinary Sepsis - pan cultures and Rocephin   Urinary Retention - Crandall   Failure to thrive - continue supportive care   Chronic Back Pain -   PRN morphine   Osteoporosis with compression fracture - pain control   MOLST form competed last admission DNR DNI HPI:  An 89 yo Female patient with PMHx significant for DVT (2019) not AC, intermittent asthma (never intubated/no icu stays), polymyalgia rheumatica (chronic prednisone), dementia, osteoporosis with multiple vertebral compression, presents today BIBEMS due to chest pain and ZUNILDA.   Patient lethargic, unable to obtained detail history, most of information obtained from chart review. As per EMS documentation patient reported chest pain and SOB after having a family argument. Of note patient was dc today from rehab.   She was recently admitted at [facility] on 2020, due to GI bleed found to have multiple duodenal ulcers.   At the ED was found to be hyperglycemic (636) 8 units of regular insulin; blood glucose dropped to 66, dextrose was given.  Also noted to have a fever Tmax 102 rectally and a + UA.     Attempted to speak to her Partha, but did not answer his phone (2020 22:50)      PAST MEDICAL & SURGICAL HISTORY:  Chronic back pain  CHF (congestive heart failure)  Atrial fibrillation  Polymyalgia rheumatica  Diverticulosis  Asthma  S/P knee replacement, left  S/P hysterectomy:   S/P appendectomy:     budesonide 160 MICROgram(s)/formoterol 4.5 MICROgram(s) Inhaler 2 Puff(s) Inhalation two times a day  cefTRIAXone   IVPB 1000 milliGRAM(s) IV Intermittent every 24 hours  dextrose 40% Gel 15 Gram(s) Oral once PRN  dextrose 5% + sodium chloride 0.9%. 1000 milliLiter(s) IV Continuous <Continuous>  dextrose 5%. 1000 milliLiter(s) IV Continuous <Continuous>  dextrose 50% Injectable 12.5 Gram(s) IV Push once  dextrose 50% Injectable 25 Gram(s) IV Push once  dextrose 50% Injectable 25 Gram(s) IV Push once  glucagon  Injectable 1 milliGRAM(s) IntraMuscular once PRN  pantoprazole  Injectable 40 milliGRAM(s) IV Push two times a day    MEDICATIONS  (STANDING):  budesonide 160 MICROgram(s)/formoterol 4.5 MICROgram(s) Inhaler 2 Puff(s) Inhalation two times a day  cefTRIAXone   IVPB 1000 milliGRAM(s) IV Intermittent every 24 hours  dextrose 5% + sodium chloride 0.9%. 1000 milliLiter(s) (75 mL/Hr) IV Continuous <Continuous>  dextrose 5%. 1000 milliLiter(s) (50 mL/Hr) IV Continuous <Continuous>  dextrose 50% Injectable 12.5 Gram(s) IV Push once  dextrose 50% Injectable 25 Gram(s) IV Push once  dextrose 50% Injectable 25 Gram(s) IV Push once  pantoprazole  Injectable 40 milliGRAM(s) IV Push two times a day    MEDICATIONS  (PRN):  dextrose 40% Gel 15 Gram(s) Oral once PRN Blood Glucose LESS THAN 70 milliGRAM(s)/deciliter  glucagon  Injectable 1 milliGRAM(s) IntraMuscular once PRN Glucose LESS THAN 70 milligrams/deciliter      Allergies    dust (Unknown)  No Known Drug Allergies    Intolerances    provide [facility] soft diet (Unknown)      SOCIAL HISTORY:    FAMILY HISTORY:  Family history of stroke      LABS:                        10.9   18.89 )-----------( 174      ( 2020 07:16 )             34.3     07-05    135  |  95<L>  |  16.0  ----------------------------<  122<H>  3.9   |  29.0  |  0.56    Ca    8.2<L>      2020 07:16  Phos  3.3     07-05  Mg     1.7     07-05    TPro  5.1<L>  /  Alb  3.3  /  TBili  0.4  /  DBili  x   /  AST  23  /  ALT  21  /  AlkPhos  86  07-04    PT/INR - ( 2020 20:25 )   PT: 12.0 sec;   INR: 1.04 ratio         PTT - ( 2020 20:25 )  PTT:22.3 sec  Urinalysis Basic - ( 2020 19:03 )    Color: Yellow / Appearance: Clear / S.010 / pH: x  Gluc: x / Ketone: Negative  / Bili: Negative / Urobili: Negative mg/dL   Blood: x / Protein: 15 mg/dL / Nitrite: Negative   Leuk Esterase: Trace / RBC: 6-10 /HPF / WBC 11-25   Sq Epi: x / Non Sq Epi: Occasional / Bacteria: Many          ROS unobtainable [id number] to lethargy   - Headache  - Neck Stiffness  + Chest Pain according to history  - SOB  - Abd pain  - Pelvic Pain  - Leg Pain        Vital Signs Last 24 Hrs  T(C): 39.3 (2020 13:10), Max: 39.3 (2020 13:10)  T(F): 102.7 (2020 13:10), Max: 102.7 (2020 13:10)  HR: 93 (2020 13:10) (84 - 115)  BP: 119/82 (2020 13:10) (119/82 - 175/95)  BP(mean): 96 (2020 13:10) (96 - 96)  RR: 39 (2020 13:10) (19 - 39)  SpO2: 100% (2020 13:10) (95% - 100%)    HEENT: PEARLA  Neck: Supple  Cardio: S1 S2 No Murmur  Pulm: CTA No Rales or Ronchi  Abd: Soft NT ND BS+ + Suprapubic tenderness   Rectal - refused  Ext: No DCT  Skin: No Rash  Neuro: lethargic moans in pain     Urinary Sepsis - pan cultures and Rocephin   Urinary Retention - Crandall   Failure to thrive - continue supportive care   Chronic Back Pain -   PRN morphine   Osteoporosis with compression fracture - pain control   MOLST form competed last admission DNR DNI    Called son left message to call asap

## 2020-07-05 NOTE — PROGRESS NOTE ADULT - SUBJECTIVE AND OBJECTIVE BOX
HPI:  An 89 yo Female patient with PMHx significant for DVT (2019) not AC, intermittent asthma (never intubated/no icu stays), polymyalgia rheumatica (chronic prednisone), dementia, osteoporosis with multiple vertebral compression, presents today BIBEMS due to chest pain and ZUNILDA.   Patient lethargic, unable to obtained detail history, most of information obtained from chart review. As per EMS documentation patient reported chest pain and SOB after having a family argument. Of note patient was dc today from rehab.   She was recently admitted at [facility] on 2020, due to GI bleed found to have multiple duodenal ulcers.   At the ED was found to be hyperglycemic (636) 8 units of regular insulin; blood glucose dropped to 66, dextrose was given.  Also noted to have a fever Tmax 102 rectally and a + UA.     Attempted to speak to her Partha, but did not answer his phone (2020 22:50)      PAST MEDICAL & SURGICAL HISTORY:  Chronic back pain  CHF (congestive heart failure)  Atrial fibrillation  Polymyalgia rheumatica  Diverticulosis  Asthma  S/P knee replacement, left  S/P hysterectomy:   S/P appendectomy:       ANTIMICROBIAL:  cefTRIAXone   IVPB 1000 milliGRAM(s) IV Intermittent every 24 hours        PULMONARY:  budesonide 160 MICROgram(s)/formoterol 4.5 MICROgram(s) Inhaler 2 Puff(s) Inhalation two times a day        GATROINTESTINAL:  pantoprazole  Injectable 40 milliGRAM(s) IV Push two times a day     MEDS:    ENDO/METABOLIC:  dextrose 40% Gel 15 Gram(s) Oral once PRN  dextrose 50% Injectable 12.5 Gram(s) IV Push once  dextrose 50% Injectable 25 Gram(s) IV Push once  dextrose 50% Injectable 25 Gram(s) IV Push once  glucagon  Injectable 1 milliGRAM(s) IntraMuscular once PRN    IV FLUID/NUTRITION:  dextrose 5%. 1000 milliLiter(s) IV Continuous <Continuous>  multiple electrolytes Injection Type 1 1000 milliLiter(s) IV Continuous <Continuous>            Allergies    dust (Unknown)  No Known Drug Allergies    Intolerances    provide [facility] soft diet (Unknown)      SOCIAL HISTORY:    FAMILY HISTORY:  Family history of stroke      Vital Signs Last 24 Hrs  T(C): 37.2 (2020 21:11), Max: 38.9 (2020 18:10)  T(F): 99 (2020 21:11), Max: 102 (2020 18:10)  HR: 99 (2020 06:15) (84 - 115)  BP: 175/95 (2020 06:15) (129/89 - 175/95)  BP(mean): --  RR: 19 (2020 02:31) (19 - 24)  SpO2: 95% (2020 06:15) (95% - 100%)      I&O's Detail    2020 07:01  -  2020 07:00  --------------------------------------------------------  IN:  Total IN: 0 mL    OUT:    Voided: 1400 mL  Total OUT: 1400 mL    Total NET: -1400 mL          REVIEW OF SYSTEMS:    non responsive in the ED   only moaning     PHYSICAL EXAM:    GENERAL: NAD, well-groomed, well-developed  HEAD:  Atraumatic, Normocephalic  NERVOUS SYSTEM:  moaning   CHEST/LUNG: Clear to percussion bilaterally; No rales, rhonchi, wheezing, or rubs  HEART: irregular   ABDOMEN: Soft, Nontender, Nondistended; Bowel sounds present  EXTREMITIES:  2+ Peripheral Pulses, cold to touch         LABS:                        10.9   18.89 )-----------( 174      ( 2020 07:16 )             34.3     07-05    135  |  95<L>  |  16.0  ----------------------------<  122<H>  3.9   |  29.0  |  0.56    Ca    8.2<L>      2020 07:16  Phos  3.3     07-05  Mg     1.7     07-05    TPro  5.1<L>  /  Alb  3.3  /  TBili  0.4  /  DBili  x   /  AST  23  /  ALT  21  /  AlkPhos  86  07-04    PT/INR - ( 2020 20:25 )   PT: 12.0 sec;   INR: 1.04 ratio         PTT - ( 2020 20:25 )  PTT:22.3 sec  Urinalysis Basic - ( 2020 19:03 )    Color: Yellow / Appearance: Clear / S.010 / pH: x  Gluc: x / Ketone: Negative  / Bili: Negative / Urobili: Negative mg/dL   Blood: x / Protein: 15 mg/dL / Nitrite: Negative   Leuk Esterase: Trace / RBC: 6-10 /HPF / WBC 11-25   Sq Epi: x / Non Sq Epi: Occasional / Bacteria: Many          PT/INR - ( 2020 20:25 )   PT: 12.0 sec;   INR: 1.04 ratio         PTT - ( 2020 20:25 )  PTT:22.3 sec    RADIOLOGY & ADDITIONAL STUDIES:

## 2020-07-05 NOTE — CHART NOTE - NSCHARTNOTEFT_GEN_A_CORE
88F presenting with sepsis and abdominal pain. Guarding on exam. Currently patient lethargic/confused and unable to consent for herself and unable to reach family at this time. Given severity of abdominal pain and normal creatinine on labs it is medical necessity for patient to get CT abdomen/pelvis with contrast as benefits of tests far out weight the risks.     Rick Cordova MD

## 2020-07-05 NOTE — PROGRESS NOTE ADULT - ASSESSMENT
An 89 yo Female patient with PMHx significant for DVT (11/2019) not AC, intermittent asthma (never intubated/no icu stays), polymyalgia rheumatica (chronic prednisone), dementia, osteoporosis with multiple vertebral compression, presents today BIBEMS due to chest pain and SOB. Being admitted for chest pain, rule out ACS  At the ED was found to be hyperglycemic (636) 8 units of regular insulin; blood glucose dropped to 66, dextrose was given. Also noted to have a fever Tmax 102 rectally and a + UA          Sepsis secondary to UTI   +UA   Patient with severe abdominal pain and distention, recently with colitis  -follow up blood and urine cultures.   S/p Rocephin and Zythromax at the ED  Will c/w ceftriaxone for now    Lethargy secondary to Iatrogenic Induced Hypoglycemia   Glucose of 636 on BMP, unlikely real  Pt was given 8 U of regular insulin by ED staff, glucose dropped to 66, pt required D50% 25 mg x2  Will avoid Insulin at this time   Will keep NPO as patient lethargic.   Accu-Cheks q2h while NPO  Hypoglycemic protocol in place    Chest Pain, rule out ACS  Admit to monitor and  bed   Trops -x1, will trend two more  EKG no ST elevatio, Afib with RVR    Afib RVR  S/p Cardizem IVP x1  Current rate controlled HR:   CHADSVASC: 4  Not on AC due to GI bleed    Duodenal ulcers  -recent gib w/ ulcers found on EGD  -will start protonix 40 bid iv while npo    Hyponatremia  Unlikely real, repeat showing normal na, continue to monitor    Hypokalemia   K: 2.9 on admission   S/p K raiders x2   Will trend        Polymyalgia Rheumatica  Hold Prednisone while NPO  if bp drops consider stress dose steroids.       Mild-Moderate Intermittent Asthma  Continue Symbicort/Albuterol PRN  O2 via NC if needed PRN titrate FIO2 >90% and <96%    Failure to thrive   Patient cachectic   Nutritional consult pending       DVT Prophylaxis   Intermittent compression     Code status  Attempted to speak to her Partha, but did not answer his phone  Pending [facility] discussion An 87 yo Female patient with PMHx significant for DVT (11/2019) not AC, intermittent asthma (never intubated/no icu stays), polymyalgia rheumatica (chronic prednisone), dementia, osteoporosis with multiple vertebral compression, presents today BIBEMS due to chest pain and SOB. Being admitted for chest pain, rule out ACS  At the ED was found to be hyperglycemic (636) 8 units of regular insulin; blood glucose dropped to 66, dextrose was given. Also noted to have a fever Tmax 102 rectally and a + UA          Sepsis secondary to UTI   +UA   Patient with severe abdominal pain and distention, recently with colitis  1400 cc obtained in the ER with straight cath. Crandall placed for urinary retention   -follow up blood and urine cultures.   S/p Rocephin and Zythromax at the ED  Will c/w ceftriaxone for now    Lethargy secondary to Iatrogenic Induced Hypoglycemia   Glucose of 636 on BMP, unlikely real  Pt was given 8 U of regular insulin by ED staff, glucose dropped to 66, pt required D50% 25 mg x2  Will avoid Insulin at this time   Will keep NPO as patient lethargic.   Accu-Cheks q2h while NPO  Hypoglycemic protocol in place    Chest Pain, rule out ACS  Admit to monitor and  bed   Trops -x1, will trend two more  EKG no ST elevatio, Afib with RVR    Afib RVR  S/p Cardizem IVP x1  Current rate controlled HR:   CHADSVASC: 4  Not on AC due to GI bleed    Duodenal ulcers  -recent gib w/ ulcers found on EGD  -will start protonix 40 bid iv while npo    Hyponatremia  Unlikely real, repeat showing normal na, continue to monitor    Hypokalemia   K: 2.9 on admission   S/p K raiders x2   Will trend        Polymyalgia Rheumatica  Hold Prednisone while NPO  if bp drops consider stress dose steroids.       Mild-Moderate Intermittent Asthma  Continue Symbicort/Albuterol PRN  O2 via NC if needed PRN titrate FIO2 >90% and <96%    Failure to thrive   Patient cachectic   Nutritional consult pending       DVT Prophylaxis   Intermittent compression     Code status  Attempted to speak to her Partha, but did not answer his phone  Pending [facility] discussion

## 2020-07-05 NOTE — DISCHARGE NOTE PROVIDER - NSDCFUADDINST_GEN_ALL_CORE_FT
pt will need eventual NHP     NO ASA -- has GI bleed pt will need eventual NHP     NO ASA -- has GI bleed    pt will need chronic virk due to urinary retention. due to narcotics NO ASA -- has GI bleed    pt will need chronic virk due to urinary retention. due to narcotics NO ASA -- has GI bleed    pt will need chronic virk due to urinary retention. due to narcotics    MOSLT done NO ASA -- has GI bleed    pt will need chronic virk due to urinary retention. due to narcotics    MOSLT done    pt needs help with eating     repeat swallow eval at facility to facilitate free fluids

## 2020-07-06 LAB
-  AMIKACIN: SIGNIFICANT CHANGE UP
-  AMPICILLIN/SULBACTAM: SIGNIFICANT CHANGE UP
-  AMPICILLIN: SIGNIFICANT CHANGE UP
-  AZTREONAM: SIGNIFICANT CHANGE UP
-  CEFAZOLIN: SIGNIFICANT CHANGE UP
-  CEFEPIME: SIGNIFICANT CHANGE UP
-  CEFOXITIN: SIGNIFICANT CHANGE UP
-  CEFTRIAXONE: SIGNIFICANT CHANGE UP
-  CIPROFLOXACIN: SIGNIFICANT CHANGE UP
-  GENTAMICIN: SIGNIFICANT CHANGE UP
-  IMIPENEM: SIGNIFICANT CHANGE UP
-  LEVOFLOXACIN: SIGNIFICANT CHANGE UP
-  MEROPENEM: SIGNIFICANT CHANGE UP
-  NITROFURANTOIN: SIGNIFICANT CHANGE UP
-  PIPERACILLIN/TAZOBACTAM: SIGNIFICANT CHANGE UP
-  TIGECYCLINE: SIGNIFICANT CHANGE UP
-  TOBRAMYCIN: SIGNIFICANT CHANGE UP
-  TRIMETHOPRIM/SULFAMETHOXAZOLE: SIGNIFICANT CHANGE UP
ALBUMIN SERPL ELPH-MCNC: 3.3 G/DL — SIGNIFICANT CHANGE UP (ref 3.3–5.2)
ALP SERPL-CCNC: 91 U/L — SIGNIFICANT CHANGE UP (ref 40–120)
ALT FLD-CCNC: 19 U/L — SIGNIFICANT CHANGE UP
ANION GAP SERPL CALC-SCNC: 13 MMOL/L — SIGNIFICANT CHANGE UP (ref 5–17)
AST SERPL-CCNC: 26 U/L — SIGNIFICANT CHANGE UP
BASOPHILS # BLD AUTO: 0.05 K/UL — SIGNIFICANT CHANGE UP (ref 0–0.2)
BASOPHILS NFR BLD AUTO: 0.3 % — SIGNIFICANT CHANGE UP (ref 0–2)
BILIRUB SERPL-MCNC: 0.7 MG/DL — SIGNIFICANT CHANGE UP (ref 0.4–2)
BUN SERPL-MCNC: 11 MG/DL — SIGNIFICANT CHANGE UP (ref 8–20)
CALCIUM SERPL-MCNC: 9 MG/DL — SIGNIFICANT CHANGE UP (ref 8.6–10.2)
CHLORIDE SERPL-SCNC: 95 MMOL/L — LOW (ref 98–107)
CO2 SERPL-SCNC: 26 MMOL/L — SIGNIFICANT CHANGE UP (ref 22–29)
CREAT SERPL-MCNC: 0.51 MG/DL — SIGNIFICANT CHANGE UP (ref 0.5–1.3)
CULTURE RESULTS: SIGNIFICANT CHANGE UP
EOSINOPHIL # BLD AUTO: 0.08 K/UL — SIGNIFICANT CHANGE UP (ref 0–0.5)
EOSINOPHIL NFR BLD AUTO: 0.5 % — SIGNIFICANT CHANGE UP (ref 0–6)
GLUCOSE BLDC GLUCOMTR-MCNC: 128 MG/DL — HIGH (ref 70–99)
GLUCOSE BLDC GLUCOMTR-MCNC: 140 MG/DL — HIGH (ref 70–99)
GLUCOSE BLDC GLUCOMTR-MCNC: 141 MG/DL — HIGH (ref 70–99)
GLUCOSE BLDC GLUCOMTR-MCNC: 176 MG/DL — HIGH (ref 70–99)
GLUCOSE BLDC GLUCOMTR-MCNC: 195 MG/DL — HIGH (ref 70–99)
GLUCOSE SERPL-MCNC: 164 MG/DL — HIGH (ref 70–99)
HCT VFR BLD CALC: 39.2 % — SIGNIFICANT CHANGE UP (ref 34.5–45)
HGB BLD-MCNC: 12.5 G/DL — SIGNIFICANT CHANGE UP (ref 11.5–15.5)
IMM GRANULOCYTES NFR BLD AUTO: 1.3 % — SIGNIFICANT CHANGE UP (ref 0–1.5)
LACTATE SERPL-SCNC: 1.9 MMOL/L — SIGNIFICANT CHANGE UP (ref 0.5–2)
LYMPHOCYTES # BLD AUTO: 0.64 K/UL — LOW (ref 1–3.3)
LYMPHOCYTES # BLD AUTO: 4.3 % — LOW (ref 13–44)
MCHC RBC-ENTMCNC: 29.3 PG — SIGNIFICANT CHANGE UP (ref 27–34)
MCHC RBC-ENTMCNC: 31.9 GM/DL — LOW (ref 32–36)
MCV RBC AUTO: 91.8 FL — SIGNIFICANT CHANGE UP (ref 80–100)
METHOD TYPE: SIGNIFICANT CHANGE UP
MONOCYTES # BLD AUTO: 1.38 K/UL — HIGH (ref 0–0.9)
MONOCYTES NFR BLD AUTO: 9.3 % — SIGNIFICANT CHANGE UP (ref 2–14)
NEUTROPHILS # BLD AUTO: 12.53 K/UL — HIGH (ref 1.8–7.4)
NEUTROPHILS NFR BLD AUTO: 84.3 % — HIGH (ref 43–77)
ORGANISM # SPEC MICROSCOPIC CNT: SIGNIFICANT CHANGE UP
ORGANISM # SPEC MICROSCOPIC CNT: SIGNIFICANT CHANGE UP
PLATELET # BLD AUTO: 204 K/UL — SIGNIFICANT CHANGE UP (ref 150–400)
POTASSIUM SERPL-MCNC: 3.9 MMOL/L — SIGNIFICANT CHANGE UP (ref 3.5–5.3)
POTASSIUM SERPL-SCNC: 3.9 MMOL/L — SIGNIFICANT CHANGE UP (ref 3.5–5.3)
PROT SERPL-MCNC: 5.6 G/DL — LOW (ref 6.6–8.7)
RBC # BLD: 4.27 M/UL — SIGNIFICANT CHANGE UP (ref 3.8–5.2)
RBC # FLD: 15.9 % — HIGH (ref 10.3–14.5)
SODIUM SERPL-SCNC: 134 MMOL/L — LOW (ref 135–145)
SPECIMEN SOURCE: SIGNIFICANT CHANGE UP
WBC # BLD: 14.87 K/UL — HIGH (ref 3.8–10.5)
WBC # FLD AUTO: 14.87 K/UL — HIGH (ref 3.8–10.5)

## 2020-07-06 RX ORDER — DIGOXIN 250 MCG
0.25 TABLET ORAL DAILY
Refills: 0 | Status: DISCONTINUED | OUTPATIENT
Start: 2020-07-07 | End: 2020-07-16

## 2020-07-06 RX ORDER — DILTIAZEM HCL 120 MG
60 CAPSULE, EXT RELEASE 24 HR ORAL EVERY 8 HOURS
Refills: 0 | Status: DISCONTINUED | OUTPATIENT
Start: 2020-07-06 | End: 2020-07-27

## 2020-07-06 RX ORDER — METOPROLOL TARTRATE 50 MG
5 TABLET ORAL ONCE
Refills: 0 | Status: COMPLETED | OUTPATIENT
Start: 2020-07-06 | End: 2020-07-06

## 2020-07-06 RX ORDER — DIGOXIN 250 MCG
0.5 TABLET ORAL ONCE
Refills: 0 | Status: COMPLETED | OUTPATIENT
Start: 2020-07-06 | End: 2020-07-06

## 2020-07-06 RX ORDER — TAMSULOSIN HYDROCHLORIDE 0.4 MG/1
0.4 CAPSULE ORAL AT BEDTIME
Refills: 0 | Status: DISCONTINUED | OUTPATIENT
Start: 2020-07-06 | End: 2020-07-27

## 2020-07-06 RX ORDER — POLYETHYLENE GLYCOL 3350 17 G/17G
17 POWDER, FOR SOLUTION ORAL AT BEDTIME
Refills: 0 | Status: DISCONTINUED | OUTPATIENT
Start: 2020-07-06 | End: 2020-07-27

## 2020-07-06 RX ORDER — ACETAMINOPHEN 500 MG
650 TABLET ORAL EVERY 4 HOURS
Refills: 0 | Status: DISCONTINUED | OUTPATIENT
Start: 2020-07-06 | End: 2020-07-27

## 2020-07-06 RX ORDER — SENNA PLUS 8.6 MG/1
2 TABLET ORAL AT BEDTIME
Refills: 0 | Status: DISCONTINUED | OUTPATIENT
Start: 2020-07-06 | End: 2020-07-27

## 2020-07-06 RX ORDER — METOPROLOL TARTRATE 50 MG
5 TABLET ORAL ONCE
Refills: 0 | Status: COMPLETED | OUTPATIENT
Start: 2020-07-06 | End: 2020-07-05

## 2020-07-06 RX ORDER — ACETAMINOPHEN 500 MG
1000 TABLET ORAL ONCE
Refills: 0 | Status: DISCONTINUED | OUTPATIENT
Start: 2020-07-06 | End: 2020-07-06

## 2020-07-06 RX ORDER — OXYCODONE HYDROCHLORIDE 5 MG/1
10 TABLET ORAL EVERY 12 HOURS
Refills: 0 | Status: DISCONTINUED | OUTPATIENT
Start: 2020-07-06 | End: 2020-07-13

## 2020-07-06 RX ORDER — ACETAMINOPHEN 500 MG
600 TABLET ORAL ONCE
Refills: 0 | Status: COMPLETED | OUTPATIENT
Start: 2020-07-06 | End: 2020-07-06

## 2020-07-06 RX ADMIN — OXYCODONE HYDROCHLORIDE 10 MILLIGRAM(S): 5 TABLET ORAL at 21:43

## 2020-07-06 RX ADMIN — SODIUM CHLORIDE 75 MILLILITER(S): 9 INJECTION, SOLUTION INTRAVENOUS at 07:42

## 2020-07-06 RX ADMIN — CEFTRIAXONE 100 MILLIGRAM(S): 500 INJECTION, POWDER, FOR SOLUTION INTRAMUSCULAR; INTRAVENOUS at 17:51

## 2020-07-06 RX ADMIN — MORPHINE SULFATE 2 MILLIGRAM(S): 50 CAPSULE, EXTENDED RELEASE ORAL at 15:19

## 2020-07-06 RX ADMIN — MORPHINE SULFATE 2 MILLIGRAM(S): 50 CAPSULE, EXTENDED RELEASE ORAL at 02:42

## 2020-07-06 RX ADMIN — PANTOPRAZOLE SODIUM 40 MILLIGRAM(S): 20 TABLET, DELAYED RELEASE ORAL at 17:45

## 2020-07-06 RX ADMIN — Medication 0.5 MILLIGRAM(S): at 10:45

## 2020-07-06 RX ADMIN — MORPHINE SULFATE 2 MILLIGRAM(S): 50 CAPSULE, EXTENDED RELEASE ORAL at 08:35

## 2020-07-06 RX ADMIN — Medication 60 MILLIGRAM(S): at 13:10

## 2020-07-06 RX ADMIN — Medication 240 MILLIGRAM(S): at 04:31

## 2020-07-06 RX ADMIN — Medication 650 MILLIGRAM(S): at 13:28

## 2020-07-06 RX ADMIN — PANTOPRAZOLE SODIUM 40 MILLIGRAM(S): 20 TABLET, DELAYED RELEASE ORAL at 05:50

## 2020-07-06 RX ADMIN — TAMSULOSIN HYDROCHLORIDE 0.4 MILLIGRAM(S): 0.4 CAPSULE ORAL at 21:44

## 2020-07-06 RX ADMIN — MORPHINE SULFATE 2 MILLIGRAM(S): 50 CAPSULE, EXTENDED RELEASE ORAL at 19:43

## 2020-07-06 RX ADMIN — BUDESONIDE AND FORMOTEROL FUMARATE DIHYDRATE 2 PUFF(S): 160; 4.5 AEROSOL RESPIRATORY (INHALATION) at 09:09

## 2020-07-06 RX ADMIN — SENNA PLUS 2 TABLET(S): 8.6 TABLET ORAL at 21:44

## 2020-07-06 RX ADMIN — Medication 5 MILLIGRAM(S): at 02:41

## 2020-07-06 RX ADMIN — Medication 60 MILLIGRAM(S): at 21:44

## 2020-07-06 NOTE — PROGRESS NOTE ADULT - SUBJECTIVE AND OBJECTIVE BOX
HPI:  An 87 yo Female patient with PMHx significant for DVT (2019) not AC, intermittent asthma (never intubated/no icu stays), polymyalgia rheumatica (chronic prednisone), dementia, osteoporosis with multiple vertebral compression, presents today BIBEMS due to chest pain and ZUNILDA.   Patient lethargic, unable to obtained detail history, most of information obtained from chart review. As per EMS documentation patient reported chest pain and SOB after having a family argument. Of note patient was dc today from rehab.   She was recently admitted at [facility] on 2020, due to GI bleed found to have multiple duodenal ulcers.   At the ED was found to be hyperglycemic (636) 8 units of regular insulin; blood glucose dropped to 66, dextrose was given.  Also noted to have a fever Tmax 102 rectally and a + UA.     Attempted to speak to her Partha, but did not answer his phone (2020 22:50)      PAST MEDICAL & SURGICAL HISTORY:  Chronic back pain  CHF (congestive heart failure)  Atrial fibrillation  Polymyalgia rheumatica  Diverticulosis  Asthma  S/P knee replacement, left  S/P hysterectomy:   S/P appendectomy:       ANTIMICROBIAL:  cefTRIAXone   IVPB 1000 milliGRAM(s) IV Intermittent every 24 hours        PULMONARY:  budesonide 160 MICROgram(s)/formoterol 4.5 MICROgram(s) Inhaler 2 Puff(s) Inhalation two times a day        GATROINTESTINAL:  pantoprazole  Injectable 40 milliGRAM(s) IV Push two times a day     MEDS:    ENDO/METABOLIC:  dextrose 40% Gel 15 Gram(s) Oral once PRN  dextrose 50% Injectable 12.5 Gram(s) IV Push once  dextrose 50% Injectable 25 Gram(s) IV Push once  dextrose 50% Injectable 25 Gram(s) IV Push once  glucagon  Injectable 1 milliGRAM(s) IntraMuscular once PRN    IV FLUID/NUTRITION:  dextrose 5%. 1000 milliLiter(s) IV Continuous <Continuous>  multiple electrolytes Injection Type 1 1000 milliLiter(s) IV Continuous <Continuous>            Allergies    dust (Unknown)  No Known Drug Allergies    Intolerances    provide [facility] soft diet (Unknown)      SOCIAL HISTORY:    FAMILY HISTORY:  Family history of stroke      ICU Vital Signs Last 24 Hrs  T(C): 37.4 (2020 07:00), Max: 39.3 (2020 13:10)  T(F): 99.3 (2020 07:00), Max: 102.7 (2020 13:10)  HR: 126 (2020 08:00) (93 - 179)  BP: 147/60 (2020 08:00) (119/82 - 214/80)  BP(mean): 92 (2020 04:00) (92 - 119)  ABP: --  ABP(mean): --  RR: 37 (2020 08:00) (21 - 50)  SpO2: 100% (2020 08:00) (91% - 100%)                REVIEW OF SYSTEMS:  REVIEW OF SYSTEMS      General: moaning at times 	    Respiratory: no complaints   	  Cardiovascular:	? CP vs costochondritis     Gastrointestinal:	no complaints     Genitourinary:	no complaints     Musculoskeletal:	 c/o back pain    Neurological:	AAO x 3          PHYSICAL EXAM:    GENERAL: NAD, well-groomed, well-developed  HEAD:  Atraumatic, Normocephalic  NERVOUS SYSTEM:  moaning   CHEST/LUNG: Clear to percussion bilaterally; No rales, rhonchi, wheezing, or rubs  HEART: irregular afutter   ABDOMEN: Soft, Nontender, Nondistended; Bowel sounds present  EXTREMITIES:  2+ Peripheral Pulses, cold to touch         LABS:                                   12.5   14.87 )-----------( 204      ( 2020 04:41 )             39.2         07-06    134<L>  |  95<L>  |  11.0  ----------------------------<  164<H>  3.9   |  26.0  |  0.51    Ca    9.0      2020 04:41  Phos  3.3     07-05  Mg     1.7     07-05    TPro  5.6<L>  /  Alb  3.3  /  TBili  0.7  /  DBili  x   /  AST  26  /  ALT  19  /  AlkPhos  91  07-06      Color: Yellow / Appearance: Clear / S.010 / pH: x  Gluc: x / Ketone: Negative  / Bili: Negative / Urobili: Negative mg/dL   Blood: x / Protein: 15 mg/dL / Nitrite: Negative   Leuk Esterase: Trace / RBC: 6-10 /HPF / WBC 11-25   Sq Epi: x / Non Sq Epi: Occasional / Bacteria: Many        urine culture :   positive with E coli       RADIOLOGY & ADDITIONAL STUDIES:

## 2020-07-06 NOTE — CONSULT NOTE ADULT - SUBJECTIVE AND OBJECTIVE BOX
[facility]                                      [facility]                              [address] [address]                                                 PHONE: [phone]                                                 FAX: [phone]  ------------------------------------------------------------------------------------------------    88y Female with past medical history as under BIBEMS due to chest pain. Patient lethargic, unable to obtained detail history, most of information obtained from chart review. As per EMS documentation patient reported chest pain and SOB after having a family argument. Of note patient was dc today from rehab. She was recently admitted at [facility] on June 2020, due to GI bleed found to have multiple duodenal ulcers. At the ED was found to be hyperglycemic (636). Also noted to have a fever Tmax 102 rectally and a + UA.   Telemetry with AF and RVR. At the time of evaluation, pt lethargic and in pain from back.     PAST MEDICAL & SURGICAL HISTORY:  Chronic back pain  CHF (congestive heart failure)  Atrial fibrillation  Polymyalgia rheumatica  Diverticulosis  Asthma  S/P knee replacement, left  S/P hysterectomy: 1982  S/P appendectomy: 1962      dust (Unknown)  No Known Drug Allergies  provide [facility] soft diet (Unknown)      Review of Systems:  Unable to obtain accurate ROS due to dementia    Family history:   No pertinent family history in first degree relative of early CAD, sudden cardiac death or  congenital heart disease    Social History:  No smoking   No alcohol  No other drug use    Vital Signs Last 24 Hrs  T(C): 37.4 (06 Jul 2020 07:00), Max: 39.3 (05 Jul 2020 13:10)  T(F): 99.3 (06 Jul 2020 07:00), Max: 102.7 (05 Jul 2020 13:10)  HR: 133 (06 Jul 2020 10:00) (93 - 179)  BP: 155/94 (06 Jul 2020 10:00) (119/82 - 214/80)  BP(mean): 92 (06 Jul 2020 04:00) (92 - 119)  RR: 26 (06 Jul 2020 10:00) (21 - 50)  SpO2: 98% (06 Jul 2020 10:00) (91% - 100%)    PHYSICAL EXAM:  Constitutional: Thin elderly female in pain and appears uncomfortable  HEENT:     Head: Normocephalic and atraumatic  Eyes: Conjunctiva normal, No scleral icterus  Neck: Supple, No JVD  Mouth/Throat: Mucous membranes are normal. Mucosa are not pale or dry.  Cardiovascular: irregular S1, S2, tachy  Respiratory: Lungs clear to auscultation; no crepitations, no wheeze  Gastrointestinal:  Soft, Non-tender, + BS	  Musculoskeletal: Normal range of motion. No edema  Skin: Warm and dry. No cyanosis . No diaphoresis.  Neurologic: Unable to assess  Psychiatric: Unable to assess        LABS:                        12.5   14.87 )-----------( 204      ( 06 Jul 2020 04:41 )             39.2     07-06    134<L>  |  95<L>  |  11.0  ----------------------------<  164<H>  3.9   |  26.0  |  0.51    Ca    9.0      06 Jul 2020 04:41  Phos  3.3     07-05  Mg     1.7     07-05    TPro  5.6<L>  /  Alb  3.3  /  TBili  0.7  /  DBili  x   /  AST  26  /  ALT  19  /  AlkPhos  91  07-06    CARDIAC MARKERS ( 05 Jul 2020 07:16 )  x     / 0.01 ng/mL / x     / x     / x      CARDIAC MARKERS ( 05 Jul 2020 01:41 )  x     / 0.01 ng/mL / x     / x     / x      CARDIAC MARKERS ( 05 Jul 2020 01:00 )  x     / <0.01 ng/mL / x     / x     / x      CARDIAC MARKERS ( 04 Jul 2020 20:03 )  x     / 0.02 ng/mL / x     / x     / x          PT/INR - ( 04 Jul 2020 20:25 )   PT: 12.0 sec;   INR: 1.04 ratio         PTT - ( 04 Jul 2020 20:25 )  PTT:22.3 sec    RADIOLOGY & ADDITIONAL STUDIES: (reviewed)  CXR was independently reviewed and demonstrated: Multiple old right-sided rib fractures. Cardiomegaly. Clear lungs.    CARDIOLOGY TESTING:(reviewed)     ECG (Independent visualization): AF with RVR    ECHOCARDIOGRAM :   1. Left ventricular ejection fraction, by visual estimation, is 50 to   55%.   2. Mildly decreased global left ventricular systolic function.   3. Mildly increased LV wall thickness.   4. Spectral Doppler shows impaired relaxation pattern of left   ventricular myocardial filling (Grade I diastolic dysfunction).   5. Mild thickening of the anterior and posterior mitral valve leaflets.   6. Moderate mitral valve regurgitation.   7. Sclerotic aortic valve with normal opening.   8. Peak transaortic gradient equals 7.2 mmHg, mean transaortic gradient   equals 3.7 mmHg, the calculated aortic valve area equals 2.06 cm² by the   continuity equation consistent with mild aortic stenosis.    MEDICATIONS:(reviewed)  Home Medications:  Home Medications:  albuterol 4 mg oral tablet: 1 tab(s) orally once a day (at bedtime) (04 Jul 2020 23:04)  lidocaine 5% topical film: Apply topically to affected area once a day; on for 12 hours, off for 12 hours (04 Jul 2020 23:04)  oxyCODONE 10 mg oral tablet, extended release: 1 tab(s) orally every 12 hours (04 Jul 2020 23:04)  predniSONE 20 mg oral tablet: 1 tab(s) orally once a day (04 Jul 2020 23:04)  Protonix 40 mg oral delayed release tablet: orally 2 times a day (04 Jul 2020 23:04)  senna oral tablet: 2 tab(s) orally once a day (at bedtime) (04 Jul 2020 23:04)  Symbicort 160 mcg-4.5 mcg/inh inhalation aerosol: 2 puff(s) inhaled 2 times a day (04 Jul 2020 23:04)      MEDICATIONS  (STANDING):  budesonide 160 MICROgram(s)/formoterol 4.5 MICROgram(s) Inhaler 2 Puff(s) Inhalation two times a day  cefTRIAXone   IVPB 1000 milliGRAM(s) IV Intermittent every 24 hours  dextrose 5% + sodium chloride 0.9%. 1000 milliLiter(s) (75 mL/Hr) IV Continuous <Continuous>  dextrose 5%. 1000 milliLiter(s) (50 mL/Hr) IV Continuous <Continuous>  dextrose 50% Injectable 12.5 Gram(s) IV Push once  dextrose 50% Injectable 25 Gram(s) IV Push once  dextrose 50% Injectable 25 Gram(s) IV Push once  pantoprazole  Injectable 40 milliGRAM(s) IV Push two times a day  tamsulosin 0.4 milliGRAM(s) Oral at bedtime    MEDICATIONS  (PRN):  dextrose 40% Gel 15 Gram(s) Oral once PRN Blood Glucose LESS THAN 70 milliGRAM(s)/deciliter  glucagon  Injectable 1 milliGRAM(s) IntraMuscular once PRN Glucose LESS THAN 70 milligrams/deciliter  morphine  - Injectable 2 milliGRAM(s) IV Push every 4 hours PRN Moderate Pain (4 - 6)

## 2020-07-06 NOTE — DIETITIAN INITIAL EVALUATION ADULT. - PERTINENT LABORATORY DATA
07-06 Na134 mmol/L<L> Glu 164 mg/dL<H> K+ 3.9 mmol/L Cr  0.51 mg/dL BUN 11.0 mg/dL Phos n/a   Alb 3.3 g/dL PAB n/a

## 2020-07-06 NOTE — PROGRESS NOTE ADULT - ASSESSMENT
An 89 yo Female patient with PMHx significant for DVT (11/2019) not AC, intermittent asthma (never intubated/no icu stays), polymyalgia rheumatica (chronic prednisone), dementia, osteoporosis with multiple vertebral compression, presents today BIBEMS due to chest pain and SOB. Being admitted for chest pain, rule out ACS  At the ED was found to be hyperglycemic (636) 8 units of regular insulin; blood glucose dropped to 66, dextrose was given. Also noted to have a fever Tmax 102 rectally and a + UA          Sepsis secondary to UTI   +UA   + urine culture with Ecoli  on Rocephin     Patient with severe abdominal pain and distention, recently with colitis   Crandall placed for urinary retention   -follow up blood cultures.   S/p Rocephin and Zythromax at the ED      Lethargy secondary to Iatrogenic Induced Hypoglycemia   Glucose of 636 on BMP, unlikely real  Pt was given 8 U of regular insulin by ED staff, glucose dropped to 66, pt required D50% 25 mg x2  Will avoid Insulin at this time   currently on diet         Chest Pain,    Trops -x3,   [facility] called for eval as pt had HR of 100s-170s all last night despite b-blocker       Afib RVR and Aflutter   S/p Cardizem IVP x1 in ED and lopressor x 1 last night -- helped for a short period of time but pts BP was 114/60   CHADSVASC: 4  Not on AC due to GI bleed   approx 1 month ago was admitted for GIB     Duodenal ulcers  -recent gib w/ ulcers found on EGD  on protonix bid     Hypokalemia   K: 2.9 on admission   S/p K raiders x2   Will trend        Polymyalgia Rheumatica  Hold Prednisone while NPO  if bp drops consider stress dose steroids.       Mild-Moderate Intermittent Asthma  Continue Symbicort/Albuterol PRN  O2 via NC if needed PRN titrate FIO2 >90% and <96%    Failure to thrive   Patient cachectic   Nutritional consult pending       DVT Prophylaxis   Intermittent compression     Code status -- pt with MOLST   DNR/ DNI

## 2020-07-06 NOTE — DIETITIAN INITIAL EVALUATION ADULT. - OTHER INFO
An 88 year old Female patient with PMHx significant for DVT (11/2019) not AC, intermittent asthma (never intubated/no icu stays), polymyalgia rheumatica (chronic prednisone), dementia, osteoporosis with multiple vertebral compression, presents today BIBEMS due to chest pain and ZUNILDA.  Patient lethargic.  As per EMS documentation patient reported chest pain and SOB after having a family argument. Of note patient was dc (7/4) from rehab.   She was recently admitted at [facility] on June 2020, due to GI bleed found to have multiple duodenal ulcers.   At the ED was found to be hyperglycemic (636) 8 units of regular insulin; blood glucose dropped to 66, dextrose was given; Also noted to have a fever. Pt lethargic during visit, NPO status at this time. wt history obtained from previous admission (8# wt loss over past 6 months). Skin tear to R arm, Stage 1 Coccyx. Brief NFPE conducted.

## 2020-07-06 NOTE — DIETITIAN INITIAL EVALUATION ADULT. - MALNUTRITION
Severe-Chronic Severe-Chronic  NFPE: Severe muscle mass loss in temple, clavicle, shoulder regions and severe fat loss in orbita, buccal, triceps and thoracic regions

## 2020-07-06 NOTE — PROGRESS NOTE ADULT - SUBJECTIVE AND OBJECTIVE BOX
HPI:  An 87 yo Female patient with PMHx significant for DVT (11/2019) not AC, intermittent asthma (never intubated/no icu stays), polymyalgia rheumatica (chronic prednisone), dementia, osteoporosis with multiple vertebral compression, presents today BIBEMS due to chest pain and ZUNILDA.   Patient lethargic, unable to obtained detail history, most of information obtained from chart review. As per EMS documentation patient reported chest pain and SOB after having a family argument. Of note patient was dc today from rehab.   She was recently admitted at [facility] on June 2020, due to GI bleed found to have multiple duodenal ulcers.   At the ED was found to be hyperglycemic (636) 8 units of regular insulin; blood glucose dropped to 66, dextrose was given.  Also noted to have a fever Tmax 102 rectally and a + UA.     Attempted to speak to her Partha, but did not answer his phone (04 Jul 2020 22:50)     Allergies    dust (Unknown)  No Known Drug Allergies    Intolerances    provide [facility] soft diet (Unknown)    Chronic back pain  CHF (congestive heart failure)  Atrial fibrillation  Polymyalgia rheumatica  Diverticulosis  Asthma    MEDICATIONS  (STANDING):  budesonide 160 MICROgram(s)/formoterol 4.5 MICROgram(s) Inhaler 2 Puff(s) Inhalation two times a day  cefTRIAXone   IVPB 1000 milliGRAM(s) IV Intermittent every 24 hours  dextrose 5% + sodium chloride 0.9%. 1000 milliLiter(s) (75 mL/Hr) IV Continuous <Continuous>  dextrose 5%. 1000 milliLiter(s) (50 mL/Hr) IV Continuous <Continuous>  dextrose 50% Injectable 12.5 Gram(s) IV Push once  dextrose 50% Injectable 25 Gram(s) IV Push once  dextrose 50% Injectable 25 Gram(s) IV Push once  diltiazem    Tablet 60 milliGRAM(s) Oral every 8 hours  oxyCODONE  ER Tablet 10 milliGRAM(s) Oral every 12 hours  pantoprazole  Injectable 40 milliGRAM(s) IV Push two times a day  polyethylene glycol 3350 17 Gram(s) Oral at bedtime  senna 2 Tablet(s) Oral at bedtime  tamsulosin 0.4 milliGRAM(s) Oral at bedtime    MEDICATIONS  (PRN):  acetaminophen   Tablet .. 650 milliGRAM(s) Oral every 4 hours PRN Mild Pain (1 - 3)  dextrose 40% Gel 15 Gram(s) Oral once PRN Blood Glucose LESS THAN 70 milliGRAM(s)/deciliter  glucagon  Injectable 1 milliGRAM(s) IntraMuscular once PRN Glucose LESS THAN 70 milligrams/deciliter  morphine  - Injectable 2 milliGRAM(s) IV Push every 4 hours PRN Moderate Pain (4 - 6)                           12.5   14.87 )-----------( 204      ( 06 Jul 2020 04:41 )             39.2     07-06    134<L>  |  95<L>  |  11.0  ----------------------------<  164<H>  3.9   |  26.0  |  0.51    Ca    9.0      06 Jul 2020 04:41  Phos  3.3     07-05  Mg     1.7     07-05    TPro  5.6<L>  /  Alb  3.3  /  TBili  0.7  /  DBili  x   /  AST  26  /  ALT  19  /  AlkPhos  91  07-06      Vital Signs Last 24 Hrs  T(C): 36.7 (06 Jul 2020 16:05), Max: 38 (06 Jul 2020 02:30)  T(F): 98.1 (06 Jul 2020 16:05), Max: 100.4 (06 Jul 2020 02:30)  HR: 78 (06 Jul 2020 20:00) (78 - 179)  BP: 125/59 (06 Jul 2020 20:00) (105/68 - 214/80)  BP(mean): 87 (06 Jul 2020 20:00) (81 - 118)  RR: 27 (06 Jul 2020 20:00) (21 - 37)  SpO2: 99% (06 Jul 2020 20:00) (91% - 100%)  CAPILLARY BLOOD GLUCOSE      07-05 @ 07:01  -  07-06 @ 07:00  --------------------------------------------------------  IN: 1310 mL / OUT: 1555 mL / NET: -245 mL    07-06 @ 07:01  -  07-06 @ 21:17  --------------------------------------------------------  IN: 1145 mL / OUT: 200 mL / NET: 945 mL      Patient feeling better No CP, No SOB, No N/V +6/10 pain shoulder and back  HEENT: PEARLA  Neck: Supple  Cardio: S1 S2 No Murmur  Pulm: CTA No Rales or Ronchi  Abd: Soft NT ND BS+ no  Suprapubic tenderness   Back  -  spine tenderness diffuse   Rectal - refused  Ext: No DCT  Skin: No Rash  Neuro: awake pleasant short term loss     Urinary Sepsis - pan cultures and Rocephin   Shoulder pain - Xray   Urinary Retention - Crandall flomax  Afib - cardio  Failure to thrive - continue supportive care   Chronic Back Pain -   PRN morphine add long acting Oxycodone this worked well in the past   Osteoporosis with compression fracture - pain control   MOLST form competed last admission (3 weeks ago)  DNR DNI, spoke to patient again she agrees     son has not returned call

## 2020-07-06 NOTE — DIETITIAN INITIAL EVALUATION ADULT. - SIGNS/SYMPTOMS
as evidenced by wt loss of 8# (8.69%) over pst 6 months, and physical signs of severe muscle/fat los

## 2020-07-06 NOTE — DIETITIAN INITIAL EVALUATION ADULT. - ETIOLOGY
Related to inability to meet sufficient protein energy requirements in setting of advanced age, dementia and recent GI bleed found to have multiple duodenal ulcers

## 2020-07-06 NOTE — CHART NOTE - NSCHARTNOTEFT_GEN_A_CORE
Upon Nutritional Assessment by the Registered Dietitian your patient was determined to meet criteria / has evidence of the following diagnosis/diagnoses:          [ ]  Mild Protein Calorie Malnutrition        [ ]  Moderate Protein Calorie Malnutrition        [ x] Severe Protein Calorie Malnutrition        [ ] Unspecified Protein Calorie Malnutrition        [ ] Underweight / BMI <19        [ ] Morbid Obesity / BMI > 40      Findings as based on:  •  Comprehensive nutrition assessment and consultation  •  Calorie counts (nutrient intake analysis)  •  Food acceptance and intake status from observations by staff  •  Follow up  •  Patient education  •  Intervention secondary to interdisciplinary rounds  •   concerns      Treatment:    The following diet has been recommended:    Ensure TID     PROVIDER Section:     By signing this assessment you are acknowledging and agree with the diagnosis/diagnoses assigned by the Registered Dietitian    Comments:

## 2020-07-07 LAB
ALBUMIN SERPL ELPH-MCNC: 2.9 G/DL — LOW (ref 3.3–5.2)
ALP SERPL-CCNC: 78 U/L — SIGNIFICANT CHANGE UP (ref 40–120)
ALT FLD-CCNC: 16 U/L — SIGNIFICANT CHANGE UP
ANION GAP SERPL CALC-SCNC: 11 MMOL/L — SIGNIFICANT CHANGE UP (ref 5–17)
AST SERPL-CCNC: 25 U/L — SIGNIFICANT CHANGE UP
BASOPHILS # BLD AUTO: 0.02 K/UL — SIGNIFICANT CHANGE UP (ref 0–0.2)
BASOPHILS NFR BLD AUTO: 0.2 % — SIGNIFICANT CHANGE UP (ref 0–2)
BILIRUB SERPL-MCNC: 0.5 MG/DL — SIGNIFICANT CHANGE UP (ref 0.4–2)
BUN SERPL-MCNC: 12 MG/DL — SIGNIFICANT CHANGE UP (ref 8–20)
CALCIUM SERPL-MCNC: 8.3 MG/DL — LOW (ref 8.6–10.2)
CHLORIDE SERPL-SCNC: 100 MMOL/L — SIGNIFICANT CHANGE UP (ref 98–107)
CO2 SERPL-SCNC: 24 MMOL/L — SIGNIFICANT CHANGE UP (ref 22–29)
CREAT SERPL-MCNC: 0.56 MG/DL — SIGNIFICANT CHANGE UP (ref 0.5–1.3)
EOSINOPHIL # BLD AUTO: 0.06 K/UL — SIGNIFICANT CHANGE UP (ref 0–0.5)
EOSINOPHIL NFR BLD AUTO: 0.6 % — SIGNIFICANT CHANGE UP (ref 0–6)
GLUCOSE BLDC GLUCOMTR-MCNC: 149 MG/DL — HIGH (ref 70–99)
GLUCOSE BLDC GLUCOMTR-MCNC: 166 MG/DL — HIGH (ref 70–99)
GLUCOSE BLDC GLUCOMTR-MCNC: 184 MG/DL — HIGH (ref 70–99)
GLUCOSE BLDC GLUCOMTR-MCNC: 206 MG/DL — HIGH (ref 70–99)
GLUCOSE BLDC GLUCOMTR-MCNC: 210 MG/DL — HIGH (ref 70–99)
GLUCOSE SERPL-MCNC: 162 MG/DL — HIGH (ref 70–99)
HCT VFR BLD CALC: 29.6 % — LOW (ref 34.5–45)
HGB BLD-MCNC: 9.5 G/DL — LOW (ref 11.5–15.5)
IMM GRANULOCYTES NFR BLD AUTO: 0.8 % — SIGNIFICANT CHANGE UP (ref 0–1.5)
LYMPHOCYTES # BLD AUTO: 0.27 K/UL — LOW (ref 1–3.3)
LYMPHOCYTES # BLD AUTO: 2.5 % — LOW (ref 13–44)
MCHC RBC-ENTMCNC: 29 PG — SIGNIFICANT CHANGE UP (ref 27–34)
MCHC RBC-ENTMCNC: 32.1 GM/DL — SIGNIFICANT CHANGE UP (ref 32–36)
MCV RBC AUTO: 90.2 FL — SIGNIFICANT CHANGE UP (ref 80–100)
MONOCYTES # BLD AUTO: 0.79 K/UL — SIGNIFICANT CHANGE UP (ref 0–0.9)
MONOCYTES NFR BLD AUTO: 7.3 % — SIGNIFICANT CHANGE UP (ref 2–14)
NEUTROPHILS # BLD AUTO: 9.62 K/UL — HIGH (ref 1.8–7.4)
NEUTROPHILS NFR BLD AUTO: 88.6 % — HIGH (ref 43–77)
PLATELET # BLD AUTO: 98 K/UL — LOW (ref 150–400)
POTASSIUM SERPL-MCNC: 3.5 MMOL/L — SIGNIFICANT CHANGE UP (ref 3.5–5.3)
POTASSIUM SERPL-SCNC: 3.5 MMOL/L — SIGNIFICANT CHANGE UP (ref 3.5–5.3)
PROT SERPL-MCNC: 4.7 G/DL — LOW (ref 6.6–8.7)
RBC # BLD: 3.28 M/UL — LOW (ref 3.8–5.2)
RBC # FLD: 15.7 % — HIGH (ref 10.3–14.5)
SODIUM SERPL-SCNC: 135 MMOL/L — SIGNIFICANT CHANGE UP (ref 135–145)
WBC # BLD: 10.85 K/UL — HIGH (ref 3.8–10.5)
WBC # FLD AUTO: 10.85 K/UL — HIGH (ref 3.8–10.5)

## 2020-07-07 PROCEDURE — 73030 X-RAY EXAM OF SHOULDER: CPT | Mod: 26,RT

## 2020-07-07 RX ORDER — DILTIAZEM HCL 120 MG
1 CAPSULE, EXT RELEASE 24 HR ORAL
Qty: 0 | Refills: 0 | DISCHARGE
Start: 2020-07-07

## 2020-07-07 RX ORDER — TAMSULOSIN HYDROCHLORIDE 0.4 MG/1
1 CAPSULE ORAL
Qty: 0 | Refills: 0 | DISCHARGE
Start: 2020-07-07

## 2020-07-07 RX ORDER — DIGOXIN 250 MCG
1 TABLET ORAL
Qty: 0 | Refills: 0 | DISCHARGE

## 2020-07-07 RX ORDER — MORPHINE SULFATE 50 MG/1
2 CAPSULE, EXTENDED RELEASE ORAL EVERY 4 HOURS
Refills: 0 | Status: DISCONTINUED | OUTPATIENT
Start: 2020-07-07 | End: 2020-07-14

## 2020-07-07 RX ORDER — POLYETHYLENE GLYCOL 3350 17 G/17G
17 POWDER, FOR SOLUTION ORAL
Qty: 0 | Refills: 0 | DISCHARGE
Start: 2020-07-07

## 2020-07-07 RX ORDER — MORPHINE SULFATE 50 MG/1
5 CAPSULE, EXTENDED RELEASE ORAL EVERY 4 HOURS
Refills: 0 | Status: DISCONTINUED | OUTPATIENT
Start: 2020-07-07 | End: 2020-07-14

## 2020-07-07 RX ADMIN — BUDESONIDE AND FORMOTEROL FUMARATE DIHYDRATE 2 PUFF(S): 160; 4.5 AEROSOL RESPIRATORY (INHALATION) at 21:47

## 2020-07-07 RX ADMIN — Medication 0.25 MILLIGRAM(S): at 05:55

## 2020-07-07 RX ADMIN — OXYCODONE HYDROCHLORIDE 10 MILLIGRAM(S): 5 TABLET ORAL at 05:55

## 2020-07-07 RX ADMIN — MORPHINE SULFATE 2 MILLIGRAM(S): 50 CAPSULE, EXTENDED RELEASE ORAL at 21:24

## 2020-07-07 RX ADMIN — MORPHINE SULFATE 2 MILLIGRAM(S): 50 CAPSULE, EXTENDED RELEASE ORAL at 12:37

## 2020-07-07 RX ADMIN — MORPHINE SULFATE 2 MILLIGRAM(S): 50 CAPSULE, EXTENDED RELEASE ORAL at 00:13

## 2020-07-07 RX ADMIN — Medication 60 MILLIGRAM(S): at 05:55

## 2020-07-07 RX ADMIN — PANTOPRAZOLE SODIUM 40 MILLIGRAM(S): 20 TABLET, DELAYED RELEASE ORAL at 05:55

## 2020-07-07 RX ADMIN — TAMSULOSIN HYDROCHLORIDE 0.4 MILLIGRAM(S): 0.4 CAPSULE ORAL at 21:26

## 2020-07-07 RX ADMIN — Medication 60 MILLIGRAM(S): at 15:30

## 2020-07-07 RX ADMIN — POLYETHYLENE GLYCOL 3350 17 GRAM(S): 17 POWDER, FOR SOLUTION ORAL at 21:25

## 2020-07-07 RX ADMIN — CEFTRIAXONE 100 MILLIGRAM(S): 500 INJECTION, POWDER, FOR SOLUTION INTRAMUSCULAR; INTRAVENOUS at 17:29

## 2020-07-07 RX ADMIN — MORPHINE SULFATE 2 MILLIGRAM(S): 50 CAPSULE, EXTENDED RELEASE ORAL at 16:41

## 2020-07-07 RX ADMIN — Medication 60 MILLIGRAM(S): at 21:26

## 2020-07-07 RX ADMIN — Medication 650 MILLIGRAM(S): at 10:57

## 2020-07-07 RX ADMIN — OXYCODONE HYDROCHLORIDE 10 MILLIGRAM(S): 5 TABLET ORAL at 17:26

## 2020-07-07 RX ADMIN — Medication 30 MILLIGRAM(S): at 10:08

## 2020-07-07 RX ADMIN — SENNA PLUS 2 TABLET(S): 8.6 TABLET ORAL at 21:26

## 2020-07-07 RX ADMIN — PANTOPRAZOLE SODIUM 40 MILLIGRAM(S): 20 TABLET, DELAYED RELEASE ORAL at 17:28

## 2020-07-07 RX ADMIN — MORPHINE SULFATE 2 MILLIGRAM(S): 50 CAPSULE, EXTENDED RELEASE ORAL at 08:31

## 2020-07-07 NOTE — PROGRESS NOTE ADULT - ASSESSMENT
An 87 yo Female patient with PMHx significant for DVT (11/2019) not AC, intermittent asthma (never intubated/no icu stays), polymyalgia rheumatica (chronic prednisone), dementia, osteoporosis with multiple vertebral compression, presents today BIBEMS due to chest pain and SOB. Being admitted for chest pain, rule out ACS  At the ED was found to be hyperglycemic (636) 8 units of regular insulin; blood glucose dropped to 66, dextrose was given. Also noted to have a fever Tmax 102 rectally and a + UA          Sepsis secondary to UTI   +UA   + urine culture with Ecoli  on Rocephin Day 3/5     Patient with severe abdominal pain and distention, recently with colitis   Crandall placed for urinary retention - failed trials of void yesterday        Lethargy secondary to Iatrogenic Induced Hypoglycemia   Glucose of 636 on BMP, unlikely real  Pt was given 8 U of regular insulin by ED staff, glucose dropped to 66, pt required D50% 25 mg x2  Will avoid Insulin at this time   currently on diet         Chest Pain,    Trops -x3,   [facility] called   pt currently rate controlled       Afib RVR and Aflutter   S/p Cardizem IVP x1 in ED and lopressor x 1 last night -- helped for a short period of time but pts BP was 114/60   CHADSVASC: 4  Not on AC due to GI bleed   approx 1 month ago was admitted for GIB     Duodenal ulcers  -recent gib w/ ulcers found on EGD  on protonix bid     Hypokalemia   K: 2.9 on admission   S/p K raiders x2           Polymyalgia Rheumatica  Hold Prednisone while NPO  if bp drops consider stress dose steroids.       Mild-Moderate Intermittent Asthma  Continue Symbicort/Albuterol PRN  O2 via NC if needed PRN titrate FIO2 >90% and <96%    Failure to thrive   Patient cachectic   Nutritional consult pending       DVT Prophylaxis   Intermittent compression     Code status -- pt with MOLST   DNR/ DNI

## 2020-07-07 NOTE — PROGRESS NOTE ADULT - SUBJECTIVE AND OBJECTIVE BOX
HPI:  An 89 yo Female patient with PMHx significant for DVT (11/2019) not AC, intermittent asthma (never intubated/no icu stays), polymyalgia rheumatica (chronic prednisone), dementia, osteoporosis with multiple vertebral compression, presents today BIBEMS due to chest pain and ZUNILDA.   Patient lethargic, unable to obtained detail history, most of information obtained from chart review. As per EMS documentation patient reported chest pain and SOB after having a family argument. Of note patient was dc today from rehab.   She was recently admitted at [facility] on June 2020, due to GI bleed found to have multiple duodenal ulcers.   At the ED was found to be hyperglycemic [phone] 8 units of regular insulin; blood glucose dropped to 66, dextrose was given.  Also noted to have a fever Tmax 102 rectally and a + UA.     Attempted to speak to her Partha, but did not answer his phone (04 Jul 2020 22:50)     Allergies    dust (Unknown)  No Known Drug Allergies    Intolerances    provide [facility] soft diet (Unknown)    Chronic back pain  CHF (congestive heart failure)  Atrial fibrillation  Polymyalgia rheumatica  Diverticulosis  Asthma    MEDICATIONS  (STANDING):  budesonide 160 MICROgram(s)/formoterol 4.5 MICROgram(s) Inhaler 2 Puff(s) Inhalation two times a day  cefTRIAXone   IVPB 1000 milliGRAM(s) IV Intermittent every 24 hours  dextrose 5% + sodium chloride 0.9%. 1000 milliLiter(s) (75 mL/Hr) IV Continuous <Continuous>  dextrose 5%. 1000 milliLiter(s) (50 mL/Hr) IV Continuous <Continuous>  dextrose 50% Injectable 12.5 Gram(s) IV Push once  dextrose 50% Injectable 25 Gram(s) IV Push once  dextrose 50% Injectable 25 Gram(s) IV Push once  digoxin  Injectable 0.25 milliGRAM(s) IV Push daily  diltiazem    Tablet 60 milliGRAM(s) Oral every 8 hours  oxyCODONE  ER Tablet 10 milliGRAM(s) Oral every 12 hours  pantoprazole  Injectable 40 milliGRAM(s) IV Push two times a day  polyethylene glycol 3350 17 Gram(s) Oral at bedtime  predniSONE   Tablet 30 milliGRAM(s) Oral daily  senna 2 Tablet(s) Oral at bedtime  tamsulosin 0.4 milliGRAM(s) Oral at bedtime    MEDICATIONS  (PRN):  acetaminophen   Tablet .. 650 milliGRAM(s) Oral every 4 hours PRN Mild Pain (1 - 3)  dextrose 40% Gel 15 Gram(s) Oral once PRN Blood Glucose LESS THAN 70 milliGRAM(s)/deciliter  glucagon  Injectable 1 milliGRAM(s) IntraMuscular once PRN Glucose LESS THAN 70 milligrams/deciliter  morphine  - Injectable 2 milliGRAM(s) IV Push every 4 hours PRN Moderate Pain (4 - 6)                           9.5    10.85 )-----------( 98       ( 07 Jul 2020 07:51 )             29.6     07-07    135  |  100  |  12.0  ----------------------------<  162<H>  3.5   |  24.0  |  0.56    Ca    8.3<L>      07 Jul 2020 07:51    TPro  4.7<L>  /  Alb  2.9<L>  /  TBili  0.5  /  DBili  x   /  AST  25  /  ALT  16  /  AlkPhos  78  07-07      ;  Vital Signs Last 24 Hrs  T(C): 36.4 (07 Jul 2020 15:30), Max: 36.9 (07 Jul 2020 04:00)  T(F): 97.6 (07 Jul 2020 15:30), Max: 98.4 (07 Jul 2020 04:00)  HR: 101 (07 Jul 2020 15:19) (78 - 106)  BP: 106/63 (07 Jul 2020 15:19) (90/58 - 132/68)  BP(mean): 70 (07 Jul 2020 08:00) (69 - 94)  RR: 18 (07 Jul 2020 15:30) (18 - 33)  SpO2: 99% (07 Jul 2020 15:30) (94% - 100%)  CAPILLARY BLOOD GLUCOSE      07-06 @ 07:01  -  07-07 @ 07:00  --------------------------------------------------------  IN: 2195 mL / OUT: 1125 mL / NET: 1070 mL    07-07 @ 07:01  -  07-07 @ 17:06  --------------------------------------------------------  IN: 400 mL / OUT: 350 mL / NET: 50 mL          Patient feeling better No CP, No SOB, No N/V +4/10 pain shoulder and back  HEENT: PEARLA  Neck: Supple  Cardio: S1 S2 No Murmur  Pulm: CTA No Rales or Ronchi  Abd: Soft NT ND BS+ no  Suprapubic tenderness   Back  -  spine tenderness diffuse   Rectal - refused  Ext: No DCT  Skin: No Rash  Neuro: awake pleasant short term loss     Urinary Sepsis - pan cultures and Rocephin   Shoulder pain - Xray   Urinary Retention - Crandall flomax  Afib - cardio  Failure to thrive - continue supportive care   Chronic Back Pain -   PRN morphine increase long acting Oxycodone observe for SE,    Osteoporosis with compression fracture - pain control   MOLST form competed last admission (3 weeks ago)  DNR DNI, spoke to patient again she agrees     son has not returned call

## 2020-07-07 NOTE — PROGRESS NOTE ADULT - ASSESSMENT
Assessment  Atypical chest pain, no acute ECG changes, trop neg  Prior aflutter in SR on meds  Mild AS normal EF no evidence of CHF  dementia      Rec  cont dig/cardizem  add low dose asa  no further cardiac testing needed, pls recall as needed  may dc tele

## 2020-07-07 NOTE — PROGRESS NOTE ADULT - SUBJECTIVE AND OBJECTIVE BOX
[facility]                                   [address] [place] [address]                                                      PHONE: [phone]                                                         FAX: [phone]  [email or web address]  ---------------------------------------------------------------------------------------------------------------------------------    Overnight events/patient complaints: c/o ear pain only, no chest pain or dyspnea.  tele revealed SR no aflutter      dust (Unknown)  No Known Drug Allergies  provide [facility] soft diet (Unknown)    MEDICATIONS  (STANDING):  budesonide 160 MICROgram(s)/formoterol 4.5 MICROgram(s) Inhaler 2 Puff(s) Inhalation two times a day  cefTRIAXone   IVPB 1000 milliGRAM(s) IV Intermittent every 24 hours  dextrose 5% + sodium chloride 0.9%. 1000 milliLiter(s) (75 mL/Hr) IV Continuous <Continuous>  dextrose 5%. 1000 milliLiter(s) (50 mL/Hr) IV Continuous <Continuous>  dextrose 50% Injectable 12.5 Gram(s) IV Push once  dextrose 50% Injectable 25 Gram(s) IV Push once  dextrose 50% Injectable 25 Gram(s) IV Push once  digoxin  Injectable 0.25 milliGRAM(s) IV Push daily  diltiazem    Tablet 60 milliGRAM(s) Oral every 8 hours  oxyCODONE  ER Tablet 10 milliGRAM(s) Oral every 12 hours  pantoprazole  Injectable 40 milliGRAM(s) IV Push two times a day  polyethylene glycol 3350 17 Gram(s) Oral at bedtime  predniSONE   Tablet 30 milliGRAM(s) Oral daily  senna 2 Tablet(s) Oral at bedtime  tamsulosin 0.4 milliGRAM(s) Oral at bedtime    MEDICATIONS  (PRN):  acetaminophen   Tablet .. 650 milliGRAM(s) Oral every 4 hours PRN Mild Pain (1 - 3)  dextrose 40% Gel 15 Gram(s) Oral once PRN Blood Glucose LESS THAN 70 milliGRAM(s)/deciliter  glucagon  Injectable 1 milliGRAM(s) IntraMuscular once PRN Glucose LESS THAN 70 milligrams/deciliter  morphine  - Injectable 2 milliGRAM(s) IV Push every 4 hours PRN Moderate Pain (4 - 6)      Vital Signs Last 24 Hrs  T(C): 36.4 (07 Jul 2020 08:00), Max: 37.3 (06 Jul 2020 12:00)  T(F): 97.5 (07 Jul 2020 08:00), Max: 99.1 (06 Jul 2020 12:00)  HR: 96 (07 Jul 2020 08:00) (78 - 133)  BP: 96/57 (07 Jul 2020 08:00) (93/56 - 157/82)  BP(mean): 70 (07 Jul 2020 08:00) (69 - 94)  RR: 20 (07 Jul 2020 08:00) (19 - 33)  SpO2: 99% (07 Jul 2020 08:00) (92% - 100%)  Daily     Daily   ICU Vital Signs Last 24 Hrs  SARA TRIANA  I&O's Detail    06 Jul 2020 07:01  -  07 Jul 2020 07:00  --------------------------------------------------------  IN:    dextrose 5% + sodium chloride 0.9%.: 1725 mL    Oral Fluid: 420 mL    Solution: 50 mL  Total IN: 2195 mL    OUT:    Indwelling Catheter - Urethral: 525 mL    Voided: 600 mL  Total OUT: 1125 mL    Total NET: 1070 mL        I&O's Summary    06 Jul 2020 07:01  -  07 Jul 2020 07:00  --------------------------------------------------------  IN: 2195 mL / OUT: 1125 mL / NET: 1070 mL      Drug Dosing Weight  SARA REDA      PHYSICAL EXAM:  General: Appears well developed, well nourished alert and cooperative.  HEENT: Head; normocephalic, atraumatic.  Eyes: Pupils reactive, cornea wnl.  Neck: Supple, no nodes adenopathy, no NVD or carotid bruit or thyromegaly.  CARDIOVASCULAR: Normal S1 and S2, soft ROXANA  LUNGS: No rales, rhonchi or wheeze. Normal breath sounds bilaterally.  ABDOMEN: Soft, nontender without mass or organomegaly. bowel sounds normoactive.  EXTREMITIES: No clubbing, cyanosis or edema. Distal pulses wnl.   SKIN: warm and dry with normal turgor.  NEURO: Alert/oriented x 3/normal motor exam. No pathologic reflexes.    PSYCH: normal affect.        LABS:                        9.5    10.85 )-----------( 98       ( 07 Jul 2020 07:51 )             29.6     07-07    135  |  100  |  12.0  ----------------------------<  162<H>  3.5   |  24.0  |  0.56    Ca    8.3<L>      07 Jul 2020 07:51    TPro  4.7<L>  /  Alb  2.9<L>  /  TBili  0.5  /  DBili  x   /  AST  25  /  ALT  16  /  AlkPhos  78  07-07    SARA TRIANA            RADIOLOGY & ADDITIONAL STUDIES:    INTERPRETATION OF TELEMETRY (personally reviewed):    ECG:< from: 12 Lead ECG (07.04.20 @ 16:43) >  Diagnosis Line Sinus tachycardiawith Premature atrial complexes  Leftward axis        Confirmed by JERONIMO DAVIS ([id number]) on 7/5/2020 11:30:04 AM    < end of copied text >      ECHO:< from: TTE Echo Complete w/Doppler (11.18.19 @ 08:09) >  Summary:   1. Left ventricular ejection fraction, by visual estimation, is 50 to   55%.   2. Mildly decreased global left ventricular systolic function.   3. Mildly increased LV wall thickness.   4. Spectral Doppler shows impaired relaxation pattern of left   ventricular myocardial filling (Grade I diastolic dysfunction).   5. Mild thickening of the anterior and posterior mitral valve leaflets.   6. Moderate mitral valve regurgitation.   7. Sclerotic aortic valve with normal opening.   8. Peak transaortic gradient equals 7.2 mmHg, mean transaortic gradient   equals 3.7 mmHg, the calculated aortic valve area equals 2.06 cm² by the   continuity equation consistent with mild aortic stenosis.    MD Raymon Electronically signed on 11/18/2019 at 8:06:53 PM              *** Final ***                  < end of copied text >

## 2020-07-07 NOTE — PROGRESS NOTE ADULT - SUBJECTIVE AND OBJECTIVE BOX
HPI:  An 89 yo Female patient with PMHx significant for DVT (11/2019) not AC, intermittent asthma (never intubated/no icu stays), polymyalgia rheumatica (chronic prednisone), dementia, osteoporosis with multiple vertebral compression, presents today BIBEMS due to chest pain and ZUNILDA.   Patient lethargic, unable to obtained detail history, most of information obtained from chart review. As per EMS documentation patient reported chest pain and SOB after having a family argument. Of note patient was dc today from rehab.   She was recently admitted at [facility] on June 2020, due to GI bleed found to have multiple duodenal ulcers.   At the ED was found to be hyperglycemic [phone] 8 units of regular insulin; blood glucose dropped to 66, dextrose was given.  Also noted to have a fever Tmax 102 rectally and a + UA.     Attempted to speak to her Partha, but did not answer his phone (04 Jul 2020 22:50)      PAST MEDICAL & SURGICAL HISTORY:  Chronic back pain  CHF (congestive heart failure)  Atrial fibrillation  Polymyalgia rheumatica  Diverticulosis  Asthma  S/P knee replacement, left  S/P hysterectomy: 1982  S/P appendectomy: 1962      ANTIMICROBIAL:  cefTRIAXone   IVPB 1000 milliGRAM(s) IV Intermittent every 24 hours        PULMONARY:  budesonide 160 MICROgram(s)/formoterol 4.5 MICROgram(s) Inhaler 2 Puff(s) Inhalation two times a day    Cardio:   dig  Cardizem     GATROINTESTINAL:  pantoprazole  Injectable 40 milliGRAM(s) IV Push two times a day     MEDS:    ENDO/METABOLIC:  dextrose 40% Gel 15 Gram(s) Oral once PRN  dextrose 50% Injectable 12.5 Gram(s) IV Push once  dextrose 50% Injectable 25 Gram(s) IV Push once  dextrose 50% Injectable 25 Gram(s) IV Push once  glucagon  Injectable 1 milliGRAM(s) IntraMuscular once PRN    IV FLUID/NUTRITION:  dextrose 5%. 1000 milliLiter(s) IV Continuous <Continuous>  multiple electrolytes Injection Type 1 1000 milliLiter(s) IV Continuous <Continuous>            Allergies    dust (Unknown)  No Known Drug Allergies    Intolerances    provide [facility] soft diet (Unknown)      SOCIAL HISTORY:    FAMILY HISTORY:  Family history of stroke      ICU Vital Signs Last 24 Hrs  T(C): 36.4 (07 Jul 2020 08:00), Max: 37.3 (06 Jul 2020 12:00)  T(F): 97.5 (07 Jul 2020 08:00), Max: 99.1 (06 Jul 2020 12:00)  HR: 96 (07 Jul 2020 08:00) (78 - 133)  BP: 96/57 (07 Jul 2020 08:00) (93/56 - 157/82)  BP(mean): 70 (07 Jul 2020 08:00) (69 - 94)  ABP: --  ABP(mean): --  RR: 20 (07 Jul 2020 08:00) (19 - 33)  SpO2: 99% (07 Jul 2020 08:00) (92% - 100%)                  REVIEW OF SYSTEMS      General: moaning at times 	    Respiratory: no complaints   	  Cardiovascular:	no complaints     Gastrointestinal:	no complaints     Genitourinary:	no complaints     Musculoskeletal:	 c/o back pain    Neurological:	AAO x 3          PHYSICAL EXAM:    GENERAL: NAD, well-groomed, well-developed  HEAD:  Atraumatic, Normocephalic  NERVOUS SYSTEM:  moaning   CHEST/LUNG: Clear to percussion bilaterally; No rales, rhonchi, wheezing, or rubs  HEART: regular - rate controlled   ABDOMEN: Soft, Nontender, Nondistended; Bowel sounds present  EXTREMITIES:  2+ Peripheral Pulses, cold to touch         LABS:                                              9.5    10.85 )-----------( 98       ( 07 Jul 2020 07:51 )             29.6         07-07    135  |  100  |  12.0  ----------------------------<  162<H>  3.5   |  24.0  |  0.56    Ca    8.3<L>      07 Jul 2020 07:51    TPro  4.7<L>  /  Alb  2.9<L>  /  TBili  0.5  /  DBili  x   /  AST  25  /  ALT  16  /  AlkPhos  78  07-07            urine culture :   positive with E coli   Blood cul x 2 negative x 48 hrs       RADIOLOGY & ADDITIONAL STUDIES:

## 2020-07-08 LAB
ALBUMIN SERPL ELPH-MCNC: 3.2 G/DL — LOW (ref 3.3–5.2)
ALP SERPL-CCNC: 81 U/L — SIGNIFICANT CHANGE UP (ref 40–120)
ALT FLD-CCNC: 29 U/L — SIGNIFICANT CHANGE UP
ANION GAP SERPL CALC-SCNC: 14 MMOL/L — SIGNIFICANT CHANGE UP (ref 5–17)
AST SERPL-CCNC: 43 U/L — HIGH
BASOPHILS # BLD AUTO: 0.01 K/UL — SIGNIFICANT CHANGE UP (ref 0–0.2)
BASOPHILS NFR BLD AUTO: 0.1 % — SIGNIFICANT CHANGE UP (ref 0–2)
BILIRUB SERPL-MCNC: 0.4 MG/DL — SIGNIFICANT CHANGE UP (ref 0.4–2)
BUN SERPL-MCNC: 18 MG/DL — SIGNIFICANT CHANGE UP (ref 8–20)
CALCIUM SERPL-MCNC: 9 MG/DL — SIGNIFICANT CHANGE UP (ref 8.6–10.2)
CHLORIDE SERPL-SCNC: 97 MMOL/L — LOW (ref 98–107)
CO2 SERPL-SCNC: 23 MMOL/L — SIGNIFICANT CHANGE UP (ref 22–29)
CREAT SERPL-MCNC: 0.7 MG/DL — SIGNIFICANT CHANGE UP (ref 0.5–1.3)
EOSINOPHIL # BLD AUTO: 0 K/UL — SIGNIFICANT CHANGE UP (ref 0–0.5)
EOSINOPHIL NFR BLD AUTO: 0 % — SIGNIFICANT CHANGE UP (ref 0–6)
GLUCOSE BLDC GLUCOMTR-MCNC: 168 MG/DL — HIGH (ref 70–99)
GLUCOSE BLDC GLUCOMTR-MCNC: 191 MG/DL — HIGH (ref 70–99)
GLUCOSE SERPL-MCNC: 150 MG/DL — HIGH (ref 70–99)
HCT VFR BLD CALC: 29.1 % — LOW (ref 34.5–45)
HGB BLD-MCNC: 9.6 G/DL — LOW (ref 11.5–15.5)
IMM GRANULOCYTES NFR BLD AUTO: 0.7 % — SIGNIFICANT CHANGE UP (ref 0–1.5)
LYMPHOCYTES # BLD AUTO: 0.18 K/UL — LOW (ref 1–3.3)
LYMPHOCYTES # BLD AUTO: 1.8 % — LOW (ref 13–44)
MCHC RBC-ENTMCNC: 29.1 PG — SIGNIFICANT CHANGE UP (ref 27–34)
MCHC RBC-ENTMCNC: 33 GM/DL — SIGNIFICANT CHANGE UP (ref 32–36)
MCV RBC AUTO: 88.2 FL — SIGNIFICANT CHANGE UP (ref 80–100)
MONOCYTES # BLD AUTO: 0.66 K/UL — SIGNIFICANT CHANGE UP (ref 0–0.9)
MONOCYTES NFR BLD AUTO: 6.5 % — SIGNIFICANT CHANGE UP (ref 2–14)
NEUTROPHILS # BLD AUTO: 9.23 K/UL — HIGH (ref 1.8–7.4)
NEUTROPHILS NFR BLD AUTO: 90.9 % — HIGH (ref 43–77)
PLATELET # BLD AUTO: 190 K/UL — SIGNIFICANT CHANGE UP (ref 150–400)
POTASSIUM SERPL-MCNC: 3.7 MMOL/L — SIGNIFICANT CHANGE UP (ref 3.5–5.3)
POTASSIUM SERPL-SCNC: 3.7 MMOL/L — SIGNIFICANT CHANGE UP (ref 3.5–5.3)
PROT SERPL-MCNC: 5.4 G/DL — LOW (ref 6.6–8.7)
RBC # BLD: 3.3 M/UL — LOW (ref 3.8–5.2)
RBC # FLD: 15.5 % — HIGH (ref 10.3–14.5)
SODIUM SERPL-SCNC: 134 MMOL/L — LOW (ref 135–145)
WBC # BLD: 10.15 K/UL — SIGNIFICANT CHANGE UP (ref 3.8–10.5)
WBC # FLD AUTO: 10.15 K/UL — SIGNIFICANT CHANGE UP (ref 3.8–10.5)

## 2020-07-08 RX ORDER — MORPHINE SULFATE 50 MG/1
0.25 CAPSULE, EXTENDED RELEASE ORAL
Qty: 0 | Refills: 0 | DISCHARGE
Start: 2020-07-08

## 2020-07-08 RX ORDER — ONDANSETRON 8 MG/1
4 TABLET, FILM COATED ORAL ONCE
Refills: 0 | Status: COMPLETED | OUTPATIENT
Start: 2020-07-08 | End: 2020-07-08

## 2020-07-08 RX ORDER — HALOPERIDOL DECANOATE 100 MG/ML
1 INJECTION INTRAMUSCULAR ONCE
Refills: 0 | Status: COMPLETED | OUTPATIENT
Start: 2020-07-08 | End: 2020-07-08

## 2020-07-08 RX ORDER — ALPRAZOLAM 0.25 MG
0.25 TABLET ORAL ONCE
Refills: 0 | Status: DISCONTINUED | OUTPATIENT
Start: 2020-07-08 | End: 2020-07-10

## 2020-07-08 RX ADMIN — ONDANSETRON 4 MILLIGRAM(S): 8 TABLET, FILM COATED ORAL at 06:42

## 2020-07-08 RX ADMIN — Medication 60 MILLIGRAM(S): at 21:54

## 2020-07-08 RX ADMIN — BUDESONIDE AND FORMOTEROL FUMARATE DIHYDRATE 2 PUFF(S): 160; 4.5 AEROSOL RESPIRATORY (INHALATION) at 20:14

## 2020-07-08 RX ADMIN — Medication 60 MILLIGRAM(S): at 05:24

## 2020-07-08 RX ADMIN — CEFTRIAXONE 100 MILLIGRAM(S): 500 INJECTION, POWDER, FOR SOLUTION INTRAMUSCULAR; INTRAVENOUS at 17:30

## 2020-07-08 RX ADMIN — PANTOPRAZOLE SODIUM 40 MILLIGRAM(S): 20 TABLET, DELAYED RELEASE ORAL at 05:24

## 2020-07-08 RX ADMIN — Medication 0.25 MILLIGRAM(S): at 11:37

## 2020-07-08 RX ADMIN — Medication 60 MILLIGRAM(S): at 13:25

## 2020-07-08 RX ADMIN — BUDESONIDE AND FORMOTEROL FUMARATE DIHYDRATE 2 PUFF(S): 160; 4.5 AEROSOL RESPIRATORY (INHALATION) at 09:06

## 2020-07-08 RX ADMIN — HALOPERIDOL DECANOATE 1 MILLIGRAM(S): 100 INJECTION INTRAMUSCULAR at 22:14

## 2020-07-08 RX ADMIN — PANTOPRAZOLE SODIUM 40 MILLIGRAM(S): 20 TABLET, DELAYED RELEASE ORAL at 17:30

## 2020-07-08 NOTE — PHYSICAL THERAPY INITIAL EVALUATION ADULT - ADDITIONAL COMMENTS
Pt. is a poor historian; states she was receiving PT at the last facility;  required assist for bed mobility, transfers and short distance ambulation with rolling walker.

## 2020-07-08 NOTE — PROGRESS NOTE ADULT - SUBJECTIVE AND OBJECTIVE BOX
HPI:  An 89 yo Female patient with PMHx significant for DVT (11/2019) not AC, intermittent asthma (never intubated/no icu stays), polymyalgia rheumatica (chronic prednisone), dementia, osteoporosis with multiple vertebral compression, presents today BIBEMS due to chest pain and ZUNILDA.   Patient lethargic, unable to obtained detail history, most of information obtained from chart review. As per EMS documentation patient reported chest pain and SOB after having a family argument. Of note patient was dc today from rehab.   She was recently admitted at [facility] on June 2020, due to GI bleed found to have multiple duodenal ulcers.   At the ED was found to be hyperglycemic ([phone] 8 units of regular insulin; blood glucose dropped to 66, dextrose was given.  Also noted to have a fever Tmax 102 rectally and a + UA.     Attempted to speak to her Partha, but did not answer his phone (04 Jul 2020 22:50)     Allergies    dust (Unknown)  No Known Drug Allergies    Intolerances    provide [facility] soft diet (Unknown)    Chronic back pain  CHF (congestive heart failure)  Atrial fibrillation  Polymyalgia rheumatica  Diverticulosis  Asthma    MEDICATIONS  (STANDING):  ALPRAZolam 0.25 milliGRAM(s) Oral once  budesonide 160 MICROgram(s)/formoterol 4.5 MICROgram(s) Inhaler 2 Puff(s) Inhalation two times a day  cefTRIAXone   IVPB 1000 milliGRAM(s) IV Intermittent every 24 hours  digoxin  Injectable 0.25 milliGRAM(s) IV Push daily  diltiazem    Tablet 60 milliGRAM(s) Oral every 8 hours  oxyCODONE  ER Tablet 10 milliGRAM(s) Oral every 12 hours  pantoprazole  Injectable 40 milliGRAM(s) IV Push two times a day  polyethylene glycol 3350 17 Gram(s) Oral at bedtime  predniSONE   Tablet 30 milliGRAM(s) Oral daily  senna 2 Tablet(s) Oral at bedtime  tamsulosin 0.4 milliGRAM(s) Oral at bedtime    MEDICATIONS  (PRN):  acetaminophen   Tablet .. 650 milliGRAM(s) Oral every 4 hours PRN Mild Pain (1 - 3)  morphine  - Injectable 2 milliGRAM(s) IV Push every 4 hours PRN Severe Pain (7 - 10)  morphine Concentrate 5 milliGRAM(s) Oral every 4 hours PRN Moderate Pain (4 - 6)                           9.6    10.15 )-----------( 190      ( 08 Jul 2020 06:26 )             29.1     07-08    134<L>  |  97<L>  |  18.0  ----------------------------<  150<H>  3.7   |  23.0  |  0.70    Ca    9.0      08 Jul 2020 06:26    TPro  5.4<L>  /  Alb  3.2<L>  /  TBili  0.4  /  DBili  x   /  AST  43<H>  /  ALT  29  /  AlkPhos  81  07-08      ;  Vital Signs Last 24 Hrs  T(C): 36.8 (08 Jul 2020 16:00), Max: 36.9 (08 Jul 2020 05:15)  T(F): 98.2 (08 Jul 2020 16:00), Max: 98.4 (08 Jul 2020 05:15)  HR: 99 (08 Jul 2020 16:00) (78 - 104)  BP: 162/79 (08 Jul 2020 16:00) (113/70 - 162/79)  BP(mean): 90 (08 Jul 2020 12:00) (90 - 90)  RR: 19 (08 Jul 2020 16:00) (18 - 20)  SpO2: 96% (08 Jul 2020 16:00) (95% - 100%)  CAPILLARY BLOOD GLUCOSE      07-07 @ 07:01  -  07-08 @ 07:00  --------------------------------------------------------  IN: 600 mL / OUT: 350 mL / NET: 250 mL    07-08 @ 07:01  -  07-08 @ 18:12  --------------------------------------------------------  IN: 250 mL / OUT: 100 mL / NET: 150 mL      Patient feeling better No CP, No SOB, No N/V +2/10 pain shoulder and back  HEENT: PEARLA  Neck: Supple  Cardio: S1 S2 No Murmur  Pulm: CTA No Rales or Ronchi  Abd: Soft NT ND BS+ no  Suprapubic tenderness   Back  -  spine tenderness diffuse   Rectal - refused  Ext: No DCT  Skin: No Rash  Neuro: awake pleasant short term loss     Urinary Sepsis - pan cultures and Rocephin   Shoulder pain - Xray   Urinary Retention - Crandall flomax  Afib - cardio  Failure to thrive - continue supportive care   Chronic Back Pain -   PRN morphine increase long acting Oxycodone observe for SE,    Osteoporosis with compression fracture - pain control   MOLST form competed last admission (3 weeks ago)  DNR DNI, spoke to patient again she agrees HPI:  An 89 yo Female patient with PMHx significant for DVT (11/2019) not AC, intermittent asthma (never intubated/no icu stays), polymyalgia rheumatica (chronic prednisone), dementia, osteoporosis with multiple vertebral compression, presents today BIBEMS due to chest pain and ZUNILDA.   Patient lethargic, unable to obtained detail history, most of information obtained from chart review. As per EMS documentation patient reported chest pain and SOB after having a family argument. Of note patient was dc today from rehab.   She was recently admitted at [facility] on June 2020, due to GI bleed found to have multiple duodenal ulcers.   At the ED was found to be hyperglycemic ([phone] 8 units of regular insulin; blood glucose dropped to 66, dextrose was given.  Also noted to have a fever Tmax 102 rectally and a + UA.     Attempted to speak to her Partha, but did not answer his phone (04 Jul 2020 22:50)     Allergies    dust (Unknown)  No Known Drug Allergies    Intolerances    provide [facility] soft diet (Unknown)    Chronic back pain  CHF (congestive heart failure)  Atrial fibrillation  Polymyalgia rheumatica  Diverticulosis  Asthma    MEDICATIONS  (STANDING):  ALPRAZolam 0.25 milliGRAM(s) Oral once  budesonide 160 MICROgram(s)/formoterol 4.5 MICROgram(s) Inhaler 2 Puff(s) Inhalation two times a day  cefTRIAXone   IVPB 1000 milliGRAM(s) IV Intermittent every 24 hours  digoxin  Injectable 0.25 milliGRAM(s) IV Push daily  diltiazem    Tablet 60 milliGRAM(s) Oral every 8 hours  oxyCODONE  ER Tablet 10 milliGRAM(s) Oral every 12 hours  pantoprazole  Injectable 40 milliGRAM(s) IV Push two times a day  polyethylene glycol 3350 17 Gram(s) Oral at bedtime  predniSONE   Tablet 30 milliGRAM(s) Oral daily  senna 2 Tablet(s) Oral at bedtime  tamsulosin 0.4 milliGRAM(s) Oral at bedtime    MEDICATIONS  (PRN):  acetaminophen   Tablet .. 650 milliGRAM(s) Oral every 4 hours PRN Mild Pain (1 - 3)  morphine  - Injectable 2 milliGRAM(s) IV Push every 4 hours PRN Severe Pain (7 - 10)  morphine Concentrate 5 milliGRAM(s) Oral every 4 hours PRN Moderate Pain (4 - 6)                           9.6    10.15 )-----------( 190      ( 08 Jul 2020 06:26 )             29.1     07-08    134<L>  |  97<L>  |  18.0  ----------------------------<  150<H>  3.7   |  23.0  |  0.70    Ca    9.0      08 Jul 2020 06:26    TPro  5.4<L>  /  Alb  3.2<L>  /  TBili  0.4  /  DBili  x   /  AST  43<H>  /  ALT  29  /  AlkPhos  81  07-08      ;  Vital Signs Last 24 Hrs  T(C): 36.8 (08 Jul 2020 16:00), Max: 36.9 (08 Jul 2020 05:15)  T(F): 98.2 (08 Jul 2020 16:00), Max: 98.4 (08 Jul 2020 05:15)  HR: 99 (08 Jul 2020 16:00) (78 - 104)  BP: 162/79 (08 Jul 2020 16:00) (113/70 - 162/79)  BP(mean): 90 (08 Jul 2020 12:00) (90 - 90)  RR: 19 (08 Jul 2020 16:00) (18 - 20)  SpO2: 96% (08 Jul 2020 16:00) (95% - 100%)  CAPILLARY BLOOD GLUCOSE      07-07 @ 07:01  -  07-08 @ 07:00  --------------------------------------------------------  IN: 600 mL / OUT: 350 mL / NET: 250 mL    07-08 @ 07:01  -  07-08 @ 18:12  --------------------------------------------------------  IN: 250 mL / OUT: 100 mL / NET: 150 mL      Patient feeling better No CP, No SOB, No N/V +2/10 pain shoulder and back  HEENT: PEARLA  Neck: Supple  Cardio: S1 S2 No Murmur  Pulm: CTA No Rales or Ronchi  Abd: Soft NT ND BS+ no  Suprapubic tenderness   Back  -  spine tenderness diffuse   Rectal - refused  Ext: No DCT  Skin: No Rash  Neuro: awake pleasant short term loss     Urinary Sepsis - pan cultures and Rocephin   Shoulder pain - Xray - normal  Urinary Retention - Crandall flomax  Afib - cardio  Failure to thrive - continue supportive care   Chronic Back Pain -   PRN morphine increase long acting Oxycodone observe for SE,    Osteoporosis with compression fracture - pain control   MOLST form competed last admission (3 weeks ago)  DNR DNI, spoke to patient again she agrees

## 2020-07-08 NOTE — PROGRESS NOTE ADULT - SUBJECTIVE AND OBJECTIVE BOX
HPI:  An 89 yo Female patient with PMHx significant for DVT (11/2019) not AC, intermittent asthma (never intubated/no icu stays), polymyalgia rheumatica (chronic prednisone), dementia, osteoporosis with multiple vertebral compression, presents today BIBEMS due to chest pain and ZUNILDA.   Patient lethargic, unable to obtained detail history, most of information obtained from chart review. As per EMS documentation patient reported chest pain and SOB after having a family argument. Of note patient was dc today from rehab.   She was recently admitted at [facility] on June 2020, due to GI bleed found to have multiple duodenal ulcers.   At the ED was found to be hyperglycemic [phone] 8 units of regular insulin; blood glucose dropped to 66, dextrose was given.  Also noted to have a fever Tmax 102 rectally and a + UA.     Attempted to speak to her Partha, but did not answer his phone (04 Jul 2020 22:50)      PAST MEDICAL & SURGICAL HISTORY:  Chronic back pain  CHF (congestive heart failure)  Atrial fibrillation  Polymyalgia rheumatica  Diverticulosis  Asthma  S/P knee replacement, left  S/P hysterectomy: 1982  S/P appendectomy: 1962      ANTIMICROBIAL:  cefTRIAXone   IVPB 1000 milliGRAM(s) IV Intermittent every 24 hours        PULMONARY:  budesonide 160 MICROgram(s)/formoterol 4.5 MICROgram(s) Inhaler 2 Puff(s) Inhalation two times a day    Cardio:   dig  Cardizem     GATROINTESTINAL:  pantoprazole  Injectable 40 milliGRAM(s) IV Push two times a day     MEDS:    ENDO/METABOLIC:  dextrose 40% Gel 15 Gram(s) Oral once PRN  dextrose 50% Injectable 12.5 Gram(s) IV Push once  dextrose 50% Injectable 25 Gram(s) IV Push once  dextrose 50% Injectable 25 Gram(s) IV Push once  glucagon  Injectable 1 milliGRAM(s) IntraMuscular once PRN    IV FLUID/NUTRITION:  dextrose 5%. 1000 milliLiter(s) IV Continuous <Continuous>  multiple electrolytes Injection Type 1 1000 milliLiter(s) IV Continuous <Continuous>            Allergies    dust (Unknown)  No Known Drug Allergies    Intolerances    provide [facility] soft diet (Unknown)      SOCIAL HISTORY:    FAMILY HISTORY:  Family history of stroke                        REVIEW OF SYSTEMS      General: moaning at times 	    Respiratory: no complaints   	  Cardiovascular:	no complaints     Gastrointestinal:	no complaints     Genitourinary:	no complaints     Musculoskeletal:	 c/o back pain    Neurological:	AAO x 3          PHYSICAL EXAM:    GENERAL: NAD, well-groomed, well-developed  HEAD:  Atraumatic, Normocephalic  NERVOUS SYSTEM:  moaning   CHEST/LUNG: Clear to percussion bilaterally; No rales, rhonchi, wheezing, or rubs  HEART: regular - rate controlled   ABDOMEN: Soft, Nontender, Nondistended; Bowel sounds present  EXTREMITIES:  2+ Peripheral Pulses, cold to touch         LABS:                                              9.5    10.85 )-----------( 98       ( 07 Jul 2020 07:51 )             29.6         07-07    135  |  100  |  12.0  ----------------------------<  162<H>  3.5   |  24.0  |  0.56    Ca    8.3<L>      07 Jul 2020 07:51    TPro  4.7<L>  /  Alb  2.9<L>  /  TBili  0.5  /  DBili  x   /  AST  25  /  ALT  16  /  AlkPhos  78  07-07            urine culture :   positive with E coli   Blood cul x 2 negative x 48 hrs       RADIOLOGY & ADDITIONAL STUDIES:

## 2020-07-09 RX ADMIN — BUDESONIDE AND FORMOTEROL FUMARATE DIHYDRATE 2 PUFF(S): 160; 4.5 AEROSOL RESPIRATORY (INHALATION) at 21:07

## 2020-07-09 RX ADMIN — Medication 650 MILLIGRAM(S): at 16:37

## 2020-07-09 RX ADMIN — TAMSULOSIN HYDROCHLORIDE 0.4 MILLIGRAM(S): 0.4 CAPSULE ORAL at 22:36

## 2020-07-09 RX ADMIN — Medication 60 MILLIGRAM(S): at 16:36

## 2020-07-09 RX ADMIN — Medication 0.25 MILLIGRAM(S): at 16:58

## 2020-07-09 RX ADMIN — Medication 60 MILLIGRAM(S): at 05:56

## 2020-07-09 RX ADMIN — PANTOPRAZOLE SODIUM 40 MILLIGRAM(S): 20 TABLET, DELAYED RELEASE ORAL at 17:04

## 2020-07-09 RX ADMIN — PANTOPRAZOLE SODIUM 40 MILLIGRAM(S): 20 TABLET, DELAYED RELEASE ORAL at 05:56

## 2020-07-09 RX ADMIN — CEFTRIAXONE 100 MILLIGRAM(S): 500 INJECTION, POWDER, FOR SOLUTION INTRAMUSCULAR; INTRAVENOUS at 16:56

## 2020-07-09 RX ADMIN — Medication 60 MILLIGRAM(S): at 22:36

## 2020-07-09 RX ADMIN — BUDESONIDE AND FORMOTEROL FUMARATE DIHYDRATE 2 PUFF(S): 160; 4.5 AEROSOL RESPIRATORY (INHALATION) at 08:44

## 2020-07-09 NOTE — PROGRESS NOTE ADULT - SUBJECTIVE AND OBJECTIVE BOX
HPI:  An 87 yo Female patient with PMHx significant for DVT (11/2019) not AC, intermittent asthma (never intubated/no icu stays), polymyalgia rheumatica (chronic prednisone), dementia, osteoporosis with multiple vertebral compression, presents today BIBEMS due to chest pain and ZUNILDA.   Patient lethargic, unable to obtained detail history, most of information obtained from chart review. As per EMS documentation patient reported chest pain and SOB after having a family argument. Of note patient was dc today from rehab.   She was recently admitted at [facility] on June 2020, due to GI bleed found to have multiple duodenal ulcers.   At the ED was found to be hyperglycemic [phone] 8 units of regular insulin; blood glucose dropped to 66, dextrose was given.  Also noted to have a fever Tmax 102 rectally and a + UA.     Attempted to speak to her Partha, but did not answer his phone (04 Jul 2020 22:50)      PAST MEDICAL & SURGICAL HISTORY:  Chronic back pain  CHF (congestive heart failure)  Atrial fibrillation  Polymyalgia rheumatica  Diverticulosis  Asthma  S/P knee replacement, left  S/P hysterectomy: 1982  S/P appendectomy: 1962      ANTIMICROBIAL:  cefTRIAXone   IVPB 1000 milliGRAM(s) IV Intermittent every 24 hours        PULMONARY:  budesonide 160 MICROgram(s)/formoterol 4.5 MICROgram(s) Inhaler 2 Puff(s) Inhalation two times a day    Cardio:   dig  Cardizem     GATROINTESTINAL:  pantoprazole  Injectable 40 milliGRAM(s) IV Push two times a day     MEDS:    ENDO/METABOLIC:  dextrose 40% Gel 15 Gram(s) Oral once PRN  dextrose 50% Injectable 12.5 Gram(s) IV Push once  dextrose 50% Injectable 25 Gram(s) IV Push once  dextrose 50% Injectable 25 Gram(s) IV Push once  glucagon  Injectable 1 milliGRAM(s) IntraMuscular once PRN    IV FLUID/NUTRITION:  dextrose 5%. 1000 milliLiter(s) IV Continuous <Continuous>  multiple electrolytes Injection Type 1 1000 milliLiter(s) IV Continuous <Continuous>            Allergies    dust (Unknown)  No Known Drug Allergies    Intolerances    provide [facility] soft diet (Unknown)      SOCIAL HISTORY:    FAMILY HISTORY:  Family history of stroke        Vital Signs Last 24 Hrs  T(C): 36.7 (09 Jul 2020 05:50), Max: 36.8 (08 Jul 2020 10:01)  T(F): 98.1 (09 Jul 2020 05:50), Max: 98.3 (08 Jul 2020 10:01)  HR: 72 (09 Jul 2020 08:44) (72 - 122)  BP: 143/80 (09 Jul 2020 05:50) (124/60 - 162/79)  BP(mean): 90 (08 Jul 2020 12:00) (90 - 90)  RR: 17 (09 Jul 2020 05:50) (17 - 20)  SpO2: 95% (09 Jul 2020 08:44) (91% - 97%)                REVIEW OF SYSTEMS      General: moaning at times 	    Respiratory: no complaints   	  Cardiovascular:	no complaints     Gastrointestinal:	no complaints     Genitourinary:	no complaints     Musculoskeletal:	 c/o back pain    Neurological:	AAO x 3          PHYSICAL EXAM:    GENERAL: NAD, well-groomed, well-developed  HEAD:  Atraumatic, Normocephalic  NERVOUS SYSTEM:  moaning   CHEST/LUNG: Clear to percussion bilaterally; No rales, rhonchi, wheezing, or rubs  HEART: regular - rate controlled   ABDOMEN: Soft, Nontender, Nondistended; Bowel sounds present  EXTREMITIES:  2+ Peripheral Pulses, cold to touch         LABS:                                                         9.6    10.15 )-----------( 190      ( 08 Jul 2020 06:26 )             29.1       07-08    134<L>  |  97<L>  |  18.0  ----------------------------<  150<H>  3.7   |  23.0  |  0.70    Ca    9.0      08 Jul 2020 06:26    TPro  5.4<L>  /  Alb  3.2<L>  /  TBili  0.4  /  DBili  x   /  AST  43<H>  /  ALT  29  /  AlkPhos  81  07-08              urine culture :   positive with E coli   Blood cul x 2 negative x 48 hrs       RADIOLOGY & ADDITIONAL STUDIES:

## 2020-07-09 NOTE — PROGRESS NOTE ADULT - ASSESSMENT
An 87 yo Female patient with PMHx significant for DVT (11/2019) not AC, intermittent asthma (never intubated/no icu stays), polymyalgia rheumatica (chronic prednisone), dementia, osteoporosis with multiple vertebral compression, presents today BIBEMS due to chest pain and SOB. Being admitted for chest pain, rule out ACS  At the ED was found to be hyperglycemic (636) 8 units of regular insulin; blood glucose dropped to 66, dextrose was given. Also noted to have a fever Tmax 102 rectally and a + UA          Sepsis secondary to UTI   +UA   + urine culture with Ecoli  on Rocephin Day 5/5     Patient with severe abdominal pain and distention, recently with colitis   Crandall placed for urinary retention - failed trials of void yesterday        Lethargy secondary to Iatrogenic Induced Hypoglycemia   Glucose of 636 on BMP, unlikely real  Pt was given 8 U of regular insulin by ED staff, glucose dropped to 66, pt required D50% 25 mg x2  Will avoid Insulin at this time   currently on diet         Chest Pain,    Trops -x3,   [facility] called   pt currently rate controlled       Afib RVR and Aflutter   S/p Cardizem IVP x1 in ED and lopressor x 1 last night -- helped for a short period of time but pts BP was 114/60   CHADSVASC: 4  Not on AC due to GI bleed   approx 1 month ago was admitted for GIB     Duodenal ulcers  -recent gib w/ ulcers found on EGD  on protonix bid     Hypokalemia   K: 2.9 on admission   S/p K raiders x2           Polymyalgia Rheumatica  Hold Prednisone while NPO  if bp drops consider stress dose steroids.       Mild-Moderate Intermittent Asthma  Continue Symbicort/Albuterol PRN  O2 via NC if needed PRN titrate FIO2 >90% and <96%    Failure to thrive   Patient cachectic   Nutritional consult pending       DVT Prophylaxis   Intermittent compression     Code status -- pt with MOLST   DNR/ DNI

## 2020-07-09 NOTE — CHART NOTE - NSCHARTNOTEFT_GEN_A_CORE
Source: Patient [ ]  Family [ ]   other [ x]	  An 87 yo Female patient with PMHx significant for DVT (11/2019) not AC, intermittent asthma (never intubated/no icu stays), polymyalgia rheumatica (chronic prednisone), dementia, osteoporosis with multiple vertebral compression, presents today BIBEMS due to chest pain and SOB. Being admitted for chest pain, rule out ACS  At the ED was found to be hyperglycemic (636) 8 units of regular insulin; blood glucose dropped to 66, dextrose was given. Also noted to have a fever Tmax 102 rectally and a + UA  Sepsis secondary to UTI     Current Diet: Diet, Regular (07-06-20 @ 07:59)      Patient reports [ ] nausea  [ ] vomiting [ ] diarrhea [ ] constipation  [ ]chewing problems [ ] swallowing issues  [ ] other:     PO intake:  < 50% [ x]   50-75%  [ ]   %  [ ]  other :    Source for PO intake [x ] Patient [ ] family [x ] chart [ ] staff [ ] other      Current Weight:   7/8 42.1 kg  7/4  40.8 kg    % Weight Change     Pertinent Medications: MEDICATIONS  (STANDING):  ALPRAZolam 0.25 milliGRAM(s) Oral once  budesonide 160 MICROgram(s)/formoterol 4.5 MICROgram(s) Inhaler 2 Puff(s) Inhalation two times a day  cefTRIAXone   IVPB 1000 milliGRAM(s) IV Intermittent every 24 hours  digoxin  Injectable 0.25 milliGRAM(s) IV Push daily  diltiazem    Tablet 60 milliGRAM(s) Oral every 8 hours  oxyCODONE  ER Tablet 10 milliGRAM(s) Oral every 12 hours  pantoprazole  Injectable 40 milliGRAM(s) IV Push two times a day  polyethylene glycol 3350 17 Gram(s) Oral at bedtime  senna 2 Tablet(s) Oral at bedtime  tamsulosin 0.4 milliGRAM(s) Oral at bedtime    MEDICATIONS  (PRN):  acetaminophen   Tablet .. 650 milliGRAM(s) Oral every 4 hours PRN Mild Pain (1 - 3)  morphine  - Injectable 2 milliGRAM(s) IV Push every 4 hours PRN Severe Pain (7 - 10)  morphine Concentrate 5 milliGRAM(s) Oral every 4 hours PRN Moderate Pain (4 - 6)    Pertinent Labs: CBC Full  -  ( 08 Jul 2020 06:26 )  WBC Count : 10.15 K/uL  RBC Count : 3.30 M/uL  Hemoglobin : 9.6 g/dL  Hematocrit : 29.1 %  Platelet Count - Automated : 190 K/uL  Mean Cell Volume : 88.2 fl  Mean Cell Hemoglobin : 29.1 pg  Mean Cell Hemoglobin Concentration : 33.0 gm/dL  Auto Neutrophil # : 9.23 K/uL  Auto Lymphocyte # : 0.18 K/uL  Auto Monocyte # : 0.66 K/uL  Auto Eosinophil # : 0.00 K/uL  Auto Basophil # : 0.01 K/uL  Auto Neutrophil % : 90.9 %  Auto Lymphocyte % : 1.8 %  Auto Monocyte % : 6.5 %  Auto Eosinophil % : 0.0 %  Auto Basophil % : 0.1 %  07-08 Na134 mmol/L<L> Glu 150 mg/dL<H> K+ 3.7 mmol/L Cr  0.70 mg/dL BUN 18.0 mg/dL Phos n/a   Alb 3.2 g/dL<L> PAB n/a               Skin: stage I Coccyx  Edema : 1+  in both arms     Nutrition focused physical exam conducted - found signs of malnutrition [ ]absent [x ]present    Subcutaneous fat loss: [ x] Orbital fat pads region, [ ]Buccal fat region, [ ]Triceps region,  [ ]Ribs region    Muscle wasting: [ x]Temples region, [ x]Clavicle region, [ ]Shoulder region, [ ]Scapula region, [ ]Interosseous region,  [ ]thigh region, [ ]Calf region    Estimated Needs:   [ x] no change since previous assessment  [ ] recalculated:     Current Nutrition Diagnosis:  Pt presents at risk secondary to malnutrition Severe-Chronic, related to inability to meet sufficient protein energy requirements in setting of advanced age, dementia and recent GI bleed found to have multiple duodenal ulcers, as evidenced by wt loss of 8# (8.69%) over past 6 months, and physical signs of severe muscle/fat loss.    Recommendations:   1) change diet to soft with ensure TID  2) RX MVI   3) encourage PO intake    Monitoring and Evaluation:   [x ] PO intake [ x] Tolerance to diet prescription [X] Weights  [X] Follow up per protocol [X] Labs:

## 2020-07-10 RX ADMIN — Medication 0.25 MILLIGRAM(S): at 10:27

## 2020-07-10 RX ADMIN — Medication 60 MILLIGRAM(S): at 05:06

## 2020-07-10 RX ADMIN — TAMSULOSIN HYDROCHLORIDE 0.4 MILLIGRAM(S): 0.4 CAPSULE ORAL at 19:34

## 2020-07-10 RX ADMIN — BUDESONIDE AND FORMOTEROL FUMARATE DIHYDRATE 2 PUFF(S): 160; 4.5 AEROSOL RESPIRATORY (INHALATION) at 08:38

## 2020-07-10 RX ADMIN — Medication 60 MILLIGRAM(S): at 19:34

## 2020-07-10 RX ADMIN — Medication 650 MILLIGRAM(S): at 08:51

## 2020-07-10 RX ADMIN — Medication 0.25 MILLIGRAM(S): at 13:03

## 2020-07-10 RX ADMIN — BUDESONIDE AND FORMOTEROL FUMARATE DIHYDRATE 2 PUFF(S): 160; 4.5 AEROSOL RESPIRATORY (INHALATION) at 20:49

## 2020-07-10 RX ADMIN — Medication 60 MILLIGRAM(S): at 13:03

## 2020-07-10 RX ADMIN — PANTOPRAZOLE SODIUM 40 MILLIGRAM(S): 20 TABLET, DELAYED RELEASE ORAL at 05:06

## 2020-07-10 RX ADMIN — PANTOPRAZOLE SODIUM 40 MILLIGRAM(S): 20 TABLET, DELAYED RELEASE ORAL at 17:58

## 2020-07-10 NOTE — PROGRESS NOTE ADULT - SUBJECTIVE AND OBJECTIVE BOX
HPI:  An 89 yo Female patient with PMHx significant for DVT (11/2019) not AC, intermittent asthma (never intubated/no icu stays), polymyalgia rheumatica (chronic prednisone), dementia, osteoporosis with multiple vertebral compression, presents today BIBEMS due to chest pain and ZUNILDA.   Patient lethargic, unable to obtained detail history, most of information obtained from chart review. As per EMS documentation patient reported chest pain and SOB after having a family argument. Of note patient was dc today from rehab.   She was recently admitted at [facility] on June 2020, due to GI bleed found to have multiple duodenal ulcers.   At the ED was found to be hyperglycemic (636) 8 units of regular insulin; blood glucose dropped to 66, dextrose was given.  Also noted to have a fever Tmax 102 rectally and a + UA.     Attempted to speak to her Partha, but did not answer his phone (04 Jul 2020 22:50)     Allergies    dust (Unknown)  No Known Drug Allergies    Intolerances    provide [facility] soft diet (Unknown)    Chronic back pain  CHF (congestive heart failure)  Atrial fibrillation  Polymyalgia rheumatica  Diverticulosis  Asthma    MEDICATIONS  (STANDING):  budesonide 160 MICROgram(s)/formoterol 4.5 MICROgram(s) Inhaler 2 Puff(s) Inhalation two times a day  digoxin  Injectable 0.25 milliGRAM(s) IV Push daily  diltiazem    Tablet 60 milliGRAM(s) Oral every 8 hours  oxyCODONE  ER Tablet 10 milliGRAM(s) Oral every 12 hours  pantoprazole  Injectable 40 milliGRAM(s) IV Push two times a day  polyethylene glycol 3350 17 Gram(s) Oral at bedtime  predniSONE   Tablet 10 milliGRAM(s) Oral daily  senna 2 Tablet(s) Oral at bedtime  tamsulosin 0.4 milliGRAM(s) Oral at bedtime    MEDICATIONS  (PRN):  acetaminophen   Tablet .. 650 milliGRAM(s) Oral every 4 hours PRN Mild Pain (1 - 3)  morphine  - Injectable 2 milliGRAM(s) IV Push every 4 hours PRN Severe Pain (7 - 10)  morphine Concentrate 5 milliGRAM(s) Oral every 4 hours PRN Moderate Pain (4 - 6)               ;  Vital Signs Last 24 Hrs  T(C): 36.2 (10 Jul 2020 15:28), Max: 36.5 (09 Jul 2020 22:12)  T(F): 97.1 (10 Jul 2020 15:28), Max: 97.7 (09 Jul 2020 22:12)  HR: 77 (10 Jul 2020 15:28) (75 - 92)  BP: 120/74 (10 Jul 2020 15:28) (114/69 - 152/76)  BP(mean): --  RR: 18 (10 Jul 2020 15:28) (16 - 19)  SpO2: 100% (10 Jul 2020 15:28) (94% - 100%)  CAPILLARY BLOOD GLUCOSE      07-09 @ 07:01  -  07-10 @ 07:00  --------------------------------------------------------  IN: 310 mL / OUT: 1050 mL / NET: -740 mL    07-10 @ 07:01  -  07-10 @ 17:40  --------------------------------------------------------  IN: 320 mL / OUT: 400 mL / NET: -80 mL      Patient feeling better No CP, No SOB, No N/V +1/10 occasional pain back  HEENT: PEARLA  Neck: Supple  Cardio: S1 S2 No Murmur  Pulm: CTA No Rales or Ronchi  Abd: Soft NT ND BS+ no  Suprapubic tenderness   Back  -  spine tenderness diffuse   Rectal - refused  Ext: No DCT  Skin: No Rash  Neuro: awake pleasant short term loss     Urinary Sepsis - resolved   Shoulder pain - Xray - normal  Urinary Retention - Crandall flomax  Afib - cardio  Failure to thrive - continue supportive care   Chronic Back Pain -   PRN morphine increase long acting Oxycodone observe for SE,    Osteoporosis with compression fracture - pain control   MOLST form competed last admission (3 weeks ago)  DNR DNI, spoke to patient again she agrees

## 2020-07-10 NOTE — PROGRESS NOTE ADULT - ASSESSMENT
An 87 yo Female patient with PMHx significant for DVT (11/2019) not AC, intermittent asthma (never intubated/no icu stays), polymyalgia rheumatica (chronic prednisone), dementia, osteoporosis with multiple vertebral compression, presents today BIBEMS due to chest pain and SOB. Being admitted for chest pain, rule out ACS  At the ED was found to be hyperglycemic (636) 8 units of regular insulin; blood glucose dropped to 66, dextrose was given. Also noted to have a fever Tmax 102 rectally and a + UA          Sepsis secondary to UTI   +UA   + urine culture with Ecoli  on Rocephin Day 5/5     Patient with severe abdominal pain and distention   Crandall placed for urinary retention - failed trials of void x 3         Lethargy secondary to Iatrogenic Induced Hypoglycemia   Glucose of 636 on BMP, unlikely real  Pt was given 8 U of regular insulin by ED staff, glucose dropped to 66, pt required D50% 25 mg x2  Will avoid Insulin at this time   currently on diet         Chest Pain,    Trops -x3,   [facility] called   pt currently rate controlled       Afib RVR and Aflutter   S/p Cardizem IVP x1 in ED and lopressor x 1 last night -- helped for a short period of time but pts BP was 114/60   CHADSVASC: 4  Not on AC due to GI bleed   approx 1 month ago was admitted for GIB     Duodenal ulcers  -recent gib w/ ulcers found on EGD  on protonix bid     Hypokalemia   K: 2.9 on admission   S/p K raiders x2           Polymyalgia Rheumatica  Hold Prednisone while NPO  if bp drops consider stress dose steroids.       Mild-Moderate Intermittent Asthma  Continue Symbicort/Albuterol PRN  O2 via NC if needed PRN titrate FIO2 >90% and <96%    Failure to thrive   Patient cachectic   Nutritional consult pending       DVT Prophylaxis   Intermittent compression     Code status -- pt with MOLST   DNR/ DNI

## 2020-07-11 RX ADMIN — PANTOPRAZOLE SODIUM 40 MILLIGRAM(S): 20 TABLET, DELAYED RELEASE ORAL at 06:26

## 2020-07-11 RX ADMIN — BUDESONIDE AND FORMOTEROL FUMARATE DIHYDRATE 2 PUFF(S): 160; 4.5 AEROSOL RESPIRATORY (INHALATION) at 08:35

## 2020-07-11 RX ADMIN — Medication 0.25 MILLIGRAM(S): at 18:39

## 2020-07-11 RX ADMIN — Medication 60 MILLIGRAM(S): at 22:26

## 2020-07-11 RX ADMIN — Medication 650 MILLIGRAM(S): at 22:28

## 2020-07-11 RX ADMIN — TAMSULOSIN HYDROCHLORIDE 0.4 MILLIGRAM(S): 0.4 CAPSULE ORAL at 22:26

## 2020-07-11 RX ADMIN — BUDESONIDE AND FORMOTEROL FUMARATE DIHYDRATE 2 PUFF(S): 160; 4.5 AEROSOL RESPIRATORY (INHALATION) at 20:25

## 2020-07-11 RX ADMIN — PANTOPRAZOLE SODIUM 40 MILLIGRAM(S): 20 TABLET, DELAYED RELEASE ORAL at 18:24

## 2020-07-11 RX ADMIN — Medication 60 MILLIGRAM(S): at 13:04

## 2020-07-11 RX ADMIN — Medication 60 MILLIGRAM(S): at 06:26

## 2020-07-11 NOTE — PROGRESS NOTE ADULT - SUBJECTIVE AND OBJECTIVE BOX
HPI:  An 87 yo Female patient with PMHx significant for DVT (11/2019) not AC, intermittent asthma (never intubated/no icu stays), polymyalgia rheumatica (chronic prednisone), dementia, osteoporosis with multiple vertebral compression, presents today BIBEMS due to chest pain and ZUNILDA.   Patient lethargic, unable to obtained detail history, most of information obtained from chart review. As per EMS documentation patient reported chest pain and SOB after having a family argument. Of note patient was dc today from rehab.   She was recently admitted at [facility] on June 2020, due to GI bleed found to have multiple duodenal ulcers.   At the ED was found to be hyperglycemic (636) 8 units of regular insulin; blood glucose dropped to 66, dextrose was given.  Also noted to have a fever Tmax 102 rectally and a + UA.     Attempted to speak to her Partha, but did not answer his phone (04 Jul 2020 22:50)      PAST MEDICAL & SURGICAL HISTORY:  Chronic back pain  CHF (congestive heart failure)  Atrial fibrillation  Polymyalgia rheumatica  Diverticulosis  Asthma  S/P knee replacement, left  S/P hysterectomy: 1982  S/P appendectomy: 1962          PULMONARY:  budesonide 160 MICROgram(s)/formoterol 4.5 MICROgram(s) Inhaler 2 Puff(s) Inhalation two times a day    Cardio:   dig  Cardizem     GATROINTESTINAL:  pantoprazole  Injectable 40 milliGRAM(s) IV Push two times a day     MEDS:    ENDO/METABOLIC:  dextrose 40% Gel 15 Gram(s) Oral once PRN  dextrose 50% Injectable 12.5 Gram(s) IV Push once  dextrose 50% Injectable 25 Gram(s) IV Push once  dextrose 50% Injectable 25 Gram(s) IV Push once  glucagon  Injectable 1 milliGRAM(s) IntraMuscular once PRN    IV FLUID/NUTRITION:  dextrose 5%. 1000 milliLiter(s) IV Continuous <Continuous>  multiple electrolytes Injection Type 1 1000 milliLiter(s) IV Continuous <Continuous>            Allergies    dust (Unknown)  No Known Drug Allergies    Intolerances    provide [facility] soft diet (Unknown)      SOCIAL HISTORY:    FAMILY HISTORY:        Vital Signs Last 24 Hrs  T(C): 36.4 (11 Jul 2020 06:25), Max: 36.4 (10 Jul 2020 19:31)  T(F): 97.6 (11 Jul 2020 06:25), Max: 97.6 (10 Jul 2020 19:31)  HR: 78 (11 Jul 2020 13:01) (77 - 102)  BP: 98/72 (11 Jul 2020 13:01) (96/71 - 147/70)  BP(mean): --  RR: 16 (11 Jul 2020 10:00) (16 - 18)  SpO2: 93% (11 Jul 2020 10:00) (92% - 100%)        	          REVIEW OF SYSTEMS      General: moaning at times 	    Respiratory: no complaints   	  Cardiovascular:	no complaints     Gastrointestinal:	no complaints     Genitourinary:	no complaints     Musculoskeletal:	 c/o back pain    Neurological:	AAO x 3          PHYSICAL EXAM:    GENERAL: NAD, well-groomed, well-developed  HEAD:  Atraumatic, Normocephalic  NERVOUS SYSTEM:  moaning   CHEST/LUNG: Clear to percussion bilaterally; No rales, rhonchi, wheezing, or rubs  HEART: regular - rate controlled   ABDOMEN: Soft, Nontender, Nondistended; Bowel sounds present  EXTREMITIES:  2+ Peripheral Pulses, cold to touch         LABS:                                                      urine culture :   positive with E coli   Blood cul x 2 negative x 48 hrs       RADIOLOGY & ADDITIONAL STUDIES:

## 2020-07-11 NOTE — PROGRESS NOTE ADULT - ASSESSMENT
An 89 yo Female patient with PMHx significant for DVT (11/2019) not AC, intermittent asthma (never intubated/no icu stays), polymyalgia rheumatica (chronic prednisone), dementia, osteoporosis with multiple vertebral compression, presents today BIBEMS due to chest pain and SOB. Being admitted for chest pain, rule out ACS  At the ED was found to be hyperglycemic (636) 8 units of regular insulin; blood glucose dropped to 66, dextrose was given. Also noted to have a fever Tmax 102 rectally and a + UA          Sepsis secondary to UTI   +UA   + urine culture with Ecoli  on Rocephin completed 5 days     Patient with severe abdominal pain and distention   Crandall placed for urinary retention - failed trials of void x 3         Lethargy secondary to Iatrogenic Induced Hypoglycemia   Glucose of 636 on BMP, unlikely real  Pt was given 8 U of regular insulin by ED staff, glucose dropped to 66, pt required D50% 25 mg x2  Will avoid Insulin at this time   currently on diet         Chest Pain,    Trops -x3,   [facility] called   pt currently rate controlled       Afib RVR and Aflutter   S/p Cardizem IVP x1 in ED and lopressor x 1 last night -- helped for a short period of time but pts BP was 114/60   CHADSVASC: 4  Not on AC due to GI bleed   approx 1 month ago was admitted for GIB     Duodenal ulcers  -recent gib w/ ulcers found on EGD  on protonix bid     Hypokalemia   K: 2.9 on admission   S/p K raiders x2           Polymyalgia Rheumatica  Hold Prednisone while NPO  if bp drops consider stress dose steroids.       Mild-Moderate Intermittent Asthma  Continue Symbicort/Albuterol PRN  O2 via NC if needed PRN titrate FIO2 >90% and <96%    Failure to thrive   Patient cachectic       DVT Prophylaxis   Intermittent compression     Code status -- pt with MOLST   DNR/ DNI     family and insurance issues   d/c on hold until resolved

## 2020-07-11 NOTE — PROGRESS NOTE ADULT - SUBJECTIVE AND OBJECTIVE BOX
HPI:  An 89 yo Female patient with PMHx significant for DVT (11/2019) not AC, intermittent asthma (never intubated/no icu stays), polymyalgia rheumatica (chronic prednisone), dementia, osteoporosis with multiple vertebral compression, presents today BIBEMS due to chest pain and ZUNILDA.   Patient lethargic, unable to obtained detail history, most of information obtained from chart review. As per EMS documentation patient reported chest pain and SOB after having a family argument. Of note patient was dc today from rehab.   She was recently admitted at [facility] on June 2020, due to GI bleed found to have multiple duodenal ulcers.   At the ED was found to be hyperglycemic (636) 8 units of regular insulin; blood glucose dropped to 66, dextrose was given.  Also noted to have a fever Tmax 102 rectally and a + UA.     Attempted to speak to her Partha, but did not answer his phone (04 Jul 2020 22:50)     Allergies    dust (Unknown)  No Known Drug Allergies    Intolerances    provide [facility] soft diet (Unknown)    Chronic back pain  CHF (congestive heart failure)  Atrial fibrillation  Polymyalgia rheumatica  Diverticulosis  Asthma    MEDICATIONS  (STANDING):  budesonide 160 MICROgram(s)/formoterol 4.5 MICROgram(s) Inhaler 2 Puff(s) Inhalation two times a day  digoxin  Injectable 0.25 milliGRAM(s) IV Push daily  diltiazem    Tablet 60 milliGRAM(s) Oral every 8 hours  oxyCODONE  ER Tablet 10 milliGRAM(s) Oral every 12 hours  pantoprazole  Injectable 40 milliGRAM(s) IV Push two times a day  polyethylene glycol 3350 17 Gram(s) Oral at bedtime  predniSONE   Tablet 10 milliGRAM(s) Oral daily  senna 2 Tablet(s) Oral at bedtime  tamsulosin 0.4 milliGRAM(s) Oral at bedtime    MEDICATIONS  (PRN):  acetaminophen   Tablet .. 650 milliGRAM(s) Oral every 4 hours PRN Mild Pain (1 - 3)  morphine  - Injectable 2 milliGRAM(s) IV Push every 4 hours PRN Severe Pain (7 - 10)  morphine Concentrate 5 milliGRAM(s) Oral every 4 hours PRN Moderate Pain (4 - 6)               ;  Vital Signs Last 24 Hrs  T(C): 36.7 (11 Jul 2020 16:57), Max: 36.7 (11 Jul 2020 16:57)  T(F): 98.1 (11 Jul 2020 16:57), Max: 98.1 (11 Jul 2020 16:57)  HR: 77 (11 Jul 2020 18:34) (77 - 102)  BP: 141/73 (11 Jul 2020 18:34) (96/71 - 147/70)  BP(mean): --  RR: 18 (11 Jul 2020 16:57) (16 - 18)  SpO2: 98% (11 Jul 2020 16:57) (92% - 100%)  CAPILLARY BLOOD GLUCOSE      07-10 @ 07:01 - 07-11 @ 07:00  --------------------------------------------------------  IN: 320 mL / OUT: 1200 mL / NET: -880 mL    07-11 @ 07:01 - 07-11 @ 19:29  --------------------------------------------------------  IN: 458 mL / OUT: 0 mL / NET: 458 mL        Patient feeling better No CP, No SOB, No N/V +1/10 occasional pain back Good appetite  HEENT: PEARLA  Neck: Supple  Cardio: S1 S2 No Murmur  Pulm: CTA No Rales or Ronchi  Abd: Soft NT ND BS+ no  Suprapubic tenderness   Back  -  spine tenderness diffuse   Rectal - refused  Ext: No DCT  Skin: No Rash  Neuro: awake pleasant short term loss     Urinary Sepsis - resolved   Shoulder pain - Xray - normal  Urinary Retention - Crandall flomax  Afib - cardio  Failure to thrive - continue supportive care   Chronic Back Pain -   PRN morphine increase long acting Oxycodone observe for SE,    Osteoporosis with compression fracture - pain control   MOLST form competed last admission (3 weeks ago)  DNR DNI, spoke to patient again she agrees

## 2020-07-12 RX ADMIN — Medication 0.25 MILLIGRAM(S): at 12:13

## 2020-07-12 RX ADMIN — PANTOPRAZOLE SODIUM 40 MILLIGRAM(S): 20 TABLET, DELAYED RELEASE ORAL at 17:13

## 2020-07-12 RX ADMIN — PANTOPRAZOLE SODIUM 40 MILLIGRAM(S): 20 TABLET, DELAYED RELEASE ORAL at 05:37

## 2020-07-12 RX ADMIN — BUDESONIDE AND FORMOTEROL FUMARATE DIHYDRATE 2 PUFF(S): 160; 4.5 AEROSOL RESPIRATORY (INHALATION) at 08:42

## 2020-07-12 RX ADMIN — Medication 60 MILLIGRAM(S): at 05:35

## 2020-07-12 RX ADMIN — Medication 60 MILLIGRAM(S): at 13:51

## 2020-07-12 RX ADMIN — Medication 60 MILLIGRAM(S): at 21:34

## 2020-07-12 RX ADMIN — BUDESONIDE AND FORMOTEROL FUMARATE DIHYDRATE 2 PUFF(S): 160; 4.5 AEROSOL RESPIRATORY (INHALATION) at 20:21

## 2020-07-12 RX ADMIN — TAMSULOSIN HYDROCHLORIDE 0.4 MILLIGRAM(S): 0.4 CAPSULE ORAL at 21:34

## 2020-07-12 NOTE — PROGRESS NOTE ADULT - ASSESSMENT
An 87 yo Female patient with PMHx significant for DVT (11/2019) not AC, intermittent asthma (never intubated/no icu stays), polymyalgia rheumatica (chronic prednisone), dementia, osteoporosis with multiple vertebral compression, presents today BIBEMS due to chest pain and SOB. Being admitted for chest pain, rule out ACS  At the ED was found to be hyperglycemic (636) 8 units of regular insulin; blood glucose dropped to 66, dextrose was given. Also noted to have a fever Tmax 102 rectally and a + UA          Sepsis secondary to UTI   +UA   + urine culture with Ecoli  on Rocephin completed 5 days     Patient with severe abdominal pain and distention   Crandall placed for urinary retention - failed trials of void x 3         Lethargy secondary to Iatrogenic Induced Hypoglycemia   Glucose of 636 on BMP, unlikely real  Pt was given 8 U of regular insulin by ED staff, glucose dropped to 66, pt required D50% 25 mg x2  Will avoid Insulin at this time   currently on diet         Chest Pain,    Trops -x3,   [facility] called   pt currently rate controlled       Afib RVR and Aflutter   S/p Cardizem IVP x1 in ED and lopressor x 1 last night -- helped for a short period of time but pts BP was 114/60   CHADSVASC: 4  Not on AC due to GI bleed   approx 1 month ago was admitted for GIB     Duodenal ulcers  -recent gib w/ ulcers found on EGD  on protonix bid     Hypokalemia   K: 2.9 on admission   S/p K raiders x2           Polymyalgia Rheumatica  Hold Prednisone while NPO  if bp drops consider stress dose steroids.       Mild-Moderate Intermittent Asthma  Continue Symbicort/Albuterol PRN  O2 via NC if needed PRN titrate FIO2 >90% and <96%    Failure to thrive   Patient cachectic       DVT Prophylaxis   Intermittent compression     Code status -- pt with MOLST   DNR/ DNI     family and insurance issues   d/c on hold until resolved

## 2020-07-12 NOTE — PROGRESS NOTE ADULT - SUBJECTIVE AND OBJECTIVE BOX
HPI:  An 87 yo Female patient with PMHx significant for DVT (11/2019) not AC, intermittent asthma (never intubated/no icu stays), polymyalgia rheumatica (chronic prednisone), dementia, osteoporosis with multiple vertebral compression, presents today BIBEMS due to chest pain and ZUNILDA.   Patient lethargic, unable to obtained detail history, most of information obtained from chart review. As per EMS documentation patient reported chest pain and SOB after having a family argument. Of note patient was dc today from rehab.   She was recently admitted at [facility] on June 2020, due to GI bleed found to have multiple duodenal ulcers.   At the ED was found to be hyperglycemic (636) 8 units of regular insulin; blood glucose dropped to 66, dextrose was given.  Also noted to have a fever Tmax 102 rectally and a + UA.     Attempted to speak to her Partha, but did not answer his phone (04 Jul 2020 22:50)      PAST MEDICAL & SURGICAL HISTORY:  Chronic back pain  CHF (congestive heart failure)  Atrial fibrillation  Polymyalgia rheumatica  Diverticulosis  Asthma  S/P knee replacement, left  S/P hysterectomy: 1982  S/P appendectomy: 1962          PULMONARY:  budesonide 160 MICROgram(s)/formoterol 4.5 MICROgram(s) Inhaler 2 Puff(s) Inhalation two times a day    Cardio:   dig  Cardizem     GATROINTESTINAL:  pantoprazole  Injectable 40 milliGRAM(s) IV Push two times a day     MEDS:    ENDO/METABOLIC:  dextrose 40% Gel 15 Gram(s) Oral once PRN  dextrose 50% Injectable 12.5 Gram(s) IV Push once  dextrose 50% Injectable 25 Gram(s) IV Push once  dextrose 50% Injectable 25 Gram(s) IV Push once  glucagon  Injectable 1 milliGRAM(s) IntraMuscular once PRN    IV FLUID/NUTRITION:  dextrose 5%. 1000 milliLiter(s) IV Continuous <Continuous>  multiple electrolytes Injection Type 1 1000 milliLiter(s) IV Continuous <Continuous>            Allergies    dust (Unknown)  No Known Drug Allergies    Intolerances    provide [facility] soft diet (Unknown)      SOCIAL HISTORY:    FAMILY HISTORY:        Vital Signs Last 24 Hrs  T(C): 36.4 (12 Jul 2020 11:00), Max: 36.7 (11 Jul 2020 16:57)  T(F): 97.5 (12 Jul 2020 11:00), Max: 98.1 (11 Jul 2020 16:57)  HR: 87 (12 Jul 2020 11:00) (76 - 101)  BP: 121/78 (12 Jul 2020 11:00) (121/78 - 164/90)  BP(mean): --  RR: 18 (12 Jul 2020 11:00) (18 - 18)  SpO2: 98% (12 Jul 2020 11:00) (97% - 98%)        	          REVIEW OF SYSTEMS    General: moaning at times 	  Respiratory: no complaints   Cardiovascular:	no complaints   Gastrointestinal:	no complaints   Genitourinary:	no complaints   Musculoskeletal:	 c/o back pain  Neurological:	AAO x 3          PHYSICAL EXAM:    GENERAL: NAD, well-groomed, well-developed  HEAD:  Atraumatic, Normocephalic  NERVOUS SYSTEM:  moaning   CHEST/LUNG: Clear to percussion bilaterally; No rales, rhonchi, wheezing, or rubs  HEART: regular - rate controlled   ABDOMEN: Soft, Nontender, Nondistended; Bowel sounds present  EXTREMITIES:  2+ Peripheral Pulses, cold to touch         LABS:                                                          urine culture :   positive with E coli   Blood cul x 2 negative x 48 hrs       RADIOLOGY & ADDITIONAL STUDIES:

## 2020-07-12 NOTE — PROGRESS NOTE ADULT - SUBJECTIVE AND OBJECTIVE BOX
HPI:  An 87 yo Female patient with PMHx significant for DVT (11/2019) not AC, intermittent asthma (never intubated/no icu stays), polymyalgia rheumatica (chronic prednisone), dementia, osteoporosis with multiple vertebral compression, presents today BIBEMS due to chest pain and ZUNILDA.   Patient lethargic, unable to obtained detail history, most of information obtained from chart review. As per EMS documentation patient reported chest pain and SOB after having a family argument. Of note patient was dc today from rehab.   She was recently admitted at [facility] on June 2020, due to GI bleed found to have multiple duodenal ulcers.   At the ED was found to be hyperglycemic (636) 8 units of regular insulin; blood glucose dropped to 66, dextrose was given.  Also noted to have a fever Tmax 102 rectally and a + UA.     Attempted to speak to her Partha, but did not answer his phone (04 Jul 2020 22:50)     Allergies    dust (Unknown)  No Known Drug Allergies    Intolerances    provide [facility] soft diet (Unknown)    Chronic back pain  CHF (congestive heart failure)  Atrial fibrillation  Polymyalgia rheumatica  Diverticulosis  Asthma    MEDICATIONS  (STANDING):  budesonide 160 MICROgram(s)/formoterol 4.5 MICROgram(s) Inhaler 2 Puff(s) Inhalation two times a day  digoxin  Injectable 0.25 milliGRAM(s) IV Push daily  diltiazem    Tablet 60 milliGRAM(s) Oral every 8 hours  oxyCODONE  ER Tablet 10 milliGRAM(s) Oral every 12 hours  pantoprazole  Injectable 40 milliGRAM(s) IV Push two times a day  polyethylene glycol 3350 17 Gram(s) Oral at bedtime  predniSONE   Tablet 10 milliGRAM(s) Oral daily  senna 2 Tablet(s) Oral at bedtime  tamsulosin 0.4 milliGRAM(s) Oral at bedtime    MEDICATIONS  (PRN):  acetaminophen   Tablet .. 650 milliGRAM(s) Oral every 4 hours PRN Mild Pain (1 - 3)  morphine  - Injectable 2 milliGRAM(s) IV Push every 4 hours PRN Severe Pain (7 - 10)  morphine Concentrate 5 milliGRAM(s) Oral every 4 hours PRN Moderate Pain (4 - 6)               ;  Vital Signs Last 24 Hrs  T(C): 36.7 (12 Jul 2020 16:09), Max: 36.7 (12 Jul 2020 05:29)  T(F): 98 (12 Jul 2020 16:09), Max: 98 (12 Jul 2020 05:29)  HR: 79 (12 Jul 2020 16:09) (76 - 101)  BP: 131/76 (12 Jul 2020 16:09) (121/78 - 164/90)  BP(mean): --  RR: 18 (12 Jul 2020 16:09) (18 - 18)  SpO2: 97% (12 Jul 2020 16:09) (97% - 98%)  CAPILLARY BLOOD GLUCOSE      07-11 @ 07:01  -  07-12 @ 07:00  --------------------------------------------------------  IN: 458 mL / OUT: 650 mL / NET: -192 mL    07-12 @ 07:01  -  07-12 @ 19:33  --------------------------------------------------------  IN: 480 mL / OUT: 150 mL / NET: 330 mL      Patient feeling better No CP, No SOB, No N/V +1/10 occasional pain back  HEENT: PEARLA  Neck: Supple  Cardio: S1 S2 No Murmur  Pulm: CTA No Rales or Ronchi  Abd: Soft NT ND BS+ no  Suprapubic tenderness   Back  -  spine tenderness diffuse   Rectal - refused  Ext: No DCT  Skin: No Rash  Neuro: awake pleasant short term loss     Urinary Sepsis - resolved   Shoulder pain - Xray - normal  Urinary Retention - Crandall flomax  Afib - cardio  Failure to thrive - continue supportive care   Chronic Back Pain -   PRN morphine increase long acting Oxycodone observe for SE,    Osteoporosis with compression fracture - pain control

## 2020-07-13 RX ORDER — SODIUM CHLORIDE 9 MG/ML
1000 INJECTION, SOLUTION INTRAVENOUS
Refills: 0 | Status: DISCONTINUED | OUTPATIENT
Start: 2020-07-13 | End: 2020-07-19

## 2020-07-13 RX ADMIN — SODIUM CHLORIDE 70 MILLILITER(S): 9 INJECTION, SOLUTION INTRAVENOUS at 20:40

## 2020-07-13 RX ADMIN — Medication 60 MILLIGRAM(S): at 22:22

## 2020-07-13 RX ADMIN — Medication 60 MILLIGRAM(S): at 05:46

## 2020-07-13 RX ADMIN — TAMSULOSIN HYDROCHLORIDE 0.4 MILLIGRAM(S): 0.4 CAPSULE ORAL at 22:22

## 2020-07-13 RX ADMIN — BUDESONIDE AND FORMOTEROL FUMARATE DIHYDRATE 2 PUFF(S): 160; 4.5 AEROSOL RESPIRATORY (INHALATION) at 09:25

## 2020-07-13 RX ADMIN — Medication 60 MILLIGRAM(S): at 15:09

## 2020-07-13 RX ADMIN — BUDESONIDE AND FORMOTEROL FUMARATE DIHYDRATE 2 PUFF(S): 160; 4.5 AEROSOL RESPIRATORY (INHALATION) at 20:47

## 2020-07-13 RX ADMIN — Medication 0.25 MILLIGRAM(S): at 13:19

## 2020-07-13 RX ADMIN — PANTOPRAZOLE SODIUM 40 MILLIGRAM(S): 20 TABLET, DELAYED RELEASE ORAL at 05:46

## 2020-07-13 RX ADMIN — PANTOPRAZOLE SODIUM 40 MILLIGRAM(S): 20 TABLET, DELAYED RELEASE ORAL at 17:44

## 2020-07-13 NOTE — PROGRESS NOTE ADULT - SUBJECTIVE AND OBJECTIVE BOX
HPI:  An 87 yo Female patient with PMHx significant for DVT (11/2019) not AC, intermittent asthma (never intubated/no icu stays), polymyalgia rheumatica (chronic prednisone), dementia, osteoporosis with multiple vertebral compression, presents today BIBEMS due to chest pain and ZUNILDA.   Patient lethargic, unable to obtained detail history, most of information obtained from chart review. As per EMS documentation patient reported chest pain and SOB after having a family argument. Of note patient was dc today from rehab.   She was recently admitted at [facility] on June 2020, due to GI bleed found to have multiple duodenal ulcers.   At the ED was found to be hyperglycemic (636) 8 units of regular insulin; blood glucose dropped to 66, dextrose was given.  Also noted to have a fever Tmax 102 rectally and a + UA.     Attempted to speak to her Partha, but did not answer his phone (04 Jul 2020 22:50)     Allergies    dust (Unknown)  No Known Drug Allergies    Intolerances    provide [facility] soft diet (Unknown)    Chronic back pain  CHF (congestive heart failure)  Atrial fibrillation  Polymyalgia rheumatica  Diverticulosis  Asthma    MEDICATIONS  (STANDING):  budesonide 160 MICROgram(s)/formoterol 4.5 MICROgram(s) Inhaler 2 Puff(s) Inhalation two times a day  dextrose 5% + sodium chloride 0.45%. 1000 milliLiter(s) (70 mL/Hr) IV Continuous <Continuous>  digoxin  Injectable 0.25 milliGRAM(s) IV Push daily  diltiazem    Tablet 60 milliGRAM(s) Oral every 8 hours  pantoprazole  Injectable 40 milliGRAM(s) IV Push two times a day  polyethylene glycol 3350 17 Gram(s) Oral at bedtime  predniSONE   Tablet 10 milliGRAM(s) Oral daily  senna 2 Tablet(s) Oral at bedtime  tamsulosin 0.4 milliGRAM(s) Oral at bedtime    MEDICATIONS  (PRN):  acetaminophen   Tablet .. 650 milliGRAM(s) Oral every 4 hours PRN Mild Pain (1 - 3)  morphine  - Injectable 2 milliGRAM(s) IV Push every 4 hours PRN Severe Pain (7 - 10)  morphine Concentrate 5 milliGRAM(s) Oral every 4 hours PRN Moderate Pain (4 - 6)               ;  Vital Signs Last 24 Hrs  T(C): 36.3 (13 Jul 2020 16:05), Max: 36.8 (12 Jul 2020 21:30)  T(F): 97.4 (13 Jul 2020 16:05), Max: 98.2 (12 Jul 2020 21:30)  HR: 99 (13 Jul 2020 16:05) (82 - 99)  BP: 138/82 (13 Jul 2020 16:05) (137/87 - 165/82)  BP(mean): --  RR: 16 (13 Jul 2020 13:02) (16 - 18)  SpO2: 94% (13 Jul 2020 16:05) (94% - 98%)  CAPILLARY BLOOD GLUCOSE      07-12 @ 07:01  -  07-13 @ 07:00  --------------------------------------------------------  IN: 720 mL / OUT: 750 mL / NET: -30 mL    07-13 @ 07:01 - 07-13 @ 19:25  --------------------------------------------------------  IN: 240 mL / OUT: 300 mL / NET: -60 mL        Patient feeling better No CP, No SOB, No N/V +1/10 occasional pain back having trouble swallowing today   HEENT: KIMANI  Neck: Supple  Cardio: S1 S2 No Murmur  Pulm: CTA No Rales or Ronchi  Abd: Soft NT ND BS+ no  Suprapubic tenderness   Back  -  spine tenderness diffuse   Rectal - refused  Ext: No DCT  Skin: No Rash  Neuro: awake pleasant short term loss     Urinary Sepsis - resolved   Dysphagia - a history but worse today speech and swallow eval  Shoulder pain - Xray - normal  Urinary Retention - Crandall flomax  Afib - cardio  Failure to thrive - continue supportive care   Chronic Back Pain -   PRN morphine increase long acting Oxycodone observe for SE,    Osteoporosis with compression fracture - pain control HPI:  An 89 yo Female patient with PMHx significant for DVT (11/2019) not AC, intermittent asthma (never intubated/no icu stays), polymyalgia rheumatica (chronic prednisone), dementia, osteoporosis with multiple vertebral compression, presents today BIBEMS due to chest pain and ZUNILDA.   Patient lethargic, unable to obtained detail history, most of information obtained from chart review. As per EMS documentation patient reported chest pain and SOB after having a family argument. Of note patient was dc today from rehab.   She was recently admitted at [facility] on June 2020, due to GI bleed found to have multiple duodenal ulcers.   At the ED was found to be hyperglycemic (636) 8 units of regular insulin; blood glucose dropped to 66, dextrose was given.  Also noted to have a fever Tmax 102 rectally and a + UA.     Attempted to speak to her Partha, but did not answer his phone (04 Jul 2020 22:50)     Allergies    dust (Unknown)  No Known Drug Allergies    Intolerances    provide [facility] soft diet (Unknown)    Chronic back pain  CHF (congestive heart failure)  Atrial fibrillation  Polymyalgia rheumatica  Diverticulosis  Asthma    MEDICATIONS  (STANDING):  budesonide 160 MICROgram(s)/formoterol 4.5 MICROgram(s) Inhaler 2 Puff(s) Inhalation two times a day  dextrose 5% + sodium chloride 0.45%. 1000 milliLiter(s) (70 mL/Hr) IV Continuous <Continuous>  digoxin  Injectable 0.25 milliGRAM(s) IV Push daily  diltiazem    Tablet 60 milliGRAM(s) Oral every 8 hours  pantoprazole  Injectable 40 milliGRAM(s) IV Push two times a day  polyethylene glycol 3350 17 Gram(s) Oral at bedtime  predniSONE   Tablet 10 milliGRAM(s) Oral daily  senna 2 Tablet(s) Oral at bedtime  tamsulosin 0.4 milliGRAM(s) Oral at bedtime    MEDICATIONS  (PRN):  acetaminophen   Tablet .. 650 milliGRAM(s) Oral every 4 hours PRN Mild Pain (1 - 3)  morphine  - Injectable 2 milliGRAM(s) IV Push every 4 hours PRN Severe Pain (7 - 10)  morphine Concentrate 5 milliGRAM(s) Oral every 4 hours PRN Moderate Pain (4 - 6)               ;  Vital Signs Last 24 Hrs  T(C): 36.3 (13 Jul 2020 16:05), Max: 36.8 (12 Jul 2020 21:30)  T(F): 97.4 (13 Jul 2020 16:05), Max: 98.2 (12 Jul 2020 21:30)  HR: 99 (13 Jul 2020 16:05) (82 - 99)  BP: 138/82 (13 Jul 2020 16:05) (137/87 - 165/82)  BP(mean): --  RR: 16 (13 Jul 2020 13:02) (16 - 18)  SpO2: 94% (13 Jul 2020 16:05) (94% - 98%)  CAPILLARY BLOOD GLUCOSE      07-12 @ 07:01  -  07-13 @ 07:00  --------------------------------------------------------  IN: 720 mL / OUT: 750 mL / NET: -30 mL    07-13 @ 07:01 - 07-13 @ 19:25  --------------------------------------------------------  IN: 240 mL / OUT: 300 mL / NET: -60 mL        Patient feeling better No CP, No SOB, No N/V +1/10 occasional pain back having trouble swallowing today   HEENT: DAGOBERTOLA No Thrush  Neck: Supple  Cardio: S1 S2 No Murmur  Pulm: CTA No Rales or Ronchi  Abd: Soft NT ND BS+ no  Suprapubic tenderness   Back  -  spine tenderness diffuse   Rectal - refused  Ext: No DCT  Skin: No Rash  Neuro: awake pleasant short term loss     Urinary Sepsis - resolved   Dysphagia - a history but worse today speech and swallow eval  Shoulder pain - Xray - normal  Urinary Retention - Crandall flomax  Afib - cardio  Failure to thrive - continue supportive care   Chronic Back Pain -   PRN morphine increase long acting Oxycodone observe for SE,    Osteoporosis with compression fracture - pain control

## 2020-07-13 NOTE — PROGRESS NOTE ADULT - SUBJECTIVE AND OBJECTIVE BOX
HPI:  An 89 yo Female patient with PMHx significant for DVT (11/2019) not AC, intermittent asthma (never intubated/no icu stays), polymyalgia rheumatica (chronic prednisone), dementia, osteoporosis with multiple vertebral compression, presents today BIBEMS due to chest pain and ZUNILDA.   Patient lethargic, unable to obtained detail history, most of information obtained from chart review. As per EMS documentation patient reported chest pain and SOB after having a family argument. Of note patient was dc today from rehab.   She was recently admitted at [facility] on June 2020, due to GI bleed found to have multiple duodenal ulcers.   At the ED was found to be hyperglycemic (636) 8 units of regular insulin; blood glucose dropped to 66, dextrose was given.  Also noted to have a fever Tmax 102 rectally and a + UA.     Attempted to speak to her Partha, but did not answer his phone (04 Jul 2020 22:50)      PAST MEDICAL & SURGICAL HISTORY:  Chronic back pain  CHF (congestive heart failure)  Atrial fibrillation  Polymyalgia rheumatica  Diverticulosis  Asthma  S/P knee replacement, left  S/P hysterectomy: 1982  S/P appendectomy: 1962          PULMONARY:  budesonide 160 MICROgram(s)/formoterol 4.5 MICROgram(s) Inhaler 2 Puff(s) Inhalation two times a day    Cardio:   dig  Cardizem     GATROINTESTINAL:  pantoprazole  Injectable 40 milliGRAM(s) IV Push two times a day     MEDS:    ENDO/METABOLIC:  dextrose 40% Gel 15 Gram(s) Oral once PRN  dextrose 50% Injectable 12.5 Gram(s) IV Push once  dextrose 50% Injectable 25 Gram(s) IV Push once  dextrose 50% Injectable 25 Gram(s) IV Push once  glucagon  Injectable 1 milliGRAM(s) IntraMuscular once PRN    IV FLUID/NUTRITION:  dextrose 5%. 1000 milliLiter(s) IV Continuous <Continuous>  multiple electrolytes Injection Type 1 1000 milliLiter(s) IV Continuous <Continuous>            Allergies    dust (Unknown)  No Known Drug Allergies    Intolerances    provide [facility] soft diet (Unknown)      SOCIAL HISTORY:    FAMILY HISTORY:        Vital Signs Last 24 Hrs  T(C): 36.3 (13 Jul 2020 04:58), Max: 36.8 (12 Jul 2020 21:30)  T(F): 97.4 (13 Jul 2020 04:58), Max: 98.2 (12 Jul 2020 21:30)  HR: 82 (13 Jul 2020 04:58) (79 - 97)  BP: 165/82 (13 Jul 2020 04:58) (121/78 - 165/82)  BP(mean): --  RR: 16 (13 Jul 2020 04:58) (16 - 18)  SpO2: 95% (13 Jul 2020 04:58) (95% - 98%)        	          REVIEW OF SYSTEMS    General: moaning at times 	  Respiratory: no complaints   Cardiovascular:	no complaints   Gastrointestinal:	no complaints   Genitourinary:	no complaints   Musculoskeletal:	 c/o back pain  Neurological:	AAO x 3          PHYSICAL EXAM:    GENERAL: NAD, well-groomed, well-developed  HEAD:  Atraumatic, Normocephalic  NERVOUS SYSTEM:  moaning   CHEST/LUNG: Clear to percussion bilaterally; No rales, rhonchi, wheezing, or rubs  HEART: regular - rate controlled   ABDOMEN: Soft, Nontender, Nondistended; Bowel sounds present  EXTREMITIES:  2+ Peripheral Pulses, cold to touch         LABS:                                                          urine culture :   positive with E coli   Blood cul x 2 negative x 48 hrs       RADIOLOGY & ADDITIONAL STUDIES:

## 2020-07-13 NOTE — PROVIDER CONTACT NOTE (OTHER) - SITUATION
while being fed dinner tonight, RN noticed she wasn't swallowing properly and was not clearing her mouth of food. Pt requested more liquid to try to make it go down with no success. Pt spit food out.

## 2020-07-14 RX ADMIN — Medication 60 MILLIGRAM(S): at 21:46

## 2020-07-14 RX ADMIN — TAMSULOSIN HYDROCHLORIDE 0.4 MILLIGRAM(S): 0.4 CAPSULE ORAL at 21:46

## 2020-07-14 RX ADMIN — Medication 60 MILLIGRAM(S): at 05:32

## 2020-07-14 RX ADMIN — BUDESONIDE AND FORMOTEROL FUMARATE DIHYDRATE 2 PUFF(S): 160; 4.5 AEROSOL RESPIRATORY (INHALATION) at 08:29

## 2020-07-14 RX ADMIN — Medication 60 MILLIGRAM(S): at 14:07

## 2020-07-14 RX ADMIN — SODIUM CHLORIDE 70 MILLILITER(S): 9 INJECTION, SOLUTION INTRAVENOUS at 12:11

## 2020-07-14 RX ADMIN — PANTOPRAZOLE SODIUM 40 MILLIGRAM(S): 20 TABLET, DELAYED RELEASE ORAL at 17:59

## 2020-07-14 RX ADMIN — PANTOPRAZOLE SODIUM 40 MILLIGRAM(S): 20 TABLET, DELAYED RELEASE ORAL at 05:33

## 2020-07-14 NOTE — SWALLOW BEDSIDE ASSESSMENT ADULT - SWALLOW EVAL: DIAGNOSIS
Pt presents w/mild oral dysphagia w/pureed solids & nectar thick liquids, suspected premature pharyngeal entry 2* reduced oral manipulation resulting from reduced oral musculature strength; Suspected mod-severe pharyngeal dysphagia across all consistencies, reduced hyo-laryngeal elevation w/+multiple swallows (>5), delayed throat clearing following PO.

## 2020-07-14 NOTE — PROGRESS NOTE ADULT - SUBJECTIVE AND OBJECTIVE BOX
HPI:  An 89 yo Female patient with PMHx significant for DVT (11/2019) not AC, intermittent asthma (never intubated/no icu stays), polymyalgia rheumatica (chronic prednisone), dementia, osteoporosis with multiple vertebral compression, presents today BIBEMS due to chest pain and ZUNILDA.   Patient lethargic, unable to obtained detail history, most of information obtained from chart review. As per EMS documentation patient reported chest pain and SOB after having a family argument. Of note patient was dc today from rehab.   She was recently admitted at [facility] on June 2020, due to GI bleed found to have multiple duodenal ulcers.   At the ED was found to be hyperglycemic (636) 8 units of regular insulin; blood glucose dropped to 66, dextrose was given.  Also noted to have a fever Tmax 102 rectally and a + UA.     Attempted to speak to her Partha, but did not answer his phone (04 Jul 2020 22:50)     Allergies    dust (Unknown)  No Known Drug Allergies    Intolerances    provide [facility] soft diet (Unknown)    Chronic back pain  CHF (congestive heart failure)  Atrial fibrillation  Polymyalgia rheumatica  Diverticulosis  Asthma    MEDICATIONS  (STANDING):  budesonide 160 MICROgram(s)/formoterol 4.5 MICROgram(s) Inhaler 2 Puff(s) Inhalation two times a day  dextrose 5% + sodium chloride 0.45%. 1000 milliLiter(s) (70 mL/Hr) IV Continuous <Continuous>  digoxin  Injectable 0.25 milliGRAM(s) IV Push daily  diltiazem    Tablet 60 milliGRAM(s) Oral every 8 hours  pantoprazole  Injectable 40 milliGRAM(s) IV Push two times a day  polyethylene glycol 3350 17 Gram(s) Oral at bedtime  predniSONE   Tablet 10 milliGRAM(s) Oral daily  senna 2 Tablet(s) Oral at bedtime  tamsulosin 0.4 milliGRAM(s) Oral at bedtime    MEDICATIONS  (PRN):  acetaminophen   Tablet .. 650 milliGRAM(s) Oral every 4 hours PRN Mild Pain (1 - 3)  morphine  - Injectable 2 milliGRAM(s) IV Push every 4 hours PRN Severe Pain (7 - 10)  morphine Concentrate 5 milliGRAM(s) Oral every 4 hours PRN Moderate Pain (4 - 6)               ;  Vital Signs Last 24 Hrs  T(C): 36.4 (14 Jul 2020 15:33), Max: 36.8 (14 Jul 2020 05:02)  T(F): 97.6 (14 Jul 2020 15:33), Max: 98.2 (14 Jul 2020 05:02)  HR: 89 (14 Jul 2020 15:33) (82 - 98)  BP: 136/87 (14 Jul 2020 15:33) (100/65 - 148/81)  BP(mean): --  RR: 19 (14 Jul 2020 15:33) (18 - 19)  SpO2: 99% (14 Jul 2020 15:33) (93% - 100%)  CAPILLARY BLOOD GLUCOSE      07-13 @ 07:01  -  07-14 @ 07:00  --------------------------------------------------------  IN: 240 mL / OUT: 1200 mL / NET: -960 mL      Patient feeling better No CP, No SOB, No N/V +1/10 occasional pain back Failed swallow eval   HEENT: PEARLA No Thrush  Neck: Supple  Cardio: S1 S2 No Murmur  Pulm: CTA No Rales or Ronchi  Abd: Soft NT ND BS+ no  Suprapubic tenderness   Back  -  spine tenderness diffuse   Rectal - refused  Ext: No DCT  Skin: No Rash  Neuro: awake pleasant short term loss     Dysphagia - a history speech following   Shoulder pain - Xray - normal  Urinary Retention - Crandall flomax  Afib - cardio  Failure to thrive - continue supportive care   Chronic Back Pain -   PRN morphine increase long acting Oxycodone observe for SE,    Osteoporosis with compression fracture - pain control

## 2020-07-14 NOTE — SWALLOW BEDSIDE ASSESSMENT ADULT - COMMENTS
No further PO provided given Pt's current presentation, question if level of pain negatively impacting overall swallow profile

## 2020-07-14 NOTE — SWALLOW BEDSIDE ASSESSMENT ADULT - PHARYNGEAL PHASE
Decreased laryngeal elevation/Delayed pharyngeal swallow/Multiple swallows/Delayed throat clear post oral intake Delayed pharyngeal swallow/Multiple swallows/Decreased laryngeal elevation/Delayed throat clear post oral intake

## 2020-07-14 NOTE — SWALLOW BEDSIDE ASSESSMENT ADULT - SLP PERTINENT HISTORY OF CURRENT PROBLEM
As per MD note, "An 89 yo Female patient with PMHx significant for DVT (11/2019) not AC, intermittent asthma (never intubated/no icu stays), polymyalgia rheumatica (chronic prednisone), dementia, osteoporosis with multiple vertebral compression, presents today BIBEMS due to chest pain and SOB. Being admitted for chest pain, rule out ACS. +Sepsis 2* UTI".

## 2020-07-14 NOTE — CHART NOTE - NSCHARTNOTEFT_GEN_A_CORE
Source: Patient [ ]  Family [ ]   other [x ] pt sleeping    Current Diet: Diet, Pureed:   Dysphagia 2, Mechancial Soft, Nectar Consistency Fluid (DYS2NC) (07-13-20 @ 19:43)      PO intake:  Pt with decreased po intake at meals per RN flowsheets.  Pt is receiving mechanical soft/nectar as per physician note and requires total assistance at meals.    Current Weight:   (7/13) 77 lbs  (7/8) 92 lbs  (7/4) 89 lbs     % Weight Change - question accuracy of wts, will continue to monitor.  Aware pt with 1+trace generalized edema and 2+ mild edema B/L arms.    Pertinent Medications: MEDICATIONS  (STANDING):  budesonide 160 MICROgram(s)/formoterol 4.5 MICROgram(s) Inhaler 2 Puff(s) Inhalation two times a day  dextrose 5% + sodium chloride 0.45%. 1000 milliLiter(s) (70 mL/Hr) IV Continuous <Continuous>  digoxin  Injectable 0.25 milliGRAM(s) IV Push daily  diltiazem    Tablet 60 milliGRAM(s) Oral every 8 hours  pantoprazole  Injectable 40 milliGRAM(s) IV Push two times a day  polyethylene glycol 3350 17 Gram(s) Oral at bedtime  predniSONE   Tablet 10 milliGRAM(s) Oral daily  senna 2 Tablet(s) Oral at bedtime  tamsulosin 0.4 milliGRAM(s) Oral at bedtime    MEDICATIONS  (PRN):  acetaminophen   Tablet .. 650 milliGRAM(s) Oral every 4 hours PRN Mild Pain (1 - 3)  morphine  - Injectable 2 milliGRAM(s) IV Push every 4 hours PRN Severe Pain (7 - 10)  morphine Concentrate 5 milliGRAM(s) Oral every 4 hours PRN Moderate Pain (4 - 6)    Pertinent Labs: NA    Skin: stage I sacrum, stage 2 thoracic spine    Nutrition focused physical exam previously conducted - found signs of malnutrition [ ]absent [x ]present    Subcutaneous fat loss: [x ] Orbital fat pads region, [x ]Buccal fat region, [ ]Triceps region,  [ ]Ribs region    Muscle wasting: [x ]Temples region, [x ]Clavicle region, [x ]Shoulder region, [ ]Scapula region, [ ]Interosseous region,  [ ]thigh region, [ ]Calf region    Current Nutrition Diagnosis: Pt remains at nutrition risk secondary to severe protein calorie malnutrition (chronic) related to inability to meet sufficient protein energy requirements with poor appetite in setting of advanced age, dementia, recent GI bleed found to have multiple duodenal ulcers and skin breakdown as evidenced by wt loss of 8# (8.69%) over past 6 months and severe muscle wasting/fat loss.  Pt with poor po intake at meals with total assist.  Aware palliative care following for GOC.    Recommendations:   1. RX: Ensure TID  2. RX: MVI and Vit C daily   3. Monitor daily wts     Monitoring and Evaluation:   [ x] PO intake [x ] Tolerance to diet prescription [X] Weights  [X] Follow up per protocol [X] Labs:

## 2020-07-14 NOTE — SWALLOW BEDSIDE ASSESSMENT ADULT - ASR SWALLOW ASPIRATION MONITOR
cough/fever/pneumonia/change of breathing pattern/position upright (90Y)/gurgly voice/throat clearing/upper respiratory infection/oral hygiene

## 2020-07-14 NOTE — SWALLOW BEDSIDE ASSESSMENT ADULT - SLP GENERAL OBSERVATIONS
Pt recd supine in bed, A&A Ox2, c/o 10/10 back pain, repeatedly asking for pain meds/toilet, assisted to bedpan w/PT, +small BM. Difficulty at re-position upright for PO, though eventual success, tolerating RA SpO2 98% throughout.

## 2020-07-14 NOTE — SWALLOW BEDSIDE ASSESSMENT ADULT - ORAL PHASE
suspected premature pharyngeal entry/Decreased anterior-posterior movement of the bolus suspected premature pharyngeal entry/Decreased anterior-posterior movement of the bolus/Delayed oral transit time

## 2020-07-14 NOTE — SWALLOW BEDSIDE ASSESSMENT ADULT - SWALLOW EVAL: RECOMMENDED DIET
NPO w/consideration for short-term non-oral alternative means of nutrition/hydration as per Pt/family wishes

## 2020-07-14 NOTE — PROGRESS NOTE ADULT - ASSESSMENT
An 89 yo Female patient with PMHx significant for DVT (11/2019) not AC, intermittent asthma (never intubated/no icu stays), polymyalgia rheumatica (chronic prednisone), dementia, osteoporosis with multiple vertebral compression, presents today BIBEMS due to chest pain and SOB. Being admitted for chest pain, rule out ACS  At the ED was found to be hyperglycemic (636) 8 units of regular insulin; blood glucose dropped to 66, dextrose was given. Also noted to have a fever Tmax 102 rectally and a + UA          Sepsis secondary to UTI   +UA   + urine culture with Ecoli  on Rocephin completed 5 days     Patient with severe abdominal pain and distention   Crandall placed for urinary retention - failed trials of void x 3         Lethargy secondary to Iatrogenic Induced Hypoglycemia   Glucose of 636 on BMP, unlikely real  Pt was given 8 U of regular insulin by ED staff, glucose dropped to 66, pt required D50% 25 mg x2  Will avoid Insulin at this time   currently on diet         Chest Pain,    Trops -x3,   [facility] called   pt currently rate controlled       Afib RVR and Aflutter   S/p Cardizem IVP x1 in ED and lopressor x 1 last night -- helped for a short period of time but pts BP was 114/60   CHADSVASC: 4  Not on AC due to GI bleed   approx 1 month ago was admitted for GIB     Duodenal ulcers  -recent gib w/ ulcers found on EGD  on protonix bid     Hypokalemia   K: 2.9 on admission   S/p K raiders x2           Polymyalgia Rheumatica  Hold Prednisone while NPO  if bp drops consider stress dose steroids.       Mild-Moderate Intermittent Asthma  Continue Symbicort/Albuterol PRN  O2 via NC if needed PRN titrate FIO2 >90% and <96%    Failure to thrive   Patient cachectic     pt with dysphagia issues at dinner   ST eval this morning         DVT Prophylaxis   Intermittent compression     Code status -- pt with MOLST   DNR/ DNI     family and insurance issues   d/c on hold until resolved

## 2020-07-14 NOTE — PROGRESS NOTE ADULT - SUBJECTIVE AND OBJECTIVE BOX
HPI:  An 89 yo Female patient with PMHx significant for DVT (11/2019) not AC, intermittent asthma (never intubated/no icu stays), polymyalgia rheumatica (chronic prednisone), dementia, osteoporosis with multiple vertebral compression, presents today BIBEMS due to chest pain and ZUNILDA.   Patient lethargic, unable to obtained detail history, most of information obtained from chart review. As per EMS documentation patient reported chest pain and SOB after having a family argument. Of note patient was dc today from rehab.   She was recently admitted at [facility] on June 2020, due to GI bleed found to have multiple duodenal ulcers.   At the ED was found to be hyperglycemic (636) 8 units of regular insulin; blood glucose dropped to 66, dextrose was given.  Also noted to have a fever Tmax 102 rectally and a + UA.     Attempted to speak to her Partha, but did not answer his phone (04 Jul 2020 22:50)      PAST MEDICAL & SURGICAL HISTORY:  Chronic back pain  CHF (congestive heart failure)  Atrial fibrillation  Polymyalgia rheumatica  Diverticulosis  Asthma  S/P knee replacement, left  S/P hysterectomy: 1982  S/P appendectomy: 1962          PULMONARY:  budesonide 160 MICROgram(s)/formoterol 4.5 MICROgram(s) Inhaler 2 Puff(s) Inhalation two times a day    Cardio:   dig  Cardizem     GATROINTESTINAL:  pantoprazole  Injectable 40 milliGRAM(s) IV Push two times a day     MEDS:    ENDO/METABOLIC:  dextrose 40% Gel 15 Gram(s) Oral once PRN  dextrose 50% Injectable 12.5 Gram(s) IV Push once  dextrose 50% Injectable 25 Gram(s) IV Push once  dextrose 50% Injectable 25 Gram(s) IV Push once  glucagon  Injectable 1 milliGRAM(s) IntraMuscular once PRN    IV FLUID/NUTRITION:  dextrose 5%. 1000 milliLiter(s) IV Continuous <Continuous>  multiple electrolytes Injection Type 1 1000 milliLiter(s) IV Continuous <Continuous>            Allergies    dust (Unknown)  No Known Drug Allergies    Intolerances    provide [facility] soft diet (Unknown)      SOCIAL HISTORY:    FAMILY HISTORY:        Vital Signs Last 24 Hrs  T(C): 36.8 (14 Jul 2020 05:02), Max: 36.8 (14 Jul 2020 05:02)  T(F): 98.2 (14 Jul 2020 05:02), Max: 98.2 (14 Jul 2020 05:02)  HR: 88 (14 Jul 2020 05:02) (83 - 99)  BP: 139/88 (14 Jul 2020 05:02) (137/87 - 149/78)  BP(mean): --  RR: 18 (14 Jul 2020 05:02) (16 - 18)  SpO2: 100% (14 Jul 2020 05:02) (94% - 100%)        	          REVIEW OF SYSTEMS    General: moaning at times 	  Respiratory: no complaints   Cardiovascular:	no complaints   Gastrointestinal:	no complaints   Genitourinary:	no complaints   Musculoskeletal:	 c/o back pain  Neurological:	AAO x 3          PHYSICAL EXAM:    GENERAL: NAD, well-groomed, well-developed  HEAD:  Atraumatic, Normocephalic  NERVOUS SYSTEM:  moaning   CHEST/LUNG: Clear to percussion bilaterally; No rales, rhonchi, wheezing, or rubs  HEART: regular - rate controlled   ABDOMEN: Soft, Nontender, Nondistended; Bowel sounds present  EXTREMITIES:  2+ Peripheral Pulses, cold to touch         LABS:                                                          urine culture :   positive with E coli   Blood cul x 2 negative x 48 hrs       RADIOLOGY & ADDITIONAL STUDIES:

## 2020-07-15 RX ORDER — OXYCODONE HYDROCHLORIDE 5 MG/1
10 TABLET ORAL EVERY 12 HOURS
Refills: 0 | Status: DISCONTINUED | OUTPATIENT
Start: 2020-07-15 | End: 2020-07-22

## 2020-07-15 RX ADMIN — PANTOPRAZOLE SODIUM 40 MILLIGRAM(S): 20 TABLET, DELAYED RELEASE ORAL at 17:18

## 2020-07-15 RX ADMIN — Medication 650 MILLIGRAM(S): at 02:59

## 2020-07-15 RX ADMIN — Medication 650 MILLIGRAM(S): at 04:45

## 2020-07-15 RX ADMIN — Medication 650 MILLIGRAM(S): at 14:04

## 2020-07-15 RX ADMIN — Medication 60 MILLIGRAM(S): at 14:04

## 2020-07-15 RX ADMIN — TAMSULOSIN HYDROCHLORIDE 0.4 MILLIGRAM(S): 0.4 CAPSULE ORAL at 21:16

## 2020-07-15 RX ADMIN — OXYCODONE HYDROCHLORIDE 10 MILLIGRAM(S): 5 TABLET ORAL at 21:16

## 2020-07-15 RX ADMIN — BUDESONIDE AND FORMOTEROL FUMARATE DIHYDRATE 2 PUFF(S): 160; 4.5 AEROSOL RESPIRATORY (INHALATION) at 08:41

## 2020-07-15 RX ADMIN — OXYCODONE HYDROCHLORIDE 10 MILLIGRAM(S): 5 TABLET ORAL at 00:22

## 2020-07-15 RX ADMIN — PANTOPRAZOLE SODIUM 40 MILLIGRAM(S): 20 TABLET, DELAYED RELEASE ORAL at 06:16

## 2020-07-15 RX ADMIN — Medication 60 MILLIGRAM(S): at 21:16

## 2020-07-15 RX ADMIN — Medication 650 MILLIGRAM(S): at 14:56

## 2020-07-15 RX ADMIN — Medication 0.25 MILLIGRAM(S): at 08:25

## 2020-07-15 RX ADMIN — BUDESONIDE AND FORMOTEROL FUMARATE DIHYDRATE 2 PUFF(S): 160; 4.5 AEROSOL RESPIRATORY (INHALATION) at 20:35

## 2020-07-15 RX ADMIN — Medication 60 MILLIGRAM(S): at 06:16

## 2020-07-15 NOTE — PROGRESS NOTE ADULT - ASSESSMENT
An 87 yo Female patient with PMHx significant for DVT (11/2019) not AC, intermittent asthma (never intubated/no icu stays), polymyalgia rheumatica (chronic prednisone), dementia, osteoporosis with multiple vertebral compression, presents today BIBEMS due to chest pain and SOB. Being admitted for chest pain, rule out ACS  At the ED was found to be hyperglycemic (636) 8 units of regular insulin; blood glucose dropped to 66, dextrose was given. Also noted to have a fever Tmax 102 rectally and a + UA          Sepsis secondary to UTI   +UA   + urine culture with Ecoli  on Rocephin completed 5 days     Patient with severe abdominal pain and distention   Crandall placed for urinary retention - failed trials of void x 3         Lethargy secondary to Iatrogenic Induced Hypoglycemia   Glucose of 636 on BMP, unlikely real  Pt was given 8 U of regular insulin by ED staff, glucose dropped to 66, pt required D50% 25 mg x2  Will avoid Insulin at this time   currently on diet         Chest Pain,    Trops -x3,   [facility] called   pt currently rate controlled       Afib RVR and Aflutter   S/p Cardizem IVP x1 in ED and lopressor x 1 last night -- helped for a short period of time but pts BP was 114/60   CHADSVASC: 4  Not on AC due to GI bleed   approx 1 month ago was admitted for GIB     Duodenal ulcers  -recent gib w/ ulcers found on EGD  on protonix bid     Hypokalemia   K: 2.9 on admission   S/p K raiders x2           Polymyalgia Rheumatica  Hold Prednisone while NPO  if bp drops consider stress dose steroids.       Mild-Moderate Intermittent Asthma  Continue Symbicort/Albuterol PRN  O2 via NC if needed PRN titrate FIO2 >90% and <96%    Failure to thrive   Patient cachectic     Dysphagia   appreciate swallow eval   currently on dysphagia 2, nectar consistency fluids         DVT Prophylaxis   Intermittent compression     Code status -- pt with MOLST   DNR/ DNI     family and insurance issues   d/c on hold until resolved

## 2020-07-15 NOTE — PROGRESS NOTE ADULT - SUBJECTIVE AND OBJECTIVE BOX
HPI:  An 89 yo Female patient with PMHx significant for DVT (11/2019) not AC, intermittent asthma (never intubated/no icu stays), polymyalgia rheumatica (chronic prednisone), dementia, osteoporosis with multiple vertebral compression, presents today BIBEMS due to chest pain and ZUNILDA.   Patient lethargic, unable to obtained detail history, most of information obtained from chart review. As per EMS documentation patient reported chest pain and SOB after having a family argument. Of note patient was dc today from rehab.   She was recently admitted at [facility] on June 2020, due to GI bleed found to have multiple duodenal ulcers.   At the ED was found to be hyperglycemic (636) 8 units of regular insulin; blood glucose dropped to 66, dextrose was given.  Also noted to have a fever Tmax 102 rectally and a + UA.     Attempted to speak to her Partha, but did not answer his phone (04 Jul 2020 22:50)      PAST MEDICAL & SURGICAL HISTORY:  Chronic back pain  CHF (congestive heart failure)  Atrial fibrillation  Polymyalgia rheumatica  Diverticulosis  Asthma  S/P knee replacement, left  S/P hysterectomy: 1982  S/P appendectomy: 1962          PULMONARY:  budesonide 160 MICROgram(s)/formoterol 4.5 MICROgram(s) Inhaler 2 Puff(s) Inhalation two times a day    Cardio:   dig  Cardizem     GATROINTESTINAL:  pantoprazole  Injectable 40 milliGRAM(s) IV Push two times a day     MEDS:    ENDO/METABOLIC:  dextrose 40% Gel 15 Gram(s) Oral once PRN  dextrose 50% Injectable 12.5 Gram(s) IV Push once  dextrose 50% Injectable 25 Gram(s) IV Push once  dextrose 50% Injectable 25 Gram(s) IV Push once  glucagon  Injectable 1 milliGRAM(s) IntraMuscular once PRN    IV FLUID/NUTRITION:  dextrose 5%. 1000 milliLiter(s) IV Continuous <Continuous>  multiple electrolytes Injection Type 1 1000 milliLiter(s) IV Continuous <Continuous>            Allergies    dust (Unknown)  No Known Drug Allergies    Intolerances    provide [facility] soft diet (Unknown)      SOCIAL HISTORY:    FAMILY HISTORY:        Vital Signs Last 24 Hrs  T(C): 36.6 (15 Jul 2020 08:27), Max: 36.6 (14 Jul 2020 21:45)  T(F): 97.8 (15 Jul 2020 08:27), Max: 97.9 (14 Jul 2020 21:45)  HR: 76 (15 Jul 2020 08:42) (75 - 92)  BP: 100/72 (15 Jul 2020 08:27) (100/65 - 136/87)  BP(mean): --  RR: 19 (15 Jul 2020 08:27) (19 - 19)  SpO2: 98% (15 Jul 2020 08:42) (95% - 99%)        	          REVIEW OF SYSTEMS    General: moaning at times 	  Respiratory: no complaints   Cardiovascular:	no complaints   Gastrointestinal:	no complaints   Genitourinary:	no complaints   Musculoskeletal:	 c/o back pain  Neurological:	AAO x 3          PHYSICAL EXAM:    GENERAL: NAD, well-groomed, well-developed  HEAD:  Atraumatic, Normocephalic  NERVOUS SYSTEM:  moaning   CHEST/LUNG: Clear to percussion bilaterally; No rales, rhonchi, wheezing, or rubs  HEART: regular - rate controlled   ABDOMEN: Soft, Nontender, Nondistended; Bowel sounds present  EXTREMITIES:  2+ Peripheral Pulses, cold to touch         LABS:                                                          urine culture :   positive with E coli   Blood cul x 2 negative x 48 hrs       RADIOLOGY & ADDITIONAL STUDIES:

## 2020-07-16 PROCEDURE — 71045 X-RAY EXAM CHEST 1 VIEW: CPT | Mod: 26

## 2020-07-16 RX ORDER — ALPRAZOLAM 0.25 MG
0.25 TABLET ORAL ONCE
Refills: 0 | Status: DISCONTINUED | OUTPATIENT
Start: 2020-07-16 | End: 2020-07-16

## 2020-07-16 RX ORDER — DIGOXIN 250 MCG
0.25 TABLET ORAL DAILY
Refills: 0 | Status: DISCONTINUED | OUTPATIENT
Start: 2020-07-16 | End: 2020-07-27

## 2020-07-16 RX ADMIN — PANTOPRAZOLE SODIUM 40 MILLIGRAM(S): 20 TABLET, DELAYED RELEASE ORAL at 17:16

## 2020-07-16 RX ADMIN — Medication 650 MILLIGRAM(S): at 03:44

## 2020-07-16 RX ADMIN — Medication 0.25 MILLIGRAM(S): at 11:10

## 2020-07-16 RX ADMIN — OXYCODONE HYDROCHLORIDE 10 MILLIGRAM(S): 5 TABLET ORAL at 17:16

## 2020-07-16 RX ADMIN — OXYCODONE HYDROCHLORIDE 10 MILLIGRAM(S): 5 TABLET ORAL at 08:06

## 2020-07-16 RX ADMIN — Medication 60 MILLIGRAM(S): at 23:13

## 2020-07-16 RX ADMIN — PANTOPRAZOLE SODIUM 40 MILLIGRAM(S): 20 TABLET, DELAYED RELEASE ORAL at 05:59

## 2020-07-16 RX ADMIN — Medication 650 MILLIGRAM(S): at 23:14

## 2020-07-16 RX ADMIN — Medication 650 MILLIGRAM(S): at 11:45

## 2020-07-16 RX ADMIN — Medication 650 MILLIGRAM(S): at 04:15

## 2020-07-16 RX ADMIN — BUDESONIDE AND FORMOTEROL FUMARATE DIHYDRATE 2 PUFF(S): 160; 4.5 AEROSOL RESPIRATORY (INHALATION) at 08:56

## 2020-07-16 RX ADMIN — Medication 60 MILLIGRAM(S): at 05:59

## 2020-07-16 RX ADMIN — OXYCODONE HYDROCHLORIDE 10 MILLIGRAM(S): 5 TABLET ORAL at 05:59

## 2020-07-16 RX ADMIN — Medication 60 MILLIGRAM(S): at 14:57

## 2020-07-16 RX ADMIN — Medication 0.25 MILLIGRAM(S): at 17:16

## 2020-07-16 RX ADMIN — Medication 650 MILLIGRAM(S): at 09:32

## 2020-07-16 RX ADMIN — TAMSULOSIN HYDROCHLORIDE 0.4 MILLIGRAM(S): 0.4 CAPSULE ORAL at 23:13

## 2020-07-16 RX ADMIN — SODIUM CHLORIDE 70 MILLILITER(S): 9 INJECTION, SOLUTION INTRAVENOUS at 23:13

## 2020-07-16 NOTE — PROGRESS NOTE ADULT - SUBJECTIVE AND OBJECTIVE BOX
HPI:  An 89 yo Female patient with PMHx significant for DVT (11/2019) not AC, intermittent asthma (never intubated/no icu stays), polymyalgia rheumatica (chronic prednisone), dementia, osteoporosis with multiple vertebral compression, presents today BIBEMS due to chest pain and ZUNILDA.   Patient lethargic, unable to obtained detail history, most of information obtained from chart review. As per EMS documentation patient reported chest pain and SOB after having a family argument. Of note patient was dc today from rehab.   She was recently admitted at [facility] on June 2020, due to GI bleed found to have multiple duodenal ulcers.   At the ED was found to be hyperglycemic (636) 8 units of regular insulin; blood glucose dropped to 66, dextrose was given.  Also noted to have a fever Tmax 102 rectally and a + UA.     Attempted to speak to her Partha, but did not answer his phone (04 Jul 2020 22:50)     Allergies    dust (Unknown)  No Known Drug Allergies    Intolerances      Chronic back pain  CHF (congestive heart failure)  Atrial fibrillation  Polymyalgia rheumatica  Diverticulosis  Asthma    MEDICATIONS  (STANDING):  budesonide 160 MICROgram(s)/formoterol 4.5 MICROgram(s) Inhaler 2 Puff(s) Inhalation two times a day  dextrose 5% + sodium chloride 0.45%. 1000 milliLiter(s) (70 mL/Hr) IV Continuous <Continuous>  digoxin     Tablet 0.25 milliGRAM(s) Oral daily  diltiazem    Tablet 60 milliGRAM(s) Oral every 8 hours  oxyCODONE  ER Tablet 10 milliGRAM(s) Oral every 12 hours  pantoprazole  Injectable 40 milliGRAM(s) IV Push two times a day  polyethylene glycol 3350 17 Gram(s) Oral at bedtime  predniSONE   Tablet 10 milliGRAM(s) Oral daily  senna 2 Tablet(s) Oral at bedtime  tamsulosin 0.4 milliGRAM(s) Oral at bedtime    MEDICATIONS  (PRN):  acetaminophen   Tablet .. 650 milliGRAM(s) Oral every 4 hours PRN Mild Pain (1 - 3)               ;  Vital Signs Last 24 Hrs  T(C): 37 (16 Jul 2020 15:40), Max: 37 (16 Jul 2020 15:40)  T(F): 98.6 (16 Jul 2020 15:40), Max: 98.6 (16 Jul 2020 15:40)  HR: 76 (16 Jul 2020 15:40) (72 - 86)  BP: 108/64 (16 Jul 2020 15:40) (103/65 - 120/75)  BP(mean): --  RR: 20 (16 Jul 2020 15:40) (17 - 20)  SpO2: 98% (16 Jul 2020 15:40) (94% - 100%)  CAPILLARY BLOOD GLUCOSE      07-15 @ 07:01  -  07-16 @ 07:00  --------------------------------------------------------  IN: 770 mL / OUT: 1300 mL / NET: -530 mL    CXR - no infiltrate yet     Patient feeling better No CP, Mild SOB, No N/V +2/10 occasional pain back Failed swallow eval had desaturation today   HEENT: PEARLA No Thrush  Neck: Supple  Cardio: S1 S2 No Murmur  Pulm: CTA No Rales or Ronchi  Abd: Soft NT ND BS+ no  Suprapubic tenderness   Back  -  spine tenderness diffuse   Rectal - refused  Ext: No DCT  Skin: No Rash  Neuro: awake pleasant mild short term loss     Dysphagia - a history speech following continue pleasure feedings pat accepts increase risk of aspiration and death   Desaturation - will follow CXR normal cont palliative care   Urinary Retention - Crandall flomax  Afib - cardio  Failure to thrive - continue supportive care   Chronic Back Pain -   PRN morphine increase long acting Oxycodone observe for SE,    Osteoporosis with compression fracture - pain control

## 2020-07-16 NOTE — PROGRESS NOTE ADULT - ASSESSMENT
An 89 yo Female patient with PMHx significant for DVT (11/2019) not AC, intermittent asthma (never intubated/no icu stays), polymyalgia rheumatica (chronic prednisone), dementia, osteoporosis with multiple vertebral compression, presents today BIBEMS due to chest pain and SOB. Being admitted for chest pain, rule out ACS  At the ED was found to be hyperglycemic (636) 8 units of regular insulin; blood glucose dropped to 66, dextrose was given. Also noted to have a fever Tmax 102 rectally and a + UA          Sepsis secondary to UTI   +UA   + urine culture with Ecoli  on Rocephin completed 5 days     Patient with severe abdominal pain and distention   Crandall placed for urinary retention - failed trials of void x 3         Lethargy secondary to Iatrogenic Induced Hypoglycemia   Glucose of 636 on BMP, unlikely real  Pt was given 8 U of regular insulin by ED staff, glucose dropped to 66, pt required D50% 25 mg x2  Will avoid Insulin at this time   currently on diet         Chest Pain,    Trops -x3,   [facility] called   pt currently rate controlled       Afib RVR and Aflutter   S/p Cardizem IVP x1 in ED and lopressor x 1 last night -- helped for a short period of time but pts BP was 114/60   CHADSVASC: 4  Not on AC due to GI bleed   approx 1 month ago was admitted for GIB     Duodenal ulcers  -recent gib w/ ulcers found on EGD  on protonix bid     Hypokalemia   K: 2.9 on admission   S/p K raiders x2           Polymyalgia Rheumatica  Hold Prednisone while NPO  if bp drops consider stress dose steroids.       Mild-Moderate Intermittent Asthma  Continue Symbicort/Albuterol PRN  O2 via NC if needed PRN titrate FIO2 >90% and <96%    Failure to thrive   Patient cachectic     Dysphagia   appreciate swallow eval   currently on dysphagia 2, nectar consistency fluids         DVT Prophylaxis   Intermittent compression     Code status -- pt with MOLST   DNR/ DNI     family and insurance issues   d/c on hold until resolved

## 2020-07-16 NOTE — PROGRESS NOTE ADULT - SUBJECTIVE AND OBJECTIVE BOX
HPI:  An 87 yo Female patient with PMHx significant for DVT (11/2019) not AC, intermittent asthma (never intubated/no icu stays), polymyalgia rheumatica (chronic prednisone), dementia, osteoporosis with multiple vertebral compression, presents today BIBEMS due to chest pain and ZUNILDA.   Patient lethargic, unable to obtained detail history, most of information obtained from chart review. As per EMS documentation patient reported chest pain and SOB after having a family argument. Of note patient was dc today from rehab.   She was recently admitted at [facility] on June 2020, due to GI bleed found to have multiple duodenal ulcers.   At the ED was found to be hyperglycemic ([phone] 8 units of regular insulin; blood glucose dropped to 66, dextrose was given.  Also noted to have a fever Tmax 102 rectally and a + UA.     Attempted to speak to her Partha, but did not answer his phone (04 Jul 2020 22:50)      PAST MEDICAL & SURGICAL HISTORY:  Chronic back pain  CHF (congestive heart failure)  Atrial fibrillation  Polymyalgia rheumatica  Diverticulosis  Asthma  S/P knee replacement, left  S/P hysterectomy: 1982  S/P appendectomy: 1962          PULMONARY:  budesonide 160 MICROgram(s)/formoterol 4.5 MICROgram(s) Inhaler 2 Puff(s) Inhalation two times a day    Cardio:   dig  Cardizem     GATROINTESTINAL:  pantoprazole  Injectable 40 milliGRAM(s) IV Push two times a day     MEDS:    ENDO/METABOLIC:  dextrose 40% Gel 15 Gram(s) Oral once PRN  dextrose 50% Injectable 12.5 Gram(s) IV Push once  dextrose 50% Injectable 25 Gram(s) IV Push once  dextrose 50% Injectable 25 Gram(s) IV Push once  glucagon  Injectable 1 milliGRAM(s) IntraMuscular once PRN    IV FLUID/NUTRITION:  dextrose 5%. 1000 milliLiter(s) IV Continuous <Continuous>  multiple electrolytes Injection Type 1 1000 milliLiter(s) IV Continuous <Continuous>            Allergies    dust (Unknown)  No Known Drug Allergies    Intolerances    provide [facility] soft diet (Unknown)      SOCIAL HISTORY:    FAMILY HISTORY:        Vital Signs Last 24 Hrs  T(C): 36.8 (16 Jul 2020 05:57), Max: 36.8 (16 Jul 2020 05:57)  T(F): 98.2 (16 Jul 2020 05:57), Max: 98.2 (16 Jul 2020 05:57)  HR: 82 (16 Jul 2020 05:57) (65 - 82)  BP: 103/65 (16 Jul 2020 05:57) (103/65 - 148/78)  BP(mean): --  RR: 17 (16 Jul 2020 05:57) (17 - 18)  SpO2: 94% (16 Jul 2020 05:57) (94% - 100%)        	          REVIEW OF SYSTEMS    General: moaning at times 	  Respiratory: no complaints   Cardiovascular:	no complaints   Gastrointestinal:	no complaints   Genitourinary:	no complaints   Musculoskeletal:	 c/o back pain  Neurological:	AAO x 3          PHYSICAL EXAM:    GENERAL: NAD, well-groomed, well-developed  HEAD:  Atraumatic, Normocephalic  NERVOUS SYSTEM:  moaning   CHEST/LUNG: Clear to percussion bilaterally; No rales, rhonchi, wheezing, or rubs  HEART: regular - rate controlled   ABDOMEN: Soft, Nontender, Nondistended; Bowel sounds present  EXTREMITIES:  2+ Peripheral Pulses, cold to touch         LABS:                                                          urine culture :   positive with E coli   Blood cul x 2 negative x 48 hrs       RADIOLOGY & ADDITIONAL STUDIES:

## 2020-07-17 LAB
GLUCOSE BLDC GLUCOMTR-MCNC: 116 MG/DL — HIGH (ref 70–99)
GLUCOSE BLDC GLUCOMTR-MCNC: 118 MG/DL — HIGH (ref 70–99)
GLUCOSE BLDC GLUCOMTR-MCNC: 178 MG/DL — HIGH (ref 70–99)
GLUCOSE BLDC GLUCOMTR-MCNC: 216 MG/DL — HIGH (ref 70–99)

## 2020-07-17 RX ADMIN — BUDESONIDE AND FORMOTEROL FUMARATE DIHYDRATE 2 PUFF(S): 160; 4.5 AEROSOL RESPIRATORY (INHALATION) at 08:30

## 2020-07-17 RX ADMIN — Medication 0.25 MILLIGRAM(S): at 06:06

## 2020-07-17 RX ADMIN — Medication 60 MILLIGRAM(S): at 13:36

## 2020-07-17 RX ADMIN — OXYCODONE HYDROCHLORIDE 10 MILLIGRAM(S): 5 TABLET ORAL at 06:06

## 2020-07-17 RX ADMIN — Medication 60 MILLIGRAM(S): at 06:06

## 2020-07-17 RX ADMIN — SODIUM CHLORIDE 70 MILLILITER(S): 9 INJECTION, SOLUTION INTRAVENOUS at 11:10

## 2020-07-17 RX ADMIN — PANTOPRAZOLE SODIUM 40 MILLIGRAM(S): 20 TABLET, DELAYED RELEASE ORAL at 17:08

## 2020-07-17 RX ADMIN — TAMSULOSIN HYDROCHLORIDE 0.4 MILLIGRAM(S): 0.4 CAPSULE ORAL at 21:12

## 2020-07-17 RX ADMIN — PANTOPRAZOLE SODIUM 40 MILLIGRAM(S): 20 TABLET, DELAYED RELEASE ORAL at 06:07

## 2020-07-17 RX ADMIN — OXYCODONE HYDROCHLORIDE 10 MILLIGRAM(S): 5 TABLET ORAL at 17:07

## 2020-07-17 RX ADMIN — Medication 60 MILLIGRAM(S): at 21:12

## 2020-07-17 RX ADMIN — Medication 650 MILLIGRAM(S): at 22:18

## 2020-07-17 RX ADMIN — Medication 650 MILLIGRAM(S): at 21:33

## 2020-07-17 RX ADMIN — Medication 650 MILLIGRAM(S): at 02:40

## 2020-07-17 RX ADMIN — OXYCODONE HYDROCHLORIDE 10 MILLIGRAM(S): 5 TABLET ORAL at 18:14

## 2020-07-17 RX ADMIN — BUDESONIDE AND FORMOTEROL FUMARATE DIHYDRATE 2 PUFF(S): 160; 4.5 AEROSOL RESPIRATORY (INHALATION) at 21:21

## 2020-07-17 NOTE — PROGRESS NOTE ADULT - SUBJECTIVE AND OBJECTIVE BOX
HPI:  An 89 yo Female patient with PMHx significant for DVT (11/2019) not AC, intermittent asthma (never intubated/no icu stays), polymyalgia rheumatica (chronic prednisone), dementia, osteoporosis with multiple vertebral compression, presents today BIBEMS due to chest pain and ZUNILDA.   Patient lethargic, unable to obtained detail history, most of information obtained from chart review. As per EMS documentation patient reported chest pain and SOB after having a family argument. Of note patient was dc today from rehab.   She was recently admitted at [facility] on June 2020, due to GI bleed found to have multiple duodenal ulcers.   At the ED was found to be hyperglycemic (636) 8 units of regular insulin; blood glucose dropped to 66, dextrose was given.  Also noted to have a fever Tmax 102 rectally and a + UA.     Attempted to speak to her Partha, but did not answer his phone (04 Jul 2020 22:50)      PAST MEDICAL & SURGICAL HISTORY:  Chronic back pain  CHF (congestive heart failure)  Atrial fibrillation  Polymyalgia rheumatica  Diverticulosis  Asthma  S/P knee replacement, left  S/P hysterectomy: 1982  S/P appendectomy: 1962          PULMONARY:  budesonide 160 MICROgram(s)/formoterol 4.5 MICROgram(s) Inhaler 2 Puff(s) Inhalation two times a day    Cardio:   dig  Cardizem     GATROINTESTINAL:  pantoprazole  Injectable 40 milliGRAM(s) IV Push two times a day     MEDS:    ENDO/METABOLIC:  dextrose 40% Gel 15 Gram(s) Oral once PRN  dextrose 50% Injectable 12.5 Gram(s) IV Push once  dextrose 50% Injectable 25 Gram(s) IV Push once  dextrose 50% Injectable 25 Gram(s) IV Push once  glucagon  Injectable 1 milliGRAM(s) IntraMuscular once PRN    IV FLUID/NUTRITION:  dextrose 5%. 1000 milliLiter(s) IV Continuous <Continuous>  multiple electrolytes Injection Type 1 1000 milliLiter(s) IV Continuous <Continuous>            Allergies    dust (Unknown)  No Known Drug Allergies    Intolerances    provide [facility] soft diet (Unknown)      SOCIAL HISTORY:    FAMILY HISTORY:        Vital Signs Last 24 Hrs  T(C): 36.4 (17 Jul 2020 07:10), Max: 37.1 (16 Jul 2020 23:50)  T(F): 97.5 (17 Jul 2020 07:10), Max: 98.8 (16 Jul 2020 23:50)  HR: 83 (17 Jul 2020 07:10) (67 - 90)  BP: 141/70 (17 Jul 2020 07:10) (108/64 - 159/62)  BP(mean): --  RR: 18 (17 Jul 2020 07:10) (18 - 20)  SpO2: 95% (17 Jul 2020 07:10) (94% - 98%)        	          REVIEW OF SYSTEMS    General: moaning at times 	  Respiratory: no complaints   Cardiovascular:	no complaints   Gastrointestinal:	no complaints   Genitourinary:	no complaints   Musculoskeletal:	 c/o back pain  Neurological:	AAO x 3          PHYSICAL EXAM:    GENERAL: NAD, well-groomed, well-developed  HEAD:  Atraumatic, Normocephalic  NERVOUS SYSTEM:  moaning   CHEST/LUNG: Clear to percussion bilaterally; No rales, rhonchi, wheezing, or rubs  HEART: regular - rate controlled   ABDOMEN: Soft, Nontender, Nondistended; Bowel sounds present  EXTREMITIES:  2+ Peripheral Pulses, cold to touch         LABS:                                                          urine culture :   positive with E coli   Blood cul x 2 negative x 48 hrs       RADIOLOGY & ADDITIONAL STUDIES:

## 2020-07-17 NOTE — PROGRESS NOTE ADULT - SUBJECTIVE AND OBJECTIVE BOX
HPI:  An 89 yo Female patient with PMHx significant for DVT (11/2019) not AC, intermittent asthma (never intubated/no icu stays), polymyalgia rheumatica (chronic prednisone), dementia, osteoporosis with multiple vertebral compression, presents today BIBEMS due to chest pain and ZUNILDA.   Patient lethargic, unable to obtained detail history, most of information obtained from chart review. As per EMS documentation patient reported chest pain and SOB after having a family argument. Of note patient was dc today from rehab.   She was recently admitted at [facility] on June 2020, due to GI bleed found to have multiple duodenal ulcers.   At the ED was found to be hyperglycemic (636) 8 units of regular insulin; blood glucose dropped to 66, dextrose was given.  Also noted to have a fever Tmax 102 rectally and a + UA.     Attempted to speak to her Partha, but did not answer his phone (04 Jul 2020 22:50)     Allergies    dust (Unknown)  No Known Drug Allergies    Intolerances      Chronic back pain  CHF (congestive heart failure)  Atrial fibrillation  Polymyalgia rheumatica  Diverticulosis  Asthma    MEDICATIONS  (STANDING):  budesonide 160 MICROgram(s)/formoterol 4.5 MICROgram(s) Inhaler 2 Puff(s) Inhalation two times a day  dextrose 5% + sodium chloride 0.45%. 1000 milliLiter(s) (70 mL/Hr) IV Continuous <Continuous>  digoxin     Tablet 0.25 milliGRAM(s) Oral daily  diltiazem    Tablet 60 milliGRAM(s) Oral every 8 hours  oxyCODONE  ER Tablet 10 milliGRAM(s) Oral every 12 hours  pantoprazole  Injectable 40 milliGRAM(s) IV Push two times a day  polyethylene glycol 3350 17 Gram(s) Oral at bedtime  predniSONE   Tablet 10 milliGRAM(s) Oral daily  senna 2 Tablet(s) Oral at bedtime  tamsulosin 0.4 milliGRAM(s) Oral at bedtime    MEDICATIONS  (PRN):  acetaminophen   Tablet .. 650 milliGRAM(s) Oral every 4 hours PRN Mild Pain (1 - 3)               ;  Vital Signs Last 24 Hrs  T(C): 36.6 (17 Jul 2020 19:45), Max: 37.1 (16 Jul 2020 23:50)  T(F): 97.8 (17 Jul 2020 19:45), Max: 98.8 (16 Jul 2020 23:50)  HR: 77 (17 Jul 2020 19:45) (67 - 90)  BP: 140/79 (17 Jul 2020 19:45) (99/64 - 159/62)  BP(mean): --  RR: 18 (17 Jul 2020 19:45) (18 - 18)  SpO2: 98% (17 Jul 2020 19:45) (95% - 98%)  CAPILLARY BLOOD GLUCOSE      07-17 @ 07:01  -  07-17 @ 20:26  --------------------------------------------------------  IN: 900 mL / OUT: 0 mL / NET: 900 mL        Patient feeling better No CP, No  SOB, No N/V +2/10 occasional pain back swallowing better today     HEENT: KIMANI No Thrush  Neck: Supple  Cardio: S1 S2 No Murmur  Pulm: CTA No Rales or Ronchi  Abd: Soft NT ND BS+ no  Suprapubic tenderness   Back  -  spine tenderness diffuse   Rectal - refused  Ext: No DCT  Skin: No Rash  Neuro: awake pleasant mild short term loss     Dysphagia - a history speech following continue pleasure feedings pat accepts increase risk of aspiration and death   Desaturation - will follow CXR normal cont palliative care   Urinary Retention - Crandall flomax  Afib - cardio  Failure to thrive - continue supportive care   Chronic Back Pain -   PRN morphine increase long acting Oxycodone observe for SE,    Osteoporosis with compression fracture - pain control

## 2020-07-18 LAB
GLUCOSE BLDC GLUCOMTR-MCNC: 148 MG/DL — HIGH (ref 70–99)
GLUCOSE BLDC GLUCOMTR-MCNC: 190 MG/DL — HIGH (ref 70–99)
GLUCOSE BLDC GLUCOMTR-MCNC: 211 MG/DL — HIGH (ref 70–99)

## 2020-07-18 RX ADMIN — SODIUM CHLORIDE 70 MILLILITER(S): 9 INJECTION, SOLUTION INTRAVENOUS at 05:48

## 2020-07-18 RX ADMIN — TAMSULOSIN HYDROCHLORIDE 0.4 MILLIGRAM(S): 0.4 CAPSULE ORAL at 21:03

## 2020-07-18 RX ADMIN — Medication 60 MILLIGRAM(S): at 21:03

## 2020-07-18 RX ADMIN — PANTOPRAZOLE SODIUM 40 MILLIGRAM(S): 20 TABLET, DELAYED RELEASE ORAL at 17:18

## 2020-07-18 RX ADMIN — OXYCODONE HYDROCHLORIDE 10 MILLIGRAM(S): 5 TABLET ORAL at 05:48

## 2020-07-18 RX ADMIN — BUDESONIDE AND FORMOTEROL FUMARATE DIHYDRATE 2 PUFF(S): 160; 4.5 AEROSOL RESPIRATORY (INHALATION) at 08:24

## 2020-07-18 RX ADMIN — OXYCODONE HYDROCHLORIDE 10 MILLIGRAM(S): 5 TABLET ORAL at 18:11

## 2020-07-18 RX ADMIN — Medication 60 MILLIGRAM(S): at 13:09

## 2020-07-18 RX ADMIN — BUDESONIDE AND FORMOTEROL FUMARATE DIHYDRATE 2 PUFF(S): 160; 4.5 AEROSOL RESPIRATORY (INHALATION) at 20:32

## 2020-07-18 RX ADMIN — Medication 60 MILLIGRAM(S): at 05:48

## 2020-07-18 RX ADMIN — OXYCODONE HYDROCHLORIDE 10 MILLIGRAM(S): 5 TABLET ORAL at 06:23

## 2020-07-18 RX ADMIN — PANTOPRAZOLE SODIUM 40 MILLIGRAM(S): 20 TABLET, DELAYED RELEASE ORAL at 05:47

## 2020-07-18 RX ADMIN — Medication 650 MILLIGRAM(S): at 21:06

## 2020-07-18 RX ADMIN — Medication 650 MILLIGRAM(S): at 22:00

## 2020-07-18 RX ADMIN — SODIUM CHLORIDE 70 MILLILITER(S): 9 INJECTION, SOLUTION INTRAVENOUS at 17:19

## 2020-07-18 RX ADMIN — Medication 0.25 MILLIGRAM(S): at 05:48

## 2020-07-18 RX ADMIN — OXYCODONE HYDROCHLORIDE 10 MILLIGRAM(S): 5 TABLET ORAL at 17:18

## 2020-07-18 NOTE — PROGRESS NOTE ADULT - SUBJECTIVE AND OBJECTIVE BOX
HPI:  An 87 yo Female patient with PMHx significant for DVT (11/2019) not AC, intermittent asthma (never intubated/no icu stays), polymyalgia rheumatica (chronic prednisone), dementia, osteoporosis with multiple vertebral compression, presents today BIBEMS due to chest pain and ZUNILDA.   Patient lethargic, unable to obtained detail history, most of information obtained from chart review. As per EMS documentation patient reported chest pain and SOB after having a family argument. Of note patient was dc today from rehab.   She was recently admitted at [facility] on June 2020, due to GI bleed found to have multiple duodenal ulcers.   At the ED was found to be hyperglycemic (636) 8 units of regular insulin; blood glucose dropped to 66, dextrose was given.  Also noted to have a fever Tmax 102 rectally and a + UA.     Attempted to speak to her Partha, but did not answer his phone (04 Jul 2020 22:50)      PAST MEDICAL & SURGICAL HISTORY:  Chronic back pain  CHF (congestive heart failure)  Atrial fibrillation  Polymyalgia rheumatica  Diverticulosis  Asthma  S/P knee replacement, left  S/P hysterectomy: 1982  S/P appendectomy: 1962          PULMONARY:  budesonide 160 MICROgram(s)/formoterol 4.5 MICROgram(s) Inhaler 2 Puff(s) Inhalation two times a day    Cardio:   dig  Cardizem     GATROINTESTINAL:  pantoprazole  Injectable 40 milliGRAM(s) IV Push two times a day     MEDS:    ENDO/METABOLIC:  dextrose 40% Gel 15 Gram(s) Oral once PRN  dextrose 50% Injectable 12.5 Gram(s) IV Push once  dextrose 50% Injectable 25 Gram(s) IV Push once  dextrose 50% Injectable 25 Gram(s) IV Push once  glucagon  Injectable 1 milliGRAM(s) IntraMuscular once PRN    IV FLUID/NUTRITION:  dextrose 5%. 1000 milliLiter(s) IV Continuous <Continuous>  multiple electrolytes Injection Type 1 1000 milliLiter(s) IV Continuous <Continuous>            Allergies    dust (Unknown)  No Known Drug Allergies    Intolerances    provide [facility] soft diet (Unknown)      SOCIAL HISTORY:    FAMILY HISTORY:        Vital Signs Last 24 Hrs  T(C): 36.8 (18 Jul 2020 07:56), Max: 37 (17 Jul 2020 15:25)  T(F): 98.2 (18 Jul 2020 07:56), Max: 98.6 (17 Jul 2020 15:25)  HR: 78 (18 Jul 2020 07:56) (72 - 102)  BP: 133/89 (18 Jul 2020 07:56) (99/64 - 155/74)  BP(mean): --  RR: 20 (18 Jul 2020 07:56) (18 - 20)  SpO2: 99% (17 Jul 2020 21:23) (98% - 99%)      	          REVIEW OF SYSTEMS    General: moaning at times 	  Respiratory: no complaints   Cardiovascular:	no complaints   Gastrointestinal:	no complaints   Genitourinary:	no complaints   Musculoskeletal:	 c/o back pain            PHYSICAL EXAM:    GENERAL: NAD, well-groomed, well-developed  HEAD:  Atraumatic, Normocephalic  NERVOUS SYSTEM:  sound asleep    CHEST/LUNG: Clear to percussion bilaterally; No rales, rhonchi, wheezing, or rubs  HEART: regular - rate controlled   ABDOMEN: Soft, Nontender, Nondistended; Bowel sounds present  EXTREMITIES:  2+ Peripheral Pulses, cold to touch         LABS:                                                          urine culture :   positive with E coli   Blood cul x 2 negative x 48 hrs       RADIOLOGY & ADDITIONAL STUDIES:

## 2020-07-18 NOTE — PROGRESS NOTE ADULT - ASSESSMENT
An 87 yo Female patient with PMHx significant for DVT (11/2019) not AC, intermittent asthma (never intubated/no icu stays), polymyalgia rheumatica (chronic prednisone), dementia, osteoporosis with multiple vertebral compression, presents today BIBEMS due to chest pain and SOB. Being admitted for chest pain, rule out ACS  At the ED was found to be hyperglycemic (636) 8 units of regular insulin; blood glucose dropped to 66, dextrose was given. Also noted to have a fever Tmax 102 rectally and a + UA          Sepsis secondary to UTI   +UA   + urine culture with Ecoli  on Rocephin completed 5 days     Patient with severe abdominal pain and distention   Crandall placed for urinary retention - failed trials of void x 3         Lethargy secondary to Iatrogenic Induced Hypoglycemia   Glucose of 636 on BMP, unlikely real  Pt was given 8 U of regular insulin by ED staff, glucose dropped to 66, pt required D50% 25 mg x2  Will avoid Insulin at this time   currently on diet         Chest Pain,    Trops -x3,   [facility] called   pt currently rate controlled       Afib RVR and Aflutter   S/p Cardizem IVP x1 in ED and lopressor x 1 last night -- helped for a short period of time but pts BP was 114/60   CHADSVASC: 4  Not on AC due to GI bleed   approx 1 month ago was admitted for GIB     Duodenal ulcers  -recent gib w/ ulcers found on EGD  on protonix bid     Hypokalemia   K: 2.9 on admission   S/p K raiders          Polymyalgia Rheumatica  Hold Prednisone while NPO  if bp drops consider stress dose steroids.       Mild-Moderate Intermittent Asthma  Continue Symbicort/Albuterol PRN  O2 via NC if needed PRN titrate FIO2 >90% and <96%    Failure to thrive   Patient cachectic     Dysphagia   appreciate swallow eval   currently on dysphagia 2, nectar consistency fluids         DVT Prophylaxis   Intermittent compression     Code status -- pt with MOLST   DNR/ DNI     family and insurance issues   d/c on hold until resolved

## 2020-07-18 NOTE — PROGRESS NOTE ADULT - SUBJECTIVE AND OBJECTIVE BOX
HPI:  An 87 yo Female patient with PMHx significant for DVT (11/2019) not AC, intermittent asthma (never intubated/no icu stays), polymyalgia rheumatica (chronic prednisone), dementia, osteoporosis with multiple vertebral compression, presents today BIBEMS due to chest pain and ZUNILDA.   Patient lethargic, unable to obtained detail history, most of information obtained from chart review. As per EMS documentation patient reported chest pain and SOB after having a family argument. Of note patient was dc today from rehab.   She was recently admitted at [facility] on June 2020, due to GI bleed found to have multiple duodenal ulcers.   At the ED was found to be hyperglycemic (636) 8 units of regular insulin; blood glucose dropped to 66, dextrose was given.  Also noted to have a fever Tmax 102 rectally and a + UA.     Attempted to speak to her Partha, but did not answer his phone (04 Jul 2020 22:50)     Allergies    dust (Unknown)  No Known Drug Allergies    Intolerances      Chronic back pain  CHF (congestive heart failure)  Atrial fibrillation  Polymyalgia rheumatica  Diverticulosis  Asthma    MEDICATIONS  (STANDING):  budesonide 160 MICROgram(s)/formoterol 4.5 MICROgram(s) Inhaler 2 Puff(s) Inhalation two times a day  dextrose 5% + sodium chloride 0.45%. 1000 milliLiter(s) (70 mL/Hr) IV Continuous <Continuous>  digoxin     Tablet 0.25 milliGRAM(s) Oral daily  diltiazem    Tablet 60 milliGRAM(s) Oral every 8 hours  oxyCODONE  ER Tablet 10 milliGRAM(s) Oral every 12 hours  pantoprazole  Injectable 40 milliGRAM(s) IV Push two times a day  polyethylene glycol 3350 17 Gram(s) Oral at bedtime  predniSONE   Tablet 10 milliGRAM(s) Oral daily  senna 2 Tablet(s) Oral at bedtime  tamsulosin 0.4 milliGRAM(s) Oral at bedtime    MEDICATIONS  (PRN):  acetaminophen   Tablet .. 650 milliGRAM(s) Oral every 4 hours PRN Mild Pain (1 - 3)         Vital Signs Last 24 Hrs  T(C): 36.7 (18 Jul 2020 16:05), Max: 36.8 (18 Jul 2020 07:56)  T(F): 98 (18 Jul 2020 16:05), Max: 98.2 (18 Jul 2020 07:56)  HR: 90 (18 Jul 2020 16:05) (76 - 102)  BP: 139/88 (18 Jul 2020 16:05) (133/89 - 155/74)  BP(mean): --  RR: 20 (18 Jul 2020 07:56) (18 - 20)  SpO2: 99% (17 Jul 2020 21:23) (98% - 99%)  CAPILLARY BLOOD GLUCOSE      07-17 @ 07:01  -  07-18 @ 07:00  --------------------------------------------------------  IN: 1530 mL / OUT: 3300 mL / NET: -1770 mL    07-18 @ 07:01  -  07-18 @ 18:26  --------------------------------------------------------  IN: 840 mL / OUT: 0 mL / NET: 840 mL    Patient feeling better No CP, No  SOB, No N/V +2/10 occasional pain back swallowing better today     HEENT: PEARLA No Thrush  Neck: Supple  Cardio: S1 S2 No Murmur  Pulm: CTA No Rales or Ronchi  Abd: Soft NT ND BS+ no  Suprapubic tenderness   Back  -  spine tenderness diffuse   Rectal - refused  Ext: No DCT  Skin: No Rash  Neuro: awake pleasant mild short term loss     Dysphagia - a history speech following continue pleasure feedings pat accepts increase risk of aspiration and death   Desaturation - improving   Urinary Retention - Crandall flomax  Afib - cardio  Failure to thrive - continue supportive care   Chronic Back Pain -   PRN morphine increase long acting Oxycodone observe for SE,    Osteoporosis with compression fracture - pain control

## 2020-07-19 LAB
GLUCOSE BLDC GLUCOMTR-MCNC: 124 MG/DL — HIGH (ref 70–99)
GLUCOSE BLDC GLUCOMTR-MCNC: 161 MG/DL — HIGH (ref 70–99)
GLUCOSE BLDC GLUCOMTR-MCNC: 171 MG/DL — HIGH (ref 70–99)

## 2020-07-19 RX ORDER — SODIUM CHLORIDE 9 MG/ML
1000 INJECTION, SOLUTION INTRAVENOUS
Refills: 0 | Status: DISCONTINUED | OUTPATIENT
Start: 2020-07-19 | End: 2020-07-21

## 2020-07-19 RX ADMIN — BUDESONIDE AND FORMOTEROL FUMARATE DIHYDRATE 2 PUFF(S): 160; 4.5 AEROSOL RESPIRATORY (INHALATION) at 09:35

## 2020-07-19 RX ADMIN — Medication 650 MILLIGRAM(S): at 10:30

## 2020-07-19 RX ADMIN — Medication 60 MILLIGRAM(S): at 05:31

## 2020-07-19 RX ADMIN — Medication 60 MILLIGRAM(S): at 16:26

## 2020-07-19 RX ADMIN — SODIUM CHLORIDE 70 MILLILITER(S): 9 INJECTION, SOLUTION INTRAVENOUS at 05:32

## 2020-07-19 RX ADMIN — OXYCODONE HYDROCHLORIDE 10 MILLIGRAM(S): 5 TABLET ORAL at 22:36

## 2020-07-19 RX ADMIN — TAMSULOSIN HYDROCHLORIDE 0.4 MILLIGRAM(S): 0.4 CAPSULE ORAL at 21:15

## 2020-07-19 RX ADMIN — SODIUM CHLORIDE 50 MILLILITER(S): 9 INJECTION, SOLUTION INTRAVENOUS at 09:49

## 2020-07-19 RX ADMIN — Medication 60 MILLIGRAM(S): at 21:15

## 2020-07-19 RX ADMIN — OXYCODONE HYDROCHLORIDE 10 MILLIGRAM(S): 5 TABLET ORAL at 17:11

## 2020-07-19 RX ADMIN — Medication 650 MILLIGRAM(S): at 21:17

## 2020-07-19 RX ADMIN — PANTOPRAZOLE SODIUM 40 MILLIGRAM(S): 20 TABLET, DELAYED RELEASE ORAL at 05:31

## 2020-07-19 RX ADMIN — PANTOPRAZOLE SODIUM 40 MILLIGRAM(S): 20 TABLET, DELAYED RELEASE ORAL at 17:11

## 2020-07-19 RX ADMIN — Medication 0.25 MILLIGRAM(S): at 05:31

## 2020-07-19 RX ADMIN — Medication 650 MILLIGRAM(S): at 09:49

## 2020-07-19 RX ADMIN — BUDESONIDE AND FORMOTEROL FUMARATE DIHYDRATE 2 PUFF(S): 160; 4.5 AEROSOL RESPIRATORY (INHALATION) at 20:44

## 2020-07-19 RX ADMIN — OXYCODONE HYDROCHLORIDE 10 MILLIGRAM(S): 5 TABLET ORAL at 05:31

## 2020-07-19 NOTE — PROGRESS NOTE ADULT - SUBJECTIVE AND OBJECTIVE BOX
HPI:  An 89 yo Female patient with PMHx significant for DVT (11/2019) not AC, intermittent asthma (never intubated/no icu stays), polymyalgia rheumatica (chronic prednisone), dementia, osteoporosis with multiple vertebral compression, presents today BIBEMS due to chest pain and ZUNILDA.   Patient lethargic, unable to obtained detail history, most of information obtained from chart review. As per EMS documentation patient reported chest pain and SOB after having a family argument. Of note patient was dc today from rehab.   She was recently admitted at [facility] on June 2020, due to GI bleed found to have multiple duodenal ulcers.   At the ED was found to be hyperglycemic (636) 8 units of regular insulin; blood glucose dropped to 66, dextrose was given.  Also noted to have a fever Tmax 102 rectally and a + UA.     Attempted to speak to her Partha, but did not answer his phone (04 Jul 2020 22:50)     Allergies    dust (Unknown)  No Known Drug Allergies    Intolerances      Chronic back pain  CHF (congestive heart failure)  Atrial fibrillation  Polymyalgia rheumatica  Diverticulosis  Asthma    MEDICATIONS  (STANDING):  budesonide 160 MICROgram(s)/formoterol 4.5 MICROgram(s) Inhaler 2 Puff(s) Inhalation two times a day  digoxin     Tablet 0.25 milliGRAM(s) Oral daily  diltiazem    Tablet 60 milliGRAM(s) Oral every 8 hours  multiple electrolytes Injection Type 1 1000 milliLiter(s) (50 mL/Hr) IV Continuous <Continuous>  oxyCODONE  ER Tablet 10 milliGRAM(s) Oral every 12 hours  pantoprazole  Injectable 40 milliGRAM(s) IV Push two times a day  polyethylene glycol 3350 17 Gram(s) Oral at bedtime  predniSONE   Tablet 10 milliGRAM(s) Oral daily  senna 2 Tablet(s) Oral at bedtime  tamsulosin 0.4 milliGRAM(s) Oral at bedtime    MEDICATIONS  (PRN):  acetaminophen   Tablet .. 650 milliGRAM(s) Oral every 4 hours PRN Mild Pain (1 - 3)               ;  Vital Signs Last 24 Hrs  T(C): 36.4 (19 Jul 2020 16:00), Max: 37.2 (18 Jul 2020 20:05)  T(F): 97.6 (19 Jul 2020 16:00), Max: 99 (18 Jul 2020 20:05)  HR: 77 (19 Jul 2020 16:00) (77 - 87)  BP: 127/77 (19 Jul 2020 16:00) (125/72 - 144/94)  BP(mean): --  RR: 20 (19 Jul 2020 08:04) (20 - 20)  SpO2: 94% (19 Jul 2020 16:00) (94% - 99%)  CAPILLARY BLOOD GLUCOSE      07-18 @ 07:01  -  07-19 @ 07:00  --------------------------------------------------------  IN: 1610 mL / OUT: 600 mL / NET: 1010 mL      Patient feeling better No CP, No  SOB, No N/V +2/10 occasional pain back swallowing better     HEENT: PEARLA No Thrush  Neck: Supple  Cardio: S1 S2 No Murmur  Pulm: CTA No Rales or Ronchi  Abd: Soft NT ND BS+ no  Suprapubic tenderness   Back  -  spine tenderness diffuse   Rectal - refused  Ext: No DCT  Skin: No Rash  Neuro: awake pleasant mild short term loss     Dysphagia - a history speech following continue pleasure feedings pat accepts increase risk of aspiration and death   Urinary Retention - Crandall flomax  Afib - cardio  Failure to thrive - continue supportive care   Chronic Back Pain -   PRN morphine increase long acting Oxycodone observe for SE,    Osteoporosis with compression fracture - pain control

## 2020-07-19 NOTE — CHART NOTE - NSCHARTNOTEFT_GEN_A_CORE
Source: Patient [ ]  Family [ ]   other [ ]  An 87 yo Female patient with PMHx significant for DVT (11/2019) not AC, intermittent asthma (never intubated/no icu stays), polymyalgia rheumatica (chronic prednisone), dementia, osteoporosis with multiple vertebral compression,  Sepsis secondary to UTI   Pt had issue with hypoglycemia - insulin held  d/c on hold due to insurance issue.    Current Diet: Diet, Pureed:   Dysphagia 2, Mechancial Soft, Nectar Consistency Fluid (DYS2NC) (07-13-20 @ 19:43)      Patient reports [ ] nausea  [ ] vomiting [ ] diarrhea [ ] constipation  [ ]chewing problems [ ] swallowing issues  [ ] other:     PO intake:  < 50% [ x]   50-75%  [ ]   %  [ ]  other :    Source for PO intake [ ] Patient [ ] family [x ] chart [ ] staff [ ] other        Current Weight:   (7/16) 39.9 kg  (7/15) 41.1 kg  (7/13) 35.2 kg  ( 7/12) 39 kg  ( 7/11) 38 kg    % Weight Change   accuravy questionable  Pertinent Medications: MEDICATIONS  (STANDING):  budesonide 160 MICROgram(s)/formoterol 4.5 MICROgram(s) Inhaler 2 Puff(s) Inhalation two times a day  digoxin     Tablet 0.25 milliGRAM(s) Oral daily  diltiazem    Tablet 60 milliGRAM(s) Oral every 8 hours  multiple electrolytes Injection Type 1 1000 milliLiter(s) (50 mL/Hr) IV Continuous <Continuous>  oxyCODONE  ER Tablet 10 milliGRAM(s) Oral every 12 hours  pantoprazole  Injectable 40 milliGRAM(s) IV Push two times a day  polyethylene glycol 3350 17 Gram(s) Oral at bedtime  predniSONE   Tablet 10 milliGRAM(s) Oral daily  senna 2 Tablet(s) Oral at bedtime  tamsulosin 0.4 milliGRAM(s) Oral at bedtime    MEDICATIONS  (PRN):  acetaminophen   Tablet .. 650 milliGRAM(s) Oral every 4 hours PRN Mild Pain (1 - 3)    Pertinent Labs:         Skin:   Edema : 1+ in legs  Nutrition focused physical exam conducted Preiously- found signs of malnutrition [ ]absent [ x]present    Subcutaneous fat loss: [ x] Orbital fat pads region, [x ]Buccal fat region, [ ]Triceps region,  [ ]Ribs region    Muscle wasting: [ ]Temples region, [ x]Clavicle region, [x ]Shoulder region, [ ]Scapula region, [ ]Interosseous region,  [ ]thigh region, [ ]Calf region    Estimated Needs:   [ ] no change since previous assessment  [ ] recalculated:     Current Nutrition Diagnosis:  Pt remains at nutrition risk secondary to severe protein calorie malnutrition (chronic) related to inability to meet sufficient protein energy requirements with poor appetite in setting of advanced age, dementia, recent GI bleed found to have multiple duodenal ulcers and skin breakdown as evidenced by wt loss of 8# (8.69%) over past 6 months and severe muscle wasting/fat loss.  Pt with poor po intake at meals with total assist. followed by pallaitive care    Recommendations:   1) ensure pudding TID  2) MVI, Vit C  3) daily weights  4) encourage HBV protein foods    Monitoring and Evaluation:   [x ] PO intake [ x] Tolerance to diet prescription [X] Weights  [X] Follow up per protocol [X] Labs:

## 2020-07-19 NOTE — PROGRESS NOTE ADULT - ASSESSMENT
An 89 yo Female patient with PMHx significant for DVT (11/2019) not AC, intermittent asthma (never intubated/no icu stays), polymyalgia rheumatica (chronic prednisone), dementia, osteoporosis with multiple vertebral compression, presents today BIBEMS due to chest pain and SOB. Being admitted for chest pain, rule out ACS  At the ED was found to be hyperglycemic (636) 8 units of regular insulin; blood glucose dropped to 66, dextrose was given. Also noted to have a fever Tmax 102 rectally and a + UA          Sepsis secondary to UTI   +UA   + urine culture with Ecoli  on Rocephin completed 5 days     Patient with severe abdominal pain and distention   Crandall placed for urinary retention - failed trials of void x 3         Lethargy secondary to Iatrogenic Induced Hypoglycemia   Glucose of 636 on BMP, unlikely real  Pt was given 8 U of regular insulin by ED staff, glucose dropped to 66, pt required D50% 25 mg x2  Will avoid Insulin at this time   currently on diet         Chest Pain,    Trops -x3,   [facility] called   pt currently rate controlled       Afib RVR and Aflutter   S/p Cardizem IVP x1 in ED and lopressor x 1 last night -- helped for a short period of time but pts BP was 114/60   CHADSVASC: 4  Not on AC due to GI bleed   approx 1 month ago was admitted for GIB     Duodenal ulcers  -recent gib w/ ulcers found on EGD  on protonix bid     Hypokalemia   K: 2.9 on admission   S/p K raiders          Polymyalgia Rheumatica  Hold Prednisone while NPO  if bp drops consider stress dose steroids.       Mild-Moderate Intermittent Asthma  Continue Symbicort/Albuterol PRN  O2 via NC if needed PRN titrate FIO2 >90% and <96%    Failure to thrive   Patient cachectic     Dysphagia    swallow eval   currently on dysphagia 2, nectar consistency fluids         DVT Prophylaxis   Intermittent compression     Code status -- pt with MOLST   DNR/ DNI     family and insurance issues   d/c on hold until resolved

## 2020-07-19 NOTE — PROGRESS NOTE ADULT - SUBJECTIVE AND OBJECTIVE BOX
HPI:  An 89 yo Female patient with PMHx significant for DVT (11/2019) not AC, intermittent asthma (never intubated/no icu stays), polymyalgia rheumatica (chronic prednisone), dementia, osteoporosis with multiple vertebral compression, presents today BIBEMS due to chest pain and ZUNILDA.   Patient lethargic, unable to obtained detail history, most of information obtained from chart review. As per EMS documentation patient reported chest pain and SOB after having a family argument. Of note patient was dc today from rehab.   She was recently admitted at [facility] on June 2020, due to GI bleed found to have multiple duodenal ulcers.   At the ED was found to be hyperglycemic (636) 8 units of regular insulin; blood glucose dropped to 66, dextrose was given.  Also noted to have a fever Tmax 102 rectally and a + UA.     Attempted to speak to her Partha, but did not answer his phone (04 Jul 2020 22:50)      PAST MEDICAL & SURGICAL HISTORY:  Chronic back pain  CHF (congestive heart failure)  Atrial fibrillation  Polymyalgia rheumatica  Diverticulosis  Asthma  S/P knee replacement, left  S/P hysterectomy: 1982  S/P appendectomy: 1962          PULMONARY:  budesonide 160 MICROgram(s)/formoterol 4.5 MICROgram(s) Inhaler 2 Puff(s) Inhalation two times a day    Cardio:   dig  Cardizem     GATROINTESTINAL:  pantoprazole  Injectable 40 milliGRAM(s) IV Push two times a day     MEDS:    ENDO/METABOLIC:  dextrose 40% Gel 15 Gram(s) Oral once PRN  dextrose 50% Injectable 12.5 Gram(s) IV Push once  dextrose 50% Injectable 25 Gram(s) IV Push once  dextrose 50% Injectable 25 Gram(s) IV Push once  glucagon  Injectable 1 milliGRAM(s) IntraMuscular once PRN    IV FLUID/NUTRITION:  dextrose 5%. 1000 milliLiter(s) IV Continuous <Continuous>  multiple electrolytes Injection Type 1 1000 milliLiter(s) IV Continuous <Continuous>            Allergies    dust (Unknown)  No Known Drug Allergies    Intolerances    provide [facility] soft diet (Unknown)      SOCIAL HISTORY:    FAMILY HISTORY:          Vital Signs Last 24 Hrs  T(C): 37 (19 Jul 2020 08:04), Max: 37.2 (18 Jul 2020 20:05)  T(F): 98.6 (19 Jul 2020 08:04), Max: 99 (18 Jul 2020 20:05)  HR: 87 (19 Jul 2020 08:04) (78 - 90)  BP: 128/73 (19 Jul 2020 08:04) (125/72 - 144/94)  BP(mean): --  RR: 20 (19 Jul 2020 08:04) (20 - 20)  SpO2: 99% (19 Jul 2020 08:04) (98% - 99%)      	          REVIEW OF SYSTEMS    General: moaning at times 	  Respiratory: no complaints   Cardiovascular:	no complaints   Gastrointestinal:	no complaints   Genitourinary:	no complaints   Musculoskeletal:	 c/o back pain            PHYSICAL EXAM:    GENERAL: NAD, well-groomed, well-developed  HEAD:  Atraumatic, Normocephalic  NERVOUS SYSTEM:  sound asleep    CHEST/LUNG: Clear to percussion bilaterally; No rales, rhonchi, wheezing, or rubs  HEART: regular - rate controlled   ABDOMEN: Soft, Nontender, Nondistended; Bowel sounds present  EXTREMITIES:  2+ Peripheral Pulses, cold to touch         LABS:                                                          urine culture :   positive with E coli   Blood cul x 2 negative x 48 hrs       RADIOLOGY & ADDITIONAL STUDIES:

## 2020-07-20 LAB
GLUCOSE BLDC GLUCOMTR-MCNC: 108 MG/DL — HIGH (ref 70–99)
GLUCOSE BLDC GLUCOMTR-MCNC: 143 MG/DL — HIGH (ref 70–99)
GLUCOSE BLDC GLUCOMTR-MCNC: 201 MG/DL — HIGH (ref 70–99)

## 2020-07-20 RX ORDER — MORPHINE SULFATE 50 MG/1
3 CAPSULE, EXTENDED RELEASE ORAL DAILY
Refills: 0 | Status: DISCONTINUED | OUTPATIENT
Start: 2020-07-20 | End: 2020-07-27

## 2020-07-20 RX ADMIN — BUDESONIDE AND FORMOTEROL FUMARATE DIHYDRATE 2 PUFF(S): 160; 4.5 AEROSOL RESPIRATORY (INHALATION) at 20:29

## 2020-07-20 RX ADMIN — TAMSULOSIN HYDROCHLORIDE 0.4 MILLIGRAM(S): 0.4 CAPSULE ORAL at 22:06

## 2020-07-20 RX ADMIN — Medication 60 MILLIGRAM(S): at 15:43

## 2020-07-20 RX ADMIN — PANTOPRAZOLE SODIUM 40 MILLIGRAM(S): 20 TABLET, DELAYED RELEASE ORAL at 05:31

## 2020-07-20 RX ADMIN — SODIUM CHLORIDE 50 MILLILITER(S): 9 INJECTION, SOLUTION INTRAVENOUS at 05:31

## 2020-07-20 RX ADMIN — BUDESONIDE AND FORMOTEROL FUMARATE DIHYDRATE 2 PUFF(S): 160; 4.5 AEROSOL RESPIRATORY (INHALATION) at 08:59

## 2020-07-20 RX ADMIN — Medication 0.25 MILLIGRAM(S): at 05:31

## 2020-07-20 RX ADMIN — OXYCODONE HYDROCHLORIDE 10 MILLIGRAM(S): 5 TABLET ORAL at 17:24

## 2020-07-20 RX ADMIN — Medication 60 MILLIGRAM(S): at 22:06

## 2020-07-20 RX ADMIN — SODIUM CHLORIDE 50 MILLILITER(S): 9 INJECTION, SOLUTION INTRAVENOUS at 02:12

## 2020-07-20 RX ADMIN — Medication 60 MILLIGRAM(S): at 05:31

## 2020-07-20 RX ADMIN — OXYCODONE HYDROCHLORIDE 10 MILLIGRAM(S): 5 TABLET ORAL at 05:30

## 2020-07-20 RX ADMIN — PANTOPRAZOLE SODIUM 40 MILLIGRAM(S): 20 TABLET, DELAYED RELEASE ORAL at 17:24

## 2020-07-20 NOTE — PROGRESS NOTE ADULT - SUBJECTIVE AND OBJECTIVE BOX
HPI:  An 89 yo Female patient with PMHx significant for DVT (11/2019) not AC, intermittent asthma (never intubated/no icu stays), polymyalgia rheumatica (chronic prednisone), dementia, osteoporosis with multiple vertebral compression, presents today BIBEMS due to chest pain and ZUNILDA.   Patient lethargic, unable to obtained detail history, most of information obtained from chart review. As per EMS documentation patient reported chest pain and SOB after having a family argument. Of note patient was dc today from rehab.   She was recently admitted at [facility] on June 2020, due to GI bleed found to have multiple duodenal ulcers.   At the ED was found to be hyperglycemic (636) 8 units of regular insulin; blood glucose dropped to 66, dextrose was given.  Also noted to have a fever Tmax 102 rectally and a + UA.     Attempted to speak to her Partha, but did not answer his phone (04 Jul 2020 22:50)     Allergies    dust (Unknown)  No Known Drug Allergies    Intolerances      Chronic back pain  CHF (congestive heart failure)  Atrial fibrillation  Polymyalgia rheumatica  Diverticulosis  Asthma    MEDICATIONS  (STANDING):  budesonide 160 MICROgram(s)/formoterol 4.5 MICROgram(s) Inhaler 2 Puff(s) Inhalation two times a day  digoxin     Tablet 0.25 milliGRAM(s) Oral daily  diltiazem    Tablet 60 milliGRAM(s) Oral every 8 hours  multiple electrolytes Injection Type 1 1000 milliLiter(s) (50 mL/Hr) IV Continuous <Continuous>  oxyCODONE  ER Tablet 10 milliGRAM(s) Oral every 12 hours  pantoprazole  Injectable 40 milliGRAM(s) IV Push two times a day  polyethylene glycol 3350 17 Gram(s) Oral at bedtime  predniSONE   Tablet 10 milliGRAM(s) Oral daily  senna 2 Tablet(s) Oral at bedtime  tamsulosin 0.4 milliGRAM(s) Oral at bedtime    MEDICATIONS  (PRN):  acetaminophen   Tablet .. 650 milliGRAM(s) Oral every 4 hours PRN Mild Pain (1 - 3)               ;  Vital Signs Last 24 Hrs  T(C): 36.9 (20 Jul 2020 15:00), Max: 36.9 (20 Jul 2020 15:00)  T(F): 98.5 (20 Jul 2020 15:00), Max: 98.5 (20 Jul 2020 15:00)  HR: 89 (20 Jul 2020 15:00) (75 - 93)  BP: 128/68 (20 Jul 2020 15:00) (119/64 - 144/85)  BP(mean): --  RR: 19 (20 Jul 2020 15:00) (19 - 20)  SpO2: 93% (20 Jul 2020 15:00) (93% - 97%)  CAPILLARY BLOOD GLUCOSE      07-19 @ 07:01  -  07-20 @ 07:00  --------------------------------------------------------  IN: 1100 mL / OUT: 1550 mL / NET: -450 mL    07-20 @ 07:01  -  07-20 @ 19:24  --------------------------------------------------------  IN: 718 mL / OUT: 0 mL / NET: 718 mL      Patient feeling better No CP, No  SOB, No N/V +2/10 occasional pain back swallowing better Had large amount of pain with PT    HEENT: PEARLA No Thrush  Neck: Supple  Cardio: S1 S2 No Murmur  Pulm: CTA No Rales or Ronchi  Abd: Soft NT ND BS+ no  Suprapubic tenderness   Back  -  spine tenderness diffuse   Rectal - refused  Ext: No DCT  Skin: No Rash  Neuro: awake pleasant mild short term loss       Pain with PT - will add prePT morphine   Dysphagia - a history speech following continue pleasure feedings pat accepts increase risk of aspiration and death   Urinary Retention - Crandall flomax  Afib - cardio  Failure to thrive - continue supportive care   Chronic Back Pain -   PRN morphine increase long acting Oxycodone observe for SE,    Osteoporosis with compression fracture - pain control

## 2020-07-20 NOTE — PROGRESS NOTE ADULT - SUBJECTIVE AND OBJECTIVE BOX
HPI:  An 87 yo Female patient with PMHx significant for DVT (11/2019) not AC, intermittent asthma (never intubated/no icu stays), polymyalgia rheumatica (chronic prednisone), dementia, osteoporosis with multiple vertebral compression, presents today BIBEMS due to chest pain and ZUNILDA.   Patient lethargic, unable to obtained detail history, most of information obtained from chart review. As per EMS documentation patient reported chest pain and SOB after having a family argument. Of note patient was dc today from rehab.   She was recently admitted at [facility] on June 2020, due to GI bleed found to have multiple duodenal ulcers.   At the ED was found to be hyperglycemic (636) 8 units of regular insulin; blood glucose dropped to 66, dextrose was given.  Also noted to have a fever Tmax 102 rectally and a + UA.     Attempted to speak to her Partha, but did not answer his phone (04 Jul 2020 22:50)      PAST MEDICAL & SURGICAL HISTORY:  Chronic back pain  CHF (congestive heart failure)  Atrial fibrillation  Polymyalgia rheumatica  Diverticulosis  Asthma  S/P knee replacement, left  S/P hysterectomy: 1982  S/P appendectomy: 1962          PULMONARY:  budesonide 160 MICROgram(s)/formoterol 4.5 MICROgram(s) Inhaler 2 Puff(s) Inhalation two times a day    Cardio:   dig  Cardizem     GATROINTESTINAL:  pantoprazole  Injectable 40 milliGRAM(s) IV Push two times a day     MEDS:    ENDO/METABOLIC:  dextrose 40% Gel 15 Gram(s) Oral once PRN  dextrose 50% Injectable 12.5 Gram(s) IV Push once  dextrose 50% Injectable 25 Gram(s) IV Push once  dextrose 50% Injectable 25 Gram(s) IV Push once  glucagon  Injectable 1 milliGRAM(s) IntraMuscular once PRN    IV FLUID/NUTRITION:  dextrose 5%. 1000 milliLiter(s) IV Continuous <Continuous>  multiple electrolytes Injection Type 1 1000 milliLiter(s) IV Continuous <Continuous>            Allergies    dust (Unknown)  No Known Drug Allergies    Intolerances    provide [facility] soft diet (Unknown)      SOCIAL HISTORY:    FAMILY HISTORY:          Vital Signs Last 24 Hrs  T(C): 36.6 (20 Jul 2020 08:04), Max: 36.6 (19 Jul 2020 19:40)  T(F): 97.9 (20 Jul 2020 08:04), Max: 97.9 (19 Jul 2020 19:40)  HR: 93 (20 Jul 2020 08:04) (75 - 93)  BP: 119/64 (20 Jul 2020 08:04) (119/64 - 144/85)  BP(mean): --  RR: 20 (20 Jul 2020 08:04) (19 - 20)  SpO2: 97% (20 Jul 2020 08:04) (94% - 97%)      	          REVIEW OF SYSTEMS    General: moaning at times 	  Respiratory: no complaints   Cardiovascular:	no complaints   Gastrointestinal:	no complaints   Genitourinary:	no complaints   Musculoskeletal:	 c/o back pain            PHYSICAL EXAM:    GENERAL: NAD, well-groomed, well-developed  HEAD:  Atraumatic, Normocephalic  NERVOUS SYSTEM:  sound asleep    CHEST/LUNG: Clear to percussion bilaterally; No rales, rhonchi, wheezing, or rubs  HEART: regular - rate controlled   ABDOMEN: Soft, Nontender, Nondistended; Bowel sounds present  EXTREMITIES:  2+ Peripheral Pulses, cold to touch         LABS:                                                          urine culture :   positive with E coli   Blood cul x 2 negative x 48 hrs       RADIOLOGY & ADDITIONAL STUDIES:

## 2020-07-21 LAB
GLUCOSE BLDC GLUCOMTR-MCNC: 179 MG/DL — HIGH (ref 70–99)
GLUCOSE BLDC GLUCOMTR-MCNC: 92 MG/DL — SIGNIFICANT CHANGE UP (ref 70–99)
GLUCOSE BLDC GLUCOMTR-MCNC: 93 MG/DL — SIGNIFICANT CHANGE UP (ref 70–99)
LACTATE BLDV-MCNC: 1.2 MMOL/L — SIGNIFICANT CHANGE UP (ref 0.5–2)

## 2020-07-21 PROCEDURE — 71045 X-RAY EXAM CHEST 1 VIEW: CPT | Mod: 26

## 2020-07-21 RX ORDER — ALPRAZOLAM 0.25 MG
0.25 TABLET ORAL ONCE
Refills: 0 | Status: DISCONTINUED | OUTPATIENT
Start: 2020-07-21 | End: 2020-07-21

## 2020-07-21 RX ORDER — SODIUM CHLORIDE 9 MG/ML
1000 INJECTION, SOLUTION INTRAVENOUS
Refills: 0 | Status: DISCONTINUED | OUTPATIENT
Start: 2020-07-21 | End: 2020-07-27

## 2020-07-21 RX ORDER — PIPERACILLIN AND TAZOBACTAM 4; .5 G/20ML; G/20ML
3.38 INJECTION, POWDER, LYOPHILIZED, FOR SOLUTION INTRAVENOUS EVERY 12 HOURS
Refills: 0 | Status: DISCONTINUED | OUTPATIENT
Start: 2020-07-21 | End: 2020-07-22

## 2020-07-21 RX ORDER — COLLAGENASE CLOSTRIDIUM HIST. 250 UNIT/G
1 OINTMENT (GRAM) TOPICAL DAILY
Refills: 0 | Status: DISCONTINUED | OUTPATIENT
Start: 2020-07-21 | End: 2020-07-27

## 2020-07-21 RX ORDER — PIPERACILLIN AND TAZOBACTAM 4; .5 G/20ML; G/20ML
3.38 INJECTION, POWDER, LYOPHILIZED, FOR SOLUTION INTRAVENOUS ONCE
Refills: 0 | Status: COMPLETED | OUTPATIENT
Start: 2020-07-21 | End: 2020-07-21

## 2020-07-21 RX ADMIN — PANTOPRAZOLE SODIUM 40 MILLIGRAM(S): 20 TABLET, DELAYED RELEASE ORAL at 17:46

## 2020-07-21 RX ADMIN — PIPERACILLIN AND TAZOBACTAM 200 GRAM(S): 4; .5 INJECTION, POWDER, LYOPHILIZED, FOR SOLUTION INTRAVENOUS at 10:59

## 2020-07-21 RX ADMIN — Medication 60 MILLIGRAM(S): at 23:23

## 2020-07-21 RX ADMIN — Medication 0.25 MILLIGRAM(S): at 23:22

## 2020-07-21 RX ADMIN — PIPERACILLIN AND TAZOBACTAM 25 GRAM(S): 4; .5 INJECTION, POWDER, LYOPHILIZED, FOR SOLUTION INTRAVENOUS at 23:22

## 2020-07-21 RX ADMIN — Medication 650 MILLIGRAM(S): at 12:00

## 2020-07-21 RX ADMIN — Medication 60 MILLIGRAM(S): at 05:27

## 2020-07-21 RX ADMIN — OXYCODONE HYDROCHLORIDE 10 MILLIGRAM(S): 5 TABLET ORAL at 06:20

## 2020-07-21 RX ADMIN — Medication 650 MILLIGRAM(S): at 10:44

## 2020-07-21 RX ADMIN — Medication 60 MILLIGRAM(S): at 14:05

## 2020-07-21 RX ADMIN — Medication 1 APPLICATION(S): at 23:22

## 2020-07-21 RX ADMIN — SODIUM CHLORIDE 100 MILLILITER(S): 9 INJECTION, SOLUTION INTRAVENOUS at 14:06

## 2020-07-21 RX ADMIN — Medication 0.25 MILLIGRAM(S): at 05:27

## 2020-07-21 RX ADMIN — BUDESONIDE AND FORMOTEROL FUMARATE DIHYDRATE 2 PUFF(S): 160; 4.5 AEROSOL RESPIRATORY (INHALATION) at 20:40

## 2020-07-21 RX ADMIN — SODIUM CHLORIDE 50 MILLILITER(S): 9 INJECTION, SOLUTION INTRAVENOUS at 05:28

## 2020-07-21 RX ADMIN — OXYCODONE HYDROCHLORIDE 10 MILLIGRAM(S): 5 TABLET ORAL at 05:27

## 2020-07-21 RX ADMIN — OXYCODONE HYDROCHLORIDE 10 MILLIGRAM(S): 5 TABLET ORAL at 17:46

## 2020-07-21 RX ADMIN — PANTOPRAZOLE SODIUM 40 MILLIGRAM(S): 20 TABLET, DELAYED RELEASE ORAL at 05:27

## 2020-07-21 RX ADMIN — SODIUM CHLORIDE 100 MILLILITER(S): 9 INJECTION, SOLUTION INTRAVENOUS at 20:02

## 2020-07-21 RX ADMIN — BUDESONIDE AND FORMOTEROL FUMARATE DIHYDRATE 2 PUFF(S): 160; 4.5 AEROSOL RESPIRATORY (INHALATION) at 08:49

## 2020-07-21 RX ADMIN — TAMSULOSIN HYDROCHLORIDE 0.4 MILLIGRAM(S): 0.4 CAPSULE ORAL at 23:23

## 2020-07-21 NOTE — PROGRESS NOTE ADULT - SUBJECTIVE AND OBJECTIVE BOX
HPI:  An 87 yo Female patient with PMHx significant for DVT (11/2019) not AC, intermittent asthma (never intubated/no icu stays), polymyalgia rheumatica (chronic prednisone), dementia, osteoporosis with multiple vertebral compression, presents today BIBEMS due to chest pain and ZUNILDA.   Patient lethargic, unable to obtained detail history, most of information obtained from chart review. As per EMS documentation patient reported chest pain and SOB after having a family argument. Of note patient was dc today from rehab.   She was recently admitted at [facility] on June 2020, due to GI bleed found to have multiple duodenal ulcers.   At the ED was found to be hyperglycemic (636) 8 units of regular insulin; blood glucose dropped to 66, dextrose was given.  Also noted to have a fever Tmax 102 rectally and a + UA.     Attempted to speak to her Partha, but did not answer his phone (04 Jul 2020 22:50)     Allergies    dust (Unknown)  No Known Drug Allergies    Intolerances      Chronic back pain  CHF (congestive heart failure)  Atrial fibrillation  Polymyalgia rheumatica  Diverticulosis  Asthma    MEDICATIONS  (STANDING):  budesonide 160 MICROgram(s)/formoterol 4.5 MICROgram(s) Inhaler 2 Puff(s) Inhalation two times a day  collagenase Ointment 1 Application(s) Topical daily  digoxin     Tablet 0.25 milliGRAM(s) Oral daily  diltiazem    Tablet 60 milliGRAM(s) Oral every 8 hours  morphine  - Injectable 3 milliGRAM(s) IV Push daily  multiple electrolytes Injection Type 1 1000 milliLiter(s) (100 mL/Hr) IV Continuous <Continuous>  oxyCODONE  ER Tablet 10 milliGRAM(s) Oral every 12 hours  pantoprazole  Injectable 40 milliGRAM(s) IV Push two times a day  piperacillin/tazobactam IVPB.. 3.375 Gram(s) IV Intermittent every 12 hours  polyethylene glycol 3350 17 Gram(s) Oral at bedtime  predniSONE   Tablet 10 milliGRAM(s) Oral daily  senna 2 Tablet(s) Oral at bedtime  tamsulosin 0.4 milliGRAM(s) Oral at bedtime    MEDICATIONS  (PRN):  acetaminophen   Tablet .. 650 milliGRAM(s) Oral every 4 hours PRN Mild Pain (1 - 3)               ;  Vital Signs Last 24 Hrs  T(C): 36.4 (21 Jul 2020 15:08), Max: 38.7 (21 Jul 2020 10:29)  T(F): 97.5 (21 Jul 2020 15:08), Max: 101.6 (21 Jul 2020 10:29)  HR: 94 (21 Jul 2020 15:08) (88 - 114)  BP: 113/76 (21 Jul 2020 15:08) (103/61 - 150/91)  BP(mean): --  RR: 18 (21 Jul 2020 15:08) (17 - 20)  SpO2: 96% (21 Jul 2020 15:08) (91% - 98%)  CAPILLARY BLOOD GLUCOSE      07-20 @ 07:01  -  07-21 @ 07:00  --------------------------------------------------------  IN: 718 mL / OUT: 2000 mL / NET: -1282 mL    07-21 @ 07:01 - 07-21 @ 16:32  --------------------------------------------------------  IN: 0 mL / OUT: 1000 mL / NET: -1000 mL        Patient feeling better No CP, No  SOB, No N/V +2/10 occasional pain back had fever this morning and AMS    HEENT: PEARLA No Thrush  Neck: Supple  Cardio: S1 S2 No Murmur  Pulm: No Rales Occ  Ronchi  Abd: Soft NT ND BS+ no  Suprapubic tenderness   Back  -  spine tenderness diffuse   Rectal - refused  Ext: No DCT  Skin: No Rash + Decub - wound care consulted   Neuro: awake pleasant mild short term loss       ?Aspiration - cont zosyn CXR - Left atelectasis verse infiltrate   Pain with PT - will add prePT morphine   Dysphagia - a history speech following continue pleasure feedings pat accepts increase risk of aspiration and death   Urinary Retention - Crandall flomax  Afib - cardio  Failure to thrive - continue supportive care   Chronic Back Pain -   PRN morphine increase long acting Oxycodone observe for SE,    Osteoporosis with compression fracture - pain control

## 2020-07-21 NOTE — ADVANCED PRACTICE NURSE CONSULT - ASSESSMENT
Lethargic, incontinent of stool and urethral catheter in place.  Skin warm, fragile and dry with fair turgor, scattered areas of hyperpigmentation and hypopigmentation, blanchable erythema to bilateral heels.    Patient is noted to have multiple pressure injuries as follows:  An unstageable Pressure Injury to her Coccyx measuring 3.5 x 6.0, the wound bed is covered with 40% non-viable yellow attached slough tissue and 60% exposed dermis tissue, scant amount of serous drainage, no undermining, tunneling or odor.  No erythema, warmth or induration noted to periwound.  A stage 2 pressure injury to her upper spine measuring 3.5 x 1.5 cm with scant serous drainage.  No erythema, warmth or induration noted to periwound.

## 2020-07-21 NOTE — GOALS OF CARE CONVERSATION - ADVANCED CARE PLANNING - CONVERSATION DETAILS
Hospice referral received. At request of RN Case Manager Yee MA, this writer telephoned patient's son/HCP, Partha ([phone]), at 4:30 p.m. to discuss home hospice services. Partha did not  the phone. This writer left detailed voicemail message, including call back phone number. As of 5pm, no return phone call received.    Norma Parekh RN  [phone]

## 2020-07-21 NOTE — ADVANCED PRACTICE NURSE CONSULT - RECOMMEDATIONS
Follow Nutritional suggestions as per Dietary consult.    Recommendation for Coccyx Pressure Injury:  Daily cleanse wound and periwound with Normal Saline, apply collagenase (Santyl), nickel-coin thickness, to entire base of wound cover with dry dressing and Tape/Tegaderm.    Goal of Care:  Enzymatic debridement    Recommendation for Upper Spine Pressure Injury:  Apply Hydrocolloid (Comfeel, Duoderm) to area; change once a week  Please do not remove if sides curl up.    Goal of Care:  To promote moist wound healing    Continue low air loss bed therapy, continue to turn & reposition q2h with Z-tawana fluidized positioning device, Z-Tawana Heel boots to bilateral feet and single breathable pad, please continue measures to decrease friction/shear/pressure.     Plan discussed with patient, her primary nurses, Kayy Hallman RN and Dr. Zeke Templeton.  Please call wound care service line at [phone], if further assistance is needed.  Continue low air loss bed therapy until arrival of [place] therapy, continue to turn & reposition q2h with Z-tawana fluidized positioning device, soft pillow between bony prominences, Z-Tawana Heel boots to bilateral feet and single breathable pad, please continue measures to decrease friction/shear/pressure.     Plan discussed with patient, her primary nurses, Kayy Hallman RN and Dr. Deana Dan.  Please call wound care service line at [phone], if further assistance is needed.

## 2020-07-21 NOTE — PROVIDER CONTACT NOTE (OTHER) - RECOMMENDATIONS
hold morphine until physical therapy gets patient out of bed.
repeat speech and swallow. Thickened liquids
code sepsis

## 2020-07-21 NOTE — PROVIDER CONTACT NOTE (OTHER) - ACTION/TREATMENT ORDERED:
MD stated "let her be tonight, we will reevaluate in the morning,"  possible repeat speech and swallow exam after assessed at breakfast in the morning   WIll contuine to monitor
hold morphine until physical therapy gets patient out of bed.
IVF; CXR and Blood Cultures to be drawn as per [facility] Morris.

## 2020-07-21 NOTE — GOALS OF CARE CONVERSATION - ADVANCED CARE PLANNING - CONVERSATION DETAILS
Late Entry from 7/16/20- Received call from Dr. Dan regarding possible inpatient hospice at the [facility]. Reviewed clinical documentation and discussed case with Dr. Templeton. While patient does appear appropriate for routine level of hospice care, she is not appropriate for general inpatient care for symptom management. [facility] will follow daily for any changes.

## 2020-07-21 NOTE — ADVANCED PRACTICE NURSE CONSULT - REASON FOR CONSULT
Patient seen on skin care rounds after wound care referral received for assessment of skin impairment and recommendations of topical management.  Chart reviewed:  BMI (18.2), Kvng (13), Serum albumin (3.2 g/dL) and Total Protein (5.4 g/dL). Patient H/O DVT (11/2019) not AC, intermittent asthma (never intubated/no ICU stays), polymyalgia rheumatica (chronic prednisone), dementia, osteoporosis with multiple vertebral compression, presents today BIBEMS due to chest pain and ZUNILDA.

## 2020-07-22 LAB
-  K. PNEUMONIAE GROUP: SIGNIFICANT CHANGE UP
ALBUMIN SERPL ELPH-MCNC: 2.6 G/DL — LOW (ref 3.3–5.2)
ALP SERPL-CCNC: 85 U/L — SIGNIFICANT CHANGE UP (ref 40–120)
ALT FLD-CCNC: 10 U/L — SIGNIFICANT CHANGE UP
ANION GAP SERPL CALC-SCNC: 12 MMOL/L — SIGNIFICANT CHANGE UP (ref 5–17)
APPEARANCE UR: CLEAR — SIGNIFICANT CHANGE UP
AST SERPL-CCNC: 18 U/L — SIGNIFICANT CHANGE UP
BASOPHILS # BLD AUTO: 0.02 K/UL — SIGNIFICANT CHANGE UP (ref 0–0.2)
BASOPHILS NFR BLD AUTO: 0.2 % — SIGNIFICANT CHANGE UP (ref 0–2)
BILIRUB SERPL-MCNC: 0.8 MG/DL — SIGNIFICANT CHANGE UP (ref 0.4–2)
BILIRUB UR-MCNC: NEGATIVE — SIGNIFICANT CHANGE UP
BUN SERPL-MCNC: 9 MG/DL — SIGNIFICANT CHANGE UP (ref 8–20)
CALCIUM SERPL-MCNC: 8.2 MG/DL — LOW (ref 8.6–10.2)
CHLORIDE SERPL-SCNC: 91 MMOL/L — LOW (ref 98–107)
CO2 SERPL-SCNC: 29 MMOL/L — SIGNIFICANT CHANGE UP (ref 22–29)
COLOR SPEC: YELLOW — SIGNIFICANT CHANGE UP
CREAT SERPL-MCNC: 0.4 MG/DL — LOW (ref 0.5–1.3)
DIFF PNL FLD: ABNORMAL
EOSINOPHIL # BLD AUTO: 0.06 K/UL — SIGNIFICANT CHANGE UP (ref 0–0.5)
EOSINOPHIL NFR BLD AUTO: 0.6 % — SIGNIFICANT CHANGE UP (ref 0–6)
EPI CELLS # UR: SIGNIFICANT CHANGE UP
GLUCOSE BLDC GLUCOMTR-MCNC: 134 MG/DL — HIGH (ref 70–99)
GLUCOSE BLDC GLUCOMTR-MCNC: 167 MG/DL — HIGH (ref 70–99)
GLUCOSE BLDC GLUCOMTR-MCNC: 177 MG/DL — HIGH (ref 70–99)
GLUCOSE SERPL-MCNC: 101 MG/DL — HIGH (ref 70–99)
GLUCOSE UR QL: NEGATIVE MG/DL — SIGNIFICANT CHANGE UP
HCT VFR BLD CALC: 31.3 % — LOW (ref 34.5–45)
HGB BLD-MCNC: 10.4 G/DL — LOW (ref 11.5–15.5)
IMM GRANULOCYTES NFR BLD AUTO: 0.9 % — SIGNIFICANT CHANGE UP (ref 0–1.5)
KETONES UR-MCNC: NEGATIVE — SIGNIFICANT CHANGE UP
LEUKOCYTE ESTERASE UR-ACNC: ABNORMAL
LYMPHOCYTES # BLD AUTO: 0.58 K/UL — LOW (ref 1–3.3)
LYMPHOCYTES # BLD AUTO: 5.5 % — LOW (ref 13–44)
MCHC RBC-ENTMCNC: 27.6 PG — SIGNIFICANT CHANGE UP (ref 27–34)
MCHC RBC-ENTMCNC: 33.2 GM/DL — SIGNIFICANT CHANGE UP (ref 32–36)
MCV RBC AUTO: 83 FL — SIGNIFICANT CHANGE UP (ref 80–100)
METHOD TYPE: SIGNIFICANT CHANGE UP
MONOCYTES # BLD AUTO: 0.87 K/UL — SIGNIFICANT CHANGE UP (ref 0–0.9)
MONOCYTES NFR BLD AUTO: 8.3 % — SIGNIFICANT CHANGE UP (ref 2–14)
NEUTROPHILS # BLD AUTO: 8.84 K/UL — HIGH (ref 1.8–7.4)
NEUTROPHILS NFR BLD AUTO: 84.5 % — HIGH (ref 43–77)
NITRITE UR-MCNC: NEGATIVE — SIGNIFICANT CHANGE UP
PH UR: 6.5 — SIGNIFICANT CHANGE UP (ref 5–8)
PLATELET # BLD AUTO: 239 K/UL — SIGNIFICANT CHANGE UP (ref 150–400)
POTASSIUM SERPL-MCNC: 4 MMOL/L — SIGNIFICANT CHANGE UP (ref 3.5–5.3)
POTASSIUM SERPL-SCNC: 4 MMOL/L — SIGNIFICANT CHANGE UP (ref 3.5–5.3)
PROT SERPL-MCNC: 4.7 G/DL — LOW (ref 6.6–8.7)
PROT UR-MCNC: 15 MG/DL
RBC # BLD: 3.77 M/UL — LOW (ref 3.8–5.2)
RBC # FLD: 15.9 % — HIGH (ref 10.3–14.5)
RBC CASTS # UR COMP ASSIST: SIGNIFICANT CHANGE UP /HPF (ref 0–4)
SODIUM SERPL-SCNC: 131 MMOL/L — LOW (ref 135–145)
SP GR SPEC: 1.01 — SIGNIFICANT CHANGE UP (ref 1.01–1.02)
UROBILINOGEN FLD QL: NEGATIVE MG/DL — SIGNIFICANT CHANGE UP
WBC # BLD: 10.46 K/UL — SIGNIFICANT CHANGE UP (ref 3.8–10.5)
WBC # FLD AUTO: 10.46 K/UL — SIGNIFICANT CHANGE UP (ref 3.8–10.5)
WBC UR QL: SIGNIFICANT CHANGE UP

## 2020-07-22 PROCEDURE — 99223 1ST HOSP IP/OBS HIGH 75: CPT

## 2020-07-22 RX ORDER — PIPERACILLIN AND TAZOBACTAM 4; .5 G/20ML; G/20ML
3.38 INJECTION, POWDER, LYOPHILIZED, FOR SOLUTION INTRAVENOUS EVERY 8 HOURS
Refills: 0 | Status: DISCONTINUED | OUTPATIENT
Start: 2020-07-22 | End: 2020-07-23

## 2020-07-22 RX ORDER — OXYCODONE HYDROCHLORIDE 5 MG/1
10 TABLET ORAL EVERY 12 HOURS
Refills: 0 | Status: DISCONTINUED | OUTPATIENT
Start: 2020-07-22 | End: 2020-07-27

## 2020-07-22 RX ORDER — ALPRAZOLAM 0.25 MG
0.25 TABLET ORAL EVERY 8 HOURS
Refills: 0 | Status: DISCONTINUED | OUTPATIENT
Start: 2020-07-22 | End: 2020-07-27

## 2020-07-22 RX ORDER — MORPHINE SULFATE 50 MG/1
2 CAPSULE, EXTENDED RELEASE ORAL EVERY 8 HOURS
Refills: 0 | Status: DISCONTINUED | OUTPATIENT
Start: 2020-07-22 | End: 2020-07-27

## 2020-07-22 RX ADMIN — OXYCODONE HYDROCHLORIDE 10 MILLIGRAM(S): 5 TABLET ORAL at 17:20

## 2020-07-22 RX ADMIN — PIPERACILLIN AND TAZOBACTAM 25 GRAM(S): 4; .5 INJECTION, POWDER, LYOPHILIZED, FOR SOLUTION INTRAVENOUS at 13:36

## 2020-07-22 RX ADMIN — Medication 60 MILLIGRAM(S): at 21:46

## 2020-07-22 RX ADMIN — OXYCODONE HYDROCHLORIDE 10 MILLIGRAM(S): 5 TABLET ORAL at 07:14

## 2020-07-22 RX ADMIN — OXYCODONE HYDROCHLORIDE 10 MILLIGRAM(S): 5 TABLET ORAL at 18:20

## 2020-07-22 RX ADMIN — Medication 650 MILLIGRAM(S): at 08:36

## 2020-07-22 RX ADMIN — MORPHINE SULFATE 2 MILLIGRAM(S): 50 CAPSULE, EXTENDED RELEASE ORAL at 21:40

## 2020-07-22 RX ADMIN — OXYCODONE HYDROCHLORIDE 10 MILLIGRAM(S): 5 TABLET ORAL at 06:06

## 2020-07-22 RX ADMIN — Medication 650 MILLIGRAM(S): at 09:38

## 2020-07-22 RX ADMIN — Medication 60 MILLIGRAM(S): at 06:06

## 2020-07-22 RX ADMIN — BUDESONIDE AND FORMOTEROL FUMARATE DIHYDRATE 2 PUFF(S): 160; 4.5 AEROSOL RESPIRATORY (INHALATION) at 09:11

## 2020-07-22 RX ADMIN — PANTOPRAZOLE SODIUM 40 MILLIGRAM(S): 20 TABLET, DELAYED RELEASE ORAL at 17:21

## 2020-07-22 RX ADMIN — Medication 1 APPLICATION(S): at 11:49

## 2020-07-22 RX ADMIN — Medication 60 MILLIGRAM(S): at 13:36

## 2020-07-22 RX ADMIN — TAMSULOSIN HYDROCHLORIDE 0.4 MILLIGRAM(S): 0.4 CAPSULE ORAL at 21:45

## 2020-07-22 RX ADMIN — PIPERACILLIN AND TAZOBACTAM 25 GRAM(S): 4; .5 INJECTION, POWDER, LYOPHILIZED, FOR SOLUTION INTRAVENOUS at 21:46

## 2020-07-22 RX ADMIN — PIPERACILLIN AND TAZOBACTAM 25 GRAM(S): 4; .5 INJECTION, POWDER, LYOPHILIZED, FOR SOLUTION INTRAVENOUS at 06:07

## 2020-07-22 RX ADMIN — Medication 0.25 MILLIGRAM(S): at 06:07

## 2020-07-22 RX ADMIN — PANTOPRAZOLE SODIUM 40 MILLIGRAM(S): 20 TABLET, DELAYED RELEASE ORAL at 06:07

## 2020-07-22 NOTE — PROGRESS NOTE ADULT - ASSESSMENT
An 89 yo Female patient with PMHx significant for DVT (11/2019) not AC, intermittent asthma (never intubated/no icu stays), polymyalgia rheumatica (chronic prednisone), dementia, osteoporosis with multiple vertebral compression, presents today BIBEMS due to chest pain and SOB. Being admitted for chest pain, rule out ACS  At the ED was found to be hyperglycemic [phone] 8 units of regular insulin; blood glucose dropped to 66, dextrose was given. Also noted to have a fever Tmax 102 rectally and a + UA          1. Sepsis secondary to UTI   +UA   + urine culture with Ecoli  on Rocephin completed 5 days -- given on admission     July 21, 2020 pt had fever. Blood Cul now + with G (-) rods   placed on Zosyn and called ID for eval       2.  Urinary retention - on admission and failed trials of void x 3       3.  Lethargy secondary to Iatrogenic Induced Hypoglycemia   Glucose of 636 on BMP, unlikely real  Pt was given 8 U of regular insulin by ED staff, glucose dropped to 66, pt required D50% 25 mg x2  Will avoid Insulin at this time   currently on diet       4. Chest Pain,    Trops -x3,   [facility] called   pt currently rate controlled       5. Afib RVR and Aflutter   S/p Cardizem IVP x1 in ED and lopressor x 1 last night -- helped for a short period of time but pts BP was 114/60   CHADSVASC: 4  Not on AC due to GI bleed   approx 1 month ago was admitted for GIB     6. Duodenal ulcers  -recent gib w/ ulcers found on EGD  on protonix bid     7.  Hypokalemia   K: 2.9 on admission   S/p K raiders          8. Polymyalgia Rheumatica  Hold Prednisone while NPO  if bp drops consider stress dose steroids.       9.  Mild-Moderate Intermittent Asthma  Continue Symbicort/Albuterol PRN  O2 via NC if needed PRN titrate FIO2 >90% and <96%    10. Failure to thrive   Patient cachectic     11. Dysphagia    swallow eval   currently on dysphagia 2, nectar consistency fluids         Code status -- pt with MOLST   DNR/ DNI     family and insurance issues   d/c on hold until resolved

## 2020-07-22 NOTE — GOALS OF CARE CONVERSATION - ADVANCED CARE PLANNING - CONVERSATION DETAILS
[facility] RN again telephoned patient's son, Partha - this time he picked up the phone. However, Partha stated that patient lives alone, that "there is no possibility of her moving in with me because I have 3 other people living here," that he "absolutely can't afford" to hire private aides, and that he works "six days a week, ten hours a day" and can't be at his mother's home to take care of her. He further stated that he was "washing his hands" because his mother "had an opportunity to move in with his brother in [place] 2 years ago and didn't take it." He apologized for "ranting" at this writer but stated that he was absolutely not interested in home hospice. LUIS Smith informed of same.     Norma Parekh, INNA  [phone]

## 2020-07-22 NOTE — CONSULT NOTE ADULT - SUBJECTIVE AND OBJECTIVE BOX
[facility]  INFECTIOUS DISEASES AND INTERNAL MEDICINE at [place]  =======================================================  Fadi Carrion MD  Diplomates American Board of Internal Medicine and Infectious Diseases  Tel: [phone]      Fax: [phone]  =======================================================      [id number]  SARA TRIANA is a 88y  Female     CC: Patient is a 88y old  Female who presents with a chief complaint of Chest pain (22 Jul 2020 10:17)    HPI:  An 87 yo Female patient with PMHx significant for DVT (11/2019) not AC, intermittent asthma (never intubated/no icu stays), polymyalgia rheumatica (chronic prednisone), dementia, osteoporosis with multiple vertebral compression, presents today BIBEMS due to chest pain and ZUNILDA.   Patient lethargic, unable to obtained detail history, most of information obtained from chart review. As per EMS documentation patient reported chest pain and SOB after having a family argument. Of note patient was dc today from rehab.   She was recently admitted at [facility] on June 2020, due to GI bleed found to have multiple duodenal ulcers.   At the ED was found to be hyperglycemic [phone] 8 units of regular insulin; blood glucose dropped to 66, dextrose was given.  Also noted to have a fever Tmax 102 rectally and a + UA.     Attempted to speak to her Partha, but did not answer his phone (04 Jul 2020 22:50)      PAST MEDICAL & SURGICAL HISTORY:  Chronic back pain  CHF (congestive heart failure)  Atrial fibrillation  Polymyalgia rheumatica  Diverticulosis  Asthma  S/P knee replacement, left  S/P hysterectomy: 1982  S/P appendectomy: 1962      Social Hx: non smoker    FAMILY HISTORY:  Family history of stroke      Allergies    dust (Unknown)  No Known Drug Allergies    Intolerances        MEDICATIONS  (STANDING):  budesonide 160 MICROgram(s)/formoterol 4.5 MICROgram(s) Inhaler 2 Puff(s) Inhalation two times a day  collagenase Ointment 1 Application(s) Topical daily  digoxin     Tablet 0.25 milliGRAM(s) Oral daily  diltiazem    Tablet 60 milliGRAM(s) Oral every 8 hours  morphine  - Injectable 3 milliGRAM(s) IV Push daily  multiple electrolytes Injection Type 1 1000 milliLiter(s) (100 mL/Hr) IV Continuous <Continuous>  oxyCODONE  ER Tablet 10 milliGRAM(s) Oral every 12 hours  pantoprazole  Injectable 40 milliGRAM(s) IV Push two times a day  piperacillin/tazobactam IVPB.. 3.375 Gram(s) IV Intermittent every 8 hours  polyethylene glycol 3350 17 Gram(s) Oral at bedtime  predniSONE   Tablet 10 milliGRAM(s) Oral daily  senna 2 Tablet(s) Oral at bedtime  tamsulosin 0.4 milliGRAM(s) Oral at bedtime    MEDICATIONS  (PRN):  acetaminophen   Tablet .. 650 milliGRAM(s) Oral every 4 hours PRN Mild Pain (1 - 3)      ANTIMICROBIALS:  piperacillin/tazobactam IVPB.. 3.375 every 8 hours      OTHER MEDS: MEDICATIONS  (STANDING):  acetaminophen   Tablet .. 650 every 4 hours PRN  budesonide 160 MICROgram(s)/formoterol 4.5 MICROgram(s) Inhaler 2 two times a day  digoxin     Tablet 0.25 daily  diltiazem    Tablet 60 every 8 hours  morphine  - Injectable 3 daily  oxyCODONE  ER Tablet 10 every 12 hours  pantoprazole  Injectable 40 two times a day  polyethylene glycol 3350 17 at bedtime  predniSONE   Tablet 10 daily  senna 2 at bedtime  tamsulosin 0.4 at bedtime             REVIEW OF SYSTEMS:  CONSTITUTIONAL:  No Fever or chills  HEENT:  No diplopia or blurred vision.  No earache, sore throat or runny nose.  CARDIOVASCULAR:  No pressure, squeezing, strangling, tightness, heaviness or aching about the chest, neck, axilla or epigastrium.  RESPIRATORY:  No cough, shortness of breath  GASTROINTESTINAL:  No nausea, vomiting or diarrhea.  GENITOURINARY:  No dysuria, frequency or urgency. No Blood in urine  MUSCULOSKELETAL:  no joint aches, no muscle pain  SKIN:  No change in skin, hair or nails.  NEUROLOGIC:  No Headaches, seizures or weakness.  PSYCHIATRIC:  No disorder of thought or mood.  ENDOCRINE:  No heat or cold intolerance  HEMATOLOGICAL:  No easy bruising or bleeding.           I&O's Detail    21 Jul 2020 07:01  -  22 Jul 2020 07:00  --------------------------------------------------------  IN:  Total IN: 0 mL    OUT:    Indwelling Catheter - Urethral: 2300 mL  Total OUT: 2300 mL    Total NET: -2300 mL            Physical Exam:  Vital Signs Last 24 Hrs  T(C): 37.2 (22 Jul 2020 07:30), Max: 37.2 (22 Jul 2020 07:30)  T(F): 98.9 (22 Jul 2020 07:30), Max: 98.9 (22 Jul 2020 07:30)  HR: 83 (22 Jul 2020 13:38) (83 - 105)  BP: 105/69 (22 Jul 2020 13:38) (100/70 - 131/72)  BP(mean): --  RR: 20 (22 Jul 2020 07:30) (17 - 20)  SpO2: 97% (22 Jul 2020 09:13) (92% - 97%)    GEN: NAD, pleasant on O2  HEENT: normocephalic and atraumatic. EOMI. PERRL.  Anicteric  NECK: Supple.   LUNGS: Clear to auscultation.  HEART: Regular rate and rhythm without murmur.  ABDOMEN: Soft, nontender, and nondistended.  Positive bowel sounds.    : No CVA tenderness  EXTREMITIES: LUE + diffuse swelling  MSK: No joint swelling  NEUROLOGIC: Cranial nerves II through XII are grossly intact. No Focal Deficits  PSYCHIATRIC: Appropriate affect .  SKIN: No Rash        Labs:                        10.4   10.46 )-----------( 239      ( 22 Jul 2020 07:17 )             31.3   07-22    131<L>  |  91<L>  |  9.0  ----------------------------<  101<H>  4.0   |  29.0  |  0.40<L>    Ca    8.2<L>      22 Jul 2020 07:17    TPro  4.7<L>  /  Alb  2.6<L>  /  TBili  0.8  /  DBili  x   /  AST  18  /  ALT  10  /  AlkPhos  85  07-22      Radiology:  < from: Xray Chest 1 View- PORTABLE-Urgent (07.21.20 @ 12:47) >    IMPRESSION:  Left basilar and retrocardiac opacity and hazy left lower lung opacity, findings which may be due to a left pleural effusion with passive atelectasis. Atelectasis of other cause and/or pneumonia in the appropriate clinical context is not excluded.      < end of copied text >

## 2020-07-22 NOTE — PROGRESS NOTE ADULT - SUBJECTIVE AND OBJECTIVE BOX
HPI:  An 87 yo Female patient with PMHx significant for DVT (2019) not AC, intermittent asthma (never intubated/no icu stays), polymyalgia rheumatica (chronic prednisone), dementia, osteoporosis with multiple vertebral compression, presents today BIBEMS due to chest pain and ZUNILDA.   Patient lethargic, unable to obtained detail history, most of information obtained from chart review. As per EMS documentation patient reported chest pain and SOB after having a family argument. Of note patient was dc today from rehab.   She was recently admitted at [facility] on 2020, due to GI bleed found to have multiple duodenal ulcers.   At the ED was found to be hyperglycemic [phone] 8 units of regular insulin; blood glucose dropped to 66, dextrose was given.  Also noted to have a fever Tmax 102 rectally and a + UA.     Attempted to speak to her Partha, but did not answer his phone (2020 22:50)     Allergies    dust (Unknown)  No Known Drug Allergies    Intolerances      Chronic back pain  CHF (congestive heart failure)  Atrial fibrillation  Polymyalgia rheumatica  Diverticulosis  Asthma    MEDICATIONS  (STANDING):  budesonide 160 MICROgram(s)/formoterol 4.5 MICROgram(s) Inhaler 2 Puff(s) Inhalation two times a day  collagenase Ointment 1 Application(s) Topical daily  digoxin     Tablet 0.25 milliGRAM(s) Oral daily  diltiazem    Tablet 60 milliGRAM(s) Oral every 8 hours  morphine  - Injectable 3 milliGRAM(s) IV Push daily  multiple electrolytes Injection Type 1 1000 milliLiter(s) (100 mL/Hr) IV Continuous <Continuous>  oxyCODONE  ER Tablet 10 milliGRAM(s) Oral every 12 hours  pantoprazole  Injectable 40 milliGRAM(s) IV Push two times a day  piperacillin/tazobactam IVPB.. 3.375 Gram(s) IV Intermittent every 8 hours  polyethylene glycol 3350 17 Gram(s) Oral at bedtime  predniSONE   Tablet 10 milliGRAM(s) Oral daily  senna 2 Tablet(s) Oral at bedtime  tamsulosin 0.4 milliGRAM(s) Oral at bedtime    MEDICATIONS  (PRN):  acetaminophen   Tablet .. 650 milliGRAM(s) Oral every 4 hours PRN Mild Pain (1 - 3)  ALPRAZolam 0.25 milliGRAM(s) Oral every 8 hours PRN anxirty or pain  morphine  - Injectable 2 milliGRAM(s) IV Push every 8 hours PRN Severe Pain (7 - 10)                           10.4   10.46 )-----------( 239      ( 2020 07:17 )             31.3         131<L>  |  91<L>  |  9.0  ----------------------------<  101<H>  4.0   |  29.0  |  0.40<L>    Ca    8.2<L>      2020 07:17    TPro  4.7<L>  /  Alb  2.6<L>  /  TBili  0.8  /  DBili  x   /  AST  18  /  ALT  10  /  AlkPhos  85  22      Urinalysis Basic - ( 2020 17:54 )    Color: Yellow / Appearance: Clear / S.015 / pH: x  Gluc: x / Ketone: Negative  / Bili: Negative / Urobili: Negative mg/dL   Blood: x / Protein: 15 mg/dL / Nitrite: Negative   Leuk Esterase: Trace / RBC: 0-2 /HPF / WBC 0-2   Sq Epi: x / Non Sq Epi: Occasional / Bacteria: x    ;  Vital Signs Last 24 Hrs  T(C): 36.3 (2020 16:25), Max: 37.2 (2020 07:30)  T(F): 97.4 (2020 16:25), Max: 98.9 (2020 07:30)  HR: 80 (2020 16:25) (80 - 105)  BP: 120/74 (2020 16:25) (100/70 - 131/72)  BP(mean): --  RR: 19 (2020 16:25) (17 - 20)  SpO2: 100% (2020 16:25) (92% - 100%)  CAPILLARY BLOOD GLUCOSE      - @ 07:01  -   @ 07:00  --------------------------------------------------------  IN: 0 mL / OUT: 2300 mL / NET: -2300 mL     @ 07:01  -  07-22 @ 19:53  --------------------------------------------------------  IN: 0 mL / OUT: 300 mL / NET: -300 mL      Patient feeling better No CP, No  SOB, No N/V +2/10 occasional pain back     HEENT: PEARLA No Thrush  Neck: Supple  Cardio: S1 S2 No Murmur  Pulm: No Rales Occ  Ronchi  Abd: Soft NT ND BS+ no  Suprapubic tenderness   Back  -  spine tenderness diffuse   Rectal - refused  Ext: No DCT  Skin: No Rash + Decub - wound care consulted   Neuro: awake pleasant mild short term loss       ?Aspiration - cont zosyn CXR - Left atelectasis verse infiltrate   Pain with PT - will add prePT morphine   Dysphagia - a history speech following continue pleasure feedings pat accepts increase risk of aspiration and death   Urinary Retention - [place] flomax  Afib - cardio  Failure to thrive - continue supportive care   Chronic Back Pain -   PRN morphine increase long acting Oxycodone observe for SE,    Osteoporosis with compression fracture - pain control

## 2020-07-22 NOTE — PROGRESS NOTE ADULT - SUBJECTIVE AND OBJECTIVE BOX
HPI:  An 87 yo Female patient with PMHx significant for DVT (11/2019) not AC, intermittent asthma (never intubated/no icu stays), polymyalgia rheumatica (chronic prednisone), dementia, osteoporosis with multiple vertebral compression, presents today BIBEMS due to chest pain and ZUNILDA.   Patient lethargic, unable to obtained detail history, most of information obtained from chart review. As per EMS documentation patient reported chest pain and SOB after having a family argument. Of note patient was dc today from rehab.   She was recently admitted at [facility] on June 2020, due to GI bleed found to have multiple duodenal ulcers.   At the ED was found to be hyperglycemic (636) 8 units of regular insulin; blood glucose dropped to 66, dextrose was given.  Also noted to have a fever Tmax 102 rectally and a + UA.     Attempted to speak to her Partha, but did not answer his phone (04 Jul 2020 22:50)      PAST MEDICAL & SURGICAL HISTORY:  Chronic back pain  CHF (congestive heart failure)  Atrial fibrillation  Polymyalgia rheumatica  Diverticulosis  Asthma  S/P knee replacement, left  S/P hysterectomy: 1982  S/P appendectomy: 1962      ANTIMICROBIAL:  piperacillin/tazobactam IVPB.. 3.375 Gram(s) IV Intermittent every 12 hours    CARDIOVASCULAR:  digoxin     Tablet 0.25 milliGRAM(s) Oral daily  diltiazem    Tablet 60 milliGRAM(s) Oral every 8 hours  tamsulosin 0.4 milliGRAM(s) Oral at bedtime    PULMONARY:  budesonide 160 MICROgram(s)/formoterol 4.5 MICROgram(s) Inhaler 2 Puff(s) Inhalation two times a day    NEUROLOGIC:  acetaminophen   Tablet .. 650 milliGRAM(s) Oral every 4 hours PRN  morphine  - Injectable 3 milliGRAM(s) IV Push daily  oxyCODONE  ER Tablet 10 milliGRAM(s) Oral every 12 hours        GATROINTESTINAL:  pantoprazole  Injectable 40 milliGRAM(s) IV Push two times a day  polyethylene glycol 3350 17 Gram(s) Oral at bedtime  senna 2 Tablet(s) Oral at bedtime        ENDO/METABOLIC:  predniSONE   Tablet 10 milliGRAM(s) Oral daily    IV FLUID/NUTRITION:  multiple electrolytes Injection Type 1 1000 milliLiter(s) IV Continuous <Continuous>    TOPICAL:  collagenase Ointment 1 Application(s) Topical daily            Allergies    dust (Unknown)  No Known Drug Allergies          SOCIAL HISTORY:    FAMILY HISTORY:  Family history of stroke      Vital Signs Last 24 Hrs  T(C): 37.2 (22 Jul 2020 07:30), Max: 38.7 (21 Jul 2020 10:29)  T(F): 98.9 (22 Jul 2020 07:30), Max: 101.6 (21 Jul 2020 10:29)  HR: 87 (22 Jul 2020 09:13) (84 - 114)  BP: 111/64 (22 Jul 2020 07:30) (100/70 - 131/72)  BP(mean): --  RR: 20 (22 Jul 2020 07:30) (17 - 20)  SpO2: 97% (22 Jul 2020 09:13) (92% - 97%)            REVIEW OF SYSTEMS:    CONSTITUTIONAL: No fever, weight loss, or fatigue  EYES: No eye pain, visual disturbances, or discharge  NECK: No pain or stiffness  RESPIRATORY: No cough, wheezing, chills or hemoptysis; No shortness of breath  CARDIOVASCULAR: No chest pain  GASTROINTESTINAL: No abdominal or epigastric pain. No nausea, vomiting, or hematemesis; No diarrhea or constipation.   GENITOURINARY: No chronic virk   NEUROLOGICAL: AAO  SKIN: No itching, burning, rashes, or lesions   MUSCULOSKELETAL: + back pain             PHYSICAL EXAM:    GENERAL: NAD, well-groomed, well-developed  HEAD:  Atraumatic, Normocephalic  EYES: EOMI, PERRLA, conjunctiva and sclera clear  NECK: Supple  NERVOUS SYSTEM:  Alert & Oriented   CHEST/LUNG: Clear   HEART: Regular rate controlled   ABDOMEN: Soft, Nontender, Nondistended; Bowel sounds present  EXTREMITIES:  LLE +1 edema          LABS:                        10.4   10.46 )-----------( 239      ( 22 Jul 2020 07:17 )             31.3     07-22    131<L>  |  91<L>  |  9.0  ----------------------------<  101<H>  4.0   |  29.0  |  0.40<L>    Ca    8.2<L>      22 Jul 2020 07:17    TPro  4.7<L>  /  Alb  2.6<L>  /  TBili  0.8  /  DBili  x   /  AST  18  /  ALT  10  /  AlkPhos  85  07-22                RADIOLOGY & ADDITIONAL STUDIES:

## 2020-07-22 NOTE — CONSULT NOTE ADULT - ASSESSMENT
88y Female with prior history of significant for DVT (11/2019) not AC, intermittent asthma (never intubated/no icu stays), polymyalgia rheumatica (chronic prednisone), dementia, osteoporosis with multiple vertebral compression. Telemetry with AF and RVR. At the time of evaluation, pt lethargic and in pain from back.     AF  - Rates remain elevated  - Will add cardizem and use iv digoxin  - Not a candidate for anticoagulation    Mild AS on Echo
87 yo Female patient with PMHx significant for DVT (11/2019) not AC, intermittent asthma (never intubated/no icu stays), polymyalgia rheumatica (chronic prednisone), dementia, osteoporosis with multiple vertebral compression, presents 7/4 with chest pain, dyspnea after having a family argument, the day after being discharged from rehab.  She was recently admitted at [facility] on June 2020, due to GI bleed found to have multiple duodenal ulcers.   At the ED was found to be hyperglycemic (636) 8 units of regular insulin; blood glucose dropped to 66, dextrose was given.   Also noted to have a fever Tmax 102 rectally and a + UA. UCx E. coli, s/p ceftriaxone x 5 days. Failed TOV due to urinary retention. Spiked fever on 7/21, found to have klebsiella bacteremia    Asthma  Klebsiella bacteremia  Fever  Urinary retention    - denies cough, no urinary or GI symptoms  - BCX + klebsiella, may be from urinary source  - Check UA  - Continue zosyn 3.375 g Iv q8h  - f/u repeat BCX  - CXr with left basilar/retrocardiac opacity-if worsening dyspnea consider CT chest  - Trend Fever  - Trend Leukocytosis      d/w attending, RN  Will Follow

## 2020-07-23 LAB
-  AMIKACIN: SIGNIFICANT CHANGE UP
-  AMIKACIN: SIGNIFICANT CHANGE UP
-  AMPICILLIN/SULBACTAM: SIGNIFICANT CHANGE UP
-  AMPICILLIN/SULBACTAM: SIGNIFICANT CHANGE UP
-  AMPICILLIN: SIGNIFICANT CHANGE UP
-  AMPICILLIN: SIGNIFICANT CHANGE UP
-  AZTREONAM: SIGNIFICANT CHANGE UP
-  AZTREONAM: SIGNIFICANT CHANGE UP
-  CEFAZOLIN: SIGNIFICANT CHANGE UP
-  CEFAZOLIN: SIGNIFICANT CHANGE UP
-  CEFEPIME: SIGNIFICANT CHANGE UP
-  CEFEPIME: SIGNIFICANT CHANGE UP
-  CEFOXITIN: SIGNIFICANT CHANGE UP
-  CEFOXITIN: SIGNIFICANT CHANGE UP
-  CEFTRIAXONE: SIGNIFICANT CHANGE UP
-  CEFTRIAXONE: SIGNIFICANT CHANGE UP
-  CIPROFLOXACIN: SIGNIFICANT CHANGE UP
-  CIPROFLOXACIN: SIGNIFICANT CHANGE UP
-  ERTAPENEM: SIGNIFICANT CHANGE UP
-  ERTAPENEM: SIGNIFICANT CHANGE UP
-  GENTAMICIN: SIGNIFICANT CHANGE UP
-  GENTAMICIN: SIGNIFICANT CHANGE UP
-  IMIPENEM: SIGNIFICANT CHANGE UP
-  IMIPENEM: SIGNIFICANT CHANGE UP
-  LEVOFLOXACIN: SIGNIFICANT CHANGE UP
-  LEVOFLOXACIN: SIGNIFICANT CHANGE UP
-  MEROPENEM: SIGNIFICANT CHANGE UP
-  MEROPENEM: SIGNIFICANT CHANGE UP
-  PIPERACILLIN/TAZOBACTAM: SIGNIFICANT CHANGE UP
-  PIPERACILLIN/TAZOBACTAM: SIGNIFICANT CHANGE UP
-  TOBRAMYCIN: SIGNIFICANT CHANGE UP
-  TOBRAMYCIN: SIGNIFICANT CHANGE UP
-  TRIMETHOPRIM/SULFAMETHOXAZOLE: SIGNIFICANT CHANGE UP
-  TRIMETHOPRIM/SULFAMETHOXAZOLE: SIGNIFICANT CHANGE UP
CULTURE RESULTS: SIGNIFICANT CHANGE UP
METHOD TYPE: SIGNIFICANT CHANGE UP
METHOD TYPE: SIGNIFICANT CHANGE UP
ORGANISM # SPEC MICROSCOPIC CNT: SIGNIFICANT CHANGE UP
SPECIMEN SOURCE: SIGNIFICANT CHANGE UP
SPECIMEN SOURCE: SIGNIFICANT CHANGE UP

## 2020-07-23 PROCEDURE — 99232 SBSQ HOSP IP/OBS MODERATE 35: CPT

## 2020-07-23 RX ORDER — MEROPENEM 1 G/30ML
1000 INJECTION INTRAVENOUS EVERY 8 HOURS
Refills: 0 | Status: DISCONTINUED | OUTPATIENT
Start: 2020-07-23 | End: 2020-07-27

## 2020-07-23 RX ADMIN — Medication 0.25 MILLIGRAM(S): at 00:06

## 2020-07-23 RX ADMIN — MEROPENEM 100 MILLIGRAM(S): 1 INJECTION INTRAVENOUS at 21:37

## 2020-07-23 RX ADMIN — Medication 650 MILLIGRAM(S): at 15:02

## 2020-07-23 RX ADMIN — Medication 650 MILLIGRAM(S): at 21:36

## 2020-07-23 RX ADMIN — Medication 60 MILLIGRAM(S): at 06:51

## 2020-07-23 RX ADMIN — Medication 650 MILLIGRAM(S): at 20:04

## 2020-07-23 RX ADMIN — PANTOPRAZOLE SODIUM 40 MILLIGRAM(S): 20 TABLET, DELAYED RELEASE ORAL at 16:57

## 2020-07-23 RX ADMIN — Medication 1 APPLICATION(S): at 11:38

## 2020-07-23 RX ADMIN — OXYCODONE HYDROCHLORIDE 10 MILLIGRAM(S): 5 TABLET ORAL at 06:51

## 2020-07-23 RX ADMIN — Medication 650 MILLIGRAM(S): at 00:04

## 2020-07-23 RX ADMIN — PIPERACILLIN AND TAZOBACTAM 25 GRAM(S): 4; .5 INJECTION, POWDER, LYOPHILIZED, FOR SOLUTION INTRAVENOUS at 06:53

## 2020-07-23 RX ADMIN — PANTOPRAZOLE SODIUM 40 MILLIGRAM(S): 20 TABLET, DELAYED RELEASE ORAL at 06:53

## 2020-07-23 RX ADMIN — TAMSULOSIN HYDROCHLORIDE 0.4 MILLIGRAM(S): 0.4 CAPSULE ORAL at 21:37

## 2020-07-23 RX ADMIN — BUDESONIDE AND FORMOTEROL FUMARATE DIHYDRATE 2 PUFF(S): 160; 4.5 AEROSOL RESPIRATORY (INHALATION) at 20:24

## 2020-07-23 RX ADMIN — OXYCODONE HYDROCHLORIDE 10 MILLIGRAM(S): 5 TABLET ORAL at 17:27

## 2020-07-23 RX ADMIN — BUDESONIDE AND FORMOTEROL FUMARATE DIHYDRATE 2 PUFF(S): 160; 4.5 AEROSOL RESPIRATORY (INHALATION) at 08:45

## 2020-07-23 RX ADMIN — Medication 60 MILLIGRAM(S): at 21:37

## 2020-07-23 RX ADMIN — Medication 0.25 MILLIGRAM(S): at 21:37

## 2020-07-23 RX ADMIN — Medication 0.25 MILLIGRAM(S): at 06:51

## 2020-07-23 RX ADMIN — OXYCODONE HYDROCHLORIDE 10 MILLIGRAM(S): 5 TABLET ORAL at 16:57

## 2020-07-23 RX ADMIN — MEROPENEM 100 MILLIGRAM(S): 1 INJECTION INTRAVENOUS at 14:57

## 2020-07-23 RX ADMIN — Medication 60 MILLIGRAM(S): at 14:59

## 2020-07-23 NOTE — PROGRESS NOTE ADULT - ASSESSMENT
87 yo Female patient with PMHx significant for DVT (11/2019) not AC, intermittent asthma (never intubated/no icu stays), polymyalgia rheumatica (chronic prednisone), dementia, osteoporosis with multiple vertebral compression, presents 7/4 with chest pain, dyspnea after having a family argument, the day after being discharged from rehab.  She was recently admitted at [facility] on June 2020, due to GI bleed found to have multiple duodenal ulcers.   At the ED was found to be hyperglycemic (636) 8 units of regular insulin; blood glucose dropped to 66, dextrose was given.   Also noted to have a fever Tmax 102 rectally and a + UA. UCx E. coli, s/p ceftriaxone x 5 days. Failed TOV due to urinary retention. Spiked fever on 7/21, found to have klebsiella bacteremia    Asthma  Klebsiella bacteremia  Fever  Urinary retention    - denies cough, no urinary or GI symptoms  - BCX + klebsiella, + ESBL likely from urinary source given urinary retention  - UA (-)  - Dc zosyn--->meropenem 1 g Iv q8h  - f/u repeat BCX, once negative can complete course of IV abx with midline  - CXr with left basilar/retrocardiac opacity-if worsening dyspnea consider CT chest  - Trend Fever  - Trend Leukocytosis      Will Follow

## 2020-07-23 NOTE — PROGRESS NOTE ADULT - ASSESSMENT
An 89 yo Female patient with PMHx significant for DVT (11/2019) not AC, intermittent asthma (never intubated/no icu stays), polymyalgia rheumatica (chronic prednisone), dementia, osteoporosis with multiple vertebral compression, presents today BIBEMS due to chest pain and SOB. Being admitted for chest pain, rule out ACS  At the ED was found to be hyperglycemic (636) 8 units of regular insulin; blood glucose dropped to 66, dextrose was given. Also noted to have a fever Tmax 102 rectally and a + UA          1. Sepsis secondary to UTI   +UA   + urine culture with Ecoli  on Rocephin completed 5 days -- given on admission     July 21, 2020 pt had fever. Blood Cul now + with G (-) rods Klebsiella   placed on Zosyn and called ID for eval       2.  Urinary retention - on admission and failed trials of void x 3       3.  Lethargy secondary to Iatrogenic Induced Hypoglycemia   Glucose of 636 on BMP, unlikely real  Pt was given 8 U of regular insulin by ED staff, glucose dropped to 66, pt required D50% 25 mg x2  Will avoid Insulin at this time   currently on diet       4. Chest Pain,    Trops -x3,   [facility] called   pt currently rate controlled       5. Afib RVR and Aflutter   S/p Cardizem IVP x1 in ED and lopressor x 1 last night -- helped for a short period of time but pts BP was 114/60   CHADSVASC: 4  Not on AC due to GI bleed   approx 1 month ago was admitted for GIB     6. Duodenal ulcers  -recent gib w/ ulcers found on EGD  on protonix bid     7.  Hypokalemia   K: 2.9 on admission   S/p K raiders          8. Polymyalgia Rheumatica  Hold Prednisone while NPO  if bp drops consider stress dose steroids.       9.  Mild-Moderate Intermittent Asthma  Continue Symbicort/Albuterol PRN  O2 via NC if needed PRN titrate FIO2 >90% and <96%    10. Failure to thrive   Patient cachectic     11. Dysphagia    swallow eval   currently on dysphagia 2, nectar consistency fluids         Code status -- pt with MOLST   DNR/ DNI     family and insurance issues   d/c on hold until resolved

## 2020-07-23 NOTE — PROGRESS NOTE ADULT - SUBJECTIVE AND OBJECTIVE BOX
HPI:  An 89 yo Female patient with PMHx significant for DVT (11/2019) not AC, intermittent asthma (never intubated/no icu stays), polymyalgia rheumatica (chronic prednisone), dementia, osteoporosis with multiple vertebral compression, presents today BIBEMS due to chest pain and ZUNILDA.   Patient lethargic, unable to obtained detail history, most of information obtained from chart review. As per EMS documentation patient reported chest pain and SOB after having a family argument. Of note patient was dc today from rehab.   She was recently admitted at [facility] on June 2020, due to GI bleed found to have multiple duodenal ulcers.   At the ED was found to be hyperglycemic [phone] 8 units of regular insulin; blood glucose dropped to 66, dextrose was given.  Also noted to have a fever Tmax 102 rectally and a + UA.     Attempted to speak to her Partha, but did not answer his phone (04 Jul 2020 22:50)      PAST MEDICAL & SURGICAL HISTORY:  Chronic back pain  CHF (congestive heart failure)  Atrial fibrillation  Polymyalgia rheumatica  Diverticulosis  Asthma  S/P knee replacement, left  S/P hysterectomy: 1982  S/P appendectomy: 1962      ANTIMICROBIAL:  piperacillin/tazobactam IVPB.. 3.375 Gram(s) IV Intermittent every 12 hours    CARDIOVASCULAR:  digoxin     Tablet 0.25 milliGRAM(s) Oral daily  diltiazem    Tablet 60 milliGRAM(s) Oral every 8 hours  tamsulosin 0.4 milliGRAM(s) Oral at bedtime    PULMONARY:  budesonide 160 MICROgram(s)/formoterol 4.5 MICROgram(s) Inhaler 2 Puff(s) Inhalation two times a day    NEUROLOGIC:  acetaminophen   Tablet .. 650 milliGRAM(s) Oral every 4 hours PRN  morphine  - Injectable 3 milliGRAM(s) IV Push daily  oxyCODONE  ER Tablet 10 milliGRAM(s) Oral every 12 hours        GATROINTESTINAL:  pantoprazole  Injectable 40 milliGRAM(s) IV Push two times a day  polyethylene glycol 3350 17 Gram(s) Oral at bedtime  senna 2 Tablet(s) Oral at bedtime        ENDO/METABOLIC:  predniSONE   Tablet 10 milliGRAM(s) Oral daily    IV FLUID/NUTRITION:  multiple electrolytes Injection Type 1 1000 milliLiter(s) IV Continuous <Continuous>    TOPICAL:  collagenase Ointment 1 Application(s) Topical daily            Allergies    dust (Unknown)  No Known Drug Allergies          SOCIAL HISTORY:    FAMILY HISTORY:  Family history of stroke      Vital Signs Last 24 Hrs  T(C): 36.3 (22 Jul 2020 16:25), Max: 36.3 (22 Jul 2020 16:25)  T(F): 97.4 (22 Jul 2020 16:25), Max: 97.4 (22 Jul 2020 16:25)  HR: 60 (23 Jul 2020 06:45) (60 - 87)  BP: 106/68 (23 Jul 2020 06:45) (105/69 - 120/74)  BP(mean): --  RR: 19 (22 Jul 2020 16:25) (19 - 19)  SpO2: 100% (22 Jul 2020 16:25) (97% - 100%)          REVIEW OF SYSTEMS:    CONSTITUTIONAL: No fever, weight loss, or fatigue  EYES: No eye pain, visual disturbances, or discharge  NECK: No pain or stiffness  RESPIRATORY: No cough, wheezing, chills or hemoptysis; No shortness of breath  CARDIOVASCULAR: No chest pain  GASTROINTESTINAL: No abdominal or epigastric pain. No nausea, vomiting, or hematemesis; No diarrhea or constipation.   GENITOURINARY: No chronic virk   NEUROLOGICAL: AAO  SKIN: No itching, burning, rashes, or lesions   MUSCULOSKELETAL: + back pain             PHYSICAL EXAM:    GENERAL: NAD, well-groomed, well-developed  HEAD:  Atraumatic, Normocephalic  EYES: EOMI, PERRLA, conjunctiva and sclera clear  NECK: Supple  NERVOUS SYSTEM:  Alert & Oriented   CHEST/LUNG: Clear   HEART: Regular rate controlled   ABDOMEN: Soft, Nontender, Nondistended; Bowel sounds present  EXTREMITIES:  LLE +1 edema          LABS:                                       10.4   10.46 )-----------( 239      ( 22 Jul 2020 07:17 )             31.3         07-22    131<L>  |  91<L>  |  9.0  ----------------------------<  101<H>  4.0   |  29.0  |  0.40<L>    Ca    8.2<L>      22 Jul 2020 07:17    TPro  4.7<L>  /  Alb  2.6<L>  /  TBili  0.8  /  DBili  x   /  AST  18  /  ALT  10  /  AlkPhos  85  07-22                  RADIOLOGY & ADDITIONAL STUDIES:

## 2020-07-23 NOTE — PROGRESS NOTE ADULT - SUBJECTIVE AND OBJECTIVE BOX
HPI:  An 87 yo Female patient with PMHx significant for DVT (2019) not AC, intermittent asthma (never intubated/no icu stays), polymyalgia rheumatica (chronic prednisone), dementia, osteoporosis with multiple vertebral compression, presents today BIBEMS due to chest pain and ZUNILDA.   Patient lethargic, unable to obtained detail history, most of information obtained from chart review. As per EMS documentation patient reported chest pain and SOB after having a family argument. Of note patient was dc today from rehab.   She was recently admitted at [facility] on 2020, due to GI bleed found to have multiple duodenal ulcers.   At the ED was found to be hyperglycemic [phone] 8 units of regular insulin; blood glucose dropped to 66, dextrose was given.  Also noted to have a fever Tmax 102 rectally and a + UA.     Attempted to speak to her Partha, but did not answer his phone (2020 22:50)     Allergies    dust (Unknown)  No Known Drug Allergies    Intolerances      Chronic back pain  CHF (congestive heart failure)  Atrial fibrillation  Polymyalgia rheumatica  Diverticulosis  Asthma    MEDICATIONS  (STANDING):  budesonide 160 MICROgram(s)/formoterol 4.5 MICROgram(s) Inhaler 2 Puff(s) Inhalation two times a day  collagenase Ointment 1 Application(s) Topical daily  digoxin     Tablet 0.25 milliGRAM(s) Oral daily  diltiazem    Tablet 60 milliGRAM(s) Oral every 8 hours  meropenem  IVPB 1000 milliGRAM(s) IV Intermittent every 8 hours  morphine  - Injectable 3 milliGRAM(s) IV Push daily  multiple electrolytes Injection Type 1 1000 milliLiter(s) (100 mL/Hr) IV Continuous <Continuous>  oxyCODONE  ER Tablet 10 milliGRAM(s) Oral every 12 hours  pantoprazole  Injectable 40 milliGRAM(s) IV Push two times a day  polyethylene glycol 3350 17 Gram(s) Oral at bedtime  predniSONE   Tablet 10 milliGRAM(s) Oral daily  senna 2 Tablet(s) Oral at bedtime  tamsulosin 0.4 milliGRAM(s) Oral at bedtime    MEDICATIONS  (PRN):  acetaminophen   Tablet .. 650 milliGRAM(s) Oral every 4 hours PRN Mild Pain (1 - 3)  ALPRAZolam 0.25 milliGRAM(s) Oral every 8 hours PRN anxirty or pain  morphine  - Injectable 2 milliGRAM(s) IV Push every 8 hours PRN Severe Pain (7 - 10)                           10.4   10.46 )-----------( 239      ( 2020 07:17 )             31.3     -22    131<L>  |  91<L>  |  9.0  ----------------------------<  101<H>  4.0   |  29.0  |  0.40<L>    Ca    8.2<L>      2020 07:17    TPro  4.7<L>  /  Alb  2.6<L>  /  TBili  0.8  /  DBili  x   /  AST  18  /  ALT  10  /  AlkPhos  85  07-22      Urinalysis Basic - ( 2020 17:54 )    Color: Yellow / Appearance: Clear / S.015 / pH: x  Gluc: x / Ketone: Negative  / Bili: Negative / Urobili: Negative mg/dL   Blood: x / Protein: 15 mg/dL / Nitrite: Negative   Leuk Esterase: Trace / RBC: 0-2 /HPF / WBC 0-2   Sq Epi: x / Non Sq Epi: Occasional / Bacteria: x    ;  Vital Signs Last 24 Hrs  T(C): 36.3 (2020 15:35), Max: 36.3 (2020 15:35)  T(F): 97.3 (2020 15:35), Max: 97.3 (2020 15:35)  HR: 75 (2020 15:35) (60 - 86)  BP: 103/64 (2020 15:35) (98/61 - 120/68)  BP(mean): --  RR: 18 (2020 15:35) (17 - 18)  SpO2: 96% (2020 15:35) (96% - 100%)  CAPILLARY BLOOD GLUCOSE      - @ 07:01  -  - @ 07:00  --------------------------------------------------------  IN: 0 mL / OUT: 1100 mL / NET: -1100 mL    Patient feeling better No CP, No  SOB, No N/V +2/10 occasional pain back     HEENT: PEARLA No Thrush  Neck: Supple  Cardio: S1 S2 No Murmur  Pulm: No Rales Occ  Ronchi  Abd: Soft NT ND BS+ no  Suprapubic tenderness   Back  -  spine tenderness diffuse   Rectal - refused  Ext: No DCT  Skin: No Rash + Decub - wound care consulted   Neuro: awake pleasant mild short term loss       Aspiration pneumonia - cont zosyn CXR - Left atelectasis verse infiltrate   Pain with PT - will add prePT morphine   Dysphagia - a history speech following continue pleasure feedings pat accepts increase risk of aspiration and death   Urinary Retention - Crandall flomax  Afib - cardio  Failure to thrive - continue supportive care   Chronic Back Pain -   PRN morphine increase long acting Oxycodone observe for SE,    Osteoporosis with compression fracture - pain control

## 2020-07-23 NOTE — PROGRESS NOTE ADULT - SUBJECTIVE AND OBJECTIVE BOX
[facility]  INFECTIOUS DISEASES AND INTERNAL MEDICINE at [place]  =======================================================  Fadi Carrion MD  Diplomates American Board of Internal Medicine and Infectious Diseases  Tel: [phone]      Fax: [phone]  =======================================================    SARA TRIANA [id number]    Follow up: klebsiella bacteremia  afebrile  c/o back pain    Allergies:  dust (Unknown)  No Known Drug Allergies      Medications:  acetaminophen   Tablet .. 650 milliGRAM(s) Oral every 4 hours PRN  ALPRAZolam 0.25 milliGRAM(s) Oral every 8 hours PRN  budesonide 160 MICROgram(s)/formoterol 4.5 MICROgram(s) Inhaler 2 Puff(s) Inhalation two times a day  collagenase Ointment 1 Application(s) Topical daily  digoxin     Tablet 0.25 milliGRAM(s) Oral daily  diltiazem    Tablet 60 milliGRAM(s) Oral every 8 hours  meropenem  IVPB 1000 milliGRAM(s) IV Intermittent every 8 hours  morphine  - Injectable 2 milliGRAM(s) IV Push every 8 hours PRN  morphine  - Injectable 3 milliGRAM(s) IV Push daily  multiple electrolytes Injection Type 1 1000 milliLiter(s) IV Continuous <Continuous>  oxyCODONE  ER Tablet 10 milliGRAM(s) Oral every 12 hours  pantoprazole  Injectable 40 milliGRAM(s) IV Push two times a day  polyethylene glycol 3350 17 Gram(s) Oral at bedtime  predniSONE   Tablet 10 milliGRAM(s) Oral daily  senna 2 Tablet(s) Oral at bedtime  tamsulosin 0.4 milliGRAM(s) Oral at bedtime            REVIEW OF SYSTEMS:  CONSTITUTIONAL:  No Fever or chills  HEENT:   No diplopia or blurred vision.  No earache, sore throat or runny nose.  CARDIOVASCULAR:  No pressure, squeezing, strangling, tightness, heaviness or aching about the chest, neck, axilla or epigastrium.  RESPIRATORY:  No cough, shortness of breath  GASTROINTESTINAL:  No nausea, vomiting or diarrhea.  GENITOURINARY:  No dysuria, frequency or urgency. No Blood in urine  MUSCULOSKELETAL:  no joint aches, no muscle pain  SKIN:  No change in skin, hair or nails.  NEUROLOGIC:  No Headaches, seizures or weakness.  PSYCHIATRIC:  No disorder of thought or mood.  ENDOCRINE:  No heat or cold intolerance  HEMATOLOGICAL:  No easy bruising or bleeding.            Physical Exam:  ICU Vital Signs Last 24 Hrs  T(C): 36.3 (23 Jul 2020 15:35), Max: 36.3 (23 Jul 2020 15:35)  T(F): 97.3 (23 Jul 2020 15:35), Max: 97.3 (23 Jul 2020 15:35)  HR: 75 (23 Jul 2020 15:35) (60 - 86)  BP: 103/64 (23 Jul 2020 15:35) (98/61 - 120/68)  BP(mean): --  ABP: --  ABP(mean): --  RR: 18 (23 Jul 2020 15:35) (17 - 18)  SpO2: 96% (23 Jul 2020 15:35) (96% - 100%)      GEN: NAD, pleasant on O2  HEENT: normocephalic and atraumatic. EOMI. PERRL.  Anicteric   NECK: Supple.   LUNGS: Clear to auscultation.  HEART: Regular rate and rhythm without murmur.  ABDOMEN: Soft, nontender, and nondistended.  Positive bowel sounds.    : No CVA tenderness  EXTREMITIES: Without any edema.  MSK: no joint swelling  NEUROLOGIC: Cranial nerves II through XII are grossly intact. No focal deficits  PSYCHIATRIC: Appropriate affect .  SKIN: No Rash      Labs:                          10.4   10.46 )-----------( 239      ( 22 Jul 2020 07:17 )             31.3   07-22    131<L>  |  91<L>  |  9.0  ----------------------------<  101<H>  4.0   |  29.0  |  0.40<L>    Ca    8.2<L>      22 Jul 2020 07:17    TPro  4.7<L>  /  Alb  2.6<L>  /  TBili  0.8  /  DBili  x   /  AST  18  /  ALT  10  /  AlkPhos  85  07-22

## 2020-07-24 PROCEDURE — 99232 SBSQ HOSP IP/OBS MODERATE 35: CPT

## 2020-07-24 RX ADMIN — Medication 1 APPLICATION(S): at 11:33

## 2020-07-24 RX ADMIN — OXYCODONE HYDROCHLORIDE 10 MILLIGRAM(S): 5 TABLET ORAL at 17:03

## 2020-07-24 RX ADMIN — PANTOPRAZOLE SODIUM 40 MILLIGRAM(S): 20 TABLET, DELAYED RELEASE ORAL at 05:26

## 2020-07-24 RX ADMIN — Medication 0.25 MILLIGRAM(S): at 05:25

## 2020-07-24 RX ADMIN — Medication 60 MILLIGRAM(S): at 21:15

## 2020-07-24 RX ADMIN — MORPHINE SULFATE 2 MILLIGRAM(S): 50 CAPSULE, EXTENDED RELEASE ORAL at 11:29

## 2020-07-24 RX ADMIN — SODIUM CHLORIDE 100 MILLILITER(S): 9 INJECTION, SOLUTION INTRAVENOUS at 05:29

## 2020-07-24 RX ADMIN — MEROPENEM 100 MILLIGRAM(S): 1 INJECTION INTRAVENOUS at 14:11

## 2020-07-24 RX ADMIN — MEROPENEM 100 MILLIGRAM(S): 1 INJECTION INTRAVENOUS at 05:26

## 2020-07-24 RX ADMIN — Medication 0.25 MILLIGRAM(S): at 21:16

## 2020-07-24 RX ADMIN — MEROPENEM 100 MILLIGRAM(S): 1 INJECTION INTRAVENOUS at 21:15

## 2020-07-24 RX ADMIN — MORPHINE SULFATE 2 MILLIGRAM(S): 50 CAPSULE, EXTENDED RELEASE ORAL at 11:49

## 2020-07-24 RX ADMIN — Medication 60 MILLIGRAM(S): at 05:26

## 2020-07-24 RX ADMIN — BUDESONIDE AND FORMOTEROL FUMARATE DIHYDRATE 2 PUFF(S): 160; 4.5 AEROSOL RESPIRATORY (INHALATION) at 09:02

## 2020-07-24 RX ADMIN — BUDESONIDE AND FORMOTEROL FUMARATE DIHYDRATE 2 PUFF(S): 160; 4.5 AEROSOL RESPIRATORY (INHALATION) at 20:25

## 2020-07-24 RX ADMIN — PANTOPRAZOLE SODIUM 40 MILLIGRAM(S): 20 TABLET, DELAYED RELEASE ORAL at 17:02

## 2020-07-24 RX ADMIN — OXYCODONE HYDROCHLORIDE 10 MILLIGRAM(S): 5 TABLET ORAL at 05:29

## 2020-07-24 RX ADMIN — TAMSULOSIN HYDROCHLORIDE 0.4 MILLIGRAM(S): 0.4 CAPSULE ORAL at 21:16

## 2020-07-24 NOTE — CHART NOTE - NSCHARTNOTEFT_GEN_A_CORE
Source: Patient [ ]  Family [ ]   other [ x]EMR    Current Diet: puree with nectar  needs to be fed and eats better when fed      PO intake:  < 50% [ ]   50-75%  [ ]   %  [x ]  other :    Source for PO intake [ ] Patient [ ] family [x ] chart [ ] staff [ ] other    Enteral /Parenteral Nutrition:     Current Weight: 7/4 89.7#, 7/5 89#, 7/8 92#, 7/11 83.6#, 7/13 77#, 7/16 87#  left arm 1+ edema    % Weight Change     Pertinent Medications: MEDICATIONS  (STANDING):  budesonide 160 MICROgram(s)/formoterol 4.5 MICROgram(s) Inhaler 2 Puff(s) Inhalation two times a day  collagenase Ointment 1 Application(s) Topical daily  digoxin     Tablet 0.25 milliGRAM(s) Oral daily  diltiazem    Tablet 60 milliGRAM(s) Oral every 8 hours  meropenem  IVPB 1000 milliGRAM(s) IV Intermittent every 8 hours  morphine  - Injectable 3 milliGRAM(s) IV Push daily  multiple electrolytes Injection Type 1 1000 milliLiter(s) (100 mL/Hr) IV Continuous <Continuous>  oxyCODONE  ER Tablet 10 milliGRAM(s) Oral every 12 hours  pantoprazole  Injectable 40 milliGRAM(s) IV Push two times a day  polyethylene glycol 3350 17 Gram(s) Oral at bedtime  predniSONE   Tablet 10 milliGRAM(s) Oral daily  senna 2 Tablet(s) Oral at bedtime  tamsulosin 0.4 milliGRAM(s) Oral at bedtime    MEDICATIONS  (PRN):  acetaminophen   Tablet .. 650 milliGRAM(s) Oral every 4 hours PRN Mild Pain (1 - 3)  ALPRAZolam 0.25 milliGRAM(s) Oral every 8 hours PRN anxirty or pain  morphine  - Injectable 2 milliGRAM(s) IV Push every 8 hours PRN Severe Pain (7 - 10)    Pertinent Labs:         Skin: unstageable coccyx    Nutrition focused physical exam previously conducted - found signs of malnutrition [ ]absent [x ]present    Subcutaneous fat loss: [x ] Orbital fat pads region, [x ]Buccal fat region, [ ]Triceps region,  [ ]Ribs region    Muscle wasting: [ ]Temples region, [x ]Clavicle region, [x ]Shoulder region, [ ]Scapula region, [ ]Interosseous region,  [ ]thigh region, [ ]Calf region    Estimated Needs:   [x ] no change since previous assessment  [ ] recalculated:     Current Nutrition Diagnosis: Pt remains at nutrition risk secondary to severe protein calorie malnutrition (chronic) related to inability to meet sufficient protein energy requirements with poor appetite in setting of advanced age, dementia, recent GI bleed found to have multiple duodenal ulcers and skin breakdown as evidenced by wt loss of 8# (8.69%) over past 6 months and severe muscle wasting/fat loss.  Pt with improved po intake at meals with total assist as per nursing today. Pt followed by palliative care. Last bowel movement 7/22 fecal incontinence as per flow sheets.     Recommendations:   1) Rec ensure pudding TID  2) MVI, Vit C  3) daily weights  4) encourage HBV protein foods      Monitoring and Evaluation:   [x ] PO intake [ x] Tolerance to diet prescription [X] Weights  [X] Follow up per protocol [X] Labs:

## 2020-07-24 NOTE — PROGRESS NOTE ADULT - SUBJECTIVE AND OBJECTIVE BOX
[facility]  INFECTIOUS DISEASES AND INTERNAL MEDICINE at [place]  =======================================================  Fadi Carrion MD  Diplomates American Board of Internal Medicine and Infectious Diseases  Tel: [phone]      Fax: [phone]  =======================================================    SARA TRIANA [id number]    Follow up: ESBL klebsiella bacteremia  c/o feeling unwell- pain in abdomen, and body aches  afebrile              Allergies:  dust (Unknown)  No Known Drug Allergies      Medications:  acetaminophen   Tablet .. 650 milliGRAM(s) Oral every 4 hours PRN  ALPRAZolam 0.25 milliGRAM(s) Oral every 8 hours PRN  budesonide 160 MICROgram(s)/formoterol 4.5 MICROgram(s) Inhaler 2 Puff(s) Inhalation two times a day  collagenase Ointment 1 Application(s) Topical daily  digoxin     Tablet 0.25 milliGRAM(s) Oral daily  diltiazem    Tablet 60 milliGRAM(s) Oral every 8 hours  meropenem  IVPB 1000 milliGRAM(s) IV Intermittent every 8 hours  morphine  - Injectable 2 milliGRAM(s) IV Push every 8 hours PRN  morphine  - Injectable 3 milliGRAM(s) IV Push daily  multiple electrolytes Injection Type 1 1000 milliLiter(s) IV Continuous <Continuous>  oxyCODONE  ER Tablet 10 milliGRAM(s) Oral every 12 hours  pantoprazole  Injectable 40 milliGRAM(s) IV Push two times a day  polyethylene glycol 3350 17 Gram(s) Oral at bedtime  predniSONE   Tablet 10 milliGRAM(s) Oral daily  senna 2 Tablet(s) Oral at bedtime  tamsulosin 0.4 milliGRAM(s) Oral at bedtime            REVIEW OF SYSTEMS:  CONSTITUTIONAL:  No Fever or chills  HEENT:   No diplopia or blurred vision.  No earache, sore throat or runny nose.  CARDIOVASCULAR:  No pressure, squeezing, strangling, tightness, heaviness or aching about the chest, neck, axilla or epigastrium.  RESPIRATORY:  No cough, shortness of breath  GASTROINTESTINAL:  No nausea, vomiting or diarrhea.  GENITOURINARY:  No dysuria, frequency or urgency. No Blood in urine  MUSCULOSKELETAL:  no joint aches, no muscle pain  SKIN:  No change in skin, hair or nails.  NEUROLOGIC:  No Headaches, seizures or weakness.  PSYCHIATRIC:  No disorder of thought or mood.  ENDOCRINE:  No heat or cold intolerance  HEMATOLOGICAL:  No easy bruising or bleeding.            Physical Exam:  ICU Vital Signs Last 24 Hrs  T(C): 36.5 (24 Jul 2020 07:30), Max: 36.9 (23 Jul 2020 21:35)  T(F): 97.7 (24 Jul 2020 07:30), Max: 98.5 (23 Jul 2020 21:35)  HR: 86 (24 Jul 2020 07:30) (63 - 86)  BP: 123/72 (24 Jul 2020 07:30) (103/64 - 123/72)  BP(mean): --  ABP: --  ABP(mean): --  RR: 20 (23 Jul 2020 21:35) (18 - 20)  SpO2: 97% (23 Jul 2020 21:35) (95% - 97%)      GEN: NAD, pleasant  HEENT: normocephalic and atraumatic. EOMI. PERRL.  Anicteric   NECK: Supple.   LUNGS: Clear to auscultation.  HEART: Regular rate and rhythm without murmur.  ABDOMEN: Soft, nontender, and nondistended.  Positive bowel sounds.    : No CVA tenderness  EXTREMITIES: Without any edema.  MSK: no joint swelling  NEUROLOGIC: Cranial nerves II through XII are grossly intact. No focal deficits  PSYCHIATRIC: Appropriate affect .  SKIN: No Rash      Labs:

## 2020-07-24 NOTE — PROGRESS NOTE ADULT - SUBJECTIVE AND OBJECTIVE BOX
HPI:  An 89 yo Female patient with PMHx significant for DVT (11/2019) not AC, intermittent asthma (never intubated/no icu stays), polymyalgia rheumatica (chronic prednisone), dementia, osteoporosis with multiple vertebral compression, presents today BIBEMS due to chest pain and ZUNILDA.   Patient lethargic, unable to obtained detail history, most of information obtained from chart review. As per EMS documentation patient reported chest pain and SOB after having a family argument. Of note patient was dc today from rehab.   She was recently admitted at [facility] on June 2020, due to GI bleed found to have multiple duodenal ulcers.   At the ED was found to be hyperglycemic ([phone] 8 units of regular insulin; blood glucose dropped to 66, dextrose was given.  Also noted to have a fever Tmax 102 rectally and a + UA.     Attempted to speak to her Partha, but did not answer his phone (04 Jul 2020 22:50)     Allergies    dust (Unknown)  No Known Drug Allergies    Intolerances      Chronic back pain  CHF (congestive heart failure)  Atrial fibrillation  Polymyalgia rheumatica  Diverticulosis  Asthma    MEDICATIONS  (STANDING):  budesonide 160 MICROgram(s)/formoterol 4.5 MICROgram(s) Inhaler 2 Puff(s) Inhalation two times a day  collagenase Ointment 1 Application(s) Topical daily  digoxin     Tablet 0.25 milliGRAM(s) Oral daily  diltiazem    Tablet 60 milliGRAM(s) Oral every 8 hours  meropenem  IVPB 1000 milliGRAM(s) IV Intermittent every 8 hours  morphine  - Injectable 3 milliGRAM(s) IV Push daily  multiple electrolytes Injection Type 1 1000 milliLiter(s) (100 mL/Hr) IV Continuous <Continuous>  oxyCODONE  ER Tablet 10 milliGRAM(s) Oral every 12 hours  pantoprazole  Injectable 40 milliGRAM(s) IV Push two times a day  polyethylene glycol 3350 17 Gram(s) Oral at bedtime  predniSONE   Tablet 10 milliGRAM(s) Oral daily  senna 2 Tablet(s) Oral at bedtime  tamsulosin 0.4 milliGRAM(s) Oral at bedtime    MEDICATIONS  (PRN):  acetaminophen   Tablet .. 650 milliGRAM(s) Oral every 4 hours PRN Mild Pain (1 - 3)  ALPRAZolam 0.25 milliGRAM(s) Oral every 8 hours PRN anxirty or pain  morphine  - Injectable 2 milliGRAM(s) IV Push every 8 hours PRN Severe Pain (7 - 10)               ;  Vital Signs Last 24 Hrs  T(C): 36.7 (24 Jul 2020 15:30), Max: 36.9 (23 Jul 2020 21:35)  T(F): 98 (24 Jul 2020 15:30), Max: 98.5 (23 Jul 2020 21:35)  HR: 86 (24 Jul 2020 15:30) (63 - 86)  BP: 138/82 (24 Jul 2020 15:30) (115/69 - 138/82)  BP(mean): --  RR: 20 (23 Jul 2020 21:35) (20 - 20)  SpO2: 97% (23 Jul 2020 21:35) (95% - 97%)  CAPILLARY BLOOD GLUCOSE      07-23 @ 07:01  -  07-24 @ 07:00  --------------------------------------------------------  IN: 0 mL / OUT: 1300 mL / NET: -1300 mL    07-24 @ 07:01 - 07-24 @ 18:50  --------------------------------------------------------  IN: 0 mL / OUT: 150 mL / NET: -150 mL      Patient feeling better No CP, No  SOB, No N/V +2/10 occasional pain back     HEENT: PEARLA No Thrush  Neck: Supple  Cardio: S1 S2 No Murmur  Pulm: No Rales Occ  Ronchi  Abd: Soft NT ND BS+ no  Suprapubic tenderness   Back  -  spine tenderness diffuse   Rectal - refused  Ext: No DCT  Skin: No Rash + Decub - wound care consulted   Neuro: awake pleasant mild short term loss       Aspiration pneumonia - cont antibiotics CXR - Left atelectasis verse infiltrate   Pain with PT - will add prePT morphine   Dysphagia - a history speech following continue pleasure feedings pat accepts increase risk of aspiration and death   Urinary Retention - Crandall flomax  Afib - cardio  Failure to thrive - continue supportive care   Chronic Back Pain -   PRN morphine increase long acting Oxycodone observe for SE,    Osteoporosis with compression fracture - pain control

## 2020-07-24 NOTE — PROGRESS NOTE ADULT - SUBJECTIVE AND OBJECTIVE BOX
HPI:  An 89 yo Female patient with PMHx significant for DVT (11/2019) not AC, intermittent asthma (never intubated/no icu stays), polymyalgia rheumatica (chronic prednisone), dementia, osteoporosis with multiple vertebral compression, presents today BIBEMS due to chest pain and ZUNILDA.   Patient lethargic, unable to obtained detail history, most of information obtained from chart review. As per EMS documentation patient reported chest pain and SOB after having a family argument. Of note patient was dc today from rehab.   She was recently admitted at [facility] on June 2020, due to GI bleed found to have multiple duodenal ulcers.   At the ED was found to be hyperglycemic [phone] 8 units of regular insulin; blood glucose dropped to 66, dextrose was given.  Also noted to have a fever Tmax 102 rectally and a + UA.     Attempted to speak to her Partha, but did not answer his phone (04 Jul 2020 22:50)      PAST MEDICAL & SURGICAL HISTORY:  Chronic back pain  CHF (congestive heart failure)  Atrial fibrillation  Polymyalgia rheumatica  Diverticulosis  Asthma  S/P knee replacement, left  S/P hysterectomy: 1982  S/P appendectomy: 1962      ANTIMICROBIAL:  piperacillin/tazobactam IVPB.. 3.375 Gram(s) IV Intermittent every 12 hours    CARDIOVASCULAR:  digoxin     Tablet 0.25 milliGRAM(s) Oral daily  diltiazem    Tablet 60 milliGRAM(s) Oral every 8 hours  tamsulosin 0.4 milliGRAM(s) Oral at bedtime    PULMONARY:  budesonide 160 MICROgram(s)/formoterol 4.5 MICROgram(s) Inhaler 2 Puff(s) Inhalation two times a day    NEUROLOGIC:  acetaminophen   Tablet .. 650 milliGRAM(s) Oral every 4 hours PRN  morphine  - Injectable 3 milliGRAM(s) IV Push daily  oxyCODONE  ER Tablet 10 milliGRAM(s) Oral every 12 hours        GATROINTESTINAL:  pantoprazole  Injectable 40 milliGRAM(s) IV Push two times a day  polyethylene glycol 3350 17 Gram(s) Oral at bedtime  senna 2 Tablet(s) Oral at bedtime        ENDO/METABOLIC:  predniSONE   Tablet 10 milliGRAM(s) Oral daily    IV FLUID/NUTRITION:  multiple electrolytes Injection Type 1 1000 milliLiter(s) IV Continuous <Continuous>    TOPICAL:  collagenase Ointment 1 Application(s) Topical daily            Allergies    dust (Unknown)  No Known Drug Allergies          SOCIAL HISTORY:    FAMILY HISTORY:  Family history of stroke      Vital Signs Last 24 Hrs  T(C): 36.5 (24 Jul 2020 07:30), Max: 36.9 (23 Jul 2020 21:35)  T(F): 97.7 (24 Jul 2020 07:30), Max: 98.5 (23 Jul 2020 21:35)  HR: 86 (24 Jul 2020 07:30) (63 - 86)  BP: 123/72 (24 Jul 2020 07:30) (103/64 - 123/72)  BP(mean): --  RR: 20 (23 Jul 2020 21:35) (18 - 20)  SpO2: 97% (23 Jul 2020 21:35) (95% - 97%)        REVIEW OF SYSTEMS:    CONSTITUTIONAL: No fever, weight loss, or fatigue  EYES: No eye pain, visual disturbances, or discharge  NECK: No pain or stiffness  RESPIRATORY: No cough, wheezing, chills or hemoptysis; No shortness of breath  CARDIOVASCULAR: No chest pain  GASTROINTESTINAL: No abdominal or epigastric pain. No nausea, vomiting, or hematemesis; No diarrhea or constipation.   GENITOURINARY: No chronic virk   NEUROLOGICAL: AAO  SKIN: No itching, burning, rashes, or lesions   MUSCULOSKELETAL: + back pain             PHYSICAL EXAM:    GENERAL: NAD, well-groomed, well-developed  HEAD:  Atraumatic, Normocephalic  EYES: EOMI, PERRLA, conjunctiva and sclera clear  NECK: Supple  NERVOUS SYSTEM:  Alert & Oriented   CHEST/LUNG: Clear   HEART: Regular rate controlled   ABDOMEN: Soft, Nontender, Nondistended; Bowel sounds present  EXTREMITIES:  LLE +1 edema          LABS:                                       10.4   10.46 )-----------( 239      ( 22 Jul 2020 07:17 )             31.3         07-22    131<L>  |  91<L>  |  9.0  ----------------------------<  101<H>  4.0   |  29.0  |  0.40<L>    Ca    8.2<L>      22 Jul 2020 07:17    TPro  4.7<L>  /  Alb  2.6<L>  /  TBili  0.8  /  DBili  x   /  AST  18  /  ALT  10  /  AlkPhos  85  07-22                  RADIOLOGY & ADDITIONAL STUDIES:

## 2020-07-24 NOTE — PROGRESS NOTE ADULT - ASSESSMENT
87 yo Female patient with PMHx significant for DVT (11/2019) not AC, intermittent asthma (never intubated/no icu stays), polymyalgia rheumatica (chronic prednisone), dementia, osteoporosis with multiple vertebral compression, presents 7/4 with chest pain, dyspnea after having a family argument, the day after being discharged from rehab.  She was recently admitted at [facility] on June 2020, due to GI bleed found to have multiple duodenal ulcers.   At the ED was found to be hyperglycemic (636) 8 units of regular insulin; blood glucose dropped to 66, dextrose was given.   Also noted to have a fever Tmax 102 rectally and a + UA. UCx E. coli, s/p ceftriaxone x 5 days. Failed TOV due to urinary retention. Spiked fever on 7/21, found to have klebsiella bacteremia    Asthma  Klebsiella bacteremia  Fever  Urinary retention    - denies cough, no urinary or GI symptoms  - BCX + klebsiella, + ESBL likely from urinary source given urinary retention  - UA (-)  - meropenem 1 g Iv q8h  - f/u repeat BCX, once negative can place midline and complete course of IV abx with midline  - CXr with left basilar/retrocardiac opacity-if worsening dyspnea consider CT chest  - Trend Fever  - Trend Leukocytosis      Will Follow

## 2020-07-24 NOTE — PROGRESS NOTE ADULT - ASSESSMENT
An 87 yo Female patient with PMHx significant for DVT (11/2019) not AC, intermittent asthma (never intubated/no icu stays), polymyalgia rheumatica (chronic prednisone), dementia, osteoporosis with multiple vertebral compression, presents today BIBEMS due to chest pain and SOB. Being admitted for chest pain, rule out ACS  At the ED was found to be hyperglycemic (636) 8 units of regular insulin; blood glucose dropped to 66, dextrose was given. Also noted to have a fever Tmax 102 rectally and a + UA          1. Sepsis secondary to UTI   +UA   + urine culture with Ecoli  on Rocephin completed 5 days -- given on admission     July 21, 2020 pt had fever. Blood Cul now + with G (-) rods Klebsiella   placed on Merum per ID   repeat blood cul pending       2.  Urinary retention - on admission and failed trials of void x 3       3.  Lethargy secondary to Iatrogenic Induced Hypoglycemia   Glucose of 636 on BMP, unlikely real  Pt was given 8 U of regular insulin by ED staff, glucose dropped to 66, pt required D50% 25 mg x2  Will avoid Insulin at this time   currently on diet       4. Chest Pain,    Trops -x3,   [facility] called   pt currently rate controlled       5. Afib RVR and Aflutter   S/p Cardizem IVP x1 in ED and lopressor x 1 last night -- helped for a short period of time but pts BP was 114/60   CHADSVASC: 4  Not on AC due to GI bleed   approx 1 month ago was admitted for GIB     6. Duodenal ulcers  -recent gib w/ ulcers found on EGD  on protonix bid     7.  Hypokalemia   K: 2.9 on admission   S/p K raiders          8. Polymyalgia Rheumatica  Hold Prednisone while NPO  if bp drops consider stress dose steroids.       9.  Mild-Moderate Intermittent Asthma  Continue Symbicort/Albuterol PRN  O2 via NC if needed PRN titrate FIO2 >90% and <96%    10. Failure to thrive   Patient cachectic     11. Dysphagia    swallow eval   currently on dysphagia 2, nectar consistency fluids         Code status -- pt with MOLST   DNR/ DNI     family and insurance issues   for [facility] on Monday

## 2020-07-25 LAB
-  COAGULASE NEGATIVE STAPHYLOCOCCUS: SIGNIFICANT CHANGE UP
METHOD TYPE: SIGNIFICANT CHANGE UP

## 2020-07-25 PROCEDURE — 99232 SBSQ HOSP IP/OBS MODERATE 35: CPT

## 2020-07-25 RX ADMIN — Medication 0.25 MILLIGRAM(S): at 05:25

## 2020-07-25 RX ADMIN — OXYCODONE HYDROCHLORIDE 10 MILLIGRAM(S): 5 TABLET ORAL at 06:00

## 2020-07-25 RX ADMIN — Medication 60 MILLIGRAM(S): at 05:25

## 2020-07-25 RX ADMIN — PANTOPRAZOLE SODIUM 40 MILLIGRAM(S): 20 TABLET, DELAYED RELEASE ORAL at 05:25

## 2020-07-25 RX ADMIN — TAMSULOSIN HYDROCHLORIDE 0.4 MILLIGRAM(S): 0.4 CAPSULE ORAL at 21:42

## 2020-07-25 RX ADMIN — BUDESONIDE AND FORMOTEROL FUMARATE DIHYDRATE 2 PUFF(S): 160; 4.5 AEROSOL RESPIRATORY (INHALATION) at 20:36

## 2020-07-25 RX ADMIN — MEROPENEM 100 MILLIGRAM(S): 1 INJECTION INTRAVENOUS at 05:25

## 2020-07-25 RX ADMIN — OXYCODONE HYDROCHLORIDE 10 MILLIGRAM(S): 5 TABLET ORAL at 05:26

## 2020-07-25 RX ADMIN — MEROPENEM 100 MILLIGRAM(S): 1 INJECTION INTRAVENOUS at 21:42

## 2020-07-25 RX ADMIN — Medication 60 MILLIGRAM(S): at 21:42

## 2020-07-25 RX ADMIN — MEROPENEM 100 MILLIGRAM(S): 1 INJECTION INTRAVENOUS at 15:45

## 2020-07-25 RX ADMIN — Medication 1 APPLICATION(S): at 15:44

## 2020-07-25 RX ADMIN — BUDESONIDE AND FORMOTEROL FUMARATE DIHYDRATE 2 PUFF(S): 160; 4.5 AEROSOL RESPIRATORY (INHALATION) at 08:51

## 2020-07-25 RX ADMIN — PANTOPRAZOLE SODIUM 40 MILLIGRAM(S): 20 TABLET, DELAYED RELEASE ORAL at 18:13

## 2020-07-25 NOTE — PROGRESS NOTE ADULT - SUBJECTIVE AND OBJECTIVE BOX
HPI:  An 89 yo Female patient with PMHx significant for DVT (11/2019) not AC, intermittent asthma (never intubated/no icu stays), polymyalgia rheumatica (chronic prednisone), dementia, osteoporosis with multiple vertebral compression, presents today BIBEMS due to chest pain and ZUNILDA.   Patient lethargic, unable to obtained detail history, most of information obtained from chart review. As per EMS documentation patient reported chest pain and SOB after having a family argument. Of note patient was dc today from rehab.   She was recently admitted at [facility] on June 2020, due to GI bleed found to have multiple duodenal ulcers.   At the ED was found to be hyperglycemic (636) 8 units of regular insulin; blood glucose dropped to 66, dextrose was given.  Also noted to have a fever Tmax 102 rectally and a + UA.     Attempted to speak to her Partha, but did not answer his phone (04 Jul 2020 22:50)      PAST MEDICAL & SURGICAL HISTORY:  Chronic back pain  CHF (congestive heart failure)  Atrial fibrillation  Polymyalgia rheumatica  Diverticulosis  Asthma  S/P knee replacement, left  S/P hysterectomy: 1982  S/P appendectomy: 1962      ANTIMICROBIAL:  piperacillin/tazobactam IVPB.. 3.375 Gram(s) IV Intermittent every 12 hours    CARDIOVASCULAR:  digoxin     Tablet 0.25 milliGRAM(s) Oral daily  diltiazem    Tablet 60 milliGRAM(s) Oral every 8 hours  tamsulosin 0.4 milliGRAM(s) Oral at bedtime    PULMONARY:  budesonide 160 MICROgram(s)/formoterol 4.5 MICROgram(s) Inhaler 2 Puff(s) Inhalation two times a day    NEUROLOGIC:  acetaminophen   Tablet .. 650 milliGRAM(s) Oral every 4 hours PRN  morphine  - Injectable 3 milliGRAM(s) IV Push daily  oxyCODONE  ER Tablet 10 milliGRAM(s) Oral every 12 hours        GATROINTESTINAL:  pantoprazole  Injectable 40 milliGRAM(s) IV Push two times a day  polyethylene glycol 3350 17 Gram(s) Oral at bedtime  senna 2 Tablet(s) Oral at bedtime        ENDO/METABOLIC:  predniSONE   Tablet 10 milliGRAM(s) Oral daily    IV FLUID/NUTRITION:  multiple electrolytes Injection Type 1 1000 milliLiter(s) IV Continuous <Continuous>    TOPICAL:  collagenase Ointment 1 Application(s) Topical daily            Allergies    dust (Unknown)  No Known Drug Allergies          SOCIAL HISTORY:    FAMILY HISTORY:  Family history of stroke      Vital Signs Last 24 Hrs  T(C): 36.6 (25 Jul 2020 07:20), Max: 36.7 (24 Jul 2020 15:30)  T(F): 97.9 (25 Jul 2020 07:20), Max: 98 (24 Jul 2020 15:30)  HR: 79 (25 Jul 2020 07:20) (74 - 95)  BP: 133/73 (25 Jul 2020 07:20) (126/74 - 138/82)  BP(mean): --  RR: --  SpO2: --      REVIEW OF SYSTEMS:    CONSTITUTIONAL: No fever, weight loss, or fatigue  EYES: No eye pain, visual disturbances, or discharge  NECK: No pain or stiffness  RESPIRATORY: No cough, wheezing, chills or hemoptysis; No shortness of breath  CARDIOVASCULAR: No chest pain  GASTROINTESTINAL: No abdominal or epigastric pain. No nausea, vomiting, or hematemesis; No diarrhea or constipation.   GENITOURINARY: No chronic virk   NEUROLOGICAL: AAO  SKIN: No itching, burning, rashes, or lesions   MUSCULOSKELETAL: + back pain             PHYSICAL EXAM:    GENERAL: NAD, well-groomed, well-developed  HEAD:  Atraumatic, Normocephalic  EYES: EOMI, PERRLA, conjunctiva and sclera clear  NECK: Supple  NERVOUS SYSTEM:  Alert & Oriented   CHEST/LUNG: Clear   HEART: Regular rate controlled   ABDOMEN: Soft, Nontender, Nondistended; Bowel sounds present  EXTREMITIES:  LLE +1 edema          LABS:                                              RADIOLOGY & ADDITIONAL STUDIES:

## 2020-07-25 NOTE — PROVIDER CONTACT NOTE (CRITICAL VALUE NOTIFICATION) - TEST AND RESULT REPORTED:
Blood culture anaerobic gram positive cocci in clusters
aerobic bottle gram negative rods
positive bld culture 7/21 Kleb/ESBL
K 6.2  glucose 707
na 120  k 2.9  glucose 636

## 2020-07-25 NOTE — PROGRESS NOTE ADULT - SUBJECTIVE AND OBJECTIVE BOX
[facility]  INFECTIOUS DISEASES AND INTERNAL MEDICINE at [facility]  =======================================================  Fadi Carrion MD  Diplomates American Board of Internal Medicine and Infectious Diseases  Tel: [phone]      Fax: [phone]  =======================================================    SARA TRIANA [id number]    Follow up: ESBL klebsiella bacteremia                 Allergies:  dust (Unknown)  No Known Drug Allergies      Medications:  acetaminophen   Tablet .. 650 milliGRAM(s) Oral every 4 hours PRN  ALPRAZolam 0.25 milliGRAM(s) Oral every 8 hours PRN  budesonide 160 MICROgram(s)/formoterol 4.5 MICROgram(s) Inhaler 2 Puff(s) Inhalation two times a day  collagenase Ointment 1 Application(s) Topical daily  digoxin     Tablet 0.25 milliGRAM(s) Oral daily  diltiazem    Tablet 60 milliGRAM(s) Oral every 8 hours  meropenem  IVPB 1000 milliGRAM(s) IV Intermittent every 8 hours  morphine  - Injectable 2 milliGRAM(s) IV Push every 8 hours PRN  morphine  - Injectable 3 milliGRAM(s) IV Push daily  multiple electrolytes Injection Type 1 1000 milliLiter(s) IV Continuous <Continuous>  oxyCODONE  ER Tablet 10 milliGRAM(s) Oral every 12 hours  pantoprazole  Injectable 40 milliGRAM(s) IV Push two times a day  polyethylene glycol 3350 17 Gram(s) Oral at bedtime  predniSONE   Tablet 10 milliGRAM(s) Oral daily  senna 2 Tablet(s) Oral at bedtime  tamsulosin 0.4 milliGRAM(s) Oral at bedtime            REVIEW OF SYSTEMS:   PT MINIMALLY RESPONSIVE CANNOT GIVE ROS               Physical Exam:  I Vital Signs Last 24 Hrs  T(C): 36.6 (25 Jul 2020 07:20), Max: 36.7 (24 Jul 2020 15:30)  T(F): 97.9 (25 Jul 2020 07:20), Max: 98 (24 Jul 2020 15:30)  HR: 79 (25 Jul 2020 07:20) (74 - 95)  BP: 133/73 (25 Jul 2020 07:20) (126/74 - 138/82)  BP(mean): --  RR: --  SpO2: --      GEN: NAD, MINIMALLY RESPONISVE   HEENT: normocephalic and atraumatic. EOMI. PERRL.  Anicteric   NECK: Supple.   LUNGS: Clear to auscultation.  HEART: Regular rate and rhythm without murmur.  ABDOMEN: Soft, nontender, and nondistended.  Positive bowel sounds.    : No CVA tenderness  EXTREMITIES: Without any edema.  MSK: no joint swelling  NEUROLOGIC:  MINIMALLY RESPONSIVE     Labs:

## 2020-07-25 NOTE — PROGRESS NOTE ADULT - ASSESSMENT
An 89 yo Female patient with PMHx significant for DVT (11/2019) not AC, intermittent asthma (never intubated/no icu stays), polymyalgia rheumatica (chronic prednisone), dementia, osteoporosis with multiple vertebral compression, presents today BIBEMS due to chest pain and SOB. Being admitted for chest pain, rule out ACS  At the ED was found to be hyperglycemic (636) 8 units of regular insulin; blood glucose dropped to 66, dextrose was given. Also noted to have a fever Tmax 102 rectally and a + UA          1. Sepsis secondary to UTI   +UA   + urine culture with Ecoli  on Rocephin completed 5 days -- given on admission     July 21, 2020 pt had fever. Blood Cul now + with G (-) rods Klebsiella   placed on Merum per ID   repeat blood cul pending       2.  Urinary retention - on admission and failed trials of void x 3       3.  Lethargy secondary to Iatrogenic Induced Hypoglycemia   Glucose of 636 on BMP, unlikely real  Pt was given 8 U of regular insulin by ED staff, glucose dropped to 66, pt required D50% 25 mg x2  Will avoid Insulin at this time   currently on diet       4. Chest Pain,    Trops -x3,   [facility] called   pt currently rate controlled       5. Afib RVR and Aflutter   S/p Cardizem IVP x1 in ED and lopressor x 1 last night -- helped for a short period of time but pts BP was 114/60   CHADSVASC: 4  Not on AC due to GI bleed   approx 1 month ago was admitted for GIB     6. Duodenal ulcers  -recent gib w/ ulcers found on EGD  on protonix bid     7.  Hypokalemia   K: 2.9 on admission   S/p K raiders          8. Polymyalgia Rheumatica  Hold Prednisone while NPO  if bp drops consider stress dose steroids.       9.  Mild-Moderate Intermittent Asthma  Continue Symbicort/Albuterol PRN  O2 via NC if needed PRN titrate FIO2 >90% and <96%    10. Failure to thrive   Patient cachectic     11. Dysphagia    swallow eval   currently on dysphagia 2, nectar consistency fluids         Code status -- pt with MOLST   DNR/ DNI     family and insurance issues   for [facility] on Monday

## 2020-07-25 NOTE — PROGRESS NOTE ADULT - SUBJECTIVE AND OBJECTIVE BOX
HPI:  An 87 yo Female patient with PMHx significant for DVT (11/2019) not AC, intermittent asthma (never intubated/no icu stays), polymyalgia rheumatica (chronic prednisone), dementia, osteoporosis with multiple vertebral compression, presents today BIBEMS due to chest pain and ZUNILDA.   Patient lethargic, unable to obtained detail history, most of information obtained from chart review. As per EMS documentation patient reported chest pain and SOB after having a family argument. Of note patient was dc today from rehab.   She was recently admitted at [facility] on June 2020, due to GI bleed found to have multiple duodenal ulcers.   At the ED was found to be hyperglycemic (636) 8 units of regular insulin; blood glucose dropped to 66, dextrose was given.  Also noted to have a fever Tmax 102 rectally and a + UA.     Attempted to speak to her Partha, but did not answer his phone (04 Jul 2020 22:50)     Allergies    dust (Unknown)  No Known Drug Allergies    Intolerances      Chronic back pain  CHF (congestive heart failure)  Atrial fibrillation  Polymyalgia rheumatica  Diverticulosis  Asthma    MEDICATIONS  (STANDING):  budesonide 160 MICROgram(s)/formoterol 4.5 MICROgram(s) Inhaler 2 Puff(s) Inhalation two times a day  collagenase Ointment 1 Application(s) Topical daily  digoxin     Tablet 0.25 milliGRAM(s) Oral daily  diltiazem    Tablet 60 milliGRAM(s) Oral every 8 hours  meropenem  IVPB 1000 milliGRAM(s) IV Intermittent every 8 hours  morphine  - Injectable 3 milliGRAM(s) IV Push daily  multiple electrolytes Injection Type 1 1000 milliLiter(s) (100 mL/Hr) IV Continuous <Continuous>  oxyCODONE  ER Tablet 10 milliGRAM(s) Oral every 12 hours  pantoprazole  Injectable 40 milliGRAM(s) IV Push two times a day  polyethylene glycol 3350 17 Gram(s) Oral at bedtime  predniSONE   Tablet 10 milliGRAM(s) Oral daily  senna 2 Tablet(s) Oral at bedtime  tamsulosin 0.4 milliGRAM(s) Oral at bedtime    MEDICATIONS  (PRN):  acetaminophen   Tablet .. 650 milliGRAM(s) Oral every 4 hours PRN Mild Pain (1 - 3)  ALPRAZolam 0.25 milliGRAM(s) Oral every 8 hours PRN anxirty or pain  morphine  - Injectable 2 milliGRAM(s) IV Push every 8 hours PRN Severe Pain (7 - 10)               ;  Vital Signs Last 24 Hrs  T(C): 36.6 (25 Jul 2020 07:20), Max: 36.6 (25 Jul 2020 07:20)  T(F): 97.9 (25 Jul 2020 07:20), Max: 97.9 (25 Jul 2020 07:20)  HR: 81 (25 Jul 2020 08:51) (74 - 95)  BP: 133/73 (25 Jul 2020 07:20) (126/74 - 133/73)  BP(mean): --  RR: --  SpO2: 92% (25 Jul 2020 08:51) (92% - 92%)  CAPILLARY BLOOD GLUCOSE      07-24 @ 07:01  -  07-25 @ 07:00  --------------------------------------------------------  IN: 0 mL / OUT: 2150 mL / NET: -2150 mL    07-25 @ 07:01  -  07-25 @ 18:52  --------------------------------------------------------  IN: 0 mL / OUT: 800 mL / NET: -800 mL      Patient feeling better No CP, No  SOB, No N/V +1/10 occasional pain back     HEENT: PEARLA No Thrush  Neck: Supple  Cardio: S1 S2 No Murmur  Pulm: No Rales Occ  Ronchi  Abd: Soft NT ND BS+ no  Suprapubic tenderness   Back  -  spine tenderness diffuse   Rectal - refused  Ext: No DCT  Skin: No Rash + Decub - wound care consulted   Neuro: somnolent but arrestable mild short term loss       Aspiration pneumonia - cont antibiotics CXR - Left atelectasis verse infiltrate   Klebseilla Bactermia - ID following   Pain with PT - will add pre-PT morphine   Dysphagia - a history speech following continue pleasure feedings pat accepts increase risk of aspiration and death   Urinary Retention - chronic [place] flomax  Afib - cardio  Failure to thrive - continue supportive care   Chronic Back Pain -   PRN morphine increase long acting Oxycodone observe for SE,    Osteoporosis with compression fracture - pain control

## 2020-07-25 NOTE — PROGRESS NOTE ADULT - ASSESSMENT
89 yo Female patient with PMHx significant for DVT (11/2019) not AC, intermittent asthma (never intubated/no icu stays), polymyalgia rheumatica (chronic prednisone), dementia, osteoporosis with multiple vertebral compression, presents 7/4 with chest pain, dyspnea after having a family argument, the day after being discharged from rehab.  She was recently admitted at [facility] on June 2020, due to GI bleed found to have multiple duodenal ulcers.   At the ED was found to be hyperglycemic (636) 8 units of regular insulin; blood glucose dropped to 66, dextrose was given.   Also noted to have a fever Tmax 102 rectally and a + UA. UCx E. coli, s/p ceftriaxone x 5 days. Failed TOV due to urinary retention. Spiked fever on 7/21, found to have klebsiella bacteremia    Asthma  Klebsiella bacteremia  Fever  Urinary retention    - denies cough, no urinary or GI symptoms  - BCX + klebsiella, + ESBL likely from urinary source given urinary retention  - UA (-)  - meropenem 1 g Iv q8h  BLOOD CX COAG NEG STAPH MOST LIKELY CONTAMINANT   WILL FOLLOW UP WITH FURTHER RECOMMENDATIONS  -

## 2020-07-25 NOTE — PROCEDURE NOTE - NSICDXPROCEDURE_GEN_ALL_CORE_FT
PROCEDURES:  Insertion, catheter, intravenous 25-Jul-2020 17:55:37  Ruby Mac  Ultrasound guided venous access 25-Jul-2020 17:55:28  Ruby Mac

## 2020-07-26 LAB — GLUCOSE BLDC GLUCOMTR-MCNC: 108 MG/DL — HIGH (ref 70–99)

## 2020-07-26 PROCEDURE — 99232 SBSQ HOSP IP/OBS MODERATE 35: CPT

## 2020-07-26 RX ADMIN — TAMSULOSIN HYDROCHLORIDE 0.4 MILLIGRAM(S): 0.4 CAPSULE ORAL at 22:34

## 2020-07-26 RX ADMIN — BUDESONIDE AND FORMOTEROL FUMARATE DIHYDRATE 2 PUFF(S): 160; 4.5 AEROSOL RESPIRATORY (INHALATION) at 20:24

## 2020-07-26 RX ADMIN — Medication 1 APPLICATION(S): at 13:25

## 2020-07-26 RX ADMIN — PANTOPRAZOLE SODIUM 40 MILLIGRAM(S): 20 TABLET, DELAYED RELEASE ORAL at 18:16

## 2020-07-26 RX ADMIN — OXYCODONE HYDROCHLORIDE 10 MILLIGRAM(S): 5 TABLET ORAL at 05:46

## 2020-07-26 RX ADMIN — Medication 60 MILLIGRAM(S): at 05:46

## 2020-07-26 RX ADMIN — OXYCODONE HYDROCHLORIDE 10 MILLIGRAM(S): 5 TABLET ORAL at 17:41

## 2020-07-26 RX ADMIN — PANTOPRAZOLE SODIUM 40 MILLIGRAM(S): 20 TABLET, DELAYED RELEASE ORAL at 05:46

## 2020-07-26 RX ADMIN — BUDESONIDE AND FORMOTEROL FUMARATE DIHYDRATE 2 PUFF(S): 160; 4.5 AEROSOL RESPIRATORY (INHALATION) at 09:37

## 2020-07-26 RX ADMIN — MEROPENEM 100 MILLIGRAM(S): 1 INJECTION INTRAVENOUS at 22:34

## 2020-07-26 RX ADMIN — Medication 0.25 MILLIGRAM(S): at 05:46

## 2020-07-26 RX ADMIN — Medication 60 MILLIGRAM(S): at 13:22

## 2020-07-26 RX ADMIN — SODIUM CHLORIDE 100 MILLILITER(S): 9 INJECTION, SOLUTION INTRAVENOUS at 23:34

## 2020-07-26 RX ADMIN — Medication 60 MILLIGRAM(S): at 22:34

## 2020-07-26 RX ADMIN — SODIUM CHLORIDE 100 MILLILITER(S): 9 INJECTION, SOLUTION INTRAVENOUS at 08:35

## 2020-07-26 RX ADMIN — MEROPENEM 100 MILLIGRAM(S): 1 INJECTION INTRAVENOUS at 13:48

## 2020-07-26 RX ADMIN — MORPHINE SULFATE 3 MILLIGRAM(S): 50 CAPSULE, EXTENDED RELEASE ORAL at 13:24

## 2020-07-26 RX ADMIN — MEROPENEM 100 MILLIGRAM(S): 1 INJECTION INTRAVENOUS at 05:46

## 2020-07-26 NOTE — PROGRESS NOTE ADULT - ASSESSMENT
An 87 yo Female patient with PMHx significant for DVT (11/2019) not AC, intermittent asthma (never intubated/no icu stays), polymyalgia rheumatica (chronic prednisone), dementia, osteoporosis with multiple vertebral compression, presents today BIBEMS due to chest pain and SOB. Being admitted for chest pain, rule out ACS  At the ED was found to be hyperglycemic (636) 8 units of regular insulin; blood glucose dropped to 66, dextrose was given. Also noted to have a fever Tmax 102 rectally and a + UA          1. Sepsis secondary to UTI   +UA   + urine culture with Ecoli  on Rocephin completed 5 days -- given on admission     July 21, 2020 pt had fever. Blood Cul  + Klebsiella   placed on Merum per ID   repeat blood cul 1 bottle negative 48 hrs and the other likely contaminant   further recomm per ID       2.  Urinary retention - on admission and failed trials of void x 3       3.  Lethargy secondary to Iatrogenic Induced Hypoglycemia   Glucose of 636 on BMP, unlikely real  Pt was given 8 U of regular insulin by ED staff, glucose dropped to 66, pt required D50% 25 mg x2  Will avoid Insulin at this time   currently on diet       4. Chest Pain,    Trops -x3,   [facility] called   pt currently rate controlled       5. Afib RVR and Aflutter   S/p Cardizem IVP x1 in ED and lopressor x 1 last night -- helped for a short period of time but pts BP was 114/60   CHADSVASC: 4  Not on AC due to GI bleed   approx 1 month ago was admitted for GIB     6. Duodenal ulcers  -recent gib w/ ulcers found on EGD  on protonix bid     7.  Hypokalemia   K: 2.9 on admission   S/p K raiders          8. Polymyalgia Rheumatica  Hold Prednisone while NPO  if bp drops consider stress dose steroids.       9.  Mild-Moderate Intermittent Asthma  Continue Symbicort/Albuterol PRN  O2 via NC if needed PRN titrate FIO2 >90% and <96%    10. Failure to thrive   Patient cachectic     11. Dysphagia    swallow eval   currently on dysphagia 2, nectar consistency fluids         Code status -- pt with MOLST   DNR/ DNI     family and insurance issues   for [facility] on Monday

## 2020-07-26 NOTE — PROGRESS NOTE ADULT - ASSESSMENT
89 yo Female patient with PMHx significant for DVT (11/2019) not AC, intermittent asthma (never intubated/no icu stays), polymyalgia rheumatica (chronic prednisone), dementia, osteoporosis with multiple vertebral compression, presents 7/4 with chest pain, dyspnea after having a family argument, the day after being discharged from rehab.  She was recently admitted at [facility] on June 2020, due to GI bleed found to have multiple duodenal ulcers.   At the ED was found to be hyperglycemic [phone] 8 units of regular insulin; blood glucose dropped to 66, dextrose was given.   Also noted to have a fever Tmax 102 rectally and a + UA. UCx E. coli, s/p ceftriaxone x 5 days. Failed TOV due to urinary retention. Spiked fever on 7/21, found to have klebsiella bacteremia    Asthma  Klebsiella bacteremia  Fever  Urinary retention    - denies cough, no urinary or GI symptoms  - BCX + klebsiella, + ESBL likely from urinary source given urinary retention  - UA (-)  - meropenem 1 g Iv q8h  BLOOD CX COAG NEG STAPH MOST LIKELY CONTAMINANT   CONTINUE CURRENT IV ABX  MERREM FOR ESBL  -

## 2020-07-26 NOTE — PROGRESS NOTE ADULT - SUBJECTIVE AND OBJECTIVE BOX
HPI:  An 87 yo Female patient with PMHx significant for DVT (11/2019) not AC, intermittent asthma (never intubated/no icu stays), polymyalgia rheumatica (chronic prednisone), dementia, osteoporosis with multiple vertebral compression, presents today BIBEMS due to chest pain and ZUNILDA.   Patient lethargic, unable to obtained detail history, most of information obtained from chart review. As per EMS documentation patient reported chest pain and SOB after having a family argument. Of note patient was dc today from rehab.   She was recently admitted at [facility] on June 2020, due to GI bleed found to have multiple duodenal ulcers.   At the ED was found to be hyperglycemic ([phone] 8 units of regular insulin; blood glucose dropped to 66, dextrose was given.  Also noted to have a fever Tmax 102 rectally and a + UA.     Attempted to speak to her Partha, but did not answer his phone (04 Jul 2020 22:50)     Allergies    dust (Unknown)  No Known Drug Allergies    Intolerances      Chronic back pain  CHF (congestive heart failure)  Atrial fibrillation  Polymyalgia rheumatica  Diverticulosis  Asthma    MEDICATIONS  (STANDING):  budesonide 160 MICROgram(s)/formoterol 4.5 MICROgram(s) Inhaler 2 Puff(s) Inhalation two times a day  collagenase Ointment 1 Application(s) Topical daily  digoxin     Tablet 0.25 milliGRAM(s) Oral daily  diltiazem    Tablet 60 milliGRAM(s) Oral every 8 hours  meropenem  IVPB 1000 milliGRAM(s) IV Intermittent every 8 hours  morphine  - Injectable 3 milliGRAM(s) IV Push daily  multiple electrolytes Injection Type 1 1000 milliLiter(s) (100 mL/Hr) IV Continuous <Continuous>  oxyCODONE  ER Tablet 10 milliGRAM(s) Oral every 12 hours  pantoprazole  Injectable 40 milliGRAM(s) IV Push two times a day  polyethylene glycol 3350 17 Gram(s) Oral at bedtime  predniSONE   Tablet 10 milliGRAM(s) Oral daily  senna 2 Tablet(s) Oral at bedtime  tamsulosin 0.4 milliGRAM(s) Oral at bedtime    MEDICATIONS  (PRN):  acetaminophen   Tablet .. 650 milliGRAM(s) Oral every 4 hours PRN Mild Pain (1 - 3)  ALPRAZolam 0.25 milliGRAM(s) Oral every 8 hours PRN anxirty or pain  morphine  - Injectable 2 milliGRAM(s) IV Push every 8 hours PRN Severe Pain (7 - 10)               ;  Vital Signs Last 24 Hrs  T(C): 36.3 (26 Jul 2020 15:27), Max: 37 (25 Jul 2020 21:39)  T(F): 97.4 (26 Jul 2020 15:27), Max: 98.6 (25 Jul 2020 21:39)  HR: 95 (26 Jul 2020 15:27) (81 - 112)  BP: 115/64 (26 Jul 2020 15:27) (108/72 - 147/79)  BP(mean): --  RR: 18 (26 Jul 2020 15:27) (18 - 19)  SpO2: 99% (26 Jul 2020 15:27) (91% - 99%)  CAPILLARY BLOOD GLUCOSE      07-25 @ 07:01  -  07-26 @ 07:00  --------------------------------------------------------  IN: 0 mL / OUT: 1450 mL / NET: -1450 mL      Patient somnolent but accusable  No CP, No  SOB, No N/V +1/10 occasional pain back     HEENT: PEARLA No Thrush  Neck: Supple  Cardio: S1 S2 No Murmur  Pulm: No Rales Occ  Ronchi  Abd: Soft NT ND BS+ no  Suprapubic tenderness   Back  -  spine tenderness diffuse   Rectal - refused  Ext: No DCT  Skin: No Rash + Decub - wound care consulted   Neuro: somnolent but arrousable  mild short term loss       Aspiration pneumonia - cont antibiotics CXR - Left atelectasis verse infiltrate   Klebseilla Bactermia - ID following   Pain with PT - will add pre-PT morphine   Dysphagia - a history speech following continue pleasure feedings pat accepts increase risk of aspiration and death   Urinary Retention - chronic [place] flomax  Afib - cardio  Failure to thrive - continue supportive care   Chronic Back Pain -   PRN morphine increase long acting Oxycodone observe for SE,    Osteoporosis with compression fracture - pain control

## 2020-07-26 NOTE — PROGRESS NOTE ADULT - SUBJECTIVE AND OBJECTIVE BOX
[facility]  INFECTIOUS DISEASES AND INTERNAL MEDICINE at [place]  =======================================================  Fadi Carrion MD  Diplomates American Board of Internal Medicine and Infectious Diseases  Tel: [phone]      Fax: [phone]  =======================================================    SARA TRIANA [id number]    Follow up: ESBL klebsiella bacteremia     Allergies:  dust (Unknown)  No Known Drug Allergies      Medications:  acetaminophen   Tablet .. 650 milliGRAM(s) Oral every 4 hours PRN  ALPRAZolam 0.25 milliGRAM(s) Oral every 8 hours PRN  budesonide 160 MICROgram(s)/formoterol 4.5 MICROgram(s) Inhaler 2 Puff(s) Inhalation two times a day  collagenase Ointment 1 Application(s) Topical daily  digoxin     Tablet 0.25 milliGRAM(s) Oral daily  diltiazem    Tablet 60 milliGRAM(s) Oral every 8 hours  meropenem  IVPB 1000 milliGRAM(s) IV Intermittent every 8 hours  morphine  - Injectable 2 milliGRAM(s) IV Push every 8 hours PRN  morphine  - Injectable 3 milliGRAM(s) IV Push daily  multiple electrolytes Injection Type 1 1000 milliLiter(s) IV Continuous <Continuous>  oxyCODONE  ER Tablet 10 milliGRAM(s) Oral every 12 hours  pantoprazole  Injectable 40 milliGRAM(s) IV Push two times a day  polyethylene glycol 3350 17 Gram(s) Oral at bedtime  predniSONE   Tablet 10 milliGRAM(s) Oral daily  senna 2 Tablet(s) Oral at bedtime  tamsulosin 0.4 milliGRAM(s) Oral at bedtime            REVIEW OF SYSTEMS:   PT MINIMALLY RESPONSIVE CANNOT GIVE ROS               Physical Exam:  I Vital Signs Last 24 Hrs  T(C): 36.6 (25 Jul 2020 07:20), Max: 36.7 (24 Jul 2020 15:30)  T(F): 97.9 (25 Jul 2020 07:20), Max: 98 (24 Jul 2020 15:30)  HR: 79 (25 Jul 2020 07:20) (74 - 95)  BP: 133/73 (25 Jul 2020 07:20) (126/74 - 138/82)  BP(mean): --  RR: --  SpO2: --      GEN: NAD, MINIMALLY RESPONISVE  OPENS EYES NON VERBAL  HEENT: normocephalic and atraumatic. EOMI. PERRL.  Anicteric   NECK: Supple.   LUNGS: Clear to auscultation.  HEART: Regular rate and rhythm without murmur.  ABDOMEN: Soft, nontender, and nondistended.  Positive bowel sounds.    : No CVA tenderness  EXTREMITIES: Without any edema.  MSK: no joint swelling  NEUROLOGIC:  MINIMALLY RESPONSIVE     Labs:

## 2020-07-26 NOTE — PROGRESS NOTE ADULT - SUBJECTIVE AND OBJECTIVE BOX
HPI:  An 89 yo Female patient with PMHx significant for DVT (11/2019) not AC, intermittent asthma (never intubated/no icu stays), polymyalgia rheumatica (chronic prednisone), dementia, osteoporosis with multiple vertebral compression, presents today BIBEMS due to chest pain and ZUNILDA.   Patient lethargic, unable to obtained detail history, most of information obtained from chart review. As per EMS documentation patient reported chest pain and SOB after having a family argument. Of note patient was dc today from rehab.   She was recently admitted at [facility] on June 2020, due to GI bleed found to have multiple duodenal ulcers.   At the ED was found to be hyperglycemic (636) 8 units of regular insulin; blood glucose dropped to 66, dextrose was given.  Also noted to have a fever Tmax 102 rectally and a + UA.     Attempted to speak to her son Partha on numerous occasions   never answers calls nor returns calls       PAST MEDICAL & SURGICAL HISTORY:  Chronic back pain  CHF (congestive heart failure)  Atrial fibrillation  Polymyalgia rheumatica  Diverticulosis  Asthma  S/P knee replacement, left  S/P hysterectomy: 1982  S/P appendectomy: 1962      ANTIMICROBIAL:  meropenem  IVPB 1000 milliGRAM(s) IV Intermittent every 8 hours    CARDIOVASCULAR:  digoxin     Tablet 0.25 milliGRAM(s) Oral daily  diltiazem    Tablet 60 milliGRAM(s) Oral every 8 hours  tamsulosin 0.4 milliGRAM(s) Oral at bedtime    PULMONARY:  budesonide 160 MICROgram(s)/formoterol 4.5 MICROgram(s) Inhaler 2 Puff(s) Inhalation two times a day    NEUROLOGIC:  acetaminophen   Tablet .. 650 milliGRAM(s) Oral every 4 hours PRN  ALPRAZolam 0.25 milliGRAM(s) Oral every 8 hours PRN  morphine  - Injectable 2 milliGRAM(s) IV Push every 8 hours PRN  morphine  - Injectable 3 milliGRAM(s) IV Push daily  oxyCODONE  ER Tablet 10 milliGRAM(s) Oral every 12 hours        GATROINTESTINAL:  pantoprazole  Injectable 40 milliGRAM(s) IV Push two times a day  polyethylene glycol 3350 17 Gram(s) Oral at bedtime  senna 2 Tablet(s) Oral at bedtime        ENDO/METABOLIC:  predniSONE   Tablet 10 milliGRAM(s) Oral daily    IV FLUID/NUTRITION:  multiple electrolytes Injection Type 1 1000 milliLiter(s) IV Continuous <Continuous>    TOPICAL:  collagenase Ointment 1 Application(s) Topical daily            Allergies    dust (Unknown)  No Known Drug Allergies            SOCIAL HISTORY:    FAMILY HISTORY:  Family history of stroke      Vital Signs Last 24 Hrs  T(C): 36.5 (26 Jul 2020 08:15), Max: 37 (25 Jul 2020 21:39)  T(F): 97.7 (26 Jul 2020 08:15), Max: 98.6 (25 Jul 2020 21:39)  HR: 81 (26 Jul 2020 08:15) (81 - 112)  BP: 108/72 (26 Jul 2020 08:15) (108/72 - 147/79)  BP(mean): --  RR: 19 (26 Jul 2020 08:15) (18 - 19)  SpO2: 91% (26 Jul 2020 08:15) (91% - 96%)          REVIEW OF SYSTEMS:    pt sleeping   comfortable   per RN no po intake this am           PHYSICAL EXAM:    GENERAL: NAD, well-groomed, well-developed  HEAD:  Atraumatic, Normocephalic  NECK: Supple, No JVD, Normal thyroid  NERVOUS SYSTEM:  non arousable    CHEST/LUNG: Clear   HEART: Regular   ABDOMEN: Soft, Nontender, Nondistended; Bowel sounds present  EXTREMITIES:  upper ext edema  MS: PMR with severe pains on movement       LABS:                      RADIOLOGY & ADDITIONAL STUDIES:

## 2020-07-27 ENCOUNTER — TRANSCRIPTION ENCOUNTER (OUTPATIENT)
Age: 85
End: 2020-07-27

## 2020-07-27 VITALS — SYSTOLIC BLOOD PRESSURE: 118 MMHG | HEART RATE: 88 BPM | DIASTOLIC BLOOD PRESSURE: 88 MMHG

## 2020-07-27 LAB
-  AMPICILLIN/SULBACTAM: SIGNIFICANT CHANGE UP
-  CEFAZOLIN: SIGNIFICANT CHANGE UP
-  CLINDAMYCIN: SIGNIFICANT CHANGE UP
-  ERYTHROMYCIN: SIGNIFICANT CHANGE UP
-  GENTAMICIN: SIGNIFICANT CHANGE UP
-  OXACILLIN: SIGNIFICANT CHANGE UP
-  PENICILLIN: SIGNIFICANT CHANGE UP
-  RIFAMPIN: SIGNIFICANT CHANGE UP
-  TETRACYCLINE: SIGNIFICANT CHANGE UP
-  TRIMETHOPRIM/SULFAMETHOXAZOLE: SIGNIFICANT CHANGE UP
-  VANCOMYCIN: SIGNIFICANT CHANGE UP
CULTURE RESULTS: SIGNIFICANT CHANGE UP
GLUCOSE BLDC GLUCOMTR-MCNC: 109 MG/DL — HIGH (ref 70–99)
GLUCOSE BLDC GLUCOMTR-MCNC: 111 MG/DL — HIGH (ref 70–99)
METHOD TYPE: SIGNIFICANT CHANGE UP
SPECIMEN SOURCE: SIGNIFICANT CHANGE UP

## 2020-07-27 PROCEDURE — 85027 COMPLETE CBC AUTOMATED: CPT

## 2020-07-27 PROCEDURE — 84484 ASSAY OF TROPONIN QUANT: CPT

## 2020-07-27 PROCEDURE — 80053 COMPREHEN METABOLIC PANEL: CPT

## 2020-07-27 PROCEDURE — 99232 SBSQ HOSP IP/OBS MODERATE 35: CPT

## 2020-07-27 PROCEDURE — 83605 ASSAY OF LACTIC ACID: CPT

## 2020-07-27 PROCEDURE — 36415 COLL VENOUS BLD VENIPUNCTURE: CPT

## 2020-07-27 PROCEDURE — 87086 URINE CULTURE/COLONY COUNT: CPT

## 2020-07-27 PROCEDURE — 99291 CRITICAL CARE FIRST HOUR: CPT | Mod: 25

## 2020-07-27 PROCEDURE — U0003: CPT

## 2020-07-27 PROCEDURE — 83735 ASSAY OF MAGNESIUM: CPT

## 2020-07-27 PROCEDURE — 96365 THER/PROPH/DIAG IV INF INIT: CPT

## 2020-07-27 PROCEDURE — 97163 PT EVAL HIGH COMPLEX 45 MIN: CPT

## 2020-07-27 PROCEDURE — 73030 X-RAY EXAM OF SHOULDER: CPT

## 2020-07-27 PROCEDURE — 71045 X-RAY EXAM CHEST 1 VIEW: CPT

## 2020-07-27 PROCEDURE — 92610 EVALUATE SWALLOWING FUNCTION: CPT

## 2020-07-27 PROCEDURE — 74177 CT ABD & PELVIS W/CONTRAST: CPT

## 2020-07-27 PROCEDURE — 82962 GLUCOSE BLOOD TEST: CPT

## 2020-07-27 PROCEDURE — 96361 HYDRATE IV INFUSION ADD-ON: CPT

## 2020-07-27 PROCEDURE — 87150 DNA/RNA AMPLIFIED PROBE: CPT

## 2020-07-27 PROCEDURE — 94760 N-INVAS EAR/PLS OXIMETRY 1: CPT

## 2020-07-27 PROCEDURE — 80048 BASIC METABOLIC PNL TOTAL CA: CPT

## 2020-07-27 PROCEDURE — 85730 THROMBOPLASTIN TIME PARTIAL: CPT

## 2020-07-27 PROCEDURE — 81001 URINALYSIS AUTO W/SCOPE: CPT

## 2020-07-27 PROCEDURE — 84100 ASSAY OF PHOSPHORUS: CPT

## 2020-07-27 PROCEDURE — 87040 BLOOD CULTURE FOR BACTERIA: CPT

## 2020-07-27 PROCEDURE — 97530 THERAPEUTIC ACTIVITIES: CPT

## 2020-07-27 PROCEDURE — 86769 SARS-COV-2 COVID-19 ANTIBODY: CPT

## 2020-07-27 PROCEDURE — 93005 ELECTROCARDIOGRAM TRACING: CPT

## 2020-07-27 PROCEDURE — 97116 GAIT TRAINING THERAPY: CPT

## 2020-07-27 PROCEDURE — 96367 TX/PROPH/DG ADDL SEQ IV INF: CPT

## 2020-07-27 PROCEDURE — 87186 SC STD MICRODIL/AGAR DIL: CPT

## 2020-07-27 PROCEDURE — 85610 PROTHROMBIN TIME: CPT

## 2020-07-27 PROCEDURE — 94640 AIRWAY INHALATION TREATMENT: CPT

## 2020-07-27 PROCEDURE — 96375 TX/PRO/DX INJ NEW DRUG ADDON: CPT

## 2020-07-27 PROCEDURE — 83036 HEMOGLOBIN GLYCOSYLATED A1C: CPT

## 2020-07-27 RX ORDER — ERTAPENEM SODIUM 1 G/1
1000 INJECTION, POWDER, LYOPHILIZED, FOR SOLUTION INTRAMUSCULAR; INTRAVENOUS EVERY 24 HOURS
Refills: 0 | Status: DISCONTINUED | OUTPATIENT
Start: 2020-07-27 | End: 2020-07-27

## 2020-07-27 RX ORDER — ERTAPENEM SODIUM 1 G/1
1 INJECTION, POWDER, LYOPHILIZED, FOR SOLUTION INTRAMUSCULAR; INTRAVENOUS
Qty: 0 | Refills: 0 | DISCHARGE
Start: 2020-07-27

## 2020-07-27 RX ADMIN — Medication 60 MILLIGRAM(S): at 14:20

## 2020-07-27 RX ADMIN — MORPHINE SULFATE 2 MILLIGRAM(S): 50 CAPSULE, EXTENDED RELEASE ORAL at 11:21

## 2020-07-27 RX ADMIN — PANTOPRAZOLE SODIUM 40 MILLIGRAM(S): 20 TABLET, DELAYED RELEASE ORAL at 05:42

## 2020-07-27 RX ADMIN — Medication 0.25 MILLIGRAM(S): at 05:41

## 2020-07-27 RX ADMIN — ERTAPENEM SODIUM 120 MILLIGRAM(S): 1 INJECTION, POWDER, LYOPHILIZED, FOR SOLUTION INTRAMUSCULAR; INTRAVENOUS at 11:40

## 2020-07-27 RX ADMIN — MORPHINE SULFATE 2 MILLIGRAM(S): 50 CAPSULE, EXTENDED RELEASE ORAL at 11:49

## 2020-07-27 RX ADMIN — MEROPENEM 100 MILLIGRAM(S): 1 INJECTION INTRAVENOUS at 05:42

## 2020-07-27 RX ADMIN — Medication 60 MILLIGRAM(S): at 05:41

## 2020-07-27 RX ADMIN — OXYCODONE HYDROCHLORIDE 10 MILLIGRAM(S): 5 TABLET ORAL at 05:42

## 2020-07-27 RX ADMIN — Medication 1 APPLICATION(S): at 12:07

## 2020-07-27 NOTE — PROGRESS NOTE ADULT - ASSESSMENT
89 yo Female patient with PMHx significant for DVT (11/2019) not AC, intermittent asthma (never intubated/no icu stays), polymyalgia rheumatica (chronic prednisone), dementia, osteoporosis with multiple vertebral compression, presents 7/4 with chest pain, dyspnea after having a family argument, the day after being discharged from rehab.  She was recently admitted at [facility] on June 2020, due to GI bleed found to have multiple duodenal ulcers.   At the ED was found to be hyperglycemic (636) 8 units of regular insulin; blood glucose dropped to 66, dextrose was given.   Also noted to have a fever Tmax 102 rectally and a + UA. UCx E. coli, s/p ceftriaxone x 5 days. Failed TOV due to urinary retention. Spiked fever on 7/21, found to have klebsiella bacteremia    Asthma  Klebsiella bacteremia  Fever  Urinary retention    - denies cough, no urinary or GI symptoms  - BCX 7/21/20 + klebsiella, + ESBL likely from urinary source given urinary retention  - UA (-)  - meropenem 1 g IV q8h 7/23/20  - BCX 7/22 NGTD  BLOOD CX 7/23 COAG NEG STAPH MOST LIKELY CONTAMINANT   -change to ertapenem 1 g IV daily, place midline and complete through 7/30/20    d/w attending and cM

## 2020-07-27 NOTE — PROGRESS NOTE ADULT - ASSESSMENT
An 89 yo Female patient with PMHx significant for DVT (11/2019) not AC, intermittent asthma (never intubated/no icu stays), polymyalgia rheumatica (chronic prednisone), dementia, osteoporosis with multiple vertebral compression, presents today BIBEMS due to chest pain and SOB. Being admitted for chest pain, rule out ACS  At the ED was found to be hyperglycemic (636) 8 units of regular insulin; blood glucose dropped to 66, dextrose was given. Also noted to have a fever Tmax 102 rectally and a + UA          1. Sepsis secondary to UTI   +UA   + urine culture with Ecoli  on Rocephin completed 5 days -- given on admission     July 21, 2020 pt had fever. Blood Cul  + Klebsiella   placed on Merum per ID   repeat blood cul 1 bottle negative 48 hrs and the other likely contaminant   further recomm per ID       2.  Urinary retention - on admission and failed trials of void x 3       3.  Lethargy secondary to Iatrogenic Induced Hypoglycemia   Glucose of 636 on BMP, unlikely real  Pt was given 8 U of regular insulin by ED staff, glucose dropped to 66, pt required D50% 25 mg x2  Will avoid Insulin at this time   currently on diet       4. Chest Pain,    Trops -x3,   [facility] called   pt currently rate controlled       5. Afib RVR and Aflutter   S/p Cardizem IVP x1 in ED and lopressor x 1 last night -- helped for a short period of time but pts BP was 114/60   CHADSVASC: 4  Not on AC due to GI bleed   approx 1 month ago was admitted for GIB     6. Duodenal ulcers  -recent gib w/ ulcers found on EGD  on protonix bid     7.  Hypokalemia   K: 2.9 on admission   S/p K raiders          8. Polymyalgia Rheumatica  Hold Prednisone while NPO  if bp drops consider stress dose steroids.       9.  Mild-Moderate Intermittent Asthma  Continue Symbicort/Albuterol PRN  O2 via NC if needed PRN titrate FIO2 >90% and <96%    10. Failure to thrive   Patient cachectic     11. Dysphagia    swallow eval   currently on dysphagia 2, nectar consistency fluids         Code status -- pt with MOLST   DNR/ DNI     family and insurance issues   for [facility] on Monday

## 2020-07-27 NOTE — PROGRESS NOTE ADULT - REASON FOR ADMISSION
Chest pain

## 2020-07-27 NOTE — PROGRESS NOTE ADULT - SUBJECTIVE AND OBJECTIVE BOX
[facility]  INFECTIOUS DISEASES AND INTERNAL MEDICINE at [place]  =======================================================  Fadi Carrion MD  Diplomates American Board of Internal Medicine and Infectious Diseases  Tel: [phone]      Fax: [phone]  =======================================================    SARA TRIANA [id number]    Follow up: kleb bacteremia  seen in AM  no complaints    Allergies:  dust (Unknown)  No Known Drug Allergies      Medications:  acetaminophen   Tablet .. 650 milliGRAM(s) Oral every 4 hours PRN  ALPRAZolam 0.25 milliGRAM(s) Oral every 8 hours PRN  budesonide 160 MICROgram(s)/formoterol 4.5 MICROgram(s) Inhaler 2 Puff(s) Inhalation two times a day  collagenase Ointment 1 Application(s) Topical daily  digoxin     Tablet 0.25 milliGRAM(s) Oral daily  diltiazem    Tablet 60 milliGRAM(s) Oral every 8 hours  ertapenem  IVPB 1000 milliGRAM(s) IV Intermittent every 24 hours  morphine  - Injectable 2 milliGRAM(s) IV Push every 8 hours PRN  oxyCODONE  ER Tablet 10 milliGRAM(s) Oral every 12 hours  pantoprazole  Injectable 40 milliGRAM(s) IV Push two times a day  polyethylene glycol 3350 17 Gram(s) Oral at bedtime  predniSONE   Tablet 10 milliGRAM(s) Oral daily  senna 2 Tablet(s) Oral at bedtime  tamsulosin 0.4 milliGRAM(s) Oral at bedtime            REVIEW OF SYSTEMS:  CONSTITUTIONAL:  No Fever or chills  HEENT:   No diplopia or blurred vision.  No earache, sore throat or runny nose.  CARDIOVASCULAR:  No pressure, squeezing, strangling, tightness, heaviness or aching about the chest, neck, axilla or epigastrium.  RESPIRATORY:  No cough, shortness of breath  GASTROINTESTINAL:  No nausea, vomiting or diarrhea.  GENITOURINARY:  No dysuria, frequency or urgency. No Blood in urine  MUSCULOSKELETAL:  no joint aches, no muscle pain  SKIN:  No change in skin, hair or nails.  NEUROLOGIC:  No Headaches, seizures or weakness.  PSYCHIATRIC:  No disorder of thought or mood.  ENDOCRINE:  No heat or cold intolerance  HEMATOLOGICAL:  No easy bruising or bleeding.            Physical Exam:  ICU Vital Signs Last 24 Hrs  T(C): 36.6 (27 Jul 2020 07:33), Max: 36.6 (26 Jul 2020 22:32)  T(F): 97.8 (27 Jul 2020 07:33), Max: 97.9 (26 Jul 2020 22:32)  HR: 88 (27 Jul 2020 14:15) (86 - 99)  BP: 118/88 (27 Jul 2020 14:15) (115/64 - 152/91)  BP(mean): --  ABP: --  ABP(mean): --  RR: 18 (27 Jul 2020 07:33) (18 - 19)  SpO2: 99% (27 Jul 2020 07:33) (94% - 99%)      GEN: NAD, pleasant on O2, lethargic  HEENT: normocephalic and atraumatic. EOMI. PERRL.  Anicteric   NECK: Supple.   LUNGS: Clear to auscultation.  HEART: Regular rate and rhythm without murmur.  ABDOMEN: Soft, nontender, and nondistended.  Positive bowel sounds.    : No CVA tenderness  EXTREMITIES: Without any edema.  MSK: no joint swelling  NEUROLOGIC: Cranial nerves II through XII are grossly intact. No focal deficits  PSYCHIATRIC: Appropriate affect .  SKIN: No Rash      Labs:

## 2020-07-27 NOTE — PROCEDURE NOTE - NSPROCDETAILS_GEN_ALL_CORE
ultrasound guidance/location identified, draped/prepped, sterile technique used/sterile dressing applied/sterile technique, catheter placed
secured in place/ultrasound utilization/dressing applied/flushes easily/blood seen on insertion

## 2020-07-27 NOTE — PROGRESS NOTE ADULT - SUBJECTIVE AND OBJECTIVE BOX
HPI:  An 87 yo Female patient with PMHx significant for DVT (11/2019) not AC, intermittent asthma (never intubated/no icu stays), polymyalgia rheumatica (chronic prednisone), dementia, osteoporosis with multiple vertebral compression, presents today BIBEMS due to chest pain and ZUNILDA.   Patient lethargic, unable to obtained detail history, most of information obtained from chart review. As per EMS documentation patient reported chest pain and SOB after having a family argument. Of note patient was dc today from rehab.   She was recently admitted at [facility] on June 2020, due to GI bleed found to have multiple duodenal ulcers.   At the ED was found to be hyperglycemic (636) 8 units of regular insulin; blood glucose dropped to 66, dextrose was given.  Also noted to have a fever Tmax 102 rectally and a + UA.     Attempted to speak to her son Partha on numerous occasions   never answers calls nor returns calls       PAST MEDICAL & SURGICAL HISTORY:  Chronic back pain  CHF (congestive heart failure)  Atrial fibrillation  Polymyalgia rheumatica  Diverticulosis  Asthma  S/P knee replacement, left  S/P hysterectomy: 1982  S/P appendectomy: 1962      ANTIMICROBIAL:  meropenem  IVPB 1000 milliGRAM(s) IV Intermittent every 8 hours    CARDIOVASCULAR:  digoxin     Tablet 0.25 milliGRAM(s) Oral daily  diltiazem    Tablet 60 milliGRAM(s) Oral every 8 hours  tamsulosin 0.4 milliGRAM(s) Oral at bedtime    PULMONARY:  budesonide 160 MICROgram(s)/formoterol 4.5 MICROgram(s) Inhaler 2 Puff(s) Inhalation two times a day    NEUROLOGIC:  acetaminophen   Tablet .. 650 milliGRAM(s) Oral every 4 hours PRN  ALPRAZolam 0.25 milliGRAM(s) Oral every 8 hours PRN  morphine  - Injectable 2 milliGRAM(s) IV Push every 8 hours PRN  morphine  - Injectable 3 milliGRAM(s) IV Push daily  oxyCODONE  ER Tablet 10 milliGRAM(s) Oral every 12 hours        GATROINTESTINAL:  pantoprazole  Injectable 40 milliGRAM(s) IV Push two times a day  polyethylene glycol 3350 17 Gram(s) Oral at bedtime  senna 2 Tablet(s) Oral at bedtime        ENDO/METABOLIC:  predniSONE   Tablet 10 milliGRAM(s) Oral daily    IV FLUID/NUTRITION:  multiple electrolytes Injection Type 1 1000 milliLiter(s) IV Continuous <Continuous>    TOPICAL:  collagenase Ointment 1 Application(s) Topical daily            Allergies    dust (Unknown)  No Known Drug Allergies            SOCIAL HISTORY:    FAMILY HISTORY:  Family history of stroke    Vital Signs Last 24 Hrs  T(C): 36.6 (27 Jul 2020 07:33), Max: 36.6 (26 Jul 2020 22:32)  T(F): 97.8 (27 Jul 2020 07:33), Max: 97.9 (26 Jul 2020 22:32)  HR: 89 (27 Jul 2020 07:33) (86 - 99)  BP: 134/84 (27 Jul 2020 07:33) (115/64 - 152/91)  BP(mean): --  RR: 18 (27 Jul 2020 07:33) (18 - 19)  SpO2: 99% (27 Jul 2020 07:33) (94% - 99%)        REVIEW OF SYSTEMS:    pt sleeping   comfortable   per RN no po intake this am but took her meds last night           PHYSICAL EXAM:    GENERAL: NAD, well-groomed, well-developed  HEAD:  Atraumatic, Normocephalic  NECK: Supple, No JVD, Normal thyroid  NERVOUS SYSTEM:  non arousable in the mornings    CHEST/LUNG: Clear   HEART: Regular   ABDOMEN: Soft, Nontender, Nondistended; Bowel sounds present  EXTREMITIES:  upper ext edema  MS: PMR with severe pains on movement       LABS:                      RADIOLOGY & ADDITIONAL STUDIES:

## 2020-07-28 LAB
CULTURE RESULTS: SIGNIFICANT CHANGE UP
ORGANISM # SPEC MICROSCOPIC CNT: SIGNIFICANT CHANGE UP
SPECIMEN SOURCE: SIGNIFICANT CHANGE UP

## 2020-07-29 NOTE — CDI QUERY NOTE - NSCDIOTHERTXTBX_GEN_ALL_CORE_HH
The patient received a nutrition assessment by a Registered Dietician on (insert date).  The documentation of this assessment states: Severe Protein Calorie Malnutrition    Findings as based on:  •  Comprehensive nutrition assessment and consultation  •  Calorie counts (nutrient intake analysis)  •  Food acceptance and intake status from observations by staff  •  Follow up  •  Patient education  •  Intervention secondary to interdisciplinary rounds  •   concerns      Treatment:    The following diet has been recommended:    Ensure TID      Please specify the clinical significance of these findings based on your clinical judgement such as:    -	Severe Protein Calorie Malnutrition  -	Other (please specify)  -	Clinically unable to be determined      Please document your response in your progress notes and/or discharge summary as appropriate.

## 2020-10-23 NOTE — ED ADULT TRIAGE NOTE - ISOLATION TYPE:
[de-identified] : This is a 31 year old woman pregnant at 31 weeks.  Patient presetns for the evaluation of a thrombocytopenia.  On September 14th at  ~26 weeks platelet count was 121 Baseline in the 180's from [id number].  No medications feels well, was only on a prenatal vitamin. Patient feels well and has no complaitns.   None

## 2020-11-19 NOTE — ED PROVIDER NOTE - MUSCULOSKELETAL MINIMAL EXAM
Please call pt and tell her I ordered the 3 tests when she was here for appointment and she was supposed to get bw before she left clinic- she can still come in and go to lab and have blood drawn neck supple

## 2020-12-23 PROBLEM — Z87.440 HISTORY OF URINARY TRACT INFECTION: Status: RESOLVED | Noted: 2020-05-29 | Resolved: 2020-12-23

## 2021-06-09 NOTE — ED PROVIDER NOTE - PSH
Universal Safety Interventions
S/P appendectomy  1962  S/P hysterectomy  1982  S/P knee replacement, left

## 2021-06-23 NOTE — CONSULT NOTE ADULT - REASON FOR ADMISSION
Weekly Progress Note  Micheal Ronquillo  1985  [id number]      D) Pt. attended 2 groups this week with 0 absences. Pt. attended 0 individual sessions this week. A) Staff facilitated groups and reviewed tx progress. Assessed for [facility]. R) No VAP needed at this time.    Any significant events, defines as events that impact patient s relationship with others inside and outside of treatment: No.   Indicate any changes or monitoring of physical or mental health problems: No.    Indicate involvement by any outside supports: No   IAPP reviewed and modified as needed: N/A       Pt. working on the following dimensions:    Dimension #1 - Withdrawal Potential - Risk 0.     Specific goals from treatment plan addressed this week:  No goals needed, risk level 0.  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    Dimension #2 - Biomedical - Risk 0.     Specific goals from treatment plan addressed this week:  No goals needed, risk level 0.  Effectiveness of strategies:  No reported change in medical/health status.    Dimension #3 - Emotional/Behavioral/Cognitive - Risk 2.     Specific goals from treatment plan addressed this week:  Process difficult moods in a healthy way.   Obtain medical insurance coverage.  Effectiveness of strategies:  No medical coverage in place. Mood appears healthy and positive. Pt. Reports a positive mood this week. Pt. Processed in a group discussion his perspectives on acceptance and selfishness. He gained insight into his need to accept life  on lifes terms.    Dimension #4 - Treatment Acceptance/Resistance - Risk 2.    Specific goals from treatment plan addressed this week:  Meet the expectation of probation to complete treatment.  Effectiveness of strategies:  Pt. Attended group 2x this week, and is now in phase two of the program. Pt. Reports continued transportation barriers that may cause him to arrive late to group.     Dimension #5 - Relapse Potential - Risk 2.     Specific goals 
from treatment plan addressed this week:  Process past use patterns to build insight.   Build knowledge and understanding of relapse prevention skills.   Effectiveness of strategies:  Pt. Reports maintaining his sobriety this week. Pt. Reports no experience of urges or triggers to use this week. Pt. Discussed in group this week his awareness of how an environment healthy or unhealthy can impact his recovery.    Dimension #6 - Recovery Environment - Risk 2.     Specific goals from treatment plan addressed this week:  Strengthen recovery support.   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 Pt. Reports he continues attending his son's basketball games and enjoys being able to encourage him. Pt. Reports over the weekend celebrating his oldest son's birthday with his son's mother and the rest of her family. Pt. Shared how he was able to remain neutral through the celebration and avoid conflict.    T) Treatment plan updated: No.  Patient notified and in agreement: N/A.  Patient educated on: Community Support. Patient has completed 38 of 84 program hours at this time. Projected discharge date is: TBD. Current discharge plan is: PENDING.       Staff Name and Title:   KIMBERLY Mason, [facility]  Date: 1/31/2019  Time: 8:48 PM    Psycho-Educational Curriculum  Educational Videos  Date (s) Attended    Nature of Addiction         1/7/19,  1/8/19,     Co-Occurring Disorders          1/14/19,  1/15/19,  1/16/19,  1/17/19,   Distorted Thinking         1/21/19,  1/22/19,  1/23/19,     Community Support           1/28/19,  1/29/19,     Communication Skills             Unmanageable Feelings             Relapse Prevention             Relationships          12/10/18,  12/11/18,     Distorted Self Image         12/20/18,   Recovery Maintenance          12/26/18,  12/27/18,  1/2/19,  1/3/19,                                                   Mandatory Education:       HIV/AIDS                             
DVT
DVT

## 2021-08-19 NOTE — ED ADULT TRIAGE NOTE - NS ED TRIAGE AVPU SCALE
Airway  Performed by: Nicanor Gatica MD  Authorized by: Nicanor Gatica MD     Final Airway Type:  Endotracheal airway  Final Endotracheal Airway*:  ETT  ETT Size (mm)*:  7.5  Cuff*:  Regular  Technique Used for Successful ETT Placement:  Direct laryngoscopy  Devices/Methods Used in Placement*:  Mask  Intubation Procedure*:  Preoxygenation, ETCO2, Atraumatic, Dentition Unchanged, Pharynx Clear and Cricoid Pressure  Insertion Site:  Oral  Blade Type*:  MAC  Blade Size*:  3  Measured from*:  Lips  Secured at (cm)*:  22  Placement Verified by: auscultation, capnometry and equal breath sounds    Glottic View*:  2 - partial view of glottis  Attempts*:  1   Patient Identified, Procedure confirmed, Emergency equipment available and Safety protocols followed  Location:  OR  Urgency:  Elective  Difficult Airway: No    Indications for Airway Management:  Anesthesia  Mask Difficulty Assessment:  1 - vent by mask  Performed By:  Anesthesiologist  Anesthesiologist:  Nicanor Gatica MD   Pt placed on a foam ramp. EZM and DL. Posterior laryngeal inlet seen with CP.         Alert-The patient is alert, awake and responds to voice. The patient is oriented to time, place, and person. The triage nurse is able to obtain subjective information.

## 2021-08-23 NOTE — ED ADULT NURSE NOTE - CHIEF COMPLAINT
HPI:   Zoie Ellison is a 39year old [id number] who presents for an annual gynecological exam.   Menses: No LMP recorded (lmp unknown). Patient is postmenopausal. Having hot flashes and night and difficulty sleeping.      Current Outpatient Medications   Medi thyromegaly  BREASTS: Normal inspection, no dominant masses on palpation, no lymphadenopathy  CV: Regular rate and rhythm  LUNGS: Clear to auscultation bilaterally, good air entry throughout  ABD: Soft, nontender and not distended, no masses, no hernia  EX ordered. Start screening colonoscopy at age 48. Contraception: menopausal menstrual period was 2 years ago  Cholesterol and screening blood work current, ordered by primary MD.  Meenu Cody 1 year or PRN.   Diagnoses and all orders for this visit:    Screening fo The patient is a 88y Female complaining of chest pain.

## 2021-09-27 NOTE — ED ADULT NURSE NOTE - AS SC [facility]
Victor Manuel Mcallister [phone]  Pedro [phone]  Mary Jo [phone]    I'm uncertain if these spellings are correct i'm having trouble reading the message    (2) very limited

## 2021-10-11 NOTE — PHYSICAL THERAPY INITIAL EVALUATION ADULT - WEIGHT-BEARING RESTRICTIONS: GAIT, REHAB EVAL
MEDICARE WELLNESS VISIT NOTE    HISTORY OF PRESENT ILLNESS:   Ines Chapa presents for her Subsequent Annual Medicare Wellness Visit.   She has no current complaints or concerns.      Patient Care Team:  Chris Lopez MD as PCP - General (Internal Medicine)        Patient Active Problem List   Diagnosis   • Osteoporosis   • Senile atrophic vaginitis   • Shingles   • S/P left cataract extraction   • Stroke (CMS/HCC)   • Epistaxis   • Bilateral sensorineural hearing loss   • Chronic anxiety   • Swelling of upper lip   • CKD (chronic kidney disease) stage 2, GFR 60-89 ml/min         Past Medical History:   Diagnosis Date   • Atrophic vaginitis    • Bilateral sensorineural hearing loss 2014   • Cellulitis of face 03   • Disorder of bone and cartilage, unspecified 2006   • Epistaxis 2012   • Hearing loss    • HTN (hypertension) 2013   • Osteoporosis    • Pneumonia 05   • Shingles    • Stroke (CMS/HCC) 2012         Past Surgical History:   Procedure Laterality Date   • Cataract extraction w/ intraocular lens implant Right    • Colonoscopy diagnostic  1999   • Dexa bone density axial skeleton  2006         Social History     Tobacco Use   • Smoking status: Former Smoker     Types: Cigarettes     Quit date: 1990     Years since quittin.7   • Smokeless tobacco: Never Used   Substance Use Topics   • Alcohol use: Yes     Alcohol/week: 7.0 standard drinks     Types: 7 Glasses of wine per week     Comment: 1 glass of wine a day   • Drug use: No     Drug use:    Drug Use:    No              Family History   Problem Relation Age of Onset   • Congestive Heart Failure Father          age 84   • Other Mother          age 104   • Cancer, Breast Sister          age 88   • Other Sister         living age 94   • Other Sister          age 92       Current Outpatient Medications   Medication Sig Dispense Refill   • LORazepam (ATIVAN) 0.5 MG tablet Take 1  tablet by mouth every 12 hours as needed for Anxiety. 60 tablet 3   • risperiDONE (RisperDAL) 0.25 MG tablet Take 1 tablet by mouth nightly. 30 tablet 0   • terbinafine (LamISIL) 250 MG tablet Take 1 tablet by mouth daily. 30 tablet 2   • ciprofloxacin (CIPRO) 250 MG tablet Take 1 tablet by mouth 2 times daily. 6 tablet 3   • cholecalciferol (VITAMIN D3) 1000 UNITS tablet Take 1 tablet by mouth daily. 30 tablet 5   • acetaminophen (TYLENOL) 325 MG tablet Take 2 tablets by mouth every 6 hours as needed for Pain. 30 tablet 0   • CALCIUM CARBONATE PO Take 1 tablet by mouth daily.     • aspirin 81 MG tablet Take 81 mg by mouth daily.     • multivitamin (THERAGRAN) per tablet Take 1 tablet by mouth daily.       No current facility-administered medications for this visit.        The following items on the Medicare Health Risk Assessment were found to be positive  11i.) Problems using the telephone: Sometimes         Vision and Hearing screens:    Hearing Screening    125Hz 250Hz 500Hz 1000Hz 2000Hz 3000Hz 4000Hz 6000Hz 8000Hz   Right ear:            Left ear:            Comments: Is wearing her hearing aids and can not detect bilateral light finger rub.    Vision Screening Comments: Undergoes a yearly eye exam.  No acute vision concerns.    Advance Directive:   The patient has the following documents:  No Advance Directives on file. Patient offered documents.    Cognitive/Functional Status: no evidence of cognitive dysfunction by direct observation    Opioid Review: Ines is not taking opioid medications.    Recent PHQ 2/9 Score:    PHQ 2:  Date Adult PHQ 2 Score Adult PHQ 2 Interpretation   9/24/2020 0 No further screening needed     OBJECTIVE:  Vital signs:  [facility] Extended Vitals - Weight in Kg/Lb 10/11/2021   /86   Pulse 98   Resp 14   Temp 95.9   Weight kg 47.265 kg   Weight lb 104 lb 3.2 oz   Height 5' 1\"   Height cm 154.9 cm   BMI 19.69   Pulse Ox 95   Patient Position Sitting   General:  Initially somewhat  anxious.  Otherwise alert and oriented to person, place and time.  Gives good eye contact and interacts well with the examiner.  Cardiovascular:  Heart exam reveals a regular rate and rhythm with a normal S1 and S2.  There are no murmurs or gallops.  She has no lower extremity edema.  There were no carotid bruits.  Respiratory:  Breath sounds clear to auscultation bilaterally.    DEPRESSION ASSESSMENT/PLAN:  Depression screening is negative no further plan needed.   BMI ASSESSMENT/PLAN: Patient BMI is within normal range. Body mass index is 19.69 kg/m².  ELEVATED BLOOD PRESSURE WITHOUT DIAGNOSIS OF HYPERTENSION:  This patient has a strong component of white coat hypertension and does have an underlying component of anxiety.  No treatment for her elevated blood pressure at this time.  Will continue to monitor.  CMP obtained today to follow-up on her renal function which was formally classified as CKD stage 2.  HEALTH MAINTENANCE:  High-dose influenza vaccination to be given today.    Return in about 1 year (around 10/11/2022) for Medicare Wellness Visit.  Problem focused visit with me in about 6 months.    Chris Lopez MD  10/11/2021         weight-bearing as tolerated

## 2021-12-07 NOTE — ED PROVIDER NOTE - CPE EDP RESP NORM
The patient has been examined and the H&P has been reviewed:    I concur with the findings and no changes have occurred since H&P was written.    Surgery risks, benefits and alternative options discussed and understood by patient/family.          Active Hospital Problems    Diagnosis  POA    *Primary osteoarthritis of both knees [M17.0]  Yes     Chronic      Resolved Hospital Problems   No resolved problems to display.        normal...

## 2022-03-25 NOTE — ED ADULT NURSE NOTE - PMH
Discharge Summary - Nirmala Deleon  66 y o  male MRN: [id number]    Unit/Bed#: -73 Encounter: [id number]      Admission Date: 3/25/2022   Discharge Date:  3/26/2022    Discharge Diagnosis:   1  Early persistent atrial fibrillation s/p cryoablation of atrial fibrillation with posterior wall isolation with this along with re-isolation of LSPV and LIPV to make wider antral lesion set  2  Hx cryo PVI (2021)  3  Hx tikosyn use   4  Mod AR/NV  5  Hx ascending aortic aneurysm   6  Hx right popliteal micotic aneurysm   7  Hx group C strep bacteremia     Procedures Performed:     Orders Placed This Encounter   Procedures    Cardiac ep lab eps/ablations       Consultants: none     HPI: Please refer to the initial history and physical as well as procedure notes for full details  Briefly, Nirmala Deleon  is a 66y o  year old male with history as above    He was seen by Ned Chowdhury PA-C as an outpatient, and repeat ablation was recommended  He presented this hospital admission to undergo this procedure  Briefly, this is a patient who has been concerned with TINSLEY with limited exertion such as climbing the stairs  Usually stairs are not limiting for him  This TINSLEY symptom is usually how he feels in atrial fibrillation  His home BP cuff does have an AF monitor within it which did report him to be back in AF when checking at home  He sought further evaluation at the EP office where ECG's confirmed atrial fibrillation  We discussed multiple treatment options such as resuming tikosyn since had tolerated this in the past but his goals are to be on as little medications as possible  Thus, a second afib ablation with posterior wall isolation was recommended  Hospital Course: Nirmala Deleon  presented at his baseline state of health  After the procedure was explained in detail and consent was obtained, he underwent ablation of atrial fibrillation without complications  He tolerated the procedure well  He was then monitored overnight for further observation  There were no acute issues or events overnight  The following morning he denied all cardiac complaints, including chest pain/pressure, dyspnea, palpitations, dizziness, lightheadedness, or syncope  His vital signs were reviewed and labs are stable  Telemetry showed sinus rhythm in the 60's and 70's  His groins were soft without significant hematoma or recurrent bleeding  Physical exam on the day of discharge was as follows:  GEN: NAD, alert and oriented, well appearing  SKIN: dry without significant lesions or rashes  HEENT: NCAT, PERRL, EOMs intact  NECK: No JVD or carotid bruits appreciated  CARDIOVASCULAR: RRR, normal S1, S2 without murmurs, rubs, or gallops appreciated  LUNGS: Clear to auscultation bilaterally without wheezes, rhonchi, or rales  ABDOMEN: Soft, nontender, nondistended  EXTREMITIES/VASCULAR: perfused without clubbing, cyanosis, or edema b/l  PSYCH: Normal mood and affect  NEURO: CN ll-Xll grossly intact    He was given routine post ablation discharge instructions and restrictions, and these were explained in detail  He was given a follow up appointment with Roman Kelley PA-C, and he was instructed to follow up with his primary cardiologist as previously instructed  In terms of his medications,   1  Only new med addition is protonix 40mg once daily     He is stable for discharge at this time with all questions answered  He was discussed in detail with Dr Page Nova who is in agreement with this discharge summary  Discharge Medications:  See after visit summary for reconciled discharge medications provided to patient and family      Medications Prior to Admission   Medication    apixaban (ELIQUIS) 5 mg    ascorbic acid (VITAMIN C) 500 mg tablet    Coenzyme Q10 200 MG capsule    COLLAGEN PO    Flaxseed, Linseed, (FLAXSEED OIL) 1000 MG CAPS    Green Tea 250 MG CAPS    Liver Extract 500 MG CAPS    Magnesium Citrate 125 MG CAPS    metoprolol tartrate (LOPRESSOR) 25 mg tablet    Pancreatin 325 MG TABS    Probiotic Product (PROBIOTIC-10 PO)    Vitamin D, Cholecalciferol, 1000 units CAPS         Pertininet Labs/diagnostics:  CBC with diff:   Results from last 7 days   Lab Units 03/25/22  1201 03/22/22  1127   WBC Thousand/uL 5 99 7 05   HEMOGLOBIN g/dL 16 1 15 4   HEMATOCRIT % 49 2 47 3   MCV fL 94 94   PLATELETS Thousands/uL 159 168   MCH pg 30 9 30 6   MCHC g/dL 32 7 32 6   RDW % 15 6* 15 4*   MPV fL 10 6 10 8   NRBC AUTO /100 WBCs 0 0       BMP:  Results from last 7 days   Lab Units 03/25/22  1201 03/22/22  1127   POTASSIUM mmol/L 4 7 4 1   CHLORIDE mmol/L 112* 111*   CO2 mmol/L 30 28   BUN mg/dL 19 21   CREATININE mg/dL 0 79 0 82   CALCIUM mg/dL 9 5 9 4       Magnesium:       Coags:   Results from last 7 days   Lab Units 03/25/22  1201   PTT seconds 29   INR  0 17*         Complications: none    Condition at Discharge: good     Discharge instructions/Information to patient and family:   See after visit summary for information provided to patient and family  Provisions for Follow-Up Care:  See after visit summary for information related to follow-up care and any pertinent home health orders  Disposition: Home    Planned Readmission: No    Discharge Statement   I spent 45 minutes minutes discharging the patient  This time was spent on the day of discharge  I had direct contact with the patient on the day of discharge  Additional documentation is required if more than 30 minutes were spent on discharge   Evaluating the incision, discussing discharge instructions and restrictions, arranging follow up appointments, discussing medications Asthma    Diverticulosis

## 2022-06-14 NOTE — PROGRESS NOTE ADULT - PROBLEM SELECTOR PROBLEM 5
Echocardiogram completed, paperwork received and documentation given to Dr Jaymie Morgan for interpretation.
DVT, lower extremity
DVT, lower extremity

## 2022-07-05 NOTE — PHYSICAL THERAPY INITIAL EVALUATION ADULT - LEVEL OF INDEPENDENCE: SIT/SUPINE, REHAB EVAL
Left  for OS appointment on 07/06 and onsite appointment screening.   
moderate assist (50% patients effort)

## 2022-10-28 NOTE — ED ADULT TRIAGE NOTE - CHIEF COMPLAINT QUOTE
Donte presents today for her OB visit.had bpp today    Patient would like communication of their results via:     Cell Phone:   Telephone Information:   Mobile [phone]     Okay to leave a message containing results? Yes    Patient's current [facility] status: Active.     Chaperone needed:  No    Baby Scripts - Patient is on [employer] Scheduling.        Vaccine Information Statement(s) or the Emergency Use Authorization was given today. This has been reviewed, questions answered, and verbal consent given by Patient for injection(s) and administration of Tetanus/Diphtheria/Pertussis (Tdap).        Patient tolerated without incident. See immunization grid for documentation.     Patient alert and confused worsening from baseline with hx of dementia per son. coming from home ems states sob with generalized weakness time s 1 day. 98% on RA at home with respirations 35 per minute. on eliquis for hx of blood clots.

## 2022-12-05 NOTE — INPATIENT CERTIFICATION FOR MEDICARE PATIENTS - NS ICMP CERT SIG IND
I certify as stated above. Clear bilaterally, pupils equal, round and reactive to light. Aklief counseling:  Patient advised to apply a pea-sized amount only at bedtime and wait 30 minutes after washing their face before applying.  If too drying, patient may add a non-comedogenic moisturizer.  The most commonly reported side effects including irritation, redness, scaling, dryness, stinging, burning, itching, and increased risk of sunburn.  The patient verbalized understanding of the proper use and possible adverse effects of retinoids.  All of the patient's questions and concerns were addressed.

## 2023-04-24 NOTE — PROGRESS NOTE ADULT - PROBLEM/PLAN-4
[x] [facility]  [address]  [place]: [phone]   F: [phone]    Physical Therapy Daily Treatment Note    Date:  2023  Patient Name:  Perry Crespo    :  1960  MRN: [id number]  Physician: Charito Dickson MD           Insurance: [facility]. Auth required after 30 visits. Iontophoresis and e-stim not covered  Medical Diagnosis: M25.561 (ICD-10-CM) - Right knee pain, unspecified chronicity          Rehab Codes: M25.561, R53.1, R26.9  Onset Date: DOS 22                     Next 's appt: PRN  Visit# / total visits: 15/20   Cancels/No Shows:     Subjective: States her injection Monday only lasted 4 days- has a call into ortho office due to elevated pain levels. Denies increased pain with aquatics. States primarily pain is medial and posterior with R knee    Pain:  [x] Yes  [] No Location: (L) > (R)Hip/buttock; R medal knee down medial proximal tibia, posterior knee    Pain Rating: (0-10 scale) 8-10/10  Pain altered Tx:  [x] No  [] Yes  Action:      Objective:   [address] [facility] Exercise Log  Aquatic,  Program- Phase 1    Date of Eval:   23                             Primary PT:  Ronel Vallejo, PT      Date 23    Visit # 12/12 13/20 14/20 15/20    Walk F/L/R 2 Laps 2 Laps 2 Laps 2 Laps    Marching 10x Add Lap 2 Laps 2 Laps    Squats 10x3\" 15x 15x 15x3\"    Step-Ups F/L Low 10x F Tall 15x Tall 15x Tall 15x    Step Down F/L        Heel-toe raises 10x 15x 15x 15x    SLR F/L/R 10x 15x 15x 15x Steamboats   HS curl 10x 15x 15x 15x    Hip/Knee Flex/Ext 10x 15x 15x 15x    F/L Lunges     Add           Kickboard Ex.  Small Small Small Small    Iso Abd. 10x5\" 10x5\" 10x5\" 10x5\"    Push-pull  10x 10x 10x    Paddling                UE Format:        Horiz Abd/Add        IR/ER (wipers)        Alt Flex/Ext        Alt Press Down        Abd/Add                Deep Water: 1 Noodle 1 Noodle 1 DISPLAY PLAN FREE TEXT

## 2023-12-28 NOTE — PROGRESS NOTE ADULT - SUBJECTIVE AND OBJECTIVE BOX
Refill passed per [facility] protocol.     Requested Prescriptions   Pending Prescriptions Disp Refills    metoprolol succinate ER 50 MG Oral Tablet 24 Hr 30 tablet 11     Sig: Take 1 tablet (50 mg total) by mouth daily.       Hypertensive Medications Protocol Passed - 12/27/2023  1:11 PM        Passed - In person appointment in the past 12 or next 3 months     Recent Outpatient Visits              1 week ago Routine physical examination    [facility], [address], [place] Govind Beard, DO    Office Visit    6 months ago COVID-19    [facility] Lokesh Salas MD    Telemedicine    1 year ago Type 2 diabetes mellitus without complication, with long-term current use of insulin (HCC)    [facility], [address], [place] Govind Beard, DO    Office Visit    1 year ago Hyperglycemia    [facility], [address], [place] Govind Beard, DO    Office Visit    1 year ago Postpartum state    [facility] - OB/GYN Edgardo Baumann MD    Postpartum          Future Appointments         Provider Department Appt Notes    In 2 weeks Govind Beard, DO [facility], [address], [place] 3 week follow up               Passed - Last BP reading less than 140/90     BP Readings from Last 1 Encounters:   12/21/23 120/70               Passed - CMP or BMP in past 6 months     Recent Results (from the past 4392 hour(s))   Comp Metabolic Panel (14)    Collection Time: 12/16/23  9:51 AM   Result Value Ref Range    Glucose 191 (H) 70 - 99 mg/dL    Sodium 137 136 - 145 mmol/L    Potassium 4.0 3.5 - 5.1 mmol/L    Chloride 103 98 - 112 mmol/L    CO2 27.0 21.0 - 32.0 mmol/L    Anion Gap 7 0 - 18 mmol/L    BUN 11 9 - 23 mg/dL    Creatinine 0.79 0.55 - 1.02 mg/dL    BUN/CREA Ratio 13.9 10.0 - 20.0    Calcium, Total 9.2 8.7 - 10.4 mg/dL    Calculated Osmolality 289 275 - 295  HPI:  An 87 yo Female patient with PMHx significant for DVT (11/2019) not AC, intermittent asthma (never intubated/no icu stays), polymyalgia rheumatica (chronic prednisone), dementia, osteoporosis with multiple vertebral compression, presents today BIBEMS due to chest pain and ZUNILDA.   Patient lethargic, unable to obtained detail history, most of information obtained from chart review. As per EMS documentation patient reported chest pain and SOB after having a family argument. Of note patient was dc today from rehab.   She was recently admitted at [facility] on June 2020, due to GI bleed found to have multiple duodenal ulcers.   At the ED was found to be hyperglycemic ([phone] 8 units of regular insulin; blood glucose dropped to 66, dextrose was given.  Also noted to have a fever Tmax 102 rectally and a + UA.     Attempted to speak to her Partha, but did not answer his phone (04 Jul 2020 22:50)      PAST MEDICAL & SURGICAL HISTORY:  Chronic back pain  CHF (congestive heart failure)  Atrial fibrillation  Polymyalgia rheumatica  Diverticulosis  Asthma  S/P knee replacement, left  S/P hysterectomy: 1982  S/P appendectomy: 1962          PULMONARY:  budesonide 160 MICROgram(s)/formoterol 4.5 MICROgram(s) Inhaler 2 Puff(s) Inhalation two times a day    Cardio:   dig  Cardizem     GATROINTESTINAL:  pantoprazole  Injectable 40 milliGRAM(s) IV Push two times a day     MEDS:    ENDO/METABOLIC:  dextrose 40% Gel 15 Gram(s) Oral once PRN  dextrose 50% Injectable 12.5 Gram(s) IV Push once  dextrose 50% Injectable 25 Gram(s) IV Push once  dextrose 50% Injectable 25 Gram(s) IV Push once  glucagon  Injectable 1 milliGRAM(s) IntraMuscular once PRN    IV FLUID/NUTRITION:  plasmalyte IV              Allergies    dust (Unknown)  No Known Drug Allergies    Intolerances    provide [facility] soft diet (Unknown)      SOCIAL HISTORY:    FAMILY HISTORY:          Vital Signs Last 24 Hrs  T(C): 36.7 (20 Jul 2020 22:00), Max: 36.9 (20 Jul 2020 15:00)  T(F): 98 (20 Jul 2020 22:00), Max: 98.5 (20 Jul 2020 15:00)  HR: 95 (21 Jul 2020 05:00) (89 - 95)  BP: 150/91 (21 Jul 2020 05:00) (128/68 - 150/91)  BP(mean): --  RR: 17 (20 Jul 2020 22:00) (17 - 19)  SpO2: 97% (20 Jul 2020 22:00) (93% - 97%)    	          REVIEW OF SYSTEMS    General: moaning at times 	  Respiratory: no complaints   Cardiovascular:	no complaints   Gastrointestinal:	no complaints   Genitourinary:	no complaints   Musculoskeletal:	 c/o back pain            PHYSICAL EXAM:    GENERAL: NAD, well-groomed, well-developed  HEAD:  Atraumatic, Normocephalic  NERVOUS SYSTEM:  sound asleep    CHEST/LUNG: Clear to percussion bilaterally; No rales, rhonchi, wheezing, or rubs  HEART: regular - rate controlled   ABDOMEN: Soft, Nontender, Nondistended; Bowel sounds present  EXTREMITIES:  2+ Peripheral Pulses, cold to touch         LABS:                                                          urine culture :   positive with E coli   Blood cul x 2 negative x 48 hrs       RADIOLOGY & ADDITIONAL STUDIES: mOsm/kg    eGFR-Cr 96 >=60 mL/min/1.73m2    ALT 30 10 - 49 U/L    AST 20 <=34 U/L    Alkaline Phosphatase 87 37 - 98 U/L    Bilirubin, Total 0.9 0.3 - 1.2 mg/dL    Total Protein 7.4 5.7 - 8.2 g/dL    Albumin 4.3 3.2 - 4.8 g/dL    Globulin  3.1 2.8 - 4.4 g/dL    A/G Ratio 1.4 1.0 - 2.0    Patient Fasting for CMP? Yes      *Note: Due to a large number of results and/or encounters for the requested time period, some results have not been displayed. A complete set of results can be found in Results Review.               Passed - In person appointment or virtual visit in the past 6 months     Recent Outpatient Visits              1 week ago Routine physical examination    [facility], [address], [place] Govind Beard, DO    Office Visit    6 months ago [address]    Telemedicine    1 year ago Type 2 diabetes mellitus without complication, with long-term current use of insulin ([facility])    [facility], [address], [place] Govind Beard, DO    Office Visit    1 year ago Hyperglycemia    [facility], [address], [place] Govind Beard, DO    Office Visit    1 year ago Postpartum state    [facility] - OB/GYN Edgardo Baumann MD    Postpartum          Future Appointments         Provider Department Appt Notes    In 2 weeks Govind Beard, DO [facility], [address], [place] 3 week follow up               Passed - EGFRCR or GFRAA > 50     GFR Evaluation  EGFRCR: 96 , resulted on 12/16/2023                Recent Outpatient Visits              1 week ago Routine physical examination    [facility], [address], [place] Govind Beard, DO    Office Visit    6 months ago [address]    Telemedicine    1 year ago Type 2 diabetes  mellitus without complication, with long-term current use of insulin (HCC)    [facility], [address], [place] Govind Beard, DO    Office Visit    1 year ago Hyperglycemia    [facility], [address], [place] Govind Beard, DO    Office Visit    1 year ago Postpartum state    [facility] - OB/GYN Edgardo Baumann MD    Postpartum             Future Appointments         Provider Department Appt Notes    In 2 weeks Govind Beard, DO [facility], [address], [place] 3 week follow up

## 2024-01-11 NOTE — ED PROVIDER NOTE - PROGRESS NOTE DETAILS
Henry: Called to bedside as patient went into atrial fibrillation in the 130s-140s.  Will give cardizem and continue to monitor. No

## 2024-04-09 NOTE — H&P ADULT - NSHPSOCIALHISTORY_GEN_ALL_CORE
"Recommendations from today's MTM visit:                                                    MTM (medication therapy management) is a service provided by a clinical pharmacist designed to help you get the most of out of your medicines.   Today we reviewed what your medicines are for, how to know if they are working, that your medicines are safe and how to make your medicine regimen as easy as possible.      Go down to the Fluoxetine 20 mg dose and take for one week and stop.   Start taking Duloxetine 30 mg daily after one week of 20 mg.   Continue to monitor your mood and how you feel while tapering off the fluoxetine and starting the duloxetine. If your mood becomes worse or have thoughts of suicide please let Dr. Jimenez know right away.      Follow-up: [place] Message After speaking with Dr. Jimenez.       It was great speaking with you today.  I value your experience and would be very thankful for your time in providing feedback in our clinic survey. In the next few days, you may receive an email or text message from [employer] with a link to a survey related to your  clinical pharmacist.\"     To schedule another MTM appointment, please call the clinic directly or you may call the MTM scheduling line at [phone] or toll-free at [phone].     My Clinical Pharmacist's contact information:                                                      Please feel free to contact me with any questions or concerns you have.      Margaret Delarosa, PharmD  Medication Therapy Management Resident, [facility]  Pager: [phone]  Email: [email or web address]       " Lives alone - has friend live next door  Denies any smoking, alcohol or drug use

## 2025-05-20 NOTE — DISCHARGE NOTE PROVIDER - NSDCCONDITION_GEN_ALL_CORE
Stable Pt coming in for lower back pain after doing heavy lifting last week. Denies any falls. Pain medication given.

## 2025-07-03 NOTE — PHYSICAL THERAPY INITIAL EVALUATION ADULT - ORIENTATION, REHAB EVAL
Post-injection instructions FOR Radiofrequency Ablation    Your radiofrequency ablation was completed today is not unusual to have some exacerbation of the pain before you get better.  Radiofrequency ablation takes an average of 2 to 3 weeks before it completely starts showing full results      Activity:  RETURN TO NORMAL ACTIVITY once the sedation is completely worn off do not sign any legal document for the first 24 hours do not drive or operate machinery for the first 24 hours until the sedation effect is completely worn off    Bandages: Remove after 24 hours     Showering/Bathing: You may shower after bandage is removed     May use ice at the site of the injection for the first 24 hours      Call the OFFICE immediately: if you notice:     Excessive bleeding from procedure site (brisk bright red bleeding from the site or bleeding that soaks the bandages or does not stop)   Severe headache  Inability to walk, leg or arm weakness or numbness that is worse after the procedure   Uncontrolled pain   New urinary or fecal incontinence   Signs of infection: Fever above 101.5F, redness, swelling, pus or drainage from the site    Please contact the pain clinic at [phone]  in 3 weeks to report results or with any further questions or concerns      oriented to person, place, time and situation

## 2025-07-14 NOTE — ED ADULT TRIAGE NOTE - HEIGHT IN CM
Continue same medications and treatments.   Patient educated on proper medication use.   Patient educated on risk factor modification.   Please bring any lab results from other providers / physicians to your next appointment.     Please bring all medicines, vitamins, and herbal supplements with you when you come to the office.     Prescriptions will not be filled unless you are compliant with your follow up appointments or have a follow up appointment scheduled as per instruction of your physician. Refills should be requested at the time of your visit.    DR DANY ZAZUETA -[phone], CLEARANCE PENDING TEST    ECHO WITH BUBBLE STUDY STAT    FOLLOW UP AFTER TESTING    I, Hoa Dominguez LPN, am scribing for and in the presence of Dr. Jefferson Peace, DO, FACC      
147.32